# Patient Record
Sex: MALE | Race: BLACK OR AFRICAN AMERICAN | NOT HISPANIC OR LATINO | Employment: PART TIME | URBAN - METROPOLITAN AREA
[De-identification: names, ages, dates, MRNs, and addresses within clinical notes are randomized per-mention and may not be internally consistent; named-entity substitution may affect disease eponyms.]

---

## 2019-09-03 PROBLEM — Z72.0 TOBACCO USE: Status: ACTIVE | Noted: 2019-09-03

## 2019-09-03 PROBLEM — N17.9 AKI (ACUTE KIDNEY INJURY): Status: ACTIVE | Noted: 2019-09-03

## 2019-09-03 PROBLEM — R09.1 PLEURISY: Status: ACTIVE | Noted: 2019-09-03

## 2019-09-03 PROBLEM — E87.1 HYPONATREMIA: Status: ACTIVE | Noted: 2019-09-03

## 2019-09-03 PROBLEM — E80.6 HYPERBILIRUBINEMIA: Status: ACTIVE | Noted: 2019-09-03

## 2019-09-03 PROBLEM — J18.9 PNEUMONIA OF LEFT LOWER LOBE DUE TO INFECTIOUS ORGANISM: Status: ACTIVE | Noted: 2019-09-03

## 2019-09-04 PROBLEM — D72.829 LEUKOCYTOSIS: Status: ACTIVE | Noted: 2019-09-04

## 2019-09-05 PROBLEM — N17.9 AKI (ACUTE KIDNEY INJURY): Status: RESOLVED | Noted: 2019-09-03 | Resolved: 2019-09-05

## 2019-09-05 PROBLEM — D72.829 LEUKOCYTOSIS: Status: RESOLVED | Noted: 2019-09-04 | Resolved: 2019-09-05

## 2019-09-05 PROBLEM — E80.6 HYPERBILIRUBINEMIA: Status: RESOLVED | Noted: 2019-09-03 | Resolved: 2019-09-05

## 2019-09-05 PROBLEM — R09.1 PLEURISY: Status: RESOLVED | Noted: 2019-09-03 | Resolved: 2019-09-05

## 2019-09-05 PROBLEM — E87.1 HYPONATREMIA: Status: RESOLVED | Noted: 2019-09-03 | Resolved: 2019-09-05

## 2019-12-07 ENCOUNTER — HOSPITAL ENCOUNTER (INPATIENT)
Facility: HOSPITAL | Age: 59
LOS: 4 days | Discharge: HOME | DRG: 552 | End: 2019-12-11
Attending: EMERGENCY MEDICINE | Admitting: NEUROLOGICAL SURGERY

## 2019-12-07 DIAGNOSIS — M79.601 PARESTHESIA AND PAIN OF BOTH UPPER EXTREMITIES: ICD-10-CM

## 2019-12-07 DIAGNOSIS — M79.602 PARESTHESIA AND PAIN OF BOTH UPPER EXTREMITIES: ICD-10-CM

## 2019-12-07 DIAGNOSIS — M46.42 DISCITIS OF CERVICAL REGION: ICD-10-CM

## 2019-12-07 DIAGNOSIS — R78.81 POSITIVE BLOOD CULTURE: ICD-10-CM

## 2019-12-07 DIAGNOSIS — M48.02 CERVICAL SPINAL STENOSIS: ICD-10-CM

## 2019-12-07 DIAGNOSIS — M54.2 ACUTE NECK PAIN: ICD-10-CM

## 2019-12-07 DIAGNOSIS — R20.0 EXTREMITY NUMBNESS: ICD-10-CM

## 2019-12-07 DIAGNOSIS — R26.9 ABNORMAL GAIT: Primary | ICD-10-CM

## 2019-12-07 DIAGNOSIS — I10 ESSENTIAL HYPERTENSION: ICD-10-CM

## 2019-12-07 DIAGNOSIS — R20.2 PARESTHESIA AND PAIN OF BOTH UPPER EXTREMITIES: ICD-10-CM

## 2019-12-07 LAB
ANION GAP SERPL CALC-SCNC: 8 MMOL/L (ref 8–16)
BASOPHILS # BLD AUTO: 0.05 K/UL (ref 0–0.2)
BASOPHILS NFR BLD: 1 % (ref 0–1.9)
BUN SERPL-MCNC: 11 MG/DL (ref 6–20)
CALCIUM SERPL-MCNC: 8 MG/DL (ref 8.7–10.5)
CHLORIDE SERPL-SCNC: 109 MMOL/L (ref 95–110)
CO2 SERPL-SCNC: 25 MMOL/L (ref 23–29)
CREAT SERPL-MCNC: 1 MG/DL (ref 0.5–1.4)
CRP SERPL-MCNC: 1.6 MG/L (ref 0–8.2)
DIFFERENTIAL METHOD: ABNORMAL
EOSINOPHIL # BLD AUTO: 0.3 K/UL (ref 0–0.5)
EOSINOPHIL NFR BLD: 6.1 % (ref 0–8)
ERYTHROCYTE [DISTWIDTH] IN BLOOD BY AUTOMATED COUNT: 11.9 % (ref 11.5–14.5)
ERYTHROCYTE [SEDIMENTATION RATE] IN BLOOD BY WESTERGREN METHOD: <2 MM/HR (ref 0–23)
EST. GFR  (AFRICAN AMERICAN): >60 ML/MIN/1.73 M^2
EST. GFR  (NON AFRICAN AMERICAN): >60 ML/MIN/1.73 M^2
GLUCOSE SERPL-MCNC: 84 MG/DL (ref 70–110)
HCT VFR BLD AUTO: 37.8 % (ref 40–54)
HGB BLD-MCNC: 11.8 G/DL (ref 14–18)
IMM GRANULOCYTES # BLD AUTO: 0.01 K/UL (ref 0–0.04)
IMM GRANULOCYTES NFR BLD AUTO: 0.2 % (ref 0–0.5)
LYMPHOCYTES # BLD AUTO: 1.8 K/UL (ref 1–4.8)
LYMPHOCYTES NFR BLD: 34.4 % (ref 18–48)
MCH RBC QN AUTO: 30.5 PG (ref 27–31)
MCHC RBC AUTO-ENTMCNC: 31.2 G/DL (ref 32–36)
MCV RBC AUTO: 98 FL (ref 82–98)
MONOCYTES # BLD AUTO: 0.5 K/UL (ref 0.3–1)
MONOCYTES NFR BLD: 9.4 % (ref 4–15)
NEUTROPHILS # BLD AUTO: 2.5 K/UL (ref 1.8–7.7)
NEUTROPHILS NFR BLD: 48.9 % (ref 38–73)
NRBC BLD-RTO: 0 /100 WBC
PLATELET # BLD AUTO: 213 K/UL (ref 150–350)
PMV BLD AUTO: 10.2 FL (ref 9.2–12.9)
POCT GLUCOSE: 184 MG/DL (ref 70–110)
POTASSIUM SERPL-SCNC: 3.6 MMOL/L (ref 3.5–5.1)
RBC # BLD AUTO: 3.87 M/UL (ref 4.6–6.2)
SODIUM SERPL-SCNC: 142 MMOL/L (ref 136–145)
WBC # BLD AUTO: 5.12 K/UL (ref 3.9–12.7)

## 2019-12-07 PROCEDURE — 99222 PR INITIAL HOSPITAL CARE,LEVL II: ICD-10-PCS | Mod: ,,, | Performed by: NEUROLOGICAL SURGERY

## 2019-12-07 PROCEDURE — 99285 EMERGENCY DEPT VISIT HI MDM: CPT | Mod: ,,, | Performed by: EMERGENCY MEDICINE

## 2019-12-07 PROCEDURE — 99285 EMERGENCY DEPT VISIT HI MDM: CPT | Mod: 25

## 2019-12-07 PROCEDURE — 11000001 HC ACUTE MED/SURG PRIVATE ROOM

## 2019-12-07 PROCEDURE — 80048 BASIC METABOLIC PNL TOTAL CA: CPT

## 2019-12-07 PROCEDURE — 93005 ELECTROCARDIOGRAM TRACING: CPT

## 2019-12-07 PROCEDURE — 99222 1ST HOSP IP/OBS MODERATE 55: CPT | Mod: ,,, | Performed by: NEUROLOGICAL SURGERY

## 2019-12-07 PROCEDURE — 25500020 PHARM REV CODE 255: Performed by: EMERGENCY MEDICINE

## 2019-12-07 PROCEDURE — 85025 COMPLETE CBC W/AUTO DIFF WBC: CPT | Mod: 91

## 2019-12-07 PROCEDURE — 86140 C-REACTIVE PROTEIN: CPT

## 2019-12-07 PROCEDURE — 87077 CULTURE AEROBIC IDENTIFY: CPT | Mod: 59

## 2019-12-07 PROCEDURE — 25000003 PHARM REV CODE 250: Performed by: STUDENT IN AN ORGANIZED HEALTH CARE EDUCATION/TRAINING PROGRAM

## 2019-12-07 PROCEDURE — 99285 PR EMERGENCY DEPT VISIT,LEVEL V: ICD-10-PCS | Mod: ,,, | Performed by: EMERGENCY MEDICINE

## 2019-12-07 PROCEDURE — 93010 ELECTROCARDIOGRAM REPORT: CPT | Mod: ,,, | Performed by: INTERNAL MEDICINE

## 2019-12-07 PROCEDURE — 93010 EKG 12-LEAD: ICD-10-PCS | Mod: ,,, | Performed by: INTERNAL MEDICINE

## 2019-12-07 PROCEDURE — 82962 GLUCOSE BLOOD TEST: CPT

## 2019-12-07 PROCEDURE — 85652 RBC SED RATE AUTOMATED: CPT

## 2019-12-07 PROCEDURE — 87040 BLOOD CULTURE FOR BACTERIA: CPT

## 2019-12-07 PROCEDURE — 87186 SC STD MICRODIL/AGAR DIL: CPT | Mod: 59

## 2019-12-07 PROCEDURE — A9585 GADOBUTROL INJECTION: HCPCS | Performed by: EMERGENCY MEDICINE

## 2019-12-07 RX ORDER — GADOBUTROL 604.72 MG/ML
10 INJECTION INTRAVENOUS
Status: COMPLETED | OUTPATIENT
Start: 2019-12-07 | End: 2019-12-07

## 2019-12-07 RX ORDER — LABETALOL HCL 20 MG/4 ML
10 SYRINGE (ML) INTRAVENOUS EVERY 4 HOURS PRN
Status: DISCONTINUED | OUTPATIENT
Start: 2019-12-07 | End: 2019-12-11 | Stop reason: HOSPADM

## 2019-12-07 RX ORDER — GLUCAGON 1 MG
1 KIT INJECTION
Status: DISCONTINUED | OUTPATIENT
Start: 2019-12-07 | End: 2019-12-09

## 2019-12-07 RX ORDER — IBUPROFEN 200 MG
24 TABLET ORAL
Status: DISCONTINUED | OUTPATIENT
Start: 2019-12-07 | End: 2019-12-09

## 2019-12-07 RX ORDER — IBUPROFEN 200 MG
16 TABLET ORAL
Status: DISCONTINUED | OUTPATIENT
Start: 2019-12-07 | End: 2019-12-09

## 2019-12-07 RX ORDER — LABETALOL HYDROCHLORIDE 5 MG/ML
10 INJECTION, SOLUTION INTRAVENOUS
Status: DISCONTINUED | OUTPATIENT
Start: 2019-12-07 | End: 2019-12-07

## 2019-12-07 RX ADMIN — LABETALOL HCL IV SOLN PREFILLED SYRINGE 20 MG/4ML (5 MG/ML) 10 MG: 20/4 SOLUTION PREFILLED SYRINGE at 09:12

## 2019-12-07 RX ADMIN — GADOBUTROL 10 ML: 604.72 INJECTION INTRAVENOUS at 05:12
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
## 2019-12-07 NOTE — LETTER
December 11, 2019         [address]  [facility] [address]  Phone: [phone]  Fax: [phone]       Patient: Valentín Field   YOB: 1960  Date of Visit: 12/11/2019    To Whom It May Concern:    Anais Field  was at [facility] from 12/07/2019 until 12/11/2019. He may return to work/school on 12/12/2019. If you have any questions or concerns, or if I can be of further assistance, please do not hesitate to contact me.    Sincerely,          Nimisha Napoles PA-C

## 2019-12-07 NOTE — LETTER
December 11, 2019         [address]  [facility] [address]  Phone: [phone]  Fax: [phone]       Patient: Valentín Field   YOB: 1960  Date of Visit: 12/11/2019    To Whom It May Concern:    Anais Field  was admitted to [facility] on 12/07/2019 and was discharged on 12/11/2019.  Please excuse his absence from work for that time period. He may return to work on 12/12/2019 with light duty (no lifting anything over 10 lbs). If you have any questions or concerns, or if I can be of further assistance, please do not hesitate to contact me.    Sincerely,          Nimisha Napoles PA-C

## 2019-12-07 NOTE — LETTER
December 11, 2019         [address]  [facility] [address]  Phone: [phone]  Fax: [phone]       Patient: Valentín Field   YOB: 1960  Date of Visit: 12/11/2019    To Whom It May Concern:    Anais Field  was at [facility] from 12/07/2019 until 12/11/2019. He may return to work on 12/12/2019. If you have any questions or concerns, or if I can be of further assistance, please do not hesitate to contact me.    Sincerely,          Nimisha Napoles PA-C

## 2019-12-07 NOTE — ED TRIAGE NOTES
"Patient reports numbness and tingling to all extremities x 4, weakness to BLE, and "slurred" speech x two weeks. Patient reports going to the hospital in [place] and reports that they were unable to complete his work-up. Patient reports he was advised to come to [facility] for MRI. Patient is A&Ox4 and following commands.   "

## 2019-12-08 PROCEDURE — 11000001 HC ACUTE MED/SURG PRIVATE ROOM

## 2019-12-08 NOTE — H&P
Chief Complaint/Reason for Admission: numbness in hands and trouble walking     History of Present Illness: 59yom with no significant known pmh presents with complaint of 2-4 weeks of progressive bilateral hand parasthesias and progressive gait instability when walking. MRI C spine showed C5-6 discitis with cord signal change as well as C3 central and foraminal stenosis. Neurosurgery consulted for evaluation.        (Not in a hospital admission)     Review of patient's allergies indicates:  No Known Allergies          Past Medical History:   Diagnosis Date    HTN (hypertension)        History reviewed. No pertinent surgical history.      Family History      None                Tobacco Use    Smoking status: Former Smoker       Start date: 9/3/2019    Smokeless tobacco: Never Used   Substance and Sexual Activity    Alcohol use: Yes       Alcohol/week: 5.0 standard drinks       Types: 5 Cans of beer per week       Comment: weekends    Drug use: No    Sexual activity: Not on file      Review of Systems   Constitutional: Negative for activity change and fatigue.   Eyes: Negative for visual disturbance.   Respiratory: Negative for shortness of breath.    Cardiovascular: Negative for chest pain.   Genitourinary: Negative for decreased urine volume and difficulty urinating.   Musculoskeletal: Negative for neck pain and neck stiffness.   Neurological: Positive for numbness (bialteral hands). Negative for dizziness, tremors, syncope, facial asymmetry, speech difficulty, weakness, light-headedness and headaches.      Objective:      Weight: 89.4 kg (197 lb)  Body mass index is 27.48 kg/m².  Vital Signs (Most Recent):  Temp: 98 °F (36.7 °C) (12/07/19 1453)  Pulse: 69 (12/07/19 1750)  Resp: 18 (12/07/19 1750)  BP: (!) 186/93 (12/07/19 1750)  SpO2: 97 % (12/07/19 1750) Vital Signs (24h Range):  Temp:  [97.8 °F (36.6 °C)-98 °F (36.7 °C)] 98 °F (36.7 °C)  Pulse:  [58-80] 69  Resp:  [10-18] 18  SpO2:  [97 %-100 %] 97 %  BP:  (165192)/() 186/93                               Physical Exam:  Nursing note and vitals reviewed.     Constitutional: He appears well-developed.      Cardiovascular: Normal rate.      Abdominal: Soft.     Neurological:   E4V5M6  AOX3  PERRL, CN II-XII grossly intact  SHIRLEY symm, full strength throughout  Sensation decreased in hands and distal forearms bilaterally, otherwise intact  No drift, negtive hoffmans bilateral           Significant Labs:      Recent Labs   Lab 12/07/19  1022   GLU 96      K 4.4      CO2 28   BUN 11   CREATININE 1.0   CALCIUM 9.0   MG 1.9      Recent Labs   Lab 12/07/19  1022   WBC 3.65*   HGB 12.4*   HCT 39.6*             Recent Labs   Lab 12/07/19  1022   INR 1.0   APTT 30.4          Microbiology Results (last 7 days)      ** No results found for the last 168 hours. **          All pertinent labs from the last 24 hours have been reviewed.     Significant Diagnostics:  CT: Ct Head Without Contrast     Result Date: 12/7/2019  Mild to moderate presumed microvascular ischemic changes cerebral white matter including hypodensities adjacent to head of caudate bilaterally particularly on the right which may reflect additional microvascular ischemic change and/or lacunar infarcts of indeterminate age.  Old lacunar infarct right thalamus. If an acute CVA is clinically suspected follow-up MRI can be obtained. Preliminary report provided by [facility] physician at time of exam. Electronically signed by:            Nazia Aparicio MD Date:                                            12/07/2019 Time:                                            10:37  MRI: Mri Brain W Wo Contrast     Result Date: 12/7/2019  1. No evidence of acute intracranial pathology. 2. Remote lacunar type infarcts within the right thalamus, right basal ganglia, and right internal capsule. 3. Chronic microvascular ischemic change. Electronically signed by resident: Emir Garcia Date:                                           12/07/2019 Time:                                            17:23 Electronically signed by:       Nima Marquez MD Date:                                              12/07/2019 Time:                                            17:34     MRI C spine:  1. Abnormal signal and enhancement of the C5-6 disc space consistent with discitis.  Abnormal T2/STIR signal within the adjacent vertebral body endplates as well as mild enhancement of the superior C5 endplate which may reflect degenerative changes with component of osteomyelitis not excluded.  2. Short-segment focus of increased signal within the cervical cord at the C4 level which may reflect cord edema or myelomalacia.  3. Cervical spondylosis, most pronounced at C3-4 where there is severe spinal canal stenosis as well as severe neural foraminal narrowing bilaterally.  This report was flagged in Epic as abnormal.     Assessment/Plan:      Cervical spinal stenosis  59yom with no significant known pmh presents with complaint of 2-4 weeks of progressive bilateral hand parasthesias and progressive gait instability when walking. MRI C spine showed concern for C5-6 discitis with cord signal change as well as C3 central and foraminal stenosis. Neurosurgery consulted for evaluation.     Plan:  Admit to Neurosurgery  CT Cervical spine  Flexion extension XR C spine  Blood cultures x2  Labs for Inflammatory markers  IR consult for aspiration biopsy  NPO midnight              Thank you for your consult. I will follow-up with patient. Please contact us if you have any additional questions.     Dereck Arteaga MD  Neurosurgery  [facility]

## 2019-12-08 NOTE — SUBJECTIVE & OBJECTIVE
(Not in a hospital admission)    Review of patient's allergies indicates:  No Known Allergies    Past Medical History:   Diagnosis Date    HTN (hypertension)      History reviewed. No pertinent surgical history.  Family History     None        Tobacco Use    Smoking status: Former Smoker     Start date: 9/3/2019    Smokeless tobacco: Never Used   Substance and Sexual Activity    Alcohol use: Yes     Alcohol/week: 5.0 standard drinks     Types: 5 Cans of beer per week     Comment: weekends    Drug use: No    Sexual activity: Not on file     Review of Systems   Constitutional: Negative for activity change and fatigue.   Eyes: Negative for visual disturbance.   Respiratory: Negative for shortness of breath.    Cardiovascular: Negative for chest pain.   Genitourinary: Negative for decreased urine volume and difficulty urinating.   Musculoskeletal: Negative for neck pain and neck stiffness.   Neurological: Positive for numbness (bialteral hands). Negative for dizziness, tremors, syncope, facial asymmetry, speech difficulty, weakness, light-headedness and headaches.     Objective:     Weight: 89.4 kg (197 lb)  Body mass index is 27.48 kg/m².  Vital Signs (Most Recent):  Temp: 98 °F (36.7 °C) (12/07/19 1453)  Pulse: 69 (12/07/19 1750)  Resp: 18 (12/07/19 1750)  BP: (!) 186/93 (12/07/19 1750)  SpO2: 97 % (12/07/19 1750) Vital Signs (24h Range):  Temp:  [97.8 °F (36.6 °C)-98 °F (36.7 °C)] 98 °F (36.7 °C)  Pulse:  [58-80] 69  Resp:  [10-18] 18  SpO2:  [97 %-100 %] 97 %  BP: (165-192)/() 186/93                          Physical Exam:  Nursing note and vitals reviewed.    Constitutional: He appears well-developed.     Cardiovascular: Normal rate.     Abdominal: Soft.     Neurological:   E4V5M6  AOX3  PERRL, CN II-XII grossly intact  SHIRLEY symm, full strength throughout  Sensation decreased in hands and distal forearms bilaterally, otherwise intact  No drift, negtive hoffmans bilateral         Significant  Labs:  Recent Labs   Lab 12/07/19  1022   GLU 96      K 4.4      CO2 28   BUN 11   CREATININE 1.0   CALCIUM 9.0   MG 1.9     Recent Labs   Lab 12/07/19  1022   WBC 3.65*   HGB 12.4*   HCT 39.6*        Recent Labs   Lab 12/07/19  1022   INR 1.0   APTT 30.4     Microbiology Results (last 7 days)     ** No results found for the last 168 hours. **        All pertinent labs from the last 24 hours have been reviewed.    Significant Diagnostics:  CT: Ct Head Without Contrast    Result Date: 12/7/2019  Mild to moderate presumed microvascular ischemic changes cerebral white matter including hypodensities adjacent to head of caudate bilaterally particularly on the right which may reflect additional microvascular ischemic change and/or lacunar infarcts of indeterminate age.  Old lacunar infarct right thalamus. If an acute CVA is clinically suspected follow-up MRI can be obtained. Preliminary report provided by [facility] physician at time of exam. Electronically signed by: Nazia Aparicio MD Date:    12/07/2019 Time:    10:37  MRI: Mri Brain W Wo Contrast    Result Date: 12/7/2019  1. No evidence of acute intracranial pathology. 2. Remote lacunar type infarcts within the right thalamus, right basal ganglia, and right internal capsule. 3. Chronic microvascular ischemic change. Electronically signed by resident: Emir Garcia Date:    12/07/2019 Time:    17:23 Electronically signed by: Nima Marquez MD Date:    12/07/2019 Time:    17:34    MRI C spine:  1. Abnormal signal and enhancement of the C5-6 disc space consistent with discitis.  Abnormal T2/STIR signal within the adjacent vertebral body endplates as well as mild enhancement of the superior C5 endplate which may reflect degenerative changes with component of osteomyelitis not excluded.  2. Short-segment focus of increased signal within the cervical cord at the C4 level which may reflect cord edema or myelomalacia.  3. Cervical spondylosis, most pronounced at  C3-4 where there is severe spinal canal stenosis as well as severe neural foraminal narrowing bilaterally.  This report was flagged in Epic as abnormal.

## 2019-12-08 NOTE — CONSULTS
[facility]  Neurosurgery  Consult Note    Consults  Subjective:     Chief Complaint/Reason for Admission: numbness in hands and trouble walking    History of Present Illness: 59yom with no significant known pmh presents with complaint of 2-4 weeks of progressive bilateral hand parasthesias and progressive gait instability when walking. MRI C spine showed C5-6 discitis with cord signal change as well as C3 central and foraminal stenosis. Neurosurgery consulted for evaluation.      (Not in a hospital admission)    Review of patient's allergies indicates:  No Known Allergies    Past Medical History:   Diagnosis Date    HTN (hypertension)      History reviewed. No pertinent surgical history.  Family History     None        Tobacco Use    Smoking status: Former Smoker     Start date: 9/3/2019    Smokeless tobacco: Never Used   Substance and Sexual Activity    Alcohol use: Yes     Alcohol/week: 5.0 standard drinks     Types: 5 Cans of beer per week     Comment: weekends    Drug use: No    Sexual activity: Not on file     Review of Systems   Constitutional: Negative for activity change and fatigue.   Eyes: Negative for visual disturbance.   Respiratory: Negative for shortness of breath.    Cardiovascular: Negative for chest pain.   Genitourinary: Negative for decreased urine volume and difficulty urinating.   Musculoskeletal: Negative for neck pain and neck stiffness.   Neurological: Positive for numbness (bialteral hands). Negative for dizziness, tremors, syncope, facial asymmetry, speech difficulty, weakness, light-headedness and headaches.     Objective:     Weight: 89.4 kg (197 lb)  Body mass index is 27.48 kg/m².  Vital Signs (Most Recent):  Temp: 98 °F (36.7 °C) (12/07/19 1453)  Pulse: 69 (12/07/19 1750)  Resp: 18 (12/07/19 1750)  BP: (!) 186/93 (12/07/19 1750)  SpO2: 97 % (12/07/19 1750) Vital Signs (24h Range):  Temp:  [97.8 °F (36.6 °C)-98 °F (36.7 °C)] 98 °F (36.7 °C)  Pulse:  [58-80]  69  Resp:  [10-18] 18  SpO2:  [97 %-100 %] 97 %  BP: (165-192)/() 186/93                          Physical Exam:  Nursing note and vitals reviewed.    Constitutional: He appears well-developed.     Cardiovascular: Normal rate.     Abdominal: Soft.     Neurological:   E4V5M6  AOX3  PERRL, CN II-XII grossly intact  SHIRLEY symm, full strength throughout  Sensation decreased in hands and distal forearms bilaterally, otherwise intact  No drift, negtive hoffmans bilateral         Significant Labs:  Recent Labs   Lab 12/07/19  1022   GLU 96      K 4.4      CO2 28   BUN 11   CREATININE 1.0   CALCIUM 9.0   MG 1.9     Recent Labs   Lab 12/07/19  1022   WBC 3.65*   HGB 12.4*   HCT 39.6*        Recent Labs   Lab 12/07/19  1022   INR 1.0   APTT 30.4     Microbiology Results (last 7 days)     ** No results found for the last 168 hours. **        All pertinent labs from the last 24 hours have been reviewed.    Significant Diagnostics:  CT: Ct Head Without Contrast    Result Date: 12/7/2019  Mild to moderate presumed microvascular ischemic changes cerebral white matter including hypodensities adjacent to head of caudate bilaterally particularly on the right which may reflect additional microvascular ischemic change and/or lacunar infarcts of indeterminate age.  Old lacunar infarct right thalamus. If an acute CVA is clinically suspected follow-up MRI can be obtained. Preliminary report provided by [facility] physician at time of exam. Electronically signed by: Nazia Aparicio MD Date:    12/07/2019 Time:    10:37  MRI: Mri Brain W Wo Contrast    Result Date: 12/7/2019  1. No evidence of acute intracranial pathology. 2. Remote lacunar type infarcts within the right thalamus, right basal ganglia, and right internal capsule. 3. Chronic microvascular ischemic change. Electronically signed by resident: Emir Garcia Date:    12/07/2019 Time:    17:23 Electronically signed by: Nima Marquez MD Date:    12/07/2019  Time:    17:34    MRI C spine:  1. Abnormal signal and enhancement of the C5-6 disc space consistent with discitis.  Abnormal T2/STIR signal within the adjacent vertebral body endplates as well as mild enhancement of the superior C5 endplate which may reflect degenerative changes with component of osteomyelitis not excluded.  2. Short-segment focus of increased signal within the cervical cord at the C4 level which may reflect cord edema or myelomalacia.  3. Cervical spondylosis, most pronounced at C3-4 where there is severe spinal canal stenosis as well as severe neural foraminal narrowing bilaterally.  This report was flagged in Epic as abnormal.    Assessment/Plan:     Cervical spinal stenosis  59yom with no significant known pmh presents with complaint of 2-4 weeks of progressive bilateral hand parasthesias and progressive gait instability when walking. MRI C spine showed concern for C5-6 discitis with cord signal change as well as C3 central and foraminal stenosis. Neurosurgery consulted for evaluation.    Plan:  Admit to Neurosurgery  CT Cervical spine  Flexion extension XR C spine  Blood cultures x2  Labs for Inflammatory markers  IR consult for aspiration biopsy  NPO midnight          Thank you for your consult. I will follow-up with patient. Please contact us if you have any additional questions.    Dereck Arteaga MD  Neurosurgery  [facility]

## 2019-12-08 NOTE — NURSING
Received lab values for this patient. Positive blood cultures. Gram positive cocci with clusters resembling staph. Notified Dr. Rory Saab of labs findings at 1326.

## 2019-12-08 NOTE — PROVIDER PROGRESS NOTES - EMERGENCY DEPT.
Encounter Date: 12/7/2019    ED Physician Progress Notes        Physician Note:   MRI shows diskitis, loss severe spinal cord stenosis and possible C5 osteomyelitis.  Neurosurgery has been consulted and plans admission.  Brain MRI shows 3 remote lacunar infarcts.

## 2019-12-08 NOTE — NURSING
Pt. Awake, alert, and oriented x 4. Call bell within reach. Bed in low position. Pt. C/o numbness to bilateral upper and lower extremities. Speech is clear. Pt. Is able to ambulate to bathroom with standby assist.  Will continue to monitor.

## 2019-12-08 NOTE — HPI
59yom with no significant known pmh presents with complaint of 2-4 weeks of progressive bilateral hand parasthesias and progressive gait instability when walking. MRI C spine showed C5-6 discitis with cord signal change as well as C3 central and foraminal stenosis. Neurosurgery consulted for evaluation.

## 2019-12-08 NOTE — ASSESSMENT & PLAN NOTE
59yom with no significant known pmh presents with complaint of 2-4 weeks of progressive bilateral hand parasthesias and progressive gait instability when walking. MRI C spine showed concern for C5-6 discitis with cord signal change as well as C3 central and foraminal stenosis. Neurosurgery consulted for evaluation.    Plan:  Admit to Neurosurgery  CT Cervical spine  Flexion extension XR C spine  Blood cultures x2  Labs for Inflammatory markers  IR consult for aspiration biopsy  NPO midnight

## 2019-12-08 NOTE — HOSPITAL COURSE
12/7: Admitted to NSGY service for infectious work up. Labs, CT Cervical spine, Flex/ext xrays ordered.   12/8: NAEON. IR consulted for disc biopsy. Plan for aspiration tomorrow. NPO at midnight. BCx from 12/7 growing GPC's. Identification pending.   12/9: NAEON. IR for disc aspiration. No complications. Patient tolerated procedure well. Recovered in ROCU. BCx from 12/7 growing COAGULASE-NEGATIVE STAPHYLOCOCCUS SPECIES, concern for contaminant. Repeat BCxs ordered. ID consulted. Begin Neurontin for BUE numbness.   12/10:  NAEON. IR cxs pending. Pending ID recs. Exam stable. Pain controlled.   12/11: NAEON. Repeat BCx's and IR Cx's from 12/9 NGTD. Repeat inflammatory markers done today remain WNL. ID with low suspicion for discitis if cultures remain negative, no Abx recommended unless Cx's begin to grow. Neuro exam stable. Continues to complain of burning/tingling pain in BUEs and heaviness or RLE while walking, unchanged. Able to ambulate unassisted and without instability/falls. Denies neck pain, weakness, loss of sensation, saddle anesthesia, b/b dysfunction, and difficulty swallowing.

## 2019-12-08 NOTE — CONSULTS
Radiology Consult    Valentín Field is a 59 y.o. male with a history of no significant known pmh presents with complaint of 2-4 weeks of progressive bilateral hand parasthesias and progressive gait instability when walking. MRI C spine showed C5-6 discitis with cord signal change as well as C3 central and foraminal stenosis. Interventional Radiology has been consulted for disc aspiration for sampling.  Past Medical History:   Diagnosis Date    HTN (hypertension)      History reviewed. No pertinent surgical history.    Scheduled Meds:   Continuous Infusions:   PRN Meds:Dextrose 10% Bolus, Dextrose 10% Bolus, glucagon (human recombinant), glucose, glucose, glucose, labetalol    Allergies: Review of patient's allergies indicates:  No Known Allergies    Labs:  Recent Labs   Lab 12/07/19  1022   INR 1.0       Recent Labs   Lab 12/07/19 2007   WBC 5.12   HGB 11.8*   HCT 37.8*   MCV 98         Recent Labs   Lab 12/07/19  1022 12/07/19  1909   GLU 96 84    142   K 4.4 3.6    109   CO2 28 25   BUN 11 11   CREATININE 1.0 1.0   CALCIUM 9.0 8.0*   MG 1.9  --    ALT 29  --    AST 24  --    ALBUMIN 3.6  --    BILITOT 0.6  --          Vitals (Most Recent):  Temp: 97 °F (36.1 °C) (12/08/19 1125)  Pulse: 79 (12/08/19 1125)  Resp: 18 (12/08/19 1125)  BP: (!) 180/96 (12/08/19 1125)  SpO2: 96 % (12/08/19 1125)    Plan:   1. NPO after midnight.  2. Hold anticoagulants.  3. Will plan for disc aspiration at C5-C6 using anterior appraoach tomorrow 12/9/19.    Jesus Phillips M.D.  PGY-3  Radiology

## 2019-12-08 NOTE — PLAN OF CARE
Pt. Making ongoing progress toward optimal comfort and wellbeing. Absence of fall/and fall related injury.

## 2019-12-09 LAB
ANION GAP SERPL CALC-SCNC: 4 MMOL/L (ref 8–16)
BASOPHILS # BLD AUTO: 0.04 K/UL (ref 0–0.2)
BASOPHILS NFR BLD: 1 % (ref 0–1.9)
BUN SERPL-MCNC: 12 MG/DL (ref 6–20)
CALCIUM SERPL-MCNC: 8.9 MG/DL (ref 8.7–10.5)
CHLORIDE SERPL-SCNC: 107 MMOL/L (ref 95–110)
CO2 SERPL-SCNC: 28 MMOL/L (ref 23–29)
CREAT SERPL-MCNC: 1.1 MG/DL (ref 0.5–1.4)
DIFFERENTIAL METHOD: ABNORMAL
EOSINOPHIL # BLD AUTO: 0.2 K/UL (ref 0–0.5)
EOSINOPHIL NFR BLD: 6.1 % (ref 0–8)
ERYTHROCYTE [DISTWIDTH] IN BLOOD BY AUTOMATED COUNT: 12.1 % (ref 11.5–14.5)
EST. GFR  (AFRICAN AMERICAN): >60 ML/MIN/1.73 M^2
EST. GFR  (NON AFRICAN AMERICAN): >60 ML/MIN/1.73 M^2
GLUCOSE SERPL-MCNC: 90 MG/DL (ref 70–110)
GRAM STN SPEC: NORMAL
GRAM STN SPEC: NORMAL
HCT VFR BLD AUTO: 40.4 % (ref 40–54)
HGB BLD-MCNC: 12.4 G/DL (ref 14–18)
IMM GRANULOCYTES # BLD AUTO: 0.01 K/UL (ref 0–0.04)
IMM GRANULOCYTES NFR BLD AUTO: 0.3 % (ref 0–0.5)
LYMPHOCYTES # BLD AUTO: 1.4 K/UL (ref 1–4.8)
LYMPHOCYTES NFR BLD: 34.4 % (ref 18–48)
MCH RBC QN AUTO: 30.1 PG (ref 27–31)
MCHC RBC AUTO-ENTMCNC: 30.7 G/DL (ref 32–36)
MCV RBC AUTO: 98 FL (ref 82–98)
MONOCYTES # BLD AUTO: 0.4 K/UL (ref 0.3–1)
MONOCYTES NFR BLD: 10.2 % (ref 4–15)
NEUTROPHILS # BLD AUTO: 1.9 K/UL (ref 1.8–7.7)
NEUTROPHILS NFR BLD: 48 % (ref 38–73)
NRBC BLD-RTO: 0 /100 WBC
PLATELET # BLD AUTO: 233 K/UL (ref 150–350)
PMV BLD AUTO: 9.6 FL (ref 9.2–12.9)
POTASSIUM SERPL-SCNC: 4.9 MMOL/L (ref 3.5–5.1)
RBC # BLD AUTO: 4.12 M/UL (ref 4.6–6.2)
SODIUM SERPL-SCNC: 139 MMOL/L (ref 136–145)
WBC # BLD AUTO: 3.92 K/UL (ref 3.9–12.7)

## 2019-12-09 PROCEDURE — 36415 COLL VENOUS BLD VENIPUNCTURE: CPT

## 2019-12-09 PROCEDURE — 87116 MYCOBACTERIA CULTURE: CPT

## 2019-12-09 PROCEDURE — 87040 BLOOD CULTURE FOR BACTERIA: CPT | Mod: 59

## 2019-12-09 PROCEDURE — 87070 CULTURE OTHR SPECIMN AEROBIC: CPT

## 2019-12-09 PROCEDURE — 63600175 PHARM REV CODE 636 W HCPCS: Performed by: RADIOLOGY

## 2019-12-09 PROCEDURE — 87206 SMEAR FLUORESCENT/ACID STAI: CPT

## 2019-12-09 PROCEDURE — 87075 CULTR BACTERIA EXCEPT BLOOD: CPT

## 2019-12-09 PROCEDURE — 80048 BASIC METABOLIC PNL TOTAL CA: CPT

## 2019-12-09 PROCEDURE — 99232 PR SUBSEQUENT HOSPITAL CARE,LEVL II: ICD-10-PCS | Mod: ,,, | Performed by: PHYSICIAN ASSISTANT

## 2019-12-09 PROCEDURE — 85025 COMPLETE CBC W/AUTO DIFF WBC: CPT

## 2019-12-09 PROCEDURE — 25000003 PHARM REV CODE 250: Performed by: PHYSICIAN ASSISTANT

## 2019-12-09 PROCEDURE — 99232 SBSQ HOSP IP/OBS MODERATE 35: CPT | Mod: ,,, | Performed by: PHYSICIAN ASSISTANT

## 2019-12-09 PROCEDURE — 25000003 PHARM REV CODE 250: Performed by: RADIOLOGY

## 2019-12-09 PROCEDURE — 11000001 HC ACUTE MED/SURG PRIVATE ROOM

## 2019-12-09 PROCEDURE — 87205 SMEAR GRAM STAIN: CPT

## 2019-12-09 PROCEDURE — 87102 FUNGUS ISOLATION CULTURE: CPT

## 2019-12-09 RX ORDER — BISACODYL 10 MG
10 SUPPOSITORY, RECTAL RECTAL DAILY PRN
Status: DISCONTINUED | OUTPATIENT
Start: 2019-12-09 | End: 2019-12-11 | Stop reason: HOSPADM

## 2019-12-09 RX ORDER — HYDROCODONE BITARTRATE AND ACETAMINOPHEN 5; 325 MG/1; MG/1
1 TABLET ORAL EVERY 4 HOURS PRN
Status: DISCONTINUED | OUTPATIENT
Start: 2019-12-09 | End: 2019-12-11 | Stop reason: HOSPADM

## 2019-12-09 RX ORDER — FENTANYL CITRATE 50 UG/ML
INJECTION, SOLUTION INTRAMUSCULAR; INTRAVENOUS CODE/TRAUMA/SEDATION MEDICATION
Status: COMPLETED | OUTPATIENT
Start: 2019-12-09 | End: 2019-12-09

## 2019-12-09 RX ORDER — ACETAMINOPHEN 325 MG/1
650 TABLET ORAL EVERY 6 HOURS PRN
Status: DISCONTINUED | OUTPATIENT
Start: 2019-12-09 | End: 2019-12-11 | Stop reason: HOSPADM

## 2019-12-09 RX ORDER — LABETALOL HCL 20 MG/4 ML
SYRINGE (ML) INTRAVENOUS CODE/TRAUMA/SEDATION MEDICATION
Status: COMPLETED | OUTPATIENT
Start: 2019-12-09 | End: 2019-12-09

## 2019-12-09 RX ORDER — HYDROCHLOROTHIAZIDE 25 MG/1
25 TABLET ORAL DAILY
Status: DISCONTINUED | OUTPATIENT
Start: 2019-12-09 | End: 2019-12-11 | Stop reason: HOSPADM

## 2019-12-09 RX ORDER — AMOXICILLIN 250 MG
1 CAPSULE ORAL 2 TIMES DAILY
Status: DISCONTINUED | OUTPATIENT
Start: 2019-12-09 | End: 2019-12-11 | Stop reason: HOSPADM

## 2019-12-09 RX ORDER — AMLODIPINE BESYLATE 5 MG/1
5 TABLET ORAL DAILY
Status: DISCONTINUED | OUTPATIENT
Start: 2019-12-09 | End: 2019-12-11 | Stop reason: HOSPADM

## 2019-12-09 RX ORDER — GABAPENTIN 300 MG/1
300 CAPSULE ORAL 3 TIMES DAILY
Status: DISCONTINUED | OUTPATIENT
Start: 2019-12-09 | End: 2019-12-11 | Stop reason: HOSPADM

## 2019-12-09 RX ORDER — MIDAZOLAM HYDROCHLORIDE 1 MG/ML
INJECTION INTRAMUSCULAR; INTRAVENOUS CODE/TRAUMA/SEDATION MEDICATION
Status: COMPLETED | OUTPATIENT
Start: 2019-12-09 | End: 2019-12-09

## 2019-12-09 RX ORDER — POLYETHYLENE GLYCOL 3350 17 G/17G
17 POWDER, FOR SOLUTION ORAL DAILY PRN
Status: DISCONTINUED | OUTPATIENT
Start: 2019-12-09 | End: 2019-12-11 | Stop reason: HOSPADM

## 2019-12-09 RX ADMIN — MIDAZOLAM HYDROCHLORIDE 1 MG: 1 INJECTION, SOLUTION INTRAMUSCULAR; INTRAVENOUS at 05:12

## 2019-12-09 RX ADMIN — AMLODIPINE BESYLATE 5 MG: 5 TABLET ORAL at 01:12

## 2019-12-09 RX ADMIN — FENTANYL CITRATE 50 MCG: 50 INJECTION, SOLUTION INTRAMUSCULAR; INTRAVENOUS at 05:12

## 2019-12-09 RX ADMIN — GABAPENTIN 300 MG: 300 CAPSULE ORAL at 09:12

## 2019-12-09 RX ADMIN — HYDROCHLOROTHIAZIDE 25 MG: 25 TABLET ORAL at 01:12

## 2019-12-09 RX ADMIN — Medication 10 MG: at 05:12

## 2019-12-09 NOTE — H&P
Vascular and Interventional Radiology History & Physical    Date:  12/9/2019    Chief Complaint:   Hand paresthesia, gait instability    History of Present Illness:  Valentín Field is a 59 y.o. male with a history of no significant known pmh presents with complaint of 2-4 weeks of progressive bilateral hand parasthesias and progressive gait instability when walking. MRI C spine showed C5-6 discitis with cord signal change as well as C3 central and foraminal stenosis. Interventional Radiology has been consulted for disc aspiration for sampling.    Past Medical History:  Past Medical History:   Diagnosis Date    HTN (hypertension)        Past Surgical History:  History reviewed. No pertinent surgical history.     Sedation History:    No known adverse reactions.     Social History:  Social History     Tobacco Use    Smoking status: Former Smoker     Start date: 9/3/2019    Smokeless tobacco: Never Used   Substance Use Topics    Alcohol use: Yes     Alcohol/week: 5.0 standard drinks     Types: 5 Cans of beer per week     Comment: weekends    Drug use: No        Home Medications:   Prior to Admission medications    Medication Sig Start Date End Date Taking? Authorizing Provider   amLODIPine (NORVASC) 5 MG tablet Take 1 tablet (5 mg total) by mouth once daily. 12/7/19 12/6/20  Ayanna Crawford MD   hydroCHLOROthiazide (HYDRODIURIL) 25 MG tablet Take 1 tablet (25 mg total) by mouth once daily. 12/7/19 12/6/20  Ayanna Crawford MD   naproxen (NAPROSYN) 500 MG tablet Take 1 tablet (500 mg total) by mouth 2 (two) times daily with meals. 11/29/19   Alex Gomez MD       Inpatient Medications:    Current Facility-Administered Medications:     acetaminophen tablet 650 mg, 650 mg, Oral, Q6H PRN, MARIA G Diaz    amLODIPine tablet 5 mg, 5 mg, Oral, Daily, MARIA G Diaz, 5 mg at 12/09/19 1320    bisacodyl suppository 10 mg, 10 mg, Rectal, Daily PRN, MARIA G Diaz    gabapentin capsule 300 mg, 300 mg, Oral,  TID, MARIA G Diaz    hydroCHLOROthiazide tablet 25 mg, 25 mg, Oral, Daily, MARIA G Diaz, 25 mg at 12/09/19 1320    HYDROcodone-acetaminophen 5-325 mg per tablet 1 tablet, 1 tablet, Oral, Q4H PRN, MARIA G Diaz    labetalol 20 mg/4 mL (5 mg/mL) IV syring, 10 mg, Intravenous, Q4H PRN, Dereck Arteaga MD, 10 mg at 12/07/19 2102    polyethylene glycol packet 17 g, 17 g, Oral, Daily PRN, MARIA G Diaz    senna-docusate 8.6-50 mg per tablet 1 tablet, 1 tablet, Oral, BID, MARIA G Diaz     Anticoagulants/Antiplatelets:   no anticoagulation    Allergies:   Review of patient's allergies indicates:  No Known Allergies    Review of Systems:   As documented in primary provider H&P.    Vital Signs (Most Recent):  Temp: 97.4 °F (36.3 °C) (12/09/19 1210)  Pulse: 66 (12/09/19 1210)  Resp: 18 (12/09/19 1210)  BP: (!) 159/84 (12/09/19 1210)  SpO2: 98 % (12/09/19 1210)    Physical Exam:  No acute distress, laying comfortably in bed, pleasant and cooperative  Regular rate and rhythm  Breathing unlabored  Abdomen benign  Extremities warm and well perfused    Sedation Exam:  ASA: III - Patient appears to have severe systemic disease not posing a constant threat to life   Mallampati: II (hard and soft palate, upper portion of tonsils anduvula visible)     Laboratory  Lab Results   Component Value Date    INR 1.0 12/07/2019       Lab Results   Component Value Date    WBC 3.92 12/09/2019    HGB 12.4 (L) 12/09/2019    HCT 40.4 12/09/2019    MCV 98 12/09/2019     12/09/2019      Lab Results   Component Value Date    GLU 90 12/09/2019     12/09/2019    K 4.9 12/09/2019     12/09/2019    CO2 28 12/09/2019    BUN 12 12/09/2019    CREATININE 1.1 12/09/2019    CALCIUM 8.9 12/09/2019    MG 1.9 12/07/2019    ALT 29 12/07/2019    AST 24 12/07/2019    ALBUMIN 3.6 12/07/2019    BILITOT 0.6 12/07/2019    BILIDIR 0.1 09/03/2019       ASSESSMENT/PLAN:                     Sedation Plan: Up to  moderate  Patient will undergo: C5-C6 disc aspiration.    Octavio Murillo MD  Radiology PGY-2

## 2019-12-09 NOTE — PLAN OF CARE
Cm met with patient to obtain discharge planning assessment.  Patient admitted with cervical stenosis and is going to IR for aspiration today.  Planned discharge is home - Plan (A) and (B).    PCP:  Primary Doctor No     Payor:  Payor: /      Pharmacy:    [id number] [place] - [id number]  [address]  [place] [id number]  Phone: [phone] Fax: [phone]       12/09/19 1306   Discharge Assessment   Assessment Type Discharge Planning Assessment   Confirmed/corrected address and phone number on facesheet? Yes   Assessment information obtained from? Patient   Expected Length of Stay (days) 5   Communicated expected length of stay with patient/caregiver yes   Prior to hospitilization cognitive status: Alert/Oriented   Prior to hospitalization functional status: Independent   Current cognitive status: Alert/Oriented   Current Functional Status: Independent   Lives With friend(s)   Able to Return to Prior Arrangements yes   Is patient able to care for self after discharge? Yes   Who are your caregiver(s) and their phone number(s)? Mirna Zaragoza - mother [phone]   Patient's perception of discharge disposition home or selfcare   Readmission Within the Last 30 Days no previous admission in last 30 days   Patient currently being followed by outpatient case management? No   Patient currently receives any other outside agency services? No   Equipment Currently Used at Home none   Do you have any problems affording any of your prescribed medications? TBD   Is the patient taking medications as prescribed? yes   Does the patient have transportation home? Yes   Transportation Anticipated family or friend will provide   Does the patient receive services at the [facility]? No   Discharge Plan A Home   Discharge Plan B Home   DME Needed Upon Discharge  none   Patient/Family in Agreement with Plan yes

## 2019-12-10 PROCEDURE — 63600175 PHARM REV CODE 636 W HCPCS: Performed by: PHYSICIAN ASSISTANT

## 2019-12-10 PROCEDURE — 99223 PR INITIAL HOSPITAL CARE,LEVL III: ICD-10-PCS | Mod: ,,, | Performed by: INTERNAL MEDICINE

## 2019-12-10 PROCEDURE — 99499 NO LOS: ICD-10-PCS | Mod: ,,, | Performed by: PHYSICIAN ASSISTANT

## 2019-12-10 PROCEDURE — 99223 1ST HOSP IP/OBS HIGH 75: CPT | Mod: ,,, | Performed by: INTERNAL MEDICINE

## 2019-12-10 PROCEDURE — 99232 PR SUBSEQUENT HOSPITAL CARE,LEVL II: ICD-10-PCS | Mod: ,,, | Performed by: PHYSICIAN ASSISTANT

## 2019-12-10 PROCEDURE — 25000003 PHARM REV CODE 250: Performed by: PHYSICIAN ASSISTANT

## 2019-12-10 PROCEDURE — 99232 SBSQ HOSP IP/OBS MODERATE 35: CPT | Mod: ,,, | Performed by: PHYSICIAN ASSISTANT

## 2019-12-10 PROCEDURE — 11000001 HC ACUTE MED/SURG PRIVATE ROOM

## 2019-12-10 PROCEDURE — 99499 UNLISTED E&M SERVICE: CPT | Mod: ,,, | Performed by: PHYSICIAN ASSISTANT

## 2019-12-10 RX ORDER — HEPARIN SODIUM 5000 [USP'U]/ML
5000 INJECTION, SOLUTION INTRAVENOUS; SUBCUTANEOUS EVERY 8 HOURS
Status: DISCONTINUED | OUTPATIENT
Start: 2019-12-10 | End: 2019-12-11 | Stop reason: HOSPADM

## 2019-12-10 RX ADMIN — SENNOSIDES AND DOCUSATE SODIUM 1 TABLET: 8.6; 5 TABLET ORAL at 09:12

## 2019-12-10 RX ADMIN — GABAPENTIN 300 MG: 300 CAPSULE ORAL at 09:12

## 2019-12-10 RX ADMIN — HYDROCHLOROTHIAZIDE 25 MG: 25 TABLET ORAL at 08:12

## 2019-12-10 RX ADMIN — GABAPENTIN 300 MG: 300 CAPSULE ORAL at 02:12

## 2019-12-10 RX ADMIN — GABAPENTIN 300 MG: 300 CAPSULE ORAL at 08:12

## 2019-12-10 RX ADMIN — AMLODIPINE BESYLATE 5 MG: 5 TABLET ORAL at 08:12

## 2019-12-10 RX ADMIN — SENNOSIDES AND DOCUSATE SODIUM 1 TABLET: 8.6; 5 TABLET ORAL at 08:12

## 2019-12-10 RX ADMIN — HEPARIN SODIUM 5000 UNITS: 5000 INJECTION, SOLUTION INTRAVENOUS; SUBCUTANEOUS at 02:12

## 2019-12-10 RX ADMIN — HEPARIN SODIUM 5000 UNITS: 5000 INJECTION, SOLUTION INTRAVENOUS; SUBCUTANEOUS at 09:12

## 2019-12-10 NOTE — HPI
60 y/o male with no significant medical history presents with BUE numbness/weakness RL> with gait instability was found to have C5-C6 discitis with C3 central and foraminal stenosis. NSGY consulted for evaluation. ESR >1, CRP 1.6. Afebrile, stable, without a leukocytosis. Blood cultures on admit +coag negative Staph. Repeat blood cultures 12/9 NGTD. Underwent disc aspiration with IR and removed serosanguineous fluid and clot. Cultures so far NGTD. We are consulted for management.     Denies any steroid injections, dental procedures, IVDU. Denies hx of boils or abscesses. Reports having falls 2/2 leg weakness and has abrasions of his right shin.

## 2019-12-10 NOTE — PROCEDURES
Radiology Post-Procedure Note    Pre Op Diagnosis: Concern for C5-6 discitis  Post Op Diagnosis: Same    Procedure: US and CT guided C 5-6 disc asporation    Procedure performed by: Jimbo Griffin MD    Written Informed Consent Obtained: Yes  Specimen Removed: YES trace serosanguinous fluid and clot  Estimated Blood Loss: Minimal    Findings:   US showed a narrow window medial to the left carotid. Under CT guidance a 22 gauge needle was advanced from an anterior lateral approach. The needle was seated in the anterior aspect of the disc space. Only trace serosanguinous fluid and clot could be aspirated. Needle was removed and hemostasis was achieved.    Patient tolerated procedure well.    Jimbo Griffin MD  Department of Radiology  Pager: [phone]

## 2019-12-10 NOTE — SUBJECTIVE & OBJECTIVE
Interval History:   NAEON. Patient resting comfortably in bed. No family at bedside. Continues to report neck pain and stiffness. Denies any UE radicular pain or weakness. Continues to report BUE numbness throughout entire arm, R>L. NPO since midnight. Voiding appropriately.     Medications:  Continuous Infusions:  Scheduled Meds:   amLODIPine  5 mg Oral Daily    gabapentin  300 mg Oral TID    hydroCHLOROthiazide  25 mg Oral Daily    senna-docusate 8.6-50 mg  1 tablet Oral BID     PRN Meds:acetaminophen, bisacodyl, HYDROcodone-acetaminophen, labetalol, polyethylene glycol     Review of Systems  Objective:     Weight: 89.4 kg (197 lb)  Body mass index is 27.48 kg/m².  Vital Signs (Most Recent):  Temp: 98.2 °F (36.8 °C) (12/09/19 1755)  Pulse: 62 (12/09/19 1815)  Resp: 12 (12/09/19 1815)  BP: (!) 168/86 (12/09/19 1815)  SpO2: 100 % (12/09/19 1815) Vital Signs (24h Range):  Temp:  [97.4 °F (36.3 °C)-98.7 °F (37.1 °C)] 98.2 °F (36.8 °C)  Pulse:  [60-74] 62  Resp:  [10-18] 12  SpO2:  [95 %-100 %] 100 %  BP: (119-190)/() 168/86       Neurosurgery Physical Exam   General: well developed, well nourished, poor dentition, no distress.   Head: normocephalic, atraumatic  Neurologic: Alert and oriented. Thought content appropriate.  GCS: Motor: 6/Verbal: 5/Eyes: 4 GCS Total: 15  Mental Status: Awake, Alert, Oriented x 4  Language: No aphasia  Speech: No dysarthria  Cranial nerves: face symmetric, tongue midline, CN II-XII grossly intact.   Eyes: pupils equal, round, reactive to light with accomodation, EOMI.   Pulmonary: normal respirations, no signs of respiratory distress  Abdomen: soft, non-distended, not tender to palpation  Skin: Skin is warm, dry and intact.  Sensory: Decreased sensation throughout RUE, not following a dermatomal distribution. Sensation intact in LUE and BLE.   Motor Strength:Moves all extremities spontaneously with good tone.  Full strength upper and lower extremities. No abnormal movements  seen.   Hancock's: Negative.  Babinski's: Negative.  Clonus: Negative.       Significant Labs:  Recent Labs   Lab 12/07/19  1909 12/09/19  1036   GLU 84 90    139   K 3.6 4.9    107   CO2 25 28   BUN 11 12   CREATININE 1.0 1.1   CALCIUM 8.0* 8.9     Recent Labs   Lab 12/07/19 2007 12/09/19  1036   WBC 5.12 3.92   HGB 11.8* 12.4*   HCT 37.8* 40.4    233     No results for input(s): LABPT, INR, APTT in the last 48 hours.  Microbiology Results (last 7 days)     Procedure Component Value Units Date/Time    Fungus culture [[id number]] Collected:  12/09/19 1759    Order Status:  Sent Specimen:  Body Fluid from Neck Updated:  12/09/19 1759    Culture, Anaerobe [[id number]] Collected:  12/09/19 1759    Order Status:  Sent Specimen:  Body Fluid from Neck Updated:  12/09/19 1759    Aerobic culture [[id number]] Collected:  12/09/19 1759    Order Status:  Sent Specimen:  Body Fluid from Neck Updated:  12/09/19 1759    AFB Culture & Smear [[id number]] Collected:  12/09/19 1759    Order Status:  Sent Specimen:  Body Fluid from Neck Updated:  12/09/19 1759    Gram stain [[id number]] Collected:  12/09/19 1759    Order Status:  Sent Specimen:  Body Fluid from Neck Updated:  12/09/19 1759    Blood culture [[id number]] Collected:  12/09/19 1036    Order Status:  Completed Specimen:  Blood Updated:  12/09/19 1745     Blood Culture, Routine No Growth to date    Blood culture [[id number]] Collected:  12/09/19 1036    Order Status:  Completed Specimen:  Blood Updated:  12/09/19 1745     Blood Culture, Routine No Growth to date    Blood culture [[id number]]  (Abnormal) Collected:  12/07/19 2008    Order Status:  Completed Specimen:  Blood from Peripheral, Antecubital, Left Updated:  12/09/19 0818     Blood Culture, Routine Gram stain aer bottle: Gram positive cocci in clusters resembling Staph       Results called to and read back by: Natasha Hahn LPN 12/08/2019  13:13      Gram stain rene bottle: Gram positive cocci in clusters  resembling Staph       Positive results previously called 12/08/2019  20:02      COAGULASE-NEGATIVE STAPHYLOCOCCUS SPECIES  Organism is a probable contaminant

## 2019-12-10 NOTE — NURSING
Pt tolerated cervical disk aspiration well. VSS. Hypertensive. See MAR for med admins. Report to be called to bedside RN. Pt to recover in ROCU before returning upstairs per policy.

## 2019-12-10 NOTE — ASSESSMENT & PLAN NOTE
60 y/o male with no significant medical history presents with BUE numbness/weakness R>L with gait instability was found to have C5-C6 discitis with C3 central and foraminal stenosis. NSGY consulted for evaluation. ESR >1, CRP 1.6. Afebrile, stable, without a leukocytosis. Blood cultures on admit +Staph hominis and Staph capitis. Repeat blood cultures 12/9 NGTD. Underwent disc aspiration with IR and removed serosanguineous fluid and clot. Cultures so far NGTD. We are consulted for management.         1. Continue to monitor off of antibiotics. Do not suspect pt with discitis if aspiration cultures and blood cultures remain negative. Recommend further workup/management per primary team in regards to weakness/numbness of his BUE   2.  ID will sign off. Please call/reconsult if aspiration cultures return positive

## 2019-12-10 NOTE — CONSULTS
[facility]  Infectious Disease  Consult Note    Patient Name: Valentín Field  MRN: [id number]  Admission Date: 12/7/2019  Hospital Length of Stay: 3 days  Attending Physician: Dedrick Mccann MD  Primary Care Provider: Primary Doctor No     Isolation Status: No active isolations    Consults  Assessment/Plan:     Discitis of cervical region  60 y/o male with no significant medical history presents with BUE numbness/weakness R>L with gait instability was found to have C5-C6 discitis with C3 central and foraminal stenosis. NSGY consulted for evaluation. ESR >1, CRP 1.6. Afebrile, stable, without a leukocytosis. Blood cultures on admit +Staph hominis and Staph capitis. Repeat blood cultures 12/9 NGTD. Underwent disc aspiration with IR and removed serosanguineous fluid and clot. Cultures so far NGTD. We are consulted for management.         1. Continue to monitor off of antibiotics. Do not suspect pt with discitis if aspiration cultures and blood cultures remain negative. Recommend further workup/management per primary team in regards to weakness/numbness of his BUE   2.  ID will sign off. Please call/reconsult if aspiration cultures return positive           Thank you for your consult. I will follow-up with patient. Please contact us if you have any additional questions.    Alicia Stephens PA-C  Infectious Disease  [facility]    Subjective:     Principal Problem: Cervical spinal stenosis    HPI: 60 y/o male with no significant medical history presents with BUE numbness/weakness RL> with gait instability was found to have C5-C6 discitis with C3 central and foraminal stenosis. NSGY consulted for evaluation. ESR >1, CRP 1.6. Afebrile, stable, without a leukocytosis. Blood cultures on admit +coag negative Staph. Repeat blood cultures 12/9 NGTD. Underwent disc aspiration with IR and removed serosanguineous fluid and clot. Cultures so far NGTD. We are consulted for management.     Denies any  steroid injections, dental procedures, IVDU. Denies hx of boils or abscesses. Reports having falls 2/2 leg weakness and has abrasions of his right shin.     Past Medical History:   Diagnosis Date    HTN (hypertension)        History reviewed. No pertinent surgical history.    Review of patient's allergies indicates:  No Known Allergies    Medications:  Medications Prior to Admission   Medication Sig    amLODIPine (NORVASC) 5 MG tablet Take 1 tablet (5 mg total) by mouth once daily.    hydroCHLOROthiazide (HYDRODIURIL) 25 MG tablet Take 1 tablet (25 mg total) by mouth once daily.    naproxen (NAPROSYN) 500 MG tablet Take 1 tablet (500 mg total) by mouth 2 (two) times daily with meals.     Antibiotics (From admission, onward)    None        Antifungals (From admission, onward)    None        Antivirals (From admission, onward)    None           Immunization History   Administered Date(s) Administered    Pneumococcal Polysaccharide - 23 Valent 09/05/2019       Family History     None        Social History     Socioeconomic History    Marital status: Single     Spouse name: Not on file    Number of children: Not on file    Years of education: Not on file    Highest education level: Not on file   Occupational History    Not on file   Social Needs    Financial resource strain: Not on file    Food insecurity:     Worry: Not on file     Inability: Not on file    Transportation needs:     Medical: Not on file     Non-medical: Not on file   Tobacco Use    Smoking status: Former Smoker     Start date: 9/3/2019    Smokeless tobacco: Never Used   Substance and Sexual Activity    Alcohol use: Yes     Alcohol/week: 5.0 standard drinks     Types: 5 Cans of beer per week     Comment: weekends    Drug use: No    Sexual activity: Not on file   Lifestyle    Physical activity:     Days per week: Not on file     Minutes per session: Not on file    Stress: Not on file   Relationships    Social connections:     Talks  on phone: Not on file     Gets together: Not on file     Attends [employer] service: Not on file     Active member of club or organization: Not on file     Attends meetings of clubs or organizations: Not on file     Relationship status: Not on file   Other Topics Concern    Not on file   Social History Narrative    Not on file     Review of Systems   Constitutional: Positive for activity change. Negative for appetite change, chills, diaphoresis, fatigue and fever.   Respiratory: Negative for cough and shortness of breath.    Cardiovascular: Negative for chest pain.   Gastrointestinal: Negative for diarrhea, nausea and vomiting.   Genitourinary: Negative for dysuria.   Musculoskeletal: Positive for arthralgias, gait problem and neck pain.   Skin: Negative for color change, pallor, rash and wound.   Neurological: Positive for weakness and numbness.   All other systems reviewed and are negative.    Objective:     Vital Signs (Most Recent):  Temp: 98 °F (36.7 °C) (12/10/19 1125)  Pulse: 67 (12/10/19 1125)  Resp: 18 (12/10/19 1125)  BP: 135/76 (12/10/19 1125)  SpO2: 97 % (12/10/19 1125) Vital Signs (24h Range):  Temp:  [96.3 °F (35.7 °C)-98.2 °F (36.8 °C)] 98 °F (36.7 °C)  Pulse:  [60-74] 67  Resp:  [10-18] 18  SpO2:  [96 %-100 %] 97 %  BP: (117-190)/() 135/76     Weight: 89.4 kg (197 lb)  Body mass index is 27.48 kg/m².    Estimated Creatinine Clearance: 77 mL/min (based on SCr of 1.1 mg/dL).    Physical Exam   Constitutional: He is oriented to person, place, and time. He appears well-developed and well-nourished. No distress.   HENT:   Head: Normocephalic and atraumatic.   Poor dentition   Eyes: Pupils are equal, round, and reactive to light.   Cardiovascular: Normal rate, regular rhythm and normal heart sounds. Exam reveals no friction rub.   No murmur heard.  Pulmonary/Chest: Effort normal. No stridor. No respiratory distress. He has no wheezes.   Abdominal: Soft. He exhibits no distension. There is no  tenderness. There is no guarding.   Musculoskeletal: Normal range of motion. He exhibits no edema, tenderness or deformity.   Abrasion to right shin   Neurological: He is alert and oriented to person, place, and time.   Skin: Skin is warm and dry. No rash noted. He is not diaphoretic. No erythema.   Psychiatric: He has a normal mood and affect.   Vitals reviewed.      Significant Labs: All pertinent labs within the past 24 hours have been reviewed.    Significant Imaging: I have reviewed all pertinent imaging results/findings within the past 24 hours.

## 2019-12-10 NOTE — SUBJECTIVE & OBJECTIVE
Interval History:   NAEON. Patient resting comfortably in bed. No family at bedside. Reports improvement in neck pain and stiffness with medication adjustment yesterday. Denies any UE radicular pain or weakness. States BUE numbness is unchanged. Denies any new symptoms. Tolerating diet. Denies any dysphagia or throat swelling. Voiding appropriately.     Medications:  Continuous Infusions:  Scheduled Meds:   amLODIPine  5 mg Oral Daily    gabapentin  300 mg Oral TID    hydroCHLOROthiazide  25 mg Oral Daily    senna-docusate 8.6-50 mg  1 tablet Oral BID     PRN Meds:acetaminophen, bisacodyl, HYDROcodone-acetaminophen, labetalol, polyethylene glycol     Review of Systems  Objective:     Weight: 89.4 kg (197 lb)  Body mass index is 27.48 kg/m².  Vital Signs (Most Recent):  Temp: 98 °F (36.7 °C) (12/10/19 1125)  Pulse: 67 (12/10/19 1125)  Resp: 18 (12/10/19 1125)  BP: 135/76 (12/10/19 1125)  SpO2: 97 % (12/10/19 1125) Vital Signs (24h Range):  Temp:  [96.3 °F (35.7 °C)-98.2 °F (36.8 °C)] 98 °F (36.7 °C)  Pulse:  [60-74] 67  Resp:  [10-18] 18  SpO2:  [96 %-100 %] 97 %  BP: (117-190)/() 135/76     Date 12/10/19 0700 - 12/11/19 0659   Shift 6019-1283 7587-3122 2177-8843 24 Hour Total   INTAKE   P.O. 600   600   Shift Total(mL/kg) 600(6.7)   600(6.7)   OUTPUT   Shift Total(mL/kg)       Weight (kg) 89.4 89.4 89.4 89.4       Neurosurgery Physical Exam   General: well developed, well nourished, poor dentition, no distress.   Head: normocephalic, atraumatic  Neck: IR aspiration site is clean and dry. No evidence of edema or erythema. No TTP.   Neurologic: Alert and oriented. Thought content appropriate.  GCS: Motor: 6/Verbal: 5/Eyes: 4 GCS Total: 15  Mental Status: Awake, Alert, Oriented x 4  Language: No aphasia  Speech: No dysarthria  Cranial nerves: face symmetric, tongue midline, CN II-XII grossly intact.   Eyes: pupils equal, round, reactive to light with accomodation, EOMI.   Pulmonary: normal respirations, no  signs of respiratory distress  Abdomen: soft, non-distended, not tender to palpation  Skin: Skin is warm, dry and intact.  Sensory: Decreased sensation throughout RUE, not following a dermatomal distribution. Sensation intact in LUE and BLE.   Motor Strength: Moves all extremities spontaneously with good tone.  Full strength upper and lower extremities. No abnormal movements seen.   Hancock's: Negative.  Babinski's: Negative.  Clonus: Negative.      Significant Labs:  Recent Labs   Lab 12/09/19  1036   GLU 90      K 4.9      CO2 28   BUN 12   CREATININE 1.1   CALCIUM 8.9     Recent Labs   Lab 12/09/19  1036   WBC 3.92   HGB 12.4*   HCT 40.4        No results for input(s): LABPT, INR, APTT in the last 48 hours.  Microbiology Results (last 7 days)     Procedure Component Value Units Date/Time    Blood culture [[id number]]  (Abnormal) Collected:  12/07/19 2008    Order Status:  Completed Specimen:  Blood from Peripheral, Antecubital, Left Updated:  12/10/19 0912     Blood Culture, Routine Gram stain aer bottle: Gram positive cocci in clusters resembling Staph       Results called to and read back by: Natasha Hahn LPN 12/08/2019  13:13      Gram stain rene bottle: Gram positive cocci in clusters resembling Staph       Positive results previously called 12/08/2019  20:02      COAGULASE-NEGATIVE STAPHYLOCOCCUS SPECIES  Identification and susceptibility pending      Aerobic culture [[id number]] Collected:  12/09/19 1759    Order Status:  Completed Specimen:  Body Fluid from Neck Updated:  12/10/19 0726     Aerobic Bacterial Culture No growth    Narrative:       Cervical Disk    Culture, Anaerobe [[id number]] Collected:  12/09/19 1759    Order Status:  Completed Specimen:  Body Fluid from Neck Updated:  12/10/19 0638     Anaerobic Culture Culture in progress    Narrative:       Cervical Disk    Gram stain [[id number]] Collected:  12/09/19 1759    Order Status:  Completed Specimen:  Body Fluid from Neck  Updated:  12/09/19 2130     Gram Stain Result Rare WBC's      No organisms seen    Narrative:       Cervical Disk    AFB Culture & Smear [[id number]] Collected:  12/09/19 1759    Order Status:  Sent Specimen:  Body Fluid from Neck Updated:  12/09/19 1916    Fungus culture [[id number]] Collected:  12/09/19 1759    Order Status:  Sent Specimen:  Body Fluid from Neck Updated:  12/09/19 1915    Blood culture [[id number]] Collected:  12/09/19 1036    Order Status:  Completed Specimen:  Blood Updated:  12/09/19 1745     Blood Culture, Routine No Growth to date    Blood culture [[id number]] Collected:  12/09/19 1036    Order Status:  Completed Specimen:  Blood Updated:  12/09/19 1745     Blood Culture, Routine No Growth to date

## 2019-12-10 NOTE — SUBJECTIVE & OBJECTIVE
Past Medical History:   Diagnosis Date    HTN (hypertension)        History reviewed. No pertinent surgical history.    Review of patient's allergies indicates:  No Known Allergies    Medications:  Medications Prior to Admission   Medication Sig    amLODIPine (NORVASC) 5 MG tablet Take 1 tablet (5 mg total) by mouth once daily.    hydroCHLOROthiazide (HYDRODIURIL) 25 MG tablet Take 1 tablet (25 mg total) by mouth once daily.    naproxen (NAPROSYN) 500 MG tablet Take 1 tablet (500 mg total) by mouth 2 (two) times daily with meals.     Antibiotics (From admission, onward)    None        Antifungals (From admission, onward)    None        Antivirals (From admission, onward)    None           Immunization History   Administered Date(s) Administered    Pneumococcal Polysaccharide - 23 Valent 09/05/2019       Family History     None        Social History     Socioeconomic History    Marital status: Single     Spouse name: Not on file    Number of children: Not on file    Years of education: Not on file    Highest education level: Not on file   Occupational History    Not on file   Social Needs    Financial resource strain: Not on file    Food insecurity:     Worry: Not on file     Inability: Not on file    Transportation needs:     Medical: Not on file     Non-medical: Not on file   Tobacco Use    Smoking status: Former Smoker     Start date: 9/3/2019    Smokeless tobacco: Never Used   Substance and Sexual Activity    Alcohol use: Yes     Alcohol/week: 5.0 standard drinks     Types: 5 Cans of beer per week     Comment: weekends    Drug use: No    Sexual activity: Not on file   Lifestyle    Physical activity:     Days per week: Not on file     Minutes per session: Not on file    Stress: Not on file   Relationships    Social connections:     Talks on phone: Not on file     Gets together: Not on file     Attends Hoahaoism service: Not on file     Active member of club or organization: Not on file      Attends meetings of clubs or organizations: Not on file     Relationship status: Not on file   Other Topics Concern    Not on file   Social History Narrative    Not on file     Review of Systems   Constitutional: Positive for activity change. Negative for appetite change, chills, diaphoresis, fatigue and fever.   Respiratory: Negative for cough and shortness of breath.    Cardiovascular: Negative for chest pain.   Gastrointestinal: Negative for diarrhea, nausea and vomiting.   Genitourinary: Negative for dysuria.   Musculoskeletal: Positive for arthralgias, gait problem and neck pain.   Skin: Negative for color change, pallor, rash and wound.   Neurological: Positive for weakness and numbness.   All other systems reviewed and are negative.    Objective:     Vital Signs (Most Recent):  Temp: 98 °F (36.7 °C) (12/10/19 1125)  Pulse: 67 (12/10/19 1125)  Resp: 18 (12/10/19 1125)  BP: 135/76 (12/10/19 1125)  SpO2: 97 % (12/10/19 1125) Vital Signs (24h Range):  Temp:  [96.3 °F (35.7 °C)-98.2 °F (36.8 °C)] 98 °F (36.7 °C)  Pulse:  [60-74] 67  Resp:  [10-18] 18  SpO2:  [96 %-100 %] 97 %  BP: (117-190)/() 135/76     Weight: 89.4 kg (197 lb)  Body mass index is 27.48 kg/m².    Estimated Creatinine Clearance: 77 mL/min (based on SCr of 1.1 mg/dL).    Physical Exam   Constitutional: He is oriented to person, place, and time. He appears well-developed and well-nourished. No distress.   HENT:   Head: Normocephalic and atraumatic.   Poor dentition   Eyes: Pupils are equal, round, and reactive to light.   Cardiovascular: Normal rate, regular rhythm and normal heart sounds. Exam reveals no friction rub.   No murmur heard.  Pulmonary/Chest: Effort normal. No stridor. No respiratory distress. He has no wheezes.   Abdominal: Soft. He exhibits no distension. There is no tenderness. There is no guarding.   Musculoskeletal: Normal range of motion. He exhibits no edema, tenderness or deformity.   Abrasion to right shin    Neurological: He is alert and oriented to person, place, and time.   Skin: Skin is warm and dry. No rash noted. He is not diaphoretic. No erythema.   Psychiatric: He has a normal mood and affect.   Vitals reviewed.      Significant Labs: All pertinent labs within the past 24 hours have been reviewed.    Significant Imaging: I have reviewed all pertinent imaging results/findings within the past 24 hours.

## 2019-12-10 NOTE — PROGRESS NOTES
[facility]  Neurosurgery  Progress Note    Subjective:     History of Present Illness: 59yom with no significant known pmh presents with complaint of 2-4 weeks of progressive bilateral hand parasthesias and progressive gait instability when walking. MRI C spine showed C5-6 discitis with cord signal change as well as C3 central and foraminal stenosis. Neurosurgery consulted for evaluation.    Post-Op Info:  * No surgery found *         Interval History:   NAEON. Patient resting comfortably in bed. No family at bedside. Continues to report neck pain and stiffness. Denies any UE radicular pain or weakness. Continues to report BUE numbness throughout entire arm, R>L. NPO since midnight. Voiding appropriately.     Medications:  Continuous Infusions:  Scheduled Meds:   amLODIPine  5 mg Oral Daily    gabapentin  300 mg Oral TID    hydroCHLOROthiazide  25 mg Oral Daily    senna-docusate 8.6-50 mg  1 tablet Oral BID     PRN Meds:acetaminophen, bisacodyl, HYDROcodone-acetaminophen, labetalol, polyethylene glycol     Review of Systems  Objective:     Weight: 89.4 kg (197 lb)  Body mass index is 27.48 kg/m².  Vital Signs (Most Recent):  Temp: 98.2 °F (36.8 °C) (12/09/19 1755)  Pulse: 62 (12/09/19 1815)  Resp: 12 (12/09/19 1815)  BP: (!) 168/86 (12/09/19 1815)  SpO2: 100 % (12/09/19 1815) Vital Signs (24h Range):  Temp:  [97.4 °F (36.3 °C)-98.7 °F (37.1 °C)] 98.2 °F (36.8 °C)  Pulse:  [60-74] 62  Resp:  [10-18] 12  SpO2:  [95 %-100 %] 100 %  BP: (119-190)/() 168/86       Neurosurgery Physical Exam   General: well developed, well nourished, poor dentition, no distress.   Head: normocephalic, atraumatic  Neurologic: Alert and oriented. Thought content appropriate.  GCS: Motor: 6/Verbal: 5/Eyes: 4 GCS Total: 15  Mental Status: Awake, Alert, Oriented x 4  Language: No aphasia  Speech: No dysarthria  Cranial nerves: face symmetric, tongue midline, CN II-XII grossly intact.   Eyes: pupils equal, round,  reactive to light with accomodation, EOMI.   Pulmonary: normal respirations, no signs of respiratory distress  Abdomen: soft, non-distended, not tender to palpation  Skin: Skin is warm, dry and intact.  Sensory: Decreased sensation throughout RUE, not following a dermatomal distribution. Sensation intact in LUE and BLE.   Motor Strength:Moves all extremities spontaneously with good tone.  Full strength upper and lower extremities. No abnormal movements seen.   Hancock's: Negative.  Babinski's: Negative.  Clonus: Negative.       Significant Labs:  Recent Labs   Lab 12/07/19 1909 12/09/19  1036   GLU 84 90    139   K 3.6 4.9    107   CO2 25 28   BUN 11 12   CREATININE 1.0 1.1   CALCIUM 8.0* 8.9     Recent Labs   Lab 12/07/19 2007 12/09/19 1036   WBC 5.12 3.92   HGB 11.8* 12.4*   HCT 37.8* 40.4    233     No results for input(s): LABPT, INR, APTT in the last 48 hours.  Microbiology Results (last 7 days)     Procedure Component Value Units Date/Time    Fungus culture [[id number]] Collected:  12/09/19 1759    Order Status:  Sent Specimen:  Body Fluid from Neck Updated:  12/09/19 1759    Culture, Anaerobe [[id number]] Collected:  12/09/19 1759    Order Status:  Sent Specimen:  Body Fluid from Neck Updated:  12/09/19 1759    Aerobic culture [[id number]] Collected:  12/09/19 1759    Order Status:  Sent Specimen:  Body Fluid from Neck Updated:  12/09/19 1759    AFB Culture & Smear [[id number]] Collected:  12/09/19 1759    Order Status:  Sent Specimen:  Body Fluid from Neck Updated:  12/09/19 1759    Gram stain [[id number]] Collected:  12/09/19 1759    Order Status:  Sent Specimen:  Body Fluid from Neck Updated:  12/09/19 1759    Blood culture [[id number]] Collected:  12/09/19 1036    Order Status:  Completed Specimen:  Blood Updated:  12/09/19 1745     Blood Culture, Routine No Growth to date    Blood culture [[id number]] Collected:  12/09/19 1036    Order Status:  Completed Specimen:  Blood Updated:  12/09/19  1745     Blood Culture, Routine No Growth to date    Blood culture [[id number]]  (Abnormal) Collected:  12/07/19 2008    Order Status:  Completed Specimen:  Blood from Peripheral, Antecubital, Left Updated:  12/09/19 0818     Blood Culture, Routine Gram stain aer bottle: Gram positive cocci in clusters resembling Staph       Results called to and read back by: Natasha Hahn LPN 12/08/2019  13:13      Gram stain rene bottle: Gram positive cocci in clusters resembling Staph       Positive results previously called 12/08/2019  20:02      COAGULASE-NEGATIVE STAPHYLOCOCCUS SPECIES  Organism is a probable contaminant              Assessment/Plan:     * Cervical spinal stenosis  59yom with no significant known pmh presents with complaint of 2-4 weeks of progressive bilateral hand parasthesias and progressive gait instability when walking. MRI C spine showed concern for C5-6 discitis with cord signal change as well as C3 central and foraminal stenosis.     -Patient neurologically stable on exam  -Pain: Begin Norco 5 mg q 4 hours PRN. Begin Neurontin 300 mg TID for UE paresthesias.   -ID: Afebrile. No leukocytosis. Pending IR aspiration today. Will FU cxs tomorrow. BCx from 12/7 growing COAGULASE-NEGATIVE STAPHYLOCOCCUS SPECIES, concern for contaminant. Repeat BCxs ordered. ESR < 2 and CRP 1.6. ID consulted to determine if abx are indicated and if so, recommend a regimen.   -HTN: -147. Continue home dose of Amlodipine 5 mg daily and HCTZ 25 mg daily.   -DVT prophylaxis: ALTON's, SCD's. Will start SQH tomorrow.  -Bowel regimen: senna BID and miralax daily PRN  -Atelectasis prevention: IS hourly while awake, OOB > 6 hours per day    DISPO: Pending cx results and ID consult        Please call with any questions      Kenyatta Ring PA-C   Neurosurgery   Pager: [phone]

## 2019-12-10 NOTE — ASSESSMENT & PLAN NOTE
59yom with no significant known pmh presents with complaint of 2-4 weeks of progressive bilateral hand parasthesias and progressive gait instability when walking. MRI C spine showed concern for C5-6 discitis with cord signal change as well as C3 central and foraminal stenosis.     -Patient neurologically stable on exam  -Pain: Improved. Continue Norco 5 mg q 4 hours PRN and Neurontin 300 mg TID for UE paresthesias.   -ID: Afebrile. Cxs from IR aspiration NGTD/pending. BCx from 12/7 growing COAGULASE-NEGATIVE STAPHYLOCOCCUS SPECIES, concern for contaminant. BCxs 12/9 NGTD. ESR < 2 and CRP 1.6. ID consulted to determine if abx are indicated and if so, recommend a regimen.   -HTN: /135. Continue home dose of Amlodipine 5 mg daily and HCTZ 25 mg daily.   -DVT prophylaxis: ALTON's, SCD's. Begin SQH today.  -Bowel regimen: senna BID and miralax daily PRN  -Atelectasis prevention: IS hourly while awake, OOB > 6 hours per day    DISPO: Pending cx results and ID consult

## 2019-12-10 NOTE — ASSESSMENT & PLAN NOTE
59yom with no significant known pmh presents with complaint of 2-4 weeks of progressive bilateral hand parasthesias and progressive gait instability when walking. MRI C spine showed concern for C5-6 discitis with cord signal change as well as C3 central and foraminal stenosis.     -Patient neurologically stable on exam  -Pain: Begin Norco 5 mg q 4 hours PRN. Begin Neurontin 300 mg TID for UE paresthesias.   -ID: Afebrile. No leukocytosis. Pending IR aspiration today. Will FU cxs tomorrow. BCx from 12/7 growing COAGULASE-NEGATIVE STAPHYLOCOCCUS SPECIES, concern for contaminant. Repeat BCxs ordered. ESR < 2 and CRP 1.6. ID consulted to determine if abx are indicated and if so, recommend a regimen.   -HTN: -147. Continue home dose of Amlodipine 5 mg daily and HCTZ 25 mg daily.   -DVT prophylaxis: ALTON's, SCD's. Will start SQH tomorrow.  -Bowel regimen: senna BID and miralax daily PRN  -Atelectasis prevention: IS hourly while awake, OOB > 6 hours per day    DISPO: Pending cx results and ID consult

## 2019-12-10 NOTE — NURSING
POC reviewed w/ patient. Questions encouraged and addressed. Verbalized understanding. AAOx4, VSS see flowsheets. Call bell in reach, bed locked in the lowest position, side rails up x2, fall precautions maintained, advised to call staff for assistance, no distress noted, WCTM.

## 2019-12-10 NOTE — PROGRESS NOTES
[facility]  Neurosurgery  Progress Note    Subjective:     History of Present Illness: 59yom with no significant known pmh presents with complaint of 2-4 weeks of progressive bilateral hand parasthesias and progressive gait instability when walking. MRI C spine showed C5-6 discitis with cord signal change as well as C3 central and foraminal stenosis. Neurosurgery consulted for evaluation.    Post-Op Info:  * No surgery found *         Interval History:   NAEON. Patient resting comfortably in bed. No family at bedside. Reports improvement in neck pain and stiffness with medication adjustment yesterday. Denies any UE radicular pain or weakness. States BUE numbness is unchanged. Denies any new symptoms. Tolerating diet. Denies any dysphagia or throat swelling. Voiding appropriately.     Medications:  Continuous Infusions:  Scheduled Meds:   amLODIPine  5 mg Oral Daily    gabapentin  300 mg Oral TID    hydroCHLOROthiazide  25 mg Oral Daily    senna-docusate 8.6-50 mg  1 tablet Oral BID     PRN Meds:acetaminophen, bisacodyl, HYDROcodone-acetaminophen, labetalol, polyethylene glycol     Review of Systems  Objective:     Weight: 89.4 kg (197 lb)  Body mass index is 27.48 kg/m².  Vital Signs (Most Recent):  Temp: 98 °F (36.7 °C) (12/10/19 1125)  Pulse: 67 (12/10/19 1125)  Resp: 18 (12/10/19 1125)  BP: 135/76 (12/10/19 1125)  SpO2: 97 % (12/10/19 1125) Vital Signs (24h Range):  Temp:  [96.3 °F (35.7 °C)-98.2 °F (36.8 °C)] 98 °F (36.7 °C)  Pulse:  [60-74] 67  Resp:  [10-18] 18  SpO2:  [96 %-100 %] 97 %  BP: (117-190)/() 135/76     Date 12/10/19 0700 - 12/11/19 0659   Shift 1991-9978 6450-9312 8396-0909 24 Hour Total   INTAKE   P.O. 600   600   Shift Total(mL/kg) 600(6.7)   600(6.7)   OUTPUT   Shift Total(mL/kg)       Weight (kg) 89.4 89.4 89.4 89.4       Neurosurgery Physical Exam   General: well developed, well nourished, poor dentition, no distress.   Head: normocephalic, atraumatic  Neck: IR  aspiration site is clean and dry. No evidence of edema or erythema. No TTP.   Neurologic: Alert and oriented. Thought content appropriate.  GCS: Motor: 6/Verbal: 5/Eyes: 4 GCS Total: 15  Mental Status: Awake, Alert, Oriented x 4  Language: No aphasia  Speech: No dysarthria  Cranial nerves: face symmetric, tongue midline, CN II-XII grossly intact.   Eyes: pupils equal, round, reactive to light with accomodation, EOMI.   Pulmonary: normal respirations, no signs of respiratory distress  Abdomen: soft, non-distended, not tender to palpation  Skin: Skin is warm, dry and intact.  Sensory: Decreased sensation throughout RUE, not following a dermatomal distribution. Sensation intact in LUE and BLE.   Motor Strength: Moves all extremities spontaneously with good tone.  Full strength upper and lower extremities. No abnormal movements seen.   Hancock's: Negative.  Babinski's: Negative.  Clonus: Negative.      Significant Labs:  Recent Labs   Lab 12/09/19  1036   GLU 90      K 4.9      CO2 28   BUN 12   CREATININE 1.1   CALCIUM 8.9     Recent Labs   Lab 12/09/19  1036   WBC 3.92   HGB 12.4*   HCT 40.4        No results for input(s): LABPT, INR, APTT in the last 48 hours.  Microbiology Results (last 7 days)     Procedure Component Value Units Date/Time    Blood culture [[id number]]  (Abnormal) Collected:  12/07/19 2008    Order Status:  Completed Specimen:  Blood from Peripheral, Antecubital, Left Updated:  12/10/19 0912     Blood Culture, Routine Gram stain aer bottle: Gram positive cocci in clusters resembling Staph       Results called to and read back by: Natasha Hahn LPN 12/08/2019  13:13      Gram stain rene bottle: Gram positive cocci in clusters resembling Staph       Positive results previously called 12/08/2019  20:02      COAGULASE-NEGATIVE STAPHYLOCOCCUS SPECIES  Identification and susceptibility pending      Aerobic culture [[id number]] Collected:  12/09/19 1759    Order Status:  Completed Specimen:   Body Fluid from Neck Updated:  12/10/19 0726     Aerobic Bacterial Culture No growth    Narrative:       Cervical Disk    Culture, Anaerobe [[id number]] Collected:  12/09/19 1759    Order Status:  Completed Specimen:  Body Fluid from Neck Updated:  12/10/19 0638     Anaerobic Culture Culture in progress    Narrative:       Cervical Disk    Gram stain [[id number]] Collected:  12/09/19 1759    Order Status:  Completed Specimen:  Body Fluid from Neck Updated:  12/09/19 2130     Gram Stain Result Rare WBC's      No organisms seen    Narrative:       Cervical Disk    AFB Culture & Smear [[id number]] Collected:  12/09/19 1759    Order Status:  Sent Specimen:  Body Fluid from Neck Updated:  12/09/19 1916    Fungus culture [[id number]] Collected:  12/09/19 1759    Order Status:  Sent Specimen:  Body Fluid from Neck Updated:  12/09/19 1915    Blood culture [[id number]] Collected:  12/09/19 1036    Order Status:  Completed Specimen:  Blood Updated:  12/09/19 1745     Blood Culture, Routine No Growth to date    Blood culture [[id number]] Collected:  12/09/19 1036    Order Status:  Completed Specimen:  Blood Updated:  12/09/19 1745     Blood Culture, Routine No Growth to date            Assessment/Plan:     * Cervical spinal stenosis  59yom with no significant known pmh presents with complaint of 2-4 weeks of progressive bilateral hand parasthesias and progressive gait instability when walking. MRI C spine showed concern for C5-6 discitis with cord signal change as well as C3 central and foraminal stenosis.     -Patient neurologically stable on exam  -Pain: Improved. Continue Norco 5 mg q 4 hours PRN and Neurontin 300 mg TID for UE paresthesias.   -ID: Afebrile. Cxs from IR aspiration NGTD/pending. BCx from 12/7 growing COAGULASE-NEGATIVE STAPHYLOCOCCUS SPECIES, concern for contaminant. BCxs 12/9 NGTD. ESR < 2 and CRP 1.6. ID consulted to determine if abx are indicated and if so, recommend a regimen.   -HTN: /135. Continue  home dose of Amlodipine 5 mg daily and HCTZ 25 mg daily.   -DVT prophylaxis: ALTON's, SCD's. Begin SQH today.  -Bowel regimen: senna BID and miralax daily PRN  -Atelectasis prevention: IS hourly while awake, OOB > 6 hours per day    DISPO: Pending cx results and ID consult      Please call with any questions      Kenyatta Ring PA-C   Neurosurgery   Pager: [phone]

## 2019-12-10 NOTE — CONSULTS
[facility]  Infectious Disease  Consult Note    Patient Name: Valentín Field  MRN: [id number]  Admission Date: 12/7/2019  Hospital Length of Stay: 3 days  Attending Physician: Dedrick Mccann MD  Primary Care Provider: Primary Doctor No     Isolation Status: No active isolations      Inpatient consult to Infectious Diseases  Consult performed by: Alicia Stephens PA-C  Consult ordered by: MARIA G Diaz  Reason for consult: Possible Discitis; positive blood cx - contam likely        ID consult received. Chart being reviewed. Full note with recommendations to follow.    Thank you,  Alicia Stephens PA-C  [phone]

## 2019-12-10 NOTE — NURSING
Pt a/o x 4,cont of b/b, denies any pain or discomfort, has a small  incision on left side of neck covered with a band-aid, other than that skin intact and no discoloration, ambulates to/ from bathroom independently, bed in lowest position, wheels locked, call light within reach, and informed to call for any assistance if/ when needed

## 2019-12-11 VITALS
RESPIRATION RATE: 18 BRPM | BODY MASS INDEX: 27.58 KG/M2 | SYSTOLIC BLOOD PRESSURE: 130 MMHG | HEART RATE: 82 BPM | DIASTOLIC BLOOD PRESSURE: 81 MMHG | TEMPERATURE: 99 F | OXYGEN SATURATION: 97 % | HEIGHT: 71 IN | WEIGHT: 197 LBS

## 2019-12-11 DIAGNOSIS — M48.02 CERVICAL SPINAL STENOSIS: Primary | ICD-10-CM

## 2019-12-11 LAB
ANION GAP SERPL CALC-SCNC: 6 MMOL/L (ref 8–16)
BASOPHILS # BLD AUTO: 0.03 K/UL (ref 0–0.2)
BASOPHILS NFR BLD: 0.7 % (ref 0–1.9)
BUN SERPL-MCNC: 14 MG/DL (ref 6–20)
CALCIUM SERPL-MCNC: 9.6 MG/DL (ref 8.7–10.5)
CHLORIDE SERPL-SCNC: 101 MMOL/L (ref 95–110)
CO2 SERPL-SCNC: 32 MMOL/L (ref 23–29)
CREAT SERPL-MCNC: 1.2 MG/DL (ref 0.5–1.4)
CRP SERPL-MCNC: 2.4 MG/L (ref 0–8.2)
DIFFERENTIAL METHOD: ABNORMAL
EOSINOPHIL # BLD AUTO: 0.3 K/UL (ref 0–0.5)
EOSINOPHIL NFR BLD: 7.5 % (ref 0–8)
ERYTHROCYTE [DISTWIDTH] IN BLOOD BY AUTOMATED COUNT: 12.2 % (ref 11.5–14.5)
ERYTHROCYTE [SEDIMENTATION RATE] IN BLOOD BY WESTERGREN METHOD: 12 MM/HR (ref 0–23)
EST. GFR  (AFRICAN AMERICAN): >60 ML/MIN/1.73 M^2
EST. GFR  (NON AFRICAN AMERICAN): >60 ML/MIN/1.73 M^2
GLUCOSE SERPL-MCNC: 117 MG/DL (ref 70–110)
HCT VFR BLD AUTO: 44.4 % (ref 40–54)
HGB BLD-MCNC: 13.8 G/DL (ref 14–18)
IMM GRANULOCYTES # BLD AUTO: 0.01 K/UL (ref 0–0.04)
IMM GRANULOCYTES NFR BLD AUTO: 0.2 % (ref 0–0.5)
LYMPHOCYTES # BLD AUTO: 1.6 K/UL (ref 1–4.8)
LYMPHOCYTES NFR BLD: 38.4 % (ref 18–48)
MCH RBC QN AUTO: 29.9 PG (ref 27–31)
MCHC RBC AUTO-ENTMCNC: 31.1 G/DL (ref 32–36)
MCV RBC AUTO: 96 FL (ref 82–98)
MONOCYTES # BLD AUTO: 0.5 K/UL (ref 0.3–1)
MONOCYTES NFR BLD: 11.3 % (ref 4–15)
NEUTROPHILS # BLD AUTO: 1.8 K/UL (ref 1.8–7.7)
NEUTROPHILS NFR BLD: 41.9 % (ref 38–73)
NRBC BLD-RTO: 0 /100 WBC
PLATELET # BLD AUTO: 261 K/UL (ref 150–350)
PMV BLD AUTO: 9.8 FL (ref 9.2–12.9)
POTASSIUM SERPL-SCNC: 4.4 MMOL/L (ref 3.5–5.1)
RBC # BLD AUTO: 4.62 M/UL (ref 4.6–6.2)
SODIUM SERPL-SCNC: 139 MMOL/L (ref 136–145)
WBC # BLD AUTO: 4.24 K/UL (ref 3.9–12.7)

## 2019-12-11 PROCEDURE — 85652 RBC SED RATE AUTOMATED: CPT

## 2019-12-11 PROCEDURE — 80048 BASIC METABOLIC PNL TOTAL CA: CPT

## 2019-12-11 PROCEDURE — 99238 HOSP IP/OBS DSCHRG MGMT 30/<: CPT | Mod: ,,, | Performed by: PHYSICIAN ASSISTANT

## 2019-12-11 PROCEDURE — 85025 COMPLETE CBC W/AUTO DIFF WBC: CPT

## 2019-12-11 PROCEDURE — 36415 COLL VENOUS BLD VENIPUNCTURE: CPT

## 2019-12-11 PROCEDURE — 86140 C-REACTIVE PROTEIN: CPT

## 2019-12-11 PROCEDURE — 25000003 PHARM REV CODE 250: Performed by: PHYSICIAN ASSISTANT

## 2019-12-11 PROCEDURE — 63600175 PHARM REV CODE 636 W HCPCS: Performed by: PHYSICIAN ASSISTANT

## 2019-12-11 PROCEDURE — 99238 PR HOSPITAL DISCHARGE DAY,<30 MIN: ICD-10-PCS | Mod: ,,, | Performed by: PHYSICIAN ASSISTANT

## 2019-12-11 RX ORDER — GABAPENTIN 300 MG/1
300 CAPSULE ORAL 3 TIMES DAILY
Qty: 90 CAPSULE | Refills: 3
Start: 2019-12-11 | End: 2019-12-11

## 2019-12-11 RX ORDER — GABAPENTIN 300 MG/1
300 CAPSULE ORAL 3 TIMES DAILY
Qty: 90 CAPSULE | Refills: 3 | Status: ON HOLD | OUTPATIENT
Start: 2019-12-11 | End: 2021-03-18 | Stop reason: HOSPADM

## 2019-12-11 RX ORDER — HYDROCODONE BITARTRATE AND ACETAMINOPHEN 5; 325 MG/1; MG/1
1 TABLET ORAL EVERY 6 HOURS PRN
Qty: 50 TABLET | Refills: 0 | Status: SHIPPED | OUTPATIENT
Start: 2019-12-11 | End: 2019-12-11

## 2019-12-11 RX ORDER — AMLODIPINE BESYLATE 5 MG/1
5 TABLET ORAL DAILY
Qty: 30 TABLET | Refills: 0
Start: 2019-12-11 | End: 2019-12-11 | Stop reason: SDUPTHER

## 2019-12-11 RX ORDER — HYDROCHLOROTHIAZIDE 25 MG/1
25 TABLET ORAL DAILY
Qty: 30 TABLET | Refills: 0
Start: 2019-12-11 | End: 2019-12-11 | Stop reason: SDUPTHER

## 2019-12-11 RX ORDER — HYDROCHLOROTHIAZIDE 25 MG/1
25 TABLET ORAL DAILY
Qty: 30 TABLET | Refills: 0 | Status: SHIPPED | OUTPATIENT
Start: 2019-12-11 | End: 2021-08-17 | Stop reason: CLARIF

## 2019-12-11 RX ORDER — HYDROCODONE BITARTRATE AND ACETAMINOPHEN 5; 325 MG/1; MG/1
1 TABLET ORAL EVERY 6 HOURS PRN
Qty: 50 TABLET | Refills: 0 | Status: ON HOLD | OUTPATIENT
Start: 2019-12-11 | End: 2021-03-18 | Stop reason: HOSPADM

## 2019-12-11 RX ORDER — AMLODIPINE BESYLATE 5 MG/1
5 TABLET ORAL DAILY
Qty: 30 TABLET | Refills: 0 | Status: SHIPPED | OUTPATIENT
Start: 2019-12-11 | End: 2021-08-24

## 2019-12-11 RX ORDER — GABAPENTIN 300 MG/1
300 CAPSULE ORAL 3 TIMES DAILY
Qty: 90 CAPSULE | Refills: 3 | Status: SHIPPED | OUTPATIENT
Start: 2019-12-11 | End: 2019-12-11

## 2019-12-11 RX ADMIN — HEPARIN SODIUM 5000 UNITS: 5000 INJECTION, SOLUTION INTRAVENOUS; SUBCUTANEOUS at 02:12

## 2019-12-11 RX ADMIN — HEPARIN SODIUM 5000 UNITS: 5000 INJECTION, SOLUTION INTRAVENOUS; SUBCUTANEOUS at 06:12

## 2019-12-11 RX ADMIN — SENNOSIDES AND DOCUSATE SODIUM 1 TABLET: 8.6; 5 TABLET ORAL at 09:12

## 2019-12-11 RX ADMIN — HYDROCODONE BITARTRATE AND ACETAMINOPHEN 1 TABLET: 5; 325 TABLET ORAL at 09:12

## 2019-12-11 RX ADMIN — AMLODIPINE BESYLATE 5 MG: 5 TABLET ORAL at 09:12

## 2019-12-11 RX ADMIN — GABAPENTIN 300 MG: 300 CAPSULE ORAL at 02:12

## 2019-12-11 RX ADMIN — HYDROCHLOROTHIAZIDE 25 MG: 25 TABLET ORAL at 09:12

## 2019-12-11 RX ADMIN — GABAPENTIN 300 MG: 300 CAPSULE ORAL at 09:12

## 2019-12-11 NOTE — DISCHARGE INSTRUCTIONS
Neurosurgery Patient Information. Please follow ONLY the instructions that are checked below.    Activity Restrictions:  [x]  Return to work will be determined on an individual basis.  [x]  No lifting greater than 5-10 pounds.  [x]  Avoid bending and twisting of the neck more than 45 degrees from neutral position in any direction. Do not lift your arms above your shoulders.  [x]  No driving or operating machinery:  [x]  while taking narcotic pain medications or muscle relaxants.  [x]  No brace needed    Discharge Medication/Follow-up:  [x]  Please refer to discharge medication reconciliation form.  [x]  Prescriptions for appropriate medication will be given upon discharge.  [x]  Take docusate (Colace 100 mg): take one capsule a day as needed for constipation. You can get this over the counter.  [x]  Follow-up appointment:  [x]  4-6 weeks with MD:  [x]  with MRI  [x]  An appointment will be mailed to you.      Call your doctor or go to the Emergency Room for any signs of infection, including: increased redness, drainage, pain, or fever (temperature ?101.5 for 24 hours). Call your doctor or go to the Emergency Room if there are any localized neurological changes; problems with speech, vision, numbness, tingling, weakness, or severe headache; or for other concerns.    Special Instructions:  [x]  No use of tobacco products.  [x]  Diet: Please eat a regular diet as tolerated.    You were given a prescription for your blood pressure medications (amlodipine and hydrochlorothiazide). Please continue to take this medication as prescribed. You will need to follow up with your Primary Care Provider within 4 weeks for continued management of your blood pressure and for medication refills. The Neurosurgery Clinic will not provide further refills for blood pressure medication.         Physicians need 3 days' notice for pain medicine to be refilled. Pain medicine will only be refilled between 8 AM and 5 PM, Monday through Friday,  due to Food and Drug Administration regulation of documentation.        [facility] Neurosurgery Office: [phone]              [facility] Neurosurgery Office: [phone]   [place] Neurosurgery Office: [phone]

## 2019-12-11 NOTE — PLAN OF CARE
Problem: Fall Injury Risk  Goal: Absence of Fall and Fall-Related Injury  Outcome: Ongoing, Not Progressing     Problem: Adult Inpatient Plan of Care  Goal: Plan of Care Review  Outcome: Ongoing, Not Progressing     POC reviewed with pt.  Pt verbalized understanding.  VSS. Questions and concerns addressed.   Pt still exhibiting numbness and tingling in bilateral upper extremities, with little relief. Fall/safety precautions maintained throughout shift.  Bed locked and in lowest position.  Call light within reach.  PT will be discharged today.   Will continue to monitor.

## 2019-12-11 NOTE — ASSESSMENT & PLAN NOTE
59yom with no significant known pmh presents with complaint of 2-4 weeks of progressive bilateral hand parasthesias and progressive gait instability when walking. MRI C spine showed concern for C5-6 discitis with cord signal change as well as C3 central and foraminal stenosis.     -Patient neurologically stable on exam. No weakness or loss of sensation, no dermatomal distribution of paresthesias.  -No acute neurosurgical intervention indicated at this time  -Pain: Improved. Continue Norco 5 mg q 4 hours PRN and Neurontin 300 mg TID for UE paresthesias.  -ID: Afebrile. Cxs from IR aspiration 12/9 NGTD. BCx from 12/7 growing COAGULASE-NEGATIVE STAPHYLOCOCCUS SPECIES, concern for contaminant. BCxs 12/9 remain NGTD. ESR and CRP < 2 and 1.6 on 12/7, repeat today 12 and 2.4 (WNL). ID consulted, appreciate input. Low suspicion for discitis given labs and as long as Cx's remain negative, recommend continuing to monitor off Abx. Will continue to follow Cx's and notify pt if become positive.  -HTN: SBP well controlled. Continue Amlodipine 5 mg daily and HCTZ 25 mg daily. Pt reports he had just been prescribed these medications and had not yet started them at home, concerned he may not be able to afford Rx at this time. Will supply 1 month Rx with cost assistance, explained he will need to f/u with PCP for further refills, pt voiced understanding.  -DVT prophylaxis: ALTON's, SCD's, SQH while inpatient  -Bowel regimen: senna and miralax OTC as needed  -Atelectasis prevention: IS hourly while awake, OOB > 6 hours per day  -Follow up with Dr. North in Neurosurgery clinic in 4 weeks with repeat MRI C-spine w/wo contrast  -Pt has no insurance and does not qualify for Medicaid, may not be able to obtain MRI for follow up visit. STEPHANIE spoke to pt about this, advised that he will need to connect with [facility] if unable to pay for services here, information provided by STEPHANIE.  -Discharge instructions given verbally to patient. All of his questions  were answered. He was encouraged to call the clinic with any questions or concerns prior to follow up appt. We discussed signs/symptoms that may warrant returning or calling, including new weakness, numbness, and bowel/bladder dysfunction.      DISPO: Given neurological stability and low suspicion for infectious process, pt medically stable for discharge today. Will need to follow up in about 4 weeks with repeat imaging to ensure stability and to discuss need for possible surgical intervention. May f/u here or at [facility] if unable to return here.    Discussed with attending staff Dr. Mccann

## 2019-12-11 NOTE — SUBJECTIVE & OBJECTIVE
Interval History: NAEON. Repeat BCx's and IR Cx's from 12/9 NGTD. Repeat inflammatory markers done today remain WNL. ID with low suspicion for discitis if cultures remain negative, no Abx recommended unless Cx's begin to grow. Neuro exam stable. Continues to complain of burning/tingling pain in BUEs and heaviness of RLE while walking, unchanged. Able to ambulate unassisted and without instability/falls. Denies neck pain, weakness, loss of sensation, saddle anesthesia, b/b dysfunction, and difficulty swallowing.     Medications:  Continuous Infusions:  Scheduled Meds:   amLODIPine  5 mg Oral Daily    gabapentin  300 mg Oral TID    heparin (porcine)  5,000 Units Subcutaneous Q8H    hydroCHLOROthiazide  25 mg Oral Daily    senna-docusate 8.6-50 mg  1 tablet Oral BID     PRN Meds:acetaminophen, bisacodyl, HYDROcodone-acetaminophen, labetalol, polyethylene glycol     Review of Systems   Constitutional: Negative for chills, diaphoresis and fever.   HENT: Negative for rhinorrhea and trouble swallowing.    Eyes: Negative for photophobia and visual disturbance.   Respiratory: Negative for cough and shortness of breath.    Cardiovascular: Negative for chest pain.   Gastrointestinal: Negative for nausea and vomiting.   Genitourinary: Negative for difficulty urinating and enuresis.   Musculoskeletal: Positive for gait problem and myalgias. Negative for neck pain and neck stiffness.   Neurological: Negative for dizziness, seizures, weakness, light-headedness, numbness and headaches.        Burning sensation throughout BUEs   Psychiatric/Behavioral: Negative for behavioral problems and confusion.     Objective:     Weight: 89.4 kg (197 lb)  Body mass index is 27.48 kg/m².  Vital Signs (Most Recent):  Temp: 98.9 °F (37.2 °C) (12/11/19 1201)  Pulse: 82 (12/11/19 1201)  Resp: 18 (12/11/19 1201)  BP: 130/81 (12/11/19 1201)  SpO2: 97 % (12/11/19 1201) Vital Signs (24h Range):  Temp:  [96.8 °F (36 °C)-98.9 °F (37.2 °C)] 98.9 °F  (37.2 °C)  Pulse:  [70-82] 82  Resp:  [18] 18  SpO2:  [96 %-99 %] 97 %  BP: (113-147)/(59-83) 130/81                          Neurosurgery Physical Exam    General: well developed, well nourished, poor dentition, no distress.   Head: normocephalic, atraumatic  Neck: IR aspiration site is clean and dry. No evidence of edema or erythema. No TTP. No stiffness.  Neurologic: Alert and oriented. Thought content appropriate.  GCS: Motor: 6/Verbal: 5/Eyes: 4 GCS Total: 15  Mental Status: Awake, Alert, Oriented x 4  Language: No aphasia  Speech: No dysarthria  Cranial nerves: face symmetric, tongue midline, CN II-XII grossly intact.   Eyes: pupils equal, round, reactive to light with accomodation, EOMI.   Pulmonary: normal respirations, no signs of respiratory distress  Abdomen: soft, non-distended, not tender to palpation  Skin: Skin is warm, dry and intact.  Sensory: Decreased sensation throughout bilateral upper extremities, R>L, not following a dermatomal distribution. Sensation intact in BLE.   Motor Strength: Moves all extremities spontaneously with good tone.  Full strength upper and lower extremities. No abnormal movements seen.   Hancock's: Negative.  Babinski's: Negative.  Clonus: Negative.  Gait: Shuffling    Significant Labs:  Recent Labs   Lab 12/11/19  0828   *      K 4.4      CO2 32*   BUN 14   CREATININE 1.2   CALCIUM 9.6     Recent Labs   Lab 12/11/19  0828   WBC 4.24   HGB 13.8*   HCT 44.4        No results for input(s): LABPT, INR, APTT in the last 48 hours.  Microbiology Results (last 7 days)     Procedure Component Value Units Date/Time    Blood culture [[id number]] Collected:  12/09/19 1036    Order Status:  Completed Specimen:  Blood Updated:  12/11/19 1212     Blood Culture, Routine No Growth to date      No Growth to date      No Growth to date    Blood culture [[id number]] Collected:  12/09/19 1036    Order Status:  Completed Specimen:  Blood Updated:  12/11/19 1212     Blood  Culture, Routine No Growth to date      No Growth to date      No Growth to date    Blood culture [[id number]]  (Abnormal)  (Susceptibility) Collected:  12/07/19 2008    Order Status:  Completed Specimen:  Blood from Peripheral, Antecubital, Left Updated:  12/11/19 1001     Blood Culture, Routine Gram stain aer bottle: Gram positive cocci in clusters resembling Staph       Results called to and read back by: Natasha Hahn LPN 12/08/2019  13:13      Gram stain rene bottle: Gram positive cocci in clusters resembling Staph       Positive results previously called 12/08/2019  20:02      STAPHYLOCOCCUS HAEMOLYTICUS      STAPHYLOCOCCUS CAPITIS  Susceptibility pending      Culture, Anaerobe [[id number]] Collected:  12/09/19 1759    Order Status:  Completed Specimen:  Body Fluid from Neck Updated:  12/11/19 0949     Anaerobic Culture Culture in progress    Narrative:       Cervical Disk    AFB Culture & Smear [[id number]] Collected:  12/09/19 1759    Order Status:  Completed Specimen:  Body Fluid from Neck Updated:  12/10/19 2127     AFB Culture & Smear Culture in progress     AFB CULTURE STAIN No acid fast bacilli seen.    Narrative:       Cervical Disk    Aerobic culture [[id number]] Collected:  12/09/19 1759    Order Status:  Completed Specimen:  Body Fluid from Neck Updated:  12/10/19 0726     Aerobic Bacterial Culture No growth    Narrative:       Cervical Disk    Gram stain [[id number]] Collected:  12/09/19 1759    Order Status:  Completed Specimen:  Body Fluid from Neck Updated:  12/09/19 2130     Gram Stain Result Rare WBC's      No organisms seen    Narrative:       Cervical Disk    Fungus culture [[id number]] Collected:  12/09/19 1759    Order Status:  Sent Specimen:  Body Fluid from Neck Updated:  12/09/19 1915        All pertinent labs from the last 24 hours have been reviewed.    Significant Diagnostics:  I have reviewed and interpreted all pertinent imaging results/findings within the past 24 hours.

## 2019-12-11 NOTE — PLAN OF CARE
Patient to be discharged home.  The patient was provided with medications paid for by CM.  The patient was instructed to reach out to [facility] to obtain MRI needed in 4 weeks.  CM suggested that patient work on this sooner then later to enable the patient to obtain the MRI.  Family to provide transportation home.  Neurosurgery clinic to schedule follow up appointment.         12/11/19 0742   Final Note   Assessment Type Final Discharge Note   Anticipated Discharge Disposition Home   Hospital Follow Up  Appt(s) scheduled?   (Neurosurgery clinic to schedule follow up appointment.)   Discharge plans and expectations educations in teach back method with documentation complete? Yes

## 2019-12-11 NOTE — DISCHARGE SUMMARY
[facility]  Neurosurgery  Discharge Summary      Patient Name: Valentín Field  MRN: [id number]  Admission Date: 12/7/2019  Hospital Length of Stay: 4 days  Discharge Date and Time:  12/11/2019 4:21 PM  Attending Physician: Dedrick Mccann MD   Discharging Provider: Nimisha Napoles PA-C  Primary Care Provider: Primary Doctor No    HPI:   59yom with no significant known pmh presents with complaint of 2-4 weeks of progressive bilateral hand parasthesias and progressive gait instability when walking. MRI C spine showed C5-6 discitis with cord signal change as well as C3 central and foraminal stenosis. Neurosurgery consulted for evaluation.    * No surgery found *     Hospital Course: 12/7: Admitted to NSGY service for infectious work up. Labs, CT Cervical spine, Flex/ext xrays ordered.   12/8: NAEON. IR consulted for disc biopsy. Plan for aspiration tomorrow. NPO at midnight. BCx from 12/7 growing GPC's. Identification pending.   12/9: NAEON. IR for disc aspiration. No complications. Patient tolerated procedure well. Recovered in ROCU. BCx from 12/7 growing COAGULASE-NEGATIVE STAPHYLOCOCCUS SPECIES, concern for contaminant. Repeat BCxs ordered. ID consulted. Begin Neurontin for BUE numbness.   12/10:  NAEON. IR cxs pending. Pending ID recs. Exam stable. Pain controlled.   12/11: NAEON. Repeat BCx's and IR Cx's from 12/9 NGTD. Repeat inflammatory markers done today remain WNL. ID with low suspicion for discitis if cultures remain negative, no Abx recommended unless Cx's begin to grow. Neuro exam stable. Continues to complain of burning/tingling pain in BUEs and heaviness or RLE while walking, unchanged. Able to ambulate unassisted and without instability/falls. Denies neck pain, weakness, loss of sensation, saddle anesthesia, b/b dysfunction, and difficulty swallowing.     Consults:   Consults (From admission, onward)        Status Ordering Provider     Inpatient consult to Infectious Diseases  Once      Provider:  (Not yet assigned)    Completed MÓNICA GILLESPIE     Inpatient consult to Interventional Radiology  Once     Provider:  (Not yet assigned)    Completed LAURI BLANCO     Inpatient consult to Neurosurgery  Once     Provider:  (Not yet assigned)    Acknowledged NARESH KULKARNI          Significant Diagnostic Studies: Labs:   BMP:   Recent Labs   Lab 12/11/19  0828   *      K 4.4      CO2 32*   BUN 14   CREATININE 1.2   CALCIUM 9.6   , CMP   Recent Labs   Lab 12/11/19  0828      K 4.4      CO2 32*   *   BUN 14   CREATININE 1.2   CALCIUM 9.6   ANIONGAP 6*   ESTGFRAFRICA >60.0   EGFRNONAA >60.0   , CBC   Recent Labs   Lab 12/11/19  0828   WBC 4.24   HGB 13.8*   HCT 44.4      , INR   Lab Results   Component Value Date    INR 1.0 12/07/2019   , A1C: No results for input(s): HGBA1C in the last 4320 hours. and All labs within the past 24 hours have been reviewed  Microbiology:   Blood Culture   Lab Results   Component Value Date    LABBLOO No Growth to date 12/09/2019    LABBLOO No Growth to date 12/09/2019    LABBLOO No Growth to date 12/09/2019    LABBLOO No Growth to date 12/09/2019    LABBLOO No Growth to date 12/09/2019    LABBLOO No Growth to date 12/09/2019    and IR Aspiration Culture (C5-6 Cervical Disc) 12/09/2019: No growth to date    Radiology:   MRI Brain, CT Head   MRI Cervical Spine, CT Cervical Spine, X-Ray Cervical Spine    Pending Diagnostic Studies:     None        Final Active Diagnoses:    Diagnosis Date Noted POA    PRINCIPAL PROBLEM:  Cervical spinal stenosis [M48.02] 12/07/2019 Yes    Paresthesia and pain of both upper extremities [R20.2, M79.601, M79.602]  Yes    Essential hypertension [I10]  Yes    Acute neck pain [M54.2]  Yes    Positive blood culture [R78.81]  Yes    Discitis of cervical region [M46.42]  Yes    Abnormal gait [R26.9] 12/07/2019 Yes      Problems Resolved During this Admission:      Discharged Condition:  stable    Disposition: Home or Self Care    Follow Up:  Follow-up Information     [facility] 7th Fl In 1 month.    Specialty:  Neurosurgery  Why:  With repeat MRI Cervical Spine  Contact information:  [facility]  [facility] [address] [phone]  Additional information:  7th Floor               Patient Instructions:      Notify your health care provider if you experience any of the following:  temperature >100.4     Notify your health care provider if you experience any of the following:  persistent nausea and vomiting or diarrhea     Notify your health care provider if you experience any of the following:  severe uncontrolled pain     Notify your health care provider if you experience any of the following:  redness, tenderness, or signs of infection (pain, swelling, redness, odor or green/yellow discharge around incision site)     Notify your health care provider if you experience any of the following:  difficulty breathing or increased cough     Notify your health care provider if you experience any of the following:  severe persistent headache     Notify your health care provider if you experience any of the following:  worsening rash     Notify your health care provider if you experience any of the following:  persistent dizziness, light-headedness, or visual disturbances     Notify your health care provider if you experience any of the following:  increased confusion or weakness     Activity as tolerated     Medications:  Reconciled Home Medications:      Medication List      START taking these medications    amLODIPine 5 MG tablet  Commonly known as:  NORVASC  Take 1 tablet (5 mg total) by mouth once daily.     gabapentin 300 MG capsule  Commonly known as:  NEURONTIN  Take 1 capsule (300 mg total) by mouth 3 (three) times daily.     hydroCHLOROthiazide 25 MG tablet  Commonly known as:  HYDRODIURIL  Take 1 tablet (25 mg total) by mouth once daily.     HYDROcodone-acetaminophen  5-325 mg per tablet  Commonly known as:  NORCO  Take 1 tablet by mouth every 6 (six) hours as needed for Pain.        CONTINUE taking these medications    naproxen 500 MG tablet  Commonly known as:  NAPROSYN  Take 1 tablet (500 mg total) by mouth 2 (two) times daily with meals.            Nimisha Napoles PA-C  Neurosurgery  [facility]

## 2019-12-11 NOTE — PLAN OF CARE
POC reviewed with pt.  Pt verbalized understanding.  Questions and concerns addressed.  Pt remains AOx4.  Pt still noting numbness and tingling in both upper extremities, with no relief overnight.  Fall/safety precautions maintained throughout shift.  Bed locked and in lowest position.  Call light within reach.  See flowsheet for full assessment and vital signs.  Will continue to monitor.

## 2019-12-12 LAB
BACTERIA BLD CULT: ABNORMAL
BACTERIA SPEC AEROBE CULT: NO GROWTH

## 2019-12-14 LAB
BACTERIA BLD CULT: NORMAL
BACTERIA BLD CULT: NORMAL

## 2019-12-16 LAB — BACTERIA SPEC ANAEROBE CULT: NORMAL

## 2020-01-03 ENCOUNTER — TELEPHONE (OUTPATIENT)
Dept: NEUROSURGERY | Facility: CLINIC | Age: 60
End: 2020-01-03

## 2020-01-03 NOTE — TELEPHONE ENCOUNTER
"Patient has been called follow-up his follow-up from the hospital. Patient states" he still does not have insurance and he has a follow-up at [facility] Friday January 10th." Patient will send his brother to pick-up a copy of his records.  "

## 2020-01-03 NOTE — TELEPHONE ENCOUNTER
----- Message from Jenny Taylor MA sent at 12/11/2019  2:07 PM CST -----      ----- Message -----  From: Nimisha Napoles PA-C  Sent: 12/11/2019   1:55 PM CST  To: Jenny Taylor MA    Patient was seen in the hospital during Dr. Mccann's call for cervical myelopathy and possible C5-6 discitis, had an IR disc biopsy and cultures were negative. He was discharged with no further intervention. Dr. Mccann would like him to see Dr. North in 4 weeks, please schedule this if able. Pt will need an MRI C-spine w/wo contrast (ordered).     He has no insurance and does not qualify for Medicaid. CM is working on finding out if/how he will be able to get the MRI, so you are aware.    Thank you,  Jordana

## 2020-01-15 LAB — FUNGUS SPEC CULT: NORMAL

## 2020-02-10 LAB
ACID FAST MOD KINY STN SPEC: NORMAL
MYCOBACTERIUM SPEC QL CULT: NORMAL

## 2020-09-01 PROBLEM — Z74.09 IMPAIRED MOBILITY AND ADLS: Status: ACTIVE | Noted: 2020-09-01

## 2020-09-01 PROBLEM — R53.1 WEAKNESS: Status: ACTIVE | Noted: 2020-09-01

## 2020-09-01 PROBLEM — Z78.9 IMPAIRED MOBILITY AND ADLS: Status: ACTIVE | Noted: 2020-09-01

## 2021-03-09 ENCOUNTER — HOSPITAL ENCOUNTER (INPATIENT)
Facility: HOSPITAL | Age: 61
LOS: 12 days | Discharge: HOME | DRG: 472 | End: 2021-03-21
Attending: EMERGENCY MEDICINE | Admitting: ORTHOPAEDIC SURGERY
Payer: MEDICAID

## 2021-03-09 DIAGNOSIS — R29.898 UPPER EXTREMITY WEAKNESS: Primary | ICD-10-CM

## 2021-03-09 DIAGNOSIS — G95.9 CERVICAL MYELOPATHY: ICD-10-CM

## 2021-03-09 LAB
BASOPHILS # BLD AUTO: 0.06 K/UL (ref 0–0.2)
BASOPHILS NFR BLD: 1.1 % (ref 0–1.9)
BUN SERPL-MCNC: 19 MG/DL (ref 6–30)
CHLORIDE SERPL-SCNC: 104 MMOL/L (ref 95–110)
CREAT SERPL-MCNC: 1 MG/DL (ref 0.5–1.4)
CRP SERPL-MCNC: 6.7 MG/L (ref 0–8.2)
CTP QC/QA: YES
DIFFERENTIAL METHOD: ABNORMAL
EOSINOPHIL # BLD AUTO: 0.2 K/UL (ref 0–0.5)
EOSINOPHIL NFR BLD: 3.5 % (ref 0–8)
ERYTHROCYTE [DISTWIDTH] IN BLOOD BY AUTOMATED COUNT: 12.2 % (ref 11.5–14.5)
ERYTHROCYTE [SEDIMENTATION RATE] IN BLOOD BY WESTERGREN METHOD: 48 MM/HR (ref 0–23)
GLUCOSE SERPL-MCNC: 91 MG/DL (ref 70–110)
HCT VFR BLD AUTO: 37.4 % (ref 40–54)
HCT VFR BLD CALC: 38 %PCV (ref 36–54)
HGB BLD-MCNC: 11.9 G/DL (ref 14–18)
IMM GRANULOCYTES # BLD AUTO: 0.02 K/UL (ref 0–0.04)
IMM GRANULOCYTES NFR BLD AUTO: 0.4 % (ref 0–0.5)
LYMPHOCYTES # BLD AUTO: 1.5 K/UL (ref 1–4.8)
LYMPHOCYTES NFR BLD: 26.6 % (ref 18–48)
MCH RBC QN AUTO: 29.7 PG (ref 27–31)
MCHC RBC AUTO-ENTMCNC: 31.8 G/DL (ref 32–36)
MCV RBC AUTO: 93 FL (ref 82–98)
MONOCYTES # BLD AUTO: 0.6 K/UL (ref 0.3–1)
MONOCYTES NFR BLD: 11.3 % (ref 4–15)
NEUTROPHILS # BLD AUTO: 3.2 K/UL (ref 1.8–7.7)
NEUTROPHILS NFR BLD: 57.1 % (ref 38–73)
NRBC BLD-RTO: 0 /100 WBC
PLATELET # BLD AUTO: 347 K/UL (ref 150–350)
PMV BLD AUTO: 9.7 FL (ref 9.2–12.9)
POC IONIZED CALCIUM: 1.2 MMOL/L (ref 1.06–1.42)
POC TCO2 (MEASURED): 32 MMOL/L (ref 23–29)
POTASSIUM BLD-SCNC: 4.5 MMOL/L (ref 3.5–5.1)
RBC # BLD AUTO: 4.01 M/UL (ref 4.6–6.2)
SAMPLE: ABNORMAL
SARS-COV-2 RDRP RESP QL NAA+PROBE: NEGATIVE
SODIUM BLD-SCNC: 138 MMOL/L (ref 136–145)
WBC # BLD AUTO: 5.67 K/UL (ref 3.9–12.7)

## 2021-03-09 PROCEDURE — U0002 COVID-19 LAB TEST NON-CDC: HCPCS | Performed by: STUDENT IN AN ORGANIZED HEALTH CARE EDUCATION/TRAINING PROGRAM

## 2021-03-09 PROCEDURE — 80047 BASIC METABLC PNL IONIZED CA: CPT

## 2021-03-09 PROCEDURE — 11000001 HC ACUTE MED/SURG PRIVATE ROOM

## 2021-03-09 PROCEDURE — 99284 PR EMERGENCY DEPT VISIT,LEVEL IV: ICD-10-PCS | Mod: CS,,, | Performed by: PHYSICIAN ASSISTANT

## 2021-03-09 PROCEDURE — 25000003 PHARM REV CODE 250: Performed by: PHYSICIAN ASSISTANT

## 2021-03-09 PROCEDURE — 86140 C-REACTIVE PROTEIN: CPT | Performed by: PHYSICIAN ASSISTANT

## 2021-03-09 PROCEDURE — A9585 GADOBUTROL INJECTION: HCPCS | Performed by: EMERGENCY MEDICINE

## 2021-03-09 PROCEDURE — 25000003 PHARM REV CODE 250: Performed by: STUDENT IN AN ORGANIZED HEALTH CARE EDUCATION/TRAINING PROGRAM

## 2021-03-09 PROCEDURE — 99285 EMERGENCY DEPT VISIT HI MDM: CPT | Mod: 25

## 2021-03-09 PROCEDURE — 85025 COMPLETE CBC W/AUTO DIFF WBC: CPT | Performed by: PHYSICIAN ASSISTANT

## 2021-03-09 PROCEDURE — 99284 EMERGENCY DEPT VISIT MOD MDM: CPT | Mod: CS,,, | Performed by: PHYSICIAN ASSISTANT

## 2021-03-09 PROCEDURE — 85652 RBC SED RATE AUTOMATED: CPT | Performed by: PHYSICIAN ASSISTANT

## 2021-03-09 PROCEDURE — 25500020 PHARM REV CODE 255: Performed by: EMERGENCY MEDICINE

## 2021-03-09 RX ORDER — CELECOXIB 200 MG/1
200 CAPSULE ORAL DAILY
Status: DISCONTINUED | OUTPATIENT
Start: 2021-03-10 | End: 2021-03-21 | Stop reason: HOSPADM

## 2021-03-09 RX ORDER — ONDANSETRON 8 MG/1
8 TABLET, ORALLY DISINTEGRATING ORAL EVERY 8 HOURS PRN
Status: DISCONTINUED | OUTPATIENT
Start: 2021-03-09 | End: 2021-03-21 | Stop reason: HOSPADM

## 2021-03-09 RX ORDER — GADOBUTROL 604.72 MG/ML
10 INJECTION INTRAVENOUS
Status: COMPLETED | OUTPATIENT
Start: 2021-03-09 | End: 2021-03-09

## 2021-03-09 RX ORDER — GABAPENTIN 100 MG/1
300 CAPSULE ORAL 3 TIMES DAILY
Status: DISCONTINUED | OUTPATIENT
Start: 2021-03-09 | End: 2021-03-21 | Stop reason: HOSPADM

## 2021-03-09 RX ORDER — OXYCODONE HYDROCHLORIDE 10 MG/1
10 TABLET ORAL EVERY 4 HOURS PRN
Status: DISCONTINUED | OUTPATIENT
Start: 2021-03-09 | End: 2021-03-21 | Stop reason: HOSPADM

## 2021-03-09 RX ORDER — ACETAMINOPHEN 325 MG/1
650 TABLET ORAL
Status: DISCONTINUED | OUTPATIENT
Start: 2021-03-09 | End: 2021-03-21 | Stop reason: HOSPADM

## 2021-03-09 RX ORDER — METHOCARBAMOL 750 MG/1
750 TABLET, FILM COATED ORAL 3 TIMES DAILY
Status: DISCONTINUED | OUTPATIENT
Start: 2021-03-09 | End: 2021-03-21 | Stop reason: HOSPADM

## 2021-03-09 RX ORDER — HYDROCODONE BITARTRATE AND ACETAMINOPHEN 5; 325 MG/1; MG/1
1 TABLET ORAL
Status: COMPLETED | OUTPATIENT
Start: 2021-03-09 | End: 2021-03-09

## 2021-03-09 RX ORDER — TALC
6 POWDER (GRAM) TOPICAL NIGHTLY PRN
Status: DISCONTINUED | OUTPATIENT
Start: 2021-03-09 | End: 2021-03-21 | Stop reason: HOSPADM

## 2021-03-09 RX ORDER — SODIUM CHLORIDE 0.9 % (FLUSH) 0.9 %
10 SYRINGE (ML) INJECTION
Status: DISCONTINUED | OUTPATIENT
Start: 2021-03-09 | End: 2021-03-21

## 2021-03-09 RX ORDER — HYDROMORPHONE HYDROCHLORIDE 1 MG/ML
0.5 INJECTION, SOLUTION INTRAMUSCULAR; INTRAVENOUS; SUBCUTANEOUS
Status: DISCONTINUED | OUTPATIENT
Start: 2021-03-09 | End: 2021-03-21 | Stop reason: HOSPADM

## 2021-03-09 RX ORDER — LIDOCAINE HYDROCHLORIDE 10 MG/ML
1 INJECTION, SOLUTION EPIDURAL; INFILTRATION; INTRACAUDAL; PERINEURAL ONCE
Status: DISCONTINUED | OUTPATIENT
Start: 2021-03-09 | End: 2021-03-21 | Stop reason: HOSPADM

## 2021-03-09 RX ORDER — OXYCODONE HYDROCHLORIDE 5 MG/1
5 TABLET ORAL EVERY 4 HOURS PRN
Status: DISCONTINUED | OUTPATIENT
Start: 2021-03-09 | End: 2021-03-21 | Stop reason: HOSPADM

## 2021-03-09 RX ADMIN — METHOCARBAMOL 750 MG: 750 TABLET ORAL at 11:03

## 2021-03-09 RX ADMIN — GABAPENTIN 300 MG: 100 CAPSULE ORAL at 10:03

## 2021-03-09 RX ADMIN — ACETAMINOPHEN 650 MG: 325 TABLET ORAL at 10:03

## 2021-03-09 RX ADMIN — GADOBUTROL 10 ML: 604.72 INJECTION INTRAVENOUS at 06:03

## 2021-03-09 RX ADMIN — OXYCODONE HYDROCHLORIDE 10 MG: 10 TABLET ORAL at 11:03

## 2021-03-09 RX ADMIN — HYDROCODONE BITARTRATE AND ACETAMINOPHEN 1 TABLET: 5; 325 TABLET ORAL at 04:03

## 2021-03-10 PROCEDURE — 99223 1ST HOSP IP/OBS HIGH 75: CPT | Mod: ,,, | Performed by: ORTHOPAEDIC SURGERY

## 2021-03-10 PROCEDURE — 63600175 PHARM REV CODE 636 W HCPCS: Performed by: STUDENT IN AN ORGANIZED HEALTH CARE EDUCATION/TRAINING PROGRAM

## 2021-03-10 PROCEDURE — 11000001 HC ACUTE MED/SURG PRIVATE ROOM

## 2021-03-10 PROCEDURE — 25000003 PHARM REV CODE 250: Performed by: STUDENT IN AN ORGANIZED HEALTH CARE EDUCATION/TRAINING PROGRAM

## 2021-03-10 PROCEDURE — 99223 PR INITIAL HOSPITAL CARE,LEVL III: ICD-10-PCS | Mod: ,,, | Performed by: ORTHOPAEDIC SURGERY

## 2021-03-10 RX ADMIN — ACETAMINOPHEN 650 MG: 325 TABLET ORAL at 11:03

## 2021-03-10 RX ADMIN — HYDROMORPHONE HYDROCHLORIDE 0.5 MG: 1 INJECTION, SOLUTION INTRAMUSCULAR; INTRAVENOUS; SUBCUTANEOUS at 09:03

## 2021-03-10 RX ADMIN — OXYCODONE HYDROCHLORIDE 10 MG: 10 TABLET ORAL at 09:03

## 2021-03-10 RX ADMIN — HYDROMORPHONE HYDROCHLORIDE 0.5 MG: 1 INJECTION, SOLUTION INTRAMUSCULAR; INTRAVENOUS; SUBCUTANEOUS at 06:03

## 2021-03-10 RX ADMIN — METHOCARBAMOL 750 MG: 750 TABLET ORAL at 04:03

## 2021-03-10 RX ADMIN — MELATONIN TAB 3 MG 6 MG: 3 TAB at 09:03

## 2021-03-10 RX ADMIN — METHOCARBAMOL 750 MG: 750 TABLET ORAL at 09:03

## 2021-03-10 RX ADMIN — GABAPENTIN 300 MG: 100 CAPSULE ORAL at 09:03

## 2021-03-10 RX ADMIN — ACETAMINOPHEN 650 MG: 325 TABLET ORAL at 04:03

## 2021-03-10 RX ADMIN — GABAPENTIN 300 MG: 100 CAPSULE ORAL at 04:03

## 2021-03-10 RX ADMIN — OXYCODONE 5 MG: 5 TABLET ORAL at 06:03

## 2021-03-10 RX ADMIN — ACETAMINOPHEN 650 MG: 325 TABLET ORAL at 09:03

## 2021-03-10 RX ADMIN — ACETAMINOPHEN 650 MG: 325 TABLET ORAL at 06:03

## 2021-03-10 RX ADMIN — CELECOXIB 200 MG: 200 CAPSULE ORAL at 09:03

## 2021-03-10 RX ADMIN — OXYCODONE HYDROCHLORIDE 15 MG: 10 TABLET ORAL at 11:03

## 2021-03-10 RX ADMIN — OXYCODONE HYDROCHLORIDE 15 MG: 10 TABLET ORAL at 04:03

## 2021-03-11 PROCEDURE — 11000001 HC ACUTE MED/SURG PRIVATE ROOM

## 2021-03-11 PROCEDURE — 63600175 PHARM REV CODE 636 W HCPCS: Performed by: STUDENT IN AN ORGANIZED HEALTH CARE EDUCATION/TRAINING PROGRAM

## 2021-03-11 PROCEDURE — 86235 NUCLEAR ANTIGEN ANTIBODY: CPT | Mod: 59 | Performed by: PSYCHIATRY & NEUROLOGY

## 2021-03-11 PROCEDURE — 82164 ANGIOTENSIN I ENZYME TEST: CPT | Performed by: PSYCHIATRY & NEUROLOGY

## 2021-03-11 PROCEDURE — 99232 PR SUBSEQUENT HOSPITAL CARE,LEVL II: ICD-10-PCS | Mod: ,,, | Performed by: ORTHOPAEDIC SURGERY

## 2021-03-11 PROCEDURE — 83520 IMMUNOASSAY QUANT NOS NONAB: CPT | Performed by: PSYCHIATRY & NEUROLOGY

## 2021-03-11 PROCEDURE — 99232 SBSQ HOSP IP/OBS MODERATE 35: CPT | Mod: ,,, | Performed by: ORTHOPAEDIC SURGERY

## 2021-03-11 PROCEDURE — 86039 ANTINUCLEAR ANTIBODIES (ANA): CPT | Performed by: PSYCHIATRY & NEUROLOGY

## 2021-03-11 PROCEDURE — 86255 FLUORESCENT ANTIBODY SCREEN: CPT | Performed by: PSYCHIATRY & NEUROLOGY

## 2021-03-11 PROCEDURE — 86235 NUCLEAR ANTIGEN ANTIBODY: CPT | Performed by: PSYCHIATRY & NEUROLOGY

## 2021-03-11 PROCEDURE — 86038 ANTINUCLEAR ANTIBODIES: CPT | Performed by: PSYCHIATRY & NEUROLOGY

## 2021-03-11 PROCEDURE — 25000003 PHARM REV CODE 250: Performed by: STUDENT IN AN ORGANIZED HEALTH CARE EDUCATION/TRAINING PROGRAM

## 2021-03-11 RX ADMIN — METHOCARBAMOL 750 MG: 750 TABLET ORAL at 02:03

## 2021-03-11 RX ADMIN — ACETAMINOPHEN 650 MG: 325 TABLET ORAL at 10:03

## 2021-03-11 RX ADMIN — ACETAMINOPHEN 650 MG: 325 TABLET ORAL at 06:03

## 2021-03-11 RX ADMIN — OXYCODONE HYDROCHLORIDE 15 MG: 10 TABLET ORAL at 08:03

## 2021-03-11 RX ADMIN — OXYCODONE HYDROCHLORIDE 15 MG: 10 TABLET ORAL at 02:03

## 2021-03-11 RX ADMIN — HYDROMORPHONE HYDROCHLORIDE 0.5 MG: 1 INJECTION, SOLUTION INTRAMUSCULAR; INTRAVENOUS; SUBCUTANEOUS at 08:03

## 2021-03-11 RX ADMIN — GABAPENTIN 300 MG: 100 CAPSULE ORAL at 09:03

## 2021-03-11 RX ADMIN — GABAPENTIN 300 MG: 100 CAPSULE ORAL at 02:03

## 2021-03-11 RX ADMIN — GABAPENTIN 300 MG: 100 CAPSULE ORAL at 08:03

## 2021-03-11 RX ADMIN — OXYCODONE HYDROCHLORIDE 10 MG: 10 TABLET ORAL at 06:03

## 2021-03-11 RX ADMIN — CELECOXIB 200 MG: 200 CAPSULE ORAL at 09:03

## 2021-03-11 RX ADMIN — METHOCARBAMOL 750 MG: 750 TABLET ORAL at 08:03

## 2021-03-11 RX ADMIN — ACETAMINOPHEN 650 MG: 325 TABLET ORAL at 09:03

## 2021-03-11 RX ADMIN — ACETAMINOPHEN 650 MG: 325 TABLET ORAL at 04:03

## 2021-03-11 RX ADMIN — METHOCARBAMOL 750 MG: 750 TABLET ORAL at 09:03

## 2021-03-12 PROCEDURE — 25000003 PHARM REV CODE 250: Performed by: STUDENT IN AN ORGANIZED HEALTH CARE EDUCATION/TRAINING PROGRAM

## 2021-03-12 PROCEDURE — 63600175 PHARM REV CODE 636 W HCPCS: Performed by: STUDENT IN AN ORGANIZED HEALTH CARE EDUCATION/TRAINING PROGRAM

## 2021-03-12 PROCEDURE — 99232 PR SUBSEQUENT HOSPITAL CARE,LEVL II: ICD-10-PCS | Mod: ,,, | Performed by: ORTHOPAEDIC SURGERY

## 2021-03-12 PROCEDURE — 11000001 HC ACUTE MED/SURG PRIVATE ROOM

## 2021-03-12 PROCEDURE — 99232 SBSQ HOSP IP/OBS MODERATE 35: CPT | Mod: ,,, | Performed by: ORTHOPAEDIC SURGERY

## 2021-03-12 RX ORDER — POLYETHYLENE GLYCOL 3350 17 G/17G
17 POWDER, FOR SOLUTION ORAL DAILY
Status: DISCONTINUED | OUTPATIENT
Start: 2021-03-12 | End: 2021-03-21 | Stop reason: HOSPADM

## 2021-03-12 RX ORDER — AMLODIPINE BESYLATE 5 MG/1
5 TABLET ORAL DAILY
Status: DISCONTINUED | OUTPATIENT
Start: 2021-03-12 | End: 2021-03-21 | Stop reason: HOSPADM

## 2021-03-12 RX ORDER — HEPARIN SODIUM 5000 [USP'U]/ML
5000 INJECTION, SOLUTION INTRAVENOUS; SUBCUTANEOUS EVERY 8 HOURS
Status: DISCONTINUED | OUTPATIENT
Start: 2021-03-12 | End: 2021-03-12

## 2021-03-12 RX ADMIN — OXYCODONE HYDROCHLORIDE 15 MG: 10 TABLET ORAL at 07:03

## 2021-03-12 RX ADMIN — METHOCARBAMOL 750 MG: 750 TABLET ORAL at 08:03

## 2021-03-12 RX ADMIN — OXYCODONE HYDROCHLORIDE 15 MG: 10 TABLET ORAL at 06:03

## 2021-03-12 RX ADMIN — ACETAMINOPHEN 650 MG: 325 TABLET ORAL at 09:03

## 2021-03-12 RX ADMIN — GABAPENTIN 300 MG: 100 CAPSULE ORAL at 08:03

## 2021-03-12 RX ADMIN — OXYCODONE HYDROCHLORIDE 15 MG: 10 TABLET ORAL at 10:03

## 2021-03-12 RX ADMIN — POLYETHYLENE GLYCOL 3350 17 G: 17 POWDER, FOR SOLUTION ORAL at 01:03

## 2021-03-12 RX ADMIN — HYDROMORPHONE HYDROCHLORIDE 0.5 MG: 1 INJECTION, SOLUTION INTRAMUSCULAR; INTRAVENOUS; SUBCUTANEOUS at 03:03

## 2021-03-12 RX ADMIN — GABAPENTIN 300 MG: 100 CAPSULE ORAL at 03:03

## 2021-03-12 RX ADMIN — METHOCARBAMOL 750 MG: 750 TABLET ORAL at 09:03

## 2021-03-12 RX ADMIN — OXYCODONE HYDROCHLORIDE 15 MG: 10 TABLET ORAL at 04:03

## 2021-03-12 RX ADMIN — GABAPENTIN 300 MG: 100 CAPSULE ORAL at 09:03

## 2021-03-12 RX ADMIN — ACETAMINOPHEN 650 MG: 325 TABLET ORAL at 04:03

## 2021-03-12 RX ADMIN — ACETAMINOPHEN 650 MG: 325 TABLET ORAL at 05:03

## 2021-03-12 RX ADMIN — HYDROMORPHONE HYDROCHLORIDE 0.5 MG: 1 INJECTION, SOLUTION INTRAMUSCULAR; INTRAVENOUS; SUBCUTANEOUS at 11:03

## 2021-03-12 RX ADMIN — OXYCODONE HYDROCHLORIDE 15 MG: 10 TABLET ORAL at 02:03

## 2021-03-12 RX ADMIN — METHOCARBAMOL 750 MG: 750 TABLET ORAL at 03:03

## 2021-03-12 RX ADMIN — AMLODIPINE BESYLATE 5 MG: 5 TABLET ORAL at 05:03

## 2021-03-12 RX ADMIN — HYDROMORPHONE HYDROCHLORIDE 0.5 MG: 1 INJECTION, SOLUTION INTRAMUSCULAR; INTRAVENOUS; SUBCUTANEOUS at 07:03

## 2021-03-12 RX ADMIN — CELECOXIB 200 MG: 200 CAPSULE ORAL at 08:03

## 2021-03-13 LAB
ALBUMIN SERPL BCP-MCNC: 3.7 G/DL (ref 3.5–5.2)
ALP SERPL-CCNC: 97 U/L (ref 55–135)
ALT SERPL W/O P-5'-P-CCNC: 66 U/L (ref 10–44)
ANA PATTERN 1: NORMAL
ANA SER QL IF: POSITIVE
ANA TITR SER IF: NORMAL {TITER}
ANION GAP SERPL CALC-SCNC: 7 MMOL/L (ref 8–16)
AST SERPL-CCNC: 23 U/L (ref 10–40)
BASOPHILS # BLD AUTO: 0.05 K/UL (ref 0–0.2)
BASOPHILS NFR BLD: 1 % (ref 0–1.9)
BILIRUB SERPL-MCNC: 0.4 MG/DL (ref 0.1–1)
BUN SERPL-MCNC: 11 MG/DL (ref 6–20)
CALCIUM SERPL-MCNC: 9.4 MG/DL (ref 8.7–10.5)
CHLORIDE SERPL-SCNC: 101 MMOL/L (ref 95–110)
CO2 SERPL-SCNC: 28 MMOL/L (ref 23–29)
CREAT SERPL-MCNC: 0.9 MG/DL (ref 0.5–1.4)
DIFFERENTIAL METHOD: ABNORMAL
EOSINOPHIL # BLD AUTO: 0.3 K/UL (ref 0–0.5)
EOSINOPHIL NFR BLD: 5.5 % (ref 0–8)
ERYTHROCYTE [DISTWIDTH] IN BLOOD BY AUTOMATED COUNT: 11.9 % (ref 11.5–14.5)
EST. GFR  (AFRICAN AMERICAN): >60 ML/MIN/1.73 M^2
EST. GFR  (NON AFRICAN AMERICAN): >60 ML/MIN/1.73 M^2
GLUCOSE SERPL-MCNC: 107 MG/DL (ref 70–110)
HCT VFR BLD AUTO: 36.5 % (ref 40–54)
HGB BLD-MCNC: 11.6 G/DL (ref 14–18)
IMM GRANULOCYTES # BLD AUTO: 0.01 K/UL (ref 0–0.04)
IMM GRANULOCYTES NFR BLD AUTO: 0.2 % (ref 0–0.5)
LYMPHOCYTES # BLD AUTO: 1.7 K/UL (ref 1–4.8)
LYMPHOCYTES NFR BLD: 33.4 % (ref 18–48)
MCH RBC QN AUTO: 29.2 PG (ref 27–31)
MCHC RBC AUTO-ENTMCNC: 31.8 G/DL (ref 32–36)
MCV RBC AUTO: 92 FL (ref 82–98)
MONOCYTES # BLD AUTO: 0.6 K/UL (ref 0.3–1)
MONOCYTES NFR BLD: 12.2 % (ref 4–15)
NEUTROPHILS # BLD AUTO: 2.4 K/UL (ref 1.8–7.7)
NEUTROPHILS NFR BLD: 47.7 % (ref 38–73)
NRBC BLD-RTO: 0 /100 WBC
PLATELET # BLD AUTO: 342 K/UL (ref 150–350)
PMV BLD AUTO: 9.6 FL (ref 9.2–12.9)
POTASSIUM SERPL-SCNC: 4 MMOL/L (ref 3.5–5.1)
PROT SERPL-MCNC: 7.4 G/DL (ref 6–8.4)
RBC # BLD AUTO: 3.97 M/UL (ref 4.6–6.2)
SODIUM SERPL-SCNC: 136 MMOL/L (ref 136–145)
WBC # BLD AUTO: 5.09 K/UL (ref 3.9–12.7)

## 2021-03-13 PROCEDURE — 80053 COMPREHEN METABOLIC PANEL: CPT | Performed by: STUDENT IN AN ORGANIZED HEALTH CARE EDUCATION/TRAINING PROGRAM

## 2021-03-13 PROCEDURE — 25000003 PHARM REV CODE 250: Performed by: STUDENT IN AN ORGANIZED HEALTH CARE EDUCATION/TRAINING PROGRAM

## 2021-03-13 PROCEDURE — 63600175 PHARM REV CODE 636 W HCPCS: Performed by: STUDENT IN AN ORGANIZED HEALTH CARE EDUCATION/TRAINING PROGRAM

## 2021-03-13 PROCEDURE — 36415 COLL VENOUS BLD VENIPUNCTURE: CPT | Performed by: STUDENT IN AN ORGANIZED HEALTH CARE EDUCATION/TRAINING PROGRAM

## 2021-03-13 PROCEDURE — 11000001 HC ACUTE MED/SURG PRIVATE ROOM

## 2021-03-13 PROCEDURE — 85025 COMPLETE CBC W/AUTO DIFF WBC: CPT | Performed by: STUDENT IN AN ORGANIZED HEALTH CARE EDUCATION/TRAINING PROGRAM

## 2021-03-13 RX ADMIN — HYDROMORPHONE HYDROCHLORIDE 0.5 MG: 1 INJECTION, SOLUTION INTRAMUSCULAR; INTRAVENOUS; SUBCUTANEOUS at 05:03

## 2021-03-13 RX ADMIN — OXYCODONE HYDROCHLORIDE 15 MG: 10 TABLET ORAL at 06:03

## 2021-03-13 RX ADMIN — METHOCARBAMOL 750 MG: 750 TABLET ORAL at 08:03

## 2021-03-13 RX ADMIN — AMLODIPINE BESYLATE 5 MG: 5 TABLET ORAL at 09:03

## 2021-03-13 RX ADMIN — OXYCODONE HYDROCHLORIDE 15 MG: 10 TABLET ORAL at 11:03

## 2021-03-13 RX ADMIN — GABAPENTIN 300 MG: 100 CAPSULE ORAL at 08:03

## 2021-03-13 RX ADMIN — OXYCODONE HYDROCHLORIDE 15 MG: 10 TABLET ORAL at 02:03

## 2021-03-13 RX ADMIN — POLYETHYLENE GLYCOL 3350 17 G: 17 POWDER, FOR SOLUTION ORAL at 09:03

## 2021-03-13 RX ADMIN — OXYCODONE HYDROCHLORIDE 15 MG: 10 TABLET ORAL at 07:03

## 2021-03-13 RX ADMIN — ACETAMINOPHEN 650 MG: 325 TABLET ORAL at 05:03

## 2021-03-13 RX ADMIN — HYDROMORPHONE HYDROCHLORIDE 0.5 MG: 1 INJECTION, SOLUTION INTRAMUSCULAR; INTRAVENOUS; SUBCUTANEOUS at 08:03

## 2021-03-13 RX ADMIN — HYDROMORPHONE HYDROCHLORIDE 0.5 MG: 1 INJECTION, SOLUTION INTRAMUSCULAR; INTRAVENOUS; SUBCUTANEOUS at 01:03

## 2021-03-14 LAB
ACE SERPL-CCNC: 64 U/L (ref 16–85)
ALBUMIN SERPL BCP-MCNC: 3.7 G/DL (ref 3.5–5.2)
ALP SERPL-CCNC: 109 U/L (ref 55–135)
ALT SERPL W/O P-5'-P-CCNC: 65 U/L (ref 10–44)
ANION GAP SERPL CALC-SCNC: 10 MMOL/L (ref 8–16)
AST SERPL-CCNC: 23 U/L (ref 10–40)
BASOPHILS # BLD AUTO: 0.05 K/UL (ref 0–0.2)
BASOPHILS NFR BLD: 1 % (ref 0–1.9)
BILIRUB SERPL-MCNC: 0.4 MG/DL (ref 0.1–1)
BUN SERPL-MCNC: 12 MG/DL (ref 6–20)
CALCIUM SERPL-MCNC: 9.8 MG/DL (ref 8.7–10.5)
CHLORIDE SERPL-SCNC: 103 MMOL/L (ref 95–110)
CO2 SERPL-SCNC: 26 MMOL/L (ref 23–29)
CREAT SERPL-MCNC: 1 MG/DL (ref 0.5–1.4)
DIFFERENTIAL METHOD: ABNORMAL
EOSINOPHIL # BLD AUTO: 0.3 K/UL (ref 0–0.5)
EOSINOPHIL NFR BLD: 5.4 % (ref 0–8)
ERYTHROCYTE [DISTWIDTH] IN BLOOD BY AUTOMATED COUNT: 11.9 % (ref 11.5–14.5)
EST. GFR  (AFRICAN AMERICAN): >60 ML/MIN/1.73 M^2
EST. GFR  (NON AFRICAN AMERICAN): >60 ML/MIN/1.73 M^2
GLUCOSE SERPL-MCNC: 112 MG/DL (ref 70–110)
HCT VFR BLD AUTO: 37 % (ref 40–54)
HGB BLD-MCNC: 12 G/DL (ref 14–18)
IMM GRANULOCYTES # BLD AUTO: 0.01 K/UL (ref 0–0.04)
IMM GRANULOCYTES NFR BLD AUTO: 0.2 % (ref 0–0.5)
LYMPHOCYTES # BLD AUTO: 1.5 K/UL (ref 1–4.8)
LYMPHOCYTES NFR BLD: 30.7 % (ref 18–48)
MCH RBC QN AUTO: 29.3 PG (ref 27–31)
MCHC RBC AUTO-ENTMCNC: 32.4 G/DL (ref 32–36)
MCV RBC AUTO: 91 FL (ref 82–98)
MONOCYTES # BLD AUTO: 0.8 K/UL (ref 0.3–1)
MONOCYTES NFR BLD: 16.3 % (ref 4–15)
NEUTROPHILS # BLD AUTO: 2.3 K/UL (ref 1.8–7.7)
NEUTROPHILS NFR BLD: 46.4 % (ref 38–73)
NRBC BLD-RTO: 0 /100 WBC
PLATELET # BLD AUTO: 354 K/UL (ref 150–350)
PMV BLD AUTO: 9.5 FL (ref 9.2–12.9)
POTASSIUM SERPL-SCNC: 4.2 MMOL/L (ref 3.5–5.1)
PROT SERPL-MCNC: 7.5 G/DL (ref 6–8.4)
RBC # BLD AUTO: 4.09 M/UL (ref 4.6–6.2)
SODIUM SERPL-SCNC: 139 MMOL/L (ref 136–145)
SOL IL2 RECEP SERPL-MCNC: 1304.9 PG/ML (ref 175.3–858.2)
WBC # BLD AUTO: 4.98 K/UL (ref 3.9–12.7)

## 2021-03-14 PROCEDURE — 63600175 PHARM REV CODE 636 W HCPCS: Performed by: STUDENT IN AN ORGANIZED HEALTH CARE EDUCATION/TRAINING PROGRAM

## 2021-03-14 PROCEDURE — 36415 COLL VENOUS BLD VENIPUNCTURE: CPT | Performed by: STUDENT IN AN ORGANIZED HEALTH CARE EDUCATION/TRAINING PROGRAM

## 2021-03-14 PROCEDURE — 80053 COMPREHEN METABOLIC PANEL: CPT | Performed by: STUDENT IN AN ORGANIZED HEALTH CARE EDUCATION/TRAINING PROGRAM

## 2021-03-14 PROCEDURE — 25000003 PHARM REV CODE 250: Performed by: STUDENT IN AN ORGANIZED HEALTH CARE EDUCATION/TRAINING PROGRAM

## 2021-03-14 PROCEDURE — 85025 COMPLETE CBC W/AUTO DIFF WBC: CPT | Performed by: STUDENT IN AN ORGANIZED HEALTH CARE EDUCATION/TRAINING PROGRAM

## 2021-03-14 PROCEDURE — 11000001 HC ACUTE MED/SURG PRIVATE ROOM

## 2021-03-14 RX ADMIN — OXYCODONE HYDROCHLORIDE 15 MG: 10 TABLET ORAL at 07:03

## 2021-03-14 RX ADMIN — ACETAMINOPHEN 650 MG: 325 TABLET ORAL at 09:03

## 2021-03-14 RX ADMIN — GABAPENTIN 300 MG: 100 CAPSULE ORAL at 03:03

## 2021-03-14 RX ADMIN — ACETAMINOPHEN 650 MG: 325 TABLET ORAL at 03:03

## 2021-03-14 RX ADMIN — CELECOXIB 200 MG: 200 CAPSULE ORAL at 09:03

## 2021-03-14 RX ADMIN — AMLODIPINE BESYLATE 5 MG: 5 TABLET ORAL at 09:03

## 2021-03-14 RX ADMIN — HYDROMORPHONE HYDROCHLORIDE 0.5 MG: 1 INJECTION, SOLUTION INTRAMUSCULAR; INTRAVENOUS; SUBCUTANEOUS at 09:03

## 2021-03-14 RX ADMIN — METHOCARBAMOL 750 MG: 750 TABLET ORAL at 09:03

## 2021-03-14 RX ADMIN — GABAPENTIN 300 MG: 100 CAPSULE ORAL at 09:03

## 2021-03-14 RX ADMIN — OXYCODONE HYDROCHLORIDE 15 MG: 10 TABLET ORAL at 04:03

## 2021-03-14 RX ADMIN — OXYCODONE HYDROCHLORIDE 10 MG: 10 TABLET ORAL at 09:03

## 2021-03-14 RX ADMIN — POLYETHYLENE GLYCOL 3350 17 G: 17 POWDER, FOR SOLUTION ORAL at 09:03

## 2021-03-14 RX ADMIN — HYDROMORPHONE HYDROCHLORIDE 0.5 MG: 1 INJECTION, SOLUTION INTRAMUSCULAR; INTRAVENOUS; SUBCUTANEOUS at 07:03

## 2021-03-14 RX ADMIN — METHOCARBAMOL 750 MG: 750 TABLET ORAL at 03:03

## 2021-03-14 RX ADMIN — OXYCODONE HYDROCHLORIDE 15 MG: 10 TABLET ORAL at 02:03

## 2021-03-15 LAB
ALBUMIN SERPL BCP-MCNC: 3.6 G/DL (ref 3.5–5.2)
ALP SERPL-CCNC: 108 U/L (ref 55–135)
ALT SERPL W/O P-5'-P-CCNC: 65 U/L (ref 10–44)
ANION GAP SERPL CALC-SCNC: 10 MMOL/L (ref 8–16)
ANTI SM ANTIBODY: 0.13 RATIO (ref 0–0.99)
ANTI SM/RNP ANTIBODY: 0.17 RATIO (ref 0–0.99)
ANTI-SM INTERPRETATION: NEGATIVE
ANTI-SM/RNP INTERPRETATION: NEGATIVE
ANTI-SSA ANTIBODY: 0.13 RATIO (ref 0–0.99)
ANTI-SSA ANTIBODY: 0.13 RATIO (ref 0–0.99)
ANTI-SSA INTERPRETATION: NEGATIVE
ANTI-SSA INTERPRETATION: NEGATIVE
ANTI-SSB ANTIBODY: 0.07 RATIO (ref 0–0.99)
ANTI-SSB ANTIBODY: 0.07 RATIO (ref 0–0.99)
ANTI-SSB INTERPRETATION: NEGATIVE
ANTI-SSB INTERPRETATION: NEGATIVE
AST SERPL-CCNC: 27 U/L (ref 10–40)
BASOPHILS # BLD AUTO: 0.04 K/UL (ref 0–0.2)
BASOPHILS NFR BLD: 0.8 % (ref 0–1.9)
BILIRUB SERPL-MCNC: 0.4 MG/DL (ref 0.1–1)
BUN SERPL-MCNC: 16 MG/DL (ref 6–20)
CALCIUM SERPL-MCNC: 9.3 MG/DL (ref 8.7–10.5)
CHLORIDE SERPL-SCNC: 100 MMOL/L (ref 95–110)
CO2 SERPL-SCNC: 27 MMOL/L (ref 23–29)
CREAT SERPL-MCNC: 1.1 MG/DL (ref 0.5–1.4)
DIFFERENTIAL METHOD: ABNORMAL
DSDNA AB SER-ACNC: NORMAL [IU]/ML
EOSINOPHIL # BLD AUTO: 0.3 K/UL (ref 0–0.5)
EOSINOPHIL NFR BLD: 5.3 % (ref 0–8)
ERYTHROCYTE [DISTWIDTH] IN BLOOD BY AUTOMATED COUNT: 12.1 % (ref 11.5–14.5)
EST. GFR  (AFRICAN AMERICAN): >60 ML/MIN/1.73 M^2
EST. GFR  (NON AFRICAN AMERICAN): >60 ML/MIN/1.73 M^2
GLUCOSE SERPL-MCNC: 101 MG/DL (ref 70–110)
HCT VFR BLD AUTO: 34.5 % (ref 40–54)
HGB BLD-MCNC: 11.2 G/DL (ref 14–18)
IMM GRANULOCYTES # BLD AUTO: 0.01 K/UL (ref 0–0.04)
IMM GRANULOCYTES NFR BLD AUTO: 0.2 % (ref 0–0.5)
LYMPHOCYTES # BLD AUTO: 1.5 K/UL (ref 1–4.8)
LYMPHOCYTES NFR BLD: 30.2 % (ref 18–48)
MCH RBC QN AUTO: 29.7 PG (ref 27–31)
MCHC RBC AUTO-ENTMCNC: 32.5 G/DL (ref 32–36)
MCV RBC AUTO: 92 FL (ref 82–98)
MONOCYTES # BLD AUTO: 1.3 K/UL (ref 0.3–1)
MONOCYTES NFR BLD: 27.5 % (ref 4–15)
NEUTROPHILS # BLD AUTO: 1.8 K/UL (ref 1.8–7.7)
NEUTROPHILS NFR BLD: 36 % (ref 38–73)
NRBC BLD-RTO: 0 /100 WBC
PLATELET # BLD AUTO: 351 K/UL (ref 150–350)
PMV BLD AUTO: 9.6 FL (ref 9.2–12.9)
POTASSIUM SERPL-SCNC: 3.9 MMOL/L (ref 3.5–5.1)
PROT SERPL-MCNC: 7.3 G/DL (ref 6–8.4)
RBC # BLD AUTO: 3.77 M/UL (ref 4.6–6.2)
SODIUM SERPL-SCNC: 137 MMOL/L (ref 136–145)
WBC # BLD AUTO: 4.87 K/UL (ref 3.9–12.7)

## 2021-03-15 PROCEDURE — 36415 COLL VENOUS BLD VENIPUNCTURE: CPT | Performed by: STUDENT IN AN ORGANIZED HEALTH CARE EDUCATION/TRAINING PROGRAM

## 2021-03-15 PROCEDURE — 11000001 HC ACUTE MED/SURG PRIVATE ROOM

## 2021-03-15 PROCEDURE — 25000003 PHARM REV CODE 250: Performed by: STUDENT IN AN ORGANIZED HEALTH CARE EDUCATION/TRAINING PROGRAM

## 2021-03-15 PROCEDURE — 85025 COMPLETE CBC W/AUTO DIFF WBC: CPT | Performed by: STUDENT IN AN ORGANIZED HEALTH CARE EDUCATION/TRAINING PROGRAM

## 2021-03-15 PROCEDURE — 80053 COMPREHEN METABOLIC PANEL: CPT | Performed by: STUDENT IN AN ORGANIZED HEALTH CARE EDUCATION/TRAINING PROGRAM

## 2021-03-15 RX ADMIN — CELECOXIB 200 MG: 200 CAPSULE ORAL at 08:03

## 2021-03-15 RX ADMIN — GABAPENTIN 300 MG: 100 CAPSULE ORAL at 08:03

## 2021-03-15 RX ADMIN — GABAPENTIN 300 MG: 100 CAPSULE ORAL at 10:03

## 2021-03-15 RX ADMIN — OXYCODONE HYDROCHLORIDE 15 MG: 10 TABLET ORAL at 08:03

## 2021-03-15 RX ADMIN — OXYCODONE HYDROCHLORIDE 15 MG: 10 TABLET ORAL at 07:03

## 2021-03-15 RX ADMIN — ACETAMINOPHEN 650 MG: 325 TABLET ORAL at 04:03

## 2021-03-15 RX ADMIN — METHOCARBAMOL 750 MG: 750 TABLET ORAL at 08:03

## 2021-03-15 RX ADMIN — GABAPENTIN 300 MG: 100 CAPSULE ORAL at 02:03

## 2021-03-15 RX ADMIN — OXYCODONE HYDROCHLORIDE 15 MG: 10 TABLET ORAL at 04:03

## 2021-03-15 RX ADMIN — OXYCODONE HYDROCHLORIDE 15 MG: 10 TABLET ORAL at 12:03

## 2021-03-15 RX ADMIN — METHOCARBAMOL 750 MG: 750 TABLET ORAL at 10:03

## 2021-03-15 RX ADMIN — AMLODIPINE BESYLATE 5 MG: 5 TABLET ORAL at 08:03

## 2021-03-16 LAB
ABO + RH BLD: NORMAL
ALBUMIN SERPL BCP-MCNC: 3.8 G/DL (ref 3.5–5.2)
ALP SERPL-CCNC: 97 U/L (ref 55–135)
ALT SERPL W/O P-5'-P-CCNC: 57 U/L (ref 10–44)
ANION GAP SERPL CALC-SCNC: 13 MMOL/L (ref 8–16)
AST SERPL-CCNC: 27 U/L (ref 10–40)
BASOPHILS # BLD AUTO: 0.05 K/UL (ref 0–0.2)
BASOPHILS NFR BLD: 1.4 % (ref 0–1.9)
BILIRUB SERPL-MCNC: 0.5 MG/DL (ref 0.1–1)
BLD GP AB SCN CELLS X3 SERPL QL: NORMAL
BUN SERPL-MCNC: 13 MG/DL (ref 6–20)
CALCIUM SERPL-MCNC: 9.3 MG/DL (ref 8.7–10.5)
CHLORIDE SERPL-SCNC: 103 MMOL/L (ref 95–110)
CO2 SERPL-SCNC: 18 MMOL/L (ref 23–29)
CREAT SERPL-MCNC: 1 MG/DL (ref 0.5–1.4)
DIFFERENTIAL METHOD: ABNORMAL
EOSINOPHIL # BLD AUTO: 0.1 K/UL (ref 0–0.5)
EOSINOPHIL NFR BLD: 3.9 % (ref 0–8)
ERYTHROCYTE [DISTWIDTH] IN BLOOD BY AUTOMATED COUNT: 12 % (ref 11.5–14.5)
EST. GFR  (AFRICAN AMERICAN): >60 ML/MIN/1.73 M^2
EST. GFR  (NON AFRICAN AMERICAN): >60 ML/MIN/1.73 M^2
GLUCOSE SERPL-MCNC: 99 MG/DL (ref 70–110)
HCT VFR BLD AUTO: 38.2 % (ref 40–54)
HGB BLD-MCNC: 12.3 G/DL (ref 14–18)
IMM GRANULOCYTES # BLD AUTO: 0.01 K/UL (ref 0–0.04)
IMM GRANULOCYTES NFR BLD AUTO: 0.3 % (ref 0–0.5)
LYMPHOCYTES # BLD AUTO: 1.1 K/UL (ref 1–4.8)
LYMPHOCYTES NFR BLD: 29.9 % (ref 18–48)
MCH RBC QN AUTO: 29.6 PG (ref 27–31)
MCHC RBC AUTO-ENTMCNC: 32.2 G/DL (ref 32–36)
MCV RBC AUTO: 92 FL (ref 82–98)
MONOCYTES # BLD AUTO: 0.6 K/UL (ref 0.3–1)
MONOCYTES NFR BLD: 17.7 % (ref 4–15)
NEUTROPHILS # BLD AUTO: 1.7 K/UL (ref 1.8–7.7)
NEUTROPHILS NFR BLD: 46.8 % (ref 38–73)
NRBC BLD-RTO: 0 /100 WBC
PLATELET # BLD AUTO: 312 K/UL (ref 150–350)
PMV BLD AUTO: 9.7 FL (ref 9.2–12.9)
POTASSIUM SERPL-SCNC: 4.3 MMOL/L (ref 3.5–5.1)
PROT SERPL-MCNC: 7.4 G/DL (ref 6–8.4)
RBC # BLD AUTO: 4.16 M/UL (ref 4.6–6.2)
SODIUM SERPL-SCNC: 134 MMOL/L (ref 136–145)
WBC # BLD AUTO: 3.55 K/UL (ref 3.9–12.7)

## 2021-03-16 PROCEDURE — 80053 COMPREHEN METABOLIC PANEL: CPT | Performed by: STUDENT IN AN ORGANIZED HEALTH CARE EDUCATION/TRAINING PROGRAM

## 2021-03-16 PROCEDURE — U0003 INFECTIOUS AGENT DETECTION BY NUCLEIC ACID (DNA OR RNA); SEVERE ACUTE RESPIRATORY SYNDROME CORONAVIRUS 2 (SARS-COV-2) (CORONAVIRUS DISEASE [COVID-19]), AMPLIFIED PROBE TECHNIQUE, MAKING USE OF HIGH THROUGHPUT TECHNOLOGIES AS DESCRIBED BY CMS-2020-01-R: HCPCS | Performed by: STUDENT IN AN ORGANIZED HEALTH CARE EDUCATION/TRAINING PROGRAM

## 2021-03-16 PROCEDURE — 11000001 HC ACUTE MED/SURG PRIVATE ROOM

## 2021-03-16 PROCEDURE — 86920 COMPATIBILITY TEST SPIN: CPT | Performed by: STUDENT IN AN ORGANIZED HEALTH CARE EDUCATION/TRAINING PROGRAM

## 2021-03-16 PROCEDURE — 85025 COMPLETE CBC W/AUTO DIFF WBC: CPT | Performed by: ORTHOPAEDIC SURGERY

## 2021-03-16 PROCEDURE — 86900 BLOOD TYPING SEROLOGIC ABO: CPT | Performed by: STUDENT IN AN ORGANIZED HEALTH CARE EDUCATION/TRAINING PROGRAM

## 2021-03-16 PROCEDURE — 36415 COLL VENOUS BLD VENIPUNCTURE: CPT | Performed by: STUDENT IN AN ORGANIZED HEALTH CARE EDUCATION/TRAINING PROGRAM

## 2021-03-16 PROCEDURE — U0005 INFEC AGEN DETEC AMPLI PROBE: HCPCS | Performed by: STUDENT IN AN ORGANIZED HEALTH CARE EDUCATION/TRAINING PROGRAM

## 2021-03-16 RX ORDER — SODIUM CHLORIDE 0.9 % (FLUSH) 0.9 %
10 SYRINGE (ML) INJECTION
Status: DISCONTINUED | OUTPATIENT
Start: 2021-03-16 | End: 2021-03-21

## 2021-03-16 RX ORDER — HYDROCODONE BITARTRATE AND ACETAMINOPHEN 500; 5 MG/1; MG/1
TABLET ORAL
Status: DISCONTINUED | OUTPATIENT
Start: 2021-03-16 | End: 2021-03-21 | Stop reason: HOSPADM

## 2021-03-17 ENCOUNTER — ANESTHESIA EVENT (OUTPATIENT)
Dept: SURGERY | Facility: HOSPITAL | Age: 61
DRG: 472 | End: 2021-03-17
Payer: MEDICAID

## 2021-03-17 LAB
ALBUMIN SERPL BCP-MCNC: 3.7 G/DL (ref 3.5–5.2)
ALP SERPL-CCNC: 94 U/L (ref 55–135)
ALT SERPL W/O P-5'-P-CCNC: 54 U/L (ref 10–44)
ANION GAP SERPL CALC-SCNC: 11 MMOL/L (ref 8–16)
AST SERPL-CCNC: 24 U/L (ref 10–40)
BASOPHILS # BLD AUTO: 0.06 K/UL (ref 0–0.2)
BASOPHILS NFR BLD: 1.1 % (ref 0–1.9)
BILIRUB SERPL-MCNC: 0.6 MG/DL (ref 0.1–1)
BUN SERPL-MCNC: 14 MG/DL (ref 6–20)
CALCIUM SERPL-MCNC: 9.5 MG/DL (ref 8.7–10.5)
CHLORIDE SERPL-SCNC: 103 MMOL/L (ref 95–110)
CNS DEMYELINATING DISEASE EVAL: NORMAL
CO2 SERPL-SCNC: 26 MMOL/L (ref 23–29)
CREAT SERPL-MCNC: 1 MG/DL (ref 0.5–1.4)
DIFFERENTIAL METHOD: ABNORMAL
EOSINOPHIL # BLD AUTO: 0.1 K/UL (ref 0–0.5)
EOSINOPHIL NFR BLD: 2.2 % (ref 0–8)
ERYTHROCYTE [DISTWIDTH] IN BLOOD BY AUTOMATED COUNT: 12.1 % (ref 11.5–14.5)
EST. GFR  (AFRICAN AMERICAN): >60 ML/MIN/1.73 M^2
EST. GFR  (NON AFRICAN AMERICAN): >60 ML/MIN/1.73 M^2
GLUCOSE SERPL-MCNC: 93 MG/DL (ref 70–110)
HCT VFR BLD AUTO: 34.9 % (ref 40–54)
HGB BLD-MCNC: 11.4 G/DL (ref 14–18)
IMM GRANULOCYTES # BLD AUTO: 0.01 K/UL (ref 0–0.04)
IMM GRANULOCYTES NFR BLD AUTO: 0.2 % (ref 0–0.5)
LYMPHOCYTES # BLD AUTO: 1.7 K/UL (ref 1–4.8)
LYMPHOCYTES NFR BLD: 31.8 % (ref 18–48)
MCH RBC QN AUTO: 30.2 PG (ref 27–31)
MCHC RBC AUTO-ENTMCNC: 32.7 G/DL (ref 32–36)
MCV RBC AUTO: 93 FL (ref 82–98)
MOG-IGG1: NORMAL
MONOCYTES # BLD AUTO: 1.2 K/UL (ref 0.3–1)
MONOCYTES NFR BLD: 21.5 % (ref 4–15)
NEUTROPHILS # BLD AUTO: 2.4 K/UL (ref 1.8–7.7)
NEUTROPHILS NFR BLD: 43.2 % (ref 38–73)
NMO/AQP4 FACS,S: NORMAL
NRBC BLD-RTO: 0 /100 WBC
PLATELET # BLD AUTO: 327 K/UL (ref 150–350)
PMV BLD AUTO: 10 FL (ref 9.2–12.9)
POTASSIUM SERPL-SCNC: 3.8 MMOL/L (ref 3.5–5.1)
PROT SERPL-MCNC: 7.4 G/DL (ref 6–8.4)
RBC # BLD AUTO: 3.77 M/UL (ref 4.6–6.2)
SARS-COV-2 RNA RESP QL NAA+PROBE: NOT DETECTED
SODIUM SERPL-SCNC: 140 MMOL/L (ref 136–145)
WBC # BLD AUTO: 5.44 K/UL (ref 3.9–12.7)

## 2021-03-17 PROCEDURE — 36415 COLL VENOUS BLD VENIPUNCTURE: CPT | Performed by: STUDENT IN AN ORGANIZED HEALTH CARE EDUCATION/TRAINING PROGRAM

## 2021-03-17 PROCEDURE — 80053 COMPREHEN METABOLIC PANEL: CPT | Performed by: STUDENT IN AN ORGANIZED HEALTH CARE EDUCATION/TRAINING PROGRAM

## 2021-03-17 PROCEDURE — 11000001 HC ACUTE MED/SURG PRIVATE ROOM

## 2021-03-17 PROCEDURE — 25000003 PHARM REV CODE 250: Performed by: STUDENT IN AN ORGANIZED HEALTH CARE EDUCATION/TRAINING PROGRAM

## 2021-03-17 PROCEDURE — 85025 COMPLETE CBC W/AUTO DIFF WBC: CPT | Performed by: ORTHOPAEDIC SURGERY

## 2021-03-17 RX ADMIN — AMLODIPINE BESYLATE 5 MG: 5 TABLET ORAL at 09:03

## 2021-03-17 RX ADMIN — GABAPENTIN 300 MG: 100 CAPSULE ORAL at 10:03

## 2021-03-17 RX ADMIN — OXYCODONE HYDROCHLORIDE 15 MG: 10 TABLET ORAL at 08:03

## 2021-03-18 ENCOUNTER — ANESTHESIA (OUTPATIENT)
Dept: SURGERY | Facility: HOSPITAL | Age: 61
DRG: 472 | End: 2021-03-18
Payer: MEDICAID

## 2021-03-18 PROBLEM — T88.4XXA HARD TO INTUBATE: Status: ACTIVE | Noted: 2021-03-18

## 2021-03-18 LAB
ALBUMIN SERPL BCP-MCNC: 3.7 G/DL (ref 3.5–5.2)
ALP SERPL-CCNC: 91 U/L (ref 55–135)
ALT SERPL W/O P-5'-P-CCNC: 50 U/L (ref 10–44)
ANION GAP SERPL CALC-SCNC: 13 MMOL/L (ref 8–16)
AST SERPL-CCNC: 22 U/L (ref 10–40)
BASOPHILS # BLD AUTO: 0.06 K/UL (ref 0–0.2)
BASOPHILS NFR BLD: 1.1 % (ref 0–1.9)
BILIRUB SERPL-MCNC: 0.5 MG/DL (ref 0.1–1)
BUN SERPL-MCNC: 17 MG/DL (ref 6–20)
CALCIUM SERPL-MCNC: 9.3 MG/DL (ref 8.7–10.5)
CHLORIDE SERPL-SCNC: 102 MMOL/L (ref 95–110)
CO2 SERPL-SCNC: 24 MMOL/L (ref 23–29)
CREAT SERPL-MCNC: 1 MG/DL (ref 0.5–1.4)
DIFFERENTIAL METHOD: ABNORMAL
EOSINOPHIL # BLD AUTO: 0.2 K/UL (ref 0–0.5)
EOSINOPHIL NFR BLD: 3.3 % (ref 0–8)
ERYTHROCYTE [DISTWIDTH] IN BLOOD BY AUTOMATED COUNT: 12 % (ref 11.5–14.5)
EST. GFR  (AFRICAN AMERICAN): >60 ML/MIN/1.73 M^2
EST. GFR  (NON AFRICAN AMERICAN): >60 ML/MIN/1.73 M^2
GLUCOSE SERPL-MCNC: 97 MG/DL (ref 70–110)
HCT VFR BLD AUTO: 36.9 % (ref 40–54)
HGB BLD-MCNC: 11.7 G/DL (ref 14–18)
IMM GRANULOCYTES # BLD AUTO: 0.02 K/UL (ref 0–0.04)
IMM GRANULOCYTES NFR BLD AUTO: 0.4 % (ref 0–0.5)
LYMPHOCYTES # BLD AUTO: 1.8 K/UL (ref 1–4.8)
LYMPHOCYTES NFR BLD: 33.3 % (ref 18–48)
MCH RBC QN AUTO: 29.4 PG (ref 27–31)
MCHC RBC AUTO-ENTMCNC: 31.7 G/DL (ref 32–36)
MCV RBC AUTO: 93 FL (ref 82–98)
MONOCYTES # BLD AUTO: 0.7 K/UL (ref 0.3–1)
MONOCYTES NFR BLD: 13.1 % (ref 4–15)
NEUTROPHILS # BLD AUTO: 2.7 K/UL (ref 1.8–7.7)
NEUTROPHILS NFR BLD: 48.8 % (ref 38–73)
NRBC BLD-RTO: 0 /100 WBC
PLATELET # BLD AUTO: 342 K/UL (ref 150–350)
PMV BLD AUTO: 9.8 FL (ref 9.2–12.9)
POTASSIUM SERPL-SCNC: 3.6 MMOL/L (ref 3.5–5.1)
PROT SERPL-MCNC: 7.3 G/DL (ref 6–8.4)
RBC # BLD AUTO: 3.98 M/UL (ref 4.6–6.2)
SODIUM SERPL-SCNC: 139 MMOL/L (ref 136–145)
WBC # BLD AUTO: 5.49 K/UL (ref 3.9–12.7)

## 2021-03-18 PROCEDURE — 25000003 PHARM REV CODE 250: Performed by: STUDENT IN AN ORGANIZED HEALTH CARE EDUCATION/TRAINING PROGRAM

## 2021-03-18 PROCEDURE — 63600175 PHARM REV CODE 636 W HCPCS: Performed by: STUDENT IN AN ORGANIZED HEALTH CARE EDUCATION/TRAINING PROGRAM

## 2021-03-18 PROCEDURE — 22845 INSERT SPINE FIXATION DEVICE: CPT | Mod: 59,,, | Performed by: ORTHOPAEDIC SURGERY

## 2021-03-18 PROCEDURE — 20936 SP BONE AGRFT LOCAL ADD-ON: CPT | Mod: ,,, | Performed by: ORTHOPAEDIC SURGERY

## 2021-03-18 PROCEDURE — 22854 INSJ BIOMECHANICAL DEVICE: CPT | Mod: AS,,, | Performed by: PHYSICIAN ASSISTANT

## 2021-03-18 PROCEDURE — 22554 PR ARTHRODESIS ANT INTERBODY MIN DISCECTOMY, CERVICAL BELOW C2: ICD-10-PCS | Mod: AS,51,, | Performed by: PHYSICIAN ASSISTANT

## 2021-03-18 PROCEDURE — 71000033 HC RECOVERY, INTIAL HOUR: Performed by: ORTHOPAEDIC SURGERY

## 2021-03-18 PROCEDURE — 99232 SBSQ HOSP IP/OBS MODERATE 35: CPT | Mod: 57,,, | Performed by: ORTHOPAEDIC SURGERY

## 2021-03-18 PROCEDURE — 63081 REMOVE VERT BODY DCMPRN CRVL: CPT | Mod: AS,,, | Performed by: PHYSICIAN ASSISTANT

## 2021-03-18 PROCEDURE — 22845 INSERT SPINE FIXATION DEVICE: CPT | Mod: AS,59,, | Performed by: PHYSICIAN ASSISTANT

## 2021-03-18 PROCEDURE — 71000015 HC POSTOP RECOV 1ST HR: Performed by: ORTHOPAEDIC SURGERY

## 2021-03-18 PROCEDURE — 63600175 PHARM REV CODE 636 W HCPCS: Performed by: NURSE ANESTHETIST, CERTIFIED REGISTERED

## 2021-03-18 PROCEDURE — 11000001 HC ACUTE MED/SURG PRIVATE ROOM

## 2021-03-18 PROCEDURE — 22845 PR ANTERIOR INSTRUMENTATION 2-3 VERTEBRAL SEGMENTS: ICD-10-PCS | Mod: AS,59,, | Performed by: PHYSICIAN ASSISTANT

## 2021-03-18 PROCEDURE — 22585 PR ARTHRODESIS ANT INTERBODY MIN DISCECTOMY,EA ADDL: ICD-10-PCS | Mod: ,,, | Performed by: ORTHOPAEDIC SURGERY

## 2021-03-18 PROCEDURE — 37000009 HC ANESTHESIA EA ADD 15 MINS: Performed by: ORTHOPAEDIC SURGERY

## 2021-03-18 PROCEDURE — 63600175 PHARM REV CODE 636 W HCPCS: Performed by: ORTHOPAEDIC SURGERY

## 2021-03-18 PROCEDURE — 63081 REMOVE VERT BODY DCMPRN CRVL: CPT | Mod: ,,, | Performed by: ORTHOPAEDIC SURGERY

## 2021-03-18 PROCEDURE — 22854 PR INS BIOMECH DEV, VERT CORPECTOMY W/INTRBODY ARTHRODESIS EA INTERSPC: ICD-10-PCS | Mod: AS,,, | Performed by: PHYSICIAN ASSISTANT

## 2021-03-18 PROCEDURE — 22854 PR INS BIOMECH DEV, VERT CORPECTOMY W/INTRBODY ARTHRODESIS EA INTERSPC: ICD-10-PCS | Mod: ,,, | Performed by: ORTHOPAEDIC SURGERY

## 2021-03-18 PROCEDURE — D9220A PRA ANESTHESIA: ICD-10-PCS | Mod: CRNA,,, | Performed by: NURSE ANESTHETIST, CERTIFIED REGISTERED

## 2021-03-18 PROCEDURE — 94761 N-INVAS EAR/PLS OXIMETRY MLT: CPT

## 2021-03-18 PROCEDURE — 99232 PR SUBSEQUENT HOSPITAL CARE,LEVL II: ICD-10-PCS | Mod: 57,,, | Performed by: ORTHOPAEDIC SURGERY

## 2021-03-18 PROCEDURE — 25000003 PHARM REV CODE 250: Performed by: ORTHOPAEDIC SURGERY

## 2021-03-18 PROCEDURE — 80053 COMPREHEN METABOLIC PANEL: CPT | Performed by: ORTHOPAEDIC SURGERY

## 2021-03-18 PROCEDURE — 22585 ARTHRD ANT NTRBD MIN DSC EA: CPT | Mod: AS,,, | Performed by: PHYSICIAN ASSISTANT

## 2021-03-18 PROCEDURE — C1769 GUIDE WIRE: HCPCS | Performed by: ORTHOPAEDIC SURGERY

## 2021-03-18 PROCEDURE — 37000008 HC ANESTHESIA 1ST 15 MINUTES: Performed by: ORTHOPAEDIC SURGERY

## 2021-03-18 PROCEDURE — 63081 PR REMV VERT BODY,CERV,ONE SGMT: ICD-10-PCS | Mod: AS,,, | Performed by: PHYSICIAN ASSISTANT

## 2021-03-18 PROCEDURE — 36000710: Performed by: ORTHOPAEDIC SURGERY

## 2021-03-18 PROCEDURE — 25000003 PHARM REV CODE 250

## 2021-03-18 PROCEDURE — 22554 PR ARTHRODESIS ANT INTERBODY MIN DISCECTOMY, CERVICAL BELOW C2: ICD-10-PCS | Mod: 51,,, | Performed by: ORTHOPAEDIC SURGERY

## 2021-03-18 PROCEDURE — 36415 COLL VENOUS BLD VENIPUNCTURE: CPT | Performed by: ORTHOPAEDIC SURGERY

## 2021-03-18 PROCEDURE — 25000003 PHARM REV CODE 250: Performed by: NURSE ANESTHETIST, CERTIFIED REGISTERED

## 2021-03-18 PROCEDURE — C1729 CATH, DRAINAGE: HCPCS | Performed by: ORTHOPAEDIC SURGERY

## 2021-03-18 PROCEDURE — C1713 ANCHOR/SCREW BN/BN,TIS/BN: HCPCS | Performed by: ORTHOPAEDIC SURGERY

## 2021-03-18 PROCEDURE — 22845 PR ANTERIOR INSTRUMENTATION 2-3 VERTEBRAL SEGMENTS: ICD-10-PCS | Mod: 59,,, | Performed by: ORTHOPAEDIC SURGERY

## 2021-03-18 PROCEDURE — 22554 ARTHRD ANT NTRBD MIN DSC CRV: CPT | Mod: 51,,, | Performed by: ORTHOPAEDIC SURGERY

## 2021-03-18 PROCEDURE — D9220A PRA ANESTHESIA: ICD-10-PCS | Mod: ANES,,, | Performed by: STUDENT IN AN ORGANIZED HEALTH CARE EDUCATION/TRAINING PROGRAM

## 2021-03-18 PROCEDURE — 71000016 HC POSTOP RECOV ADDL HR: Performed by: ORTHOPAEDIC SURGERY

## 2021-03-18 PROCEDURE — 20936 PR AUTOGRAFT SPINE SURGERY LOCAL FROM SAME INCISION: ICD-10-PCS | Mod: ,,, | Performed by: ORTHOPAEDIC SURGERY

## 2021-03-18 PROCEDURE — 85025 COMPLETE CBC W/AUTO DIFF WBC: CPT | Performed by: ORTHOPAEDIC SURGERY

## 2021-03-18 PROCEDURE — 22554 ARTHRD ANT NTRBD MIN DSC CRV: CPT | Mod: AS,51,, | Performed by: PHYSICIAN ASSISTANT

## 2021-03-18 PROCEDURE — 27201423 OPTIME MED/SURG SUP & DEVICES STERILE SUPPLY: Performed by: ORTHOPAEDIC SURGERY

## 2021-03-18 PROCEDURE — 22854 INSJ BIOMECHANICAL DEVICE: CPT | Mod: ,,, | Performed by: ORTHOPAEDIC SURGERY

## 2021-03-18 PROCEDURE — 63081 PR REMV VERT BODY,CERV,ONE SGMT: ICD-10-PCS | Mod: ,,, | Performed by: ORTHOPAEDIC SURGERY

## 2021-03-18 PROCEDURE — 22585 PR ARTHRODESIS ANT INTERBODY MIN DISCECTOMY,EA ADDL: ICD-10-PCS | Mod: AS,,, | Performed by: PHYSICIAN ASSISTANT

## 2021-03-18 PROCEDURE — 71000039 HC RECOVERY, EACH ADD'L HOUR: Performed by: ORTHOPAEDIC SURGERY

## 2021-03-18 PROCEDURE — 36000711: Performed by: ORTHOPAEDIC SURGERY

## 2021-03-18 PROCEDURE — 22585 ARTHRD ANT NTRBD MIN DSC EA: CPT | Mod: ,,, | Performed by: ORTHOPAEDIC SURGERY

## 2021-03-18 PROCEDURE — D9220A PRA ANESTHESIA: Mod: CRNA,,, | Performed by: NURSE ANESTHETIST, CERTIFIED REGISTERED

## 2021-03-18 PROCEDURE — D9220A PRA ANESTHESIA: Mod: ANES,,, | Performed by: STUDENT IN AN ORGANIZED HEALTH CARE EDUCATION/TRAINING PROGRAM

## 2021-03-18 DEVICE — IMPLANTABLE DEVICE: Type: IMPLANTABLE DEVICE | Site: SPINE CERVICAL | Status: FUNCTIONAL

## 2021-03-18 DEVICE — SCREW BONE ANT SKYLINE 18MM TI: Type: IMPLANTABLE DEVICE | Site: SPINE CERVICAL | Status: FUNCTIONAL

## 2021-03-18 RX ORDER — MUPIROCIN 20 MG/G
OINTMENT TOPICAL
Status: DISCONTINUED | OUTPATIENT
Start: 2021-03-18 | End: 2021-03-18

## 2021-03-18 RX ORDER — DEXTROMETHORPHAN HYDROBROMIDE, GUAIFENESIN 5; 100 MG/5ML; MG/5ML
650 LIQUID ORAL EVERY 6 HOURS PRN
Qty: 56 TABLET | Refills: 0 | Status: SHIPPED | OUTPATIENT
Start: 2021-03-18 | End: 2021-08-25

## 2021-03-18 RX ORDER — METHOCARBAMOL 500 MG/1
1000 TABLET, FILM COATED ORAL 3 TIMES DAILY
Qty: 84 TABLET | Refills: 0 | Status: SHIPPED | OUTPATIENT
Start: 2021-03-18 | End: 2021-04-04

## 2021-03-18 RX ORDER — CELECOXIB 200 MG/1
200 CAPSULE ORAL DAILY
Qty: 14 CAPSULE | Refills: 0 | Status: ON HOLD | OUTPATIENT
Start: 2021-03-18 | End: 2021-08-26 | Stop reason: HOSPADM

## 2021-03-18 RX ORDER — CEFAZOLIN SODIUM 1 G/3ML
2 INJECTION, POWDER, FOR SOLUTION INTRAMUSCULAR; INTRAVENOUS
Status: DISCONTINUED | OUTPATIENT
Start: 2021-03-18 | End: 2021-03-18

## 2021-03-18 RX ORDER — GABAPENTIN 100 MG/1
100 CAPSULE ORAL 3 TIMES DAILY
Qty: 42 CAPSULE | Refills: 0 | Status: SHIPPED | OUTPATIENT
Start: 2021-03-18 | End: 2021-06-07 | Stop reason: SDUPTHER

## 2021-03-18 RX ORDER — SUCCINYLCHOLINE CHLORIDE 20 MG/ML
INJECTION INTRAMUSCULAR; INTRAVENOUS
Status: DISCONTINUED | OUTPATIENT
Start: 2021-03-18 | End: 2021-03-18

## 2021-03-18 RX ORDER — LABETALOL HYDROCHLORIDE 5 MG/ML
10 INJECTION, SOLUTION INTRAVENOUS ONCE
Status: COMPLETED | OUTPATIENT
Start: 2021-03-18 | End: 2021-03-18

## 2021-03-18 RX ORDER — PROPOFOL 10 MG/ML
VIAL (ML) INTRAVENOUS CONTINUOUS PRN
Status: DISCONTINUED | OUTPATIENT
Start: 2021-03-18 | End: 2021-03-18

## 2021-03-18 RX ORDER — SODIUM CHLORIDE 0.9 % (FLUSH) 0.9 %
10 SYRINGE (ML) INJECTION
Status: DISCONTINUED | OUTPATIENT
Start: 2021-03-18 | End: 2021-03-18 | Stop reason: HOSPADM

## 2021-03-18 RX ORDER — OXYCODONE HYDROCHLORIDE 5 MG/1
5 TABLET ORAL EVERY 4 HOURS PRN
Qty: 40 TABLET | Refills: 0 | Status: SHIPPED | OUTPATIENT
Start: 2021-03-18 | End: 2021-04-05 | Stop reason: SDUPTHER

## 2021-03-18 RX ORDER — HYDROMORPHONE HYDROCHLORIDE 1 MG/ML
0.2 INJECTION, SOLUTION INTRAMUSCULAR; INTRAVENOUS; SUBCUTANEOUS EVERY 5 MIN PRN
Status: COMPLETED | OUTPATIENT
Start: 2021-03-18 | End: 2021-03-18

## 2021-03-18 RX ORDER — CEFAZOLIN SODIUM 1 G/3ML
2 INJECTION, POWDER, FOR SOLUTION INTRAMUSCULAR; INTRAVENOUS
Status: COMPLETED | OUTPATIENT
Start: 2021-03-18 | End: 2021-03-19

## 2021-03-18 RX ORDER — LABETALOL HYDROCHLORIDE 5 MG/ML
20 INJECTION, SOLUTION INTRAVENOUS ONCE
Status: DISCONTINUED | OUTPATIENT
Start: 2021-03-18 | End: 2021-03-18

## 2021-03-18 RX ORDER — REMIFENTANIL HYDROCHLORIDE 1 MG/ML
INJECTION, POWDER, LYOPHILIZED, FOR SOLUTION INTRAVENOUS CONTINUOUS PRN
Status: DISCONTINUED | OUTPATIENT
Start: 2021-03-18 | End: 2021-03-18

## 2021-03-18 RX ORDER — KETAMINE HCL IN 0.9 % NACL 50 MG/5 ML
SYRINGE (ML) INTRAVENOUS
Status: DISCONTINUED | OUTPATIENT
Start: 2021-03-18 | End: 2021-03-18

## 2021-03-18 RX ORDER — DEXAMETHASONE SODIUM PHOSPHATE 4 MG/ML
8 INJECTION, SOLUTION INTRA-ARTICULAR; INTRALESIONAL; INTRAMUSCULAR; INTRAVENOUS; SOFT TISSUE EVERY 6 HOURS
Status: COMPLETED | OUTPATIENT
Start: 2021-03-18 | End: 2021-03-19

## 2021-03-18 RX ORDER — FENTANYL CITRATE 50 UG/ML
INJECTION, SOLUTION INTRAMUSCULAR; INTRAVENOUS
Status: DISCONTINUED | OUTPATIENT
Start: 2021-03-18 | End: 2021-03-18

## 2021-03-18 RX ORDER — LIDOCAINE HYDROCHLORIDE AND EPINEPHRINE 10; 10 MG/ML; UG/ML
INJECTION, SOLUTION INFILTRATION; PERINEURAL
Status: DISCONTINUED | OUTPATIENT
Start: 2021-03-18 | End: 2021-03-18

## 2021-03-18 RX ORDER — CEFAZOLIN SODIUM 1 G/3ML
INJECTION, POWDER, FOR SOLUTION INTRAMUSCULAR; INTRAVENOUS
Status: DISCONTINUED | OUTPATIENT
Start: 2021-03-18 | End: 2021-03-18

## 2021-03-18 RX ORDER — LIDOCAINE HYDROCHLORIDE 20 MG/ML
INJECTION INTRAVENOUS
Status: DISCONTINUED | OUTPATIENT
Start: 2021-03-18 | End: 2021-03-18

## 2021-03-18 RX ORDER — ACETAMINOPHEN 10 MG/ML
INJECTION, SOLUTION INTRAVENOUS
Status: DISCONTINUED | OUTPATIENT
Start: 2021-03-18 | End: 2021-03-18

## 2021-03-18 RX ORDER — MIDAZOLAM HYDROCHLORIDE 1 MG/ML
INJECTION, SOLUTION INTRAMUSCULAR; INTRAVENOUS
Status: DISCONTINUED | OUTPATIENT
Start: 2021-03-18 | End: 2021-03-18

## 2021-03-18 RX ORDER — PROPOFOL 10 MG/ML
VIAL (ML) INTRAVENOUS
Status: DISCONTINUED | OUTPATIENT
Start: 2021-03-18 | End: 2021-03-18

## 2021-03-18 RX ORDER — DEXAMETHASONE SODIUM PHOSPHATE 4 MG/ML
INJECTION, SOLUTION INTRA-ARTICULAR; INTRALESIONAL; INTRAMUSCULAR; INTRAVENOUS; SOFT TISSUE
Status: DISCONTINUED | OUTPATIENT
Start: 2021-03-18 | End: 2021-03-18

## 2021-03-18 RX ORDER — PHENYLEPHRINE HCL IN 0.9% NACL 1 MG/10 ML
SYRINGE (ML) INTRAVENOUS
Status: DISCONTINUED | OUTPATIENT
Start: 2021-03-18 | End: 2021-03-18

## 2021-03-18 RX ORDER — LABETALOL HCL 20 MG/4 ML
SYRINGE (ML) INTRAVENOUS
Status: COMPLETED
Start: 2021-03-18 | End: 2021-03-18

## 2021-03-18 RX ADMIN — HYDROMORPHONE HYDROCHLORIDE 0.2 MG: 1 INJECTION, SOLUTION INTRAMUSCULAR; INTRAVENOUS; SUBCUTANEOUS at 06:03

## 2021-03-18 RX ADMIN — HYDROMORPHONE HYDROCHLORIDE 0.2 MG: 1 INJECTION, SOLUTION INTRAMUSCULAR; INTRAVENOUS; SUBCUTANEOUS at 07:03

## 2021-03-18 RX ADMIN — SUCCINYLCHOLINE CHLORIDE 160 MG: 20 INJECTION, SOLUTION INTRAMUSCULAR; INTRAVENOUS at 02:03

## 2021-03-18 RX ADMIN — Medication 100 MCG: at 03:03

## 2021-03-18 RX ADMIN — FENTANYL CITRATE 50 MCG: 50 INJECTION, SOLUTION INTRAMUSCULAR; INTRAVENOUS at 02:03

## 2021-03-18 RX ADMIN — FENTANYL CITRATE 25 MCG: 50 INJECTION, SOLUTION INTRAMUSCULAR; INTRAVENOUS at 02:03

## 2021-03-18 RX ADMIN — Medication 200 MCG: at 03:03

## 2021-03-18 RX ADMIN — CEFAZOLIN 2 G: 330 INJECTION, POWDER, FOR SOLUTION INTRAMUSCULAR; INTRAVENOUS at 11:03

## 2021-03-18 RX ADMIN — Medication 0.2 MCG/KG/MIN: at 02:03

## 2021-03-18 RX ADMIN — Medication 50 MG: at 03:03

## 2021-03-18 RX ADMIN — LIDOCAINE HYDROCHLORIDE 100 MG: 20 INJECTION, SOLUTION INTRAVENOUS at 02:03

## 2021-03-18 RX ADMIN — DEXAMETHASONE SODIUM PHOSPHATE 8 MG: 4 INJECTION, SOLUTION INTRAMUSCULAR; INTRAVENOUS at 11:03

## 2021-03-18 RX ADMIN — MUPIROCIN: 20 OINTMENT TOPICAL at 10:03

## 2021-03-18 RX ADMIN — MIDAZOLAM HYDROCHLORIDE 2 MG: 1 INJECTION, SOLUTION INTRAMUSCULAR; INTRAVENOUS at 02:03

## 2021-03-18 RX ADMIN — METHOCARBAMOL 750 MG: 750 TABLET ORAL at 07:03

## 2021-03-18 RX ADMIN — OXYCODONE HYDROCHLORIDE 15 MG: 10 TABLET ORAL at 06:03

## 2021-03-18 RX ADMIN — GABAPENTIN 300 MG: 100 CAPSULE ORAL at 07:03

## 2021-03-18 RX ADMIN — Medication 10 MG: at 08:03

## 2021-03-18 RX ADMIN — PROPOFOL 200 MCG/KG/MIN: 10 INJECTION, EMULSION INTRAVENOUS at 02:03

## 2021-03-18 RX ADMIN — DEXAMETHASONE SODIUM PHOSPHATE 8 MG: 4 INJECTION, SOLUTION INTRAMUSCULAR; INTRAVENOUS at 06:03

## 2021-03-18 RX ADMIN — DEXAMETHASONE SODIUM PHOSPHATE 12 MG: 4 INJECTION INTRA-ARTICULAR; INTRALESIONAL; INTRAMUSCULAR; INTRAVENOUS; SOFT TISSUE at 03:03

## 2021-03-18 RX ADMIN — AMLODIPINE BESYLATE 5 MG: 5 TABLET ORAL at 07:03

## 2021-03-18 RX ADMIN — CEFAZOLIN 2 G: 330 INJECTION, POWDER, FOR SOLUTION INTRAMUSCULAR; INTRAVENOUS at 03:03

## 2021-03-18 RX ADMIN — OXYCODONE HYDROCHLORIDE 15 MG: 10 TABLET ORAL at 11:03

## 2021-03-18 RX ADMIN — ACETAMINOPHEN 1000 MG: 10 INJECTION, SOLUTION INTRAVENOUS at 05:03

## 2021-03-18 RX ADMIN — LABETALOL HYDROCHLORIDE 10 MG: 5 INJECTION, SOLUTION INTRAVENOUS at 08:03

## 2021-03-18 RX ADMIN — PROPOFOL 200 MG: 10 INJECTION, EMULSION INTRAVENOUS at 02:03

## 2021-03-18 RX ADMIN — SODIUM CHLORIDE 0.5 MCG/KG/MIN: 9 INJECTION, SOLUTION INTRAVENOUS at 03:03

## 2021-03-18 RX ADMIN — SODIUM CHLORIDE: 0.9 INJECTION, SOLUTION INTRAVENOUS at 02:03

## 2021-03-18 RX ADMIN — Medication 100 MCG: at 04:03

## 2021-03-19 LAB
ALBUMIN SERPL BCP-MCNC: 3.8 G/DL (ref 3.5–5.2)
ALP SERPL-CCNC: 85 U/L (ref 55–135)
ALT SERPL W/O P-5'-P-CCNC: 44 U/L (ref 10–44)
ANION GAP SERPL CALC-SCNC: 12 MMOL/L (ref 8–16)
AST SERPL-CCNC: 22 U/L (ref 10–40)
BILIRUB SERPL-MCNC: 0.3 MG/DL (ref 0.1–1)
BUN SERPL-MCNC: 14 MG/DL (ref 6–20)
CALCIUM SERPL-MCNC: 9.6 MG/DL (ref 8.7–10.5)
CHLORIDE SERPL-SCNC: 103 MMOL/L (ref 95–110)
CO2 SERPL-SCNC: 23 MMOL/L (ref 23–29)
CREAT SERPL-MCNC: 1 MG/DL (ref 0.5–1.4)
EST. GFR  (AFRICAN AMERICAN): >60 ML/MIN/1.73 M^2
EST. GFR  (NON AFRICAN AMERICAN): >60 ML/MIN/1.73 M^2
GLUCOSE SERPL-MCNC: 130 MG/DL (ref 70–110)
POTASSIUM SERPL-SCNC: 4.4 MMOL/L (ref 3.5–5.1)
PROT SERPL-MCNC: 7.7 G/DL (ref 6–8.4)
SODIUM SERPL-SCNC: 138 MMOL/L (ref 136–145)

## 2021-03-19 PROCEDURE — 36415 COLL VENOUS BLD VENIPUNCTURE: CPT | Performed by: STUDENT IN AN ORGANIZED HEALTH CARE EDUCATION/TRAINING PROGRAM

## 2021-03-19 PROCEDURE — 25000003 PHARM REV CODE 250: Performed by: STUDENT IN AN ORGANIZED HEALTH CARE EDUCATION/TRAINING PROGRAM

## 2021-03-19 PROCEDURE — 63600175 PHARM REV CODE 636 W HCPCS: Performed by: STUDENT IN AN ORGANIZED HEALTH CARE EDUCATION/TRAINING PROGRAM

## 2021-03-19 PROCEDURE — 80053 COMPREHEN METABOLIC PANEL: CPT | Performed by: STUDENT IN AN ORGANIZED HEALTH CARE EDUCATION/TRAINING PROGRAM

## 2021-03-19 PROCEDURE — 11000001 HC ACUTE MED/SURG PRIVATE ROOM

## 2021-03-19 RX ADMIN — METHOCARBAMOL 750 MG: 750 TABLET ORAL at 09:03

## 2021-03-19 RX ADMIN — ACETAMINOPHEN 650 MG: 325 TABLET ORAL at 05:03

## 2021-03-19 RX ADMIN — CEFAZOLIN 2 G: 330 INJECTION, POWDER, FOR SOLUTION INTRAMUSCULAR; INTRAVENOUS at 09:03

## 2021-03-19 RX ADMIN — GABAPENTIN 300 MG: 100 CAPSULE ORAL at 03:03

## 2021-03-19 RX ADMIN — ACETAMINOPHEN 650 MG: 325 TABLET ORAL at 10:03

## 2021-03-19 RX ADMIN — AMLODIPINE BESYLATE 5 MG: 5 TABLET ORAL at 09:03

## 2021-03-19 RX ADMIN — ACETAMINOPHEN 650 MG: 325 TABLET ORAL at 11:03

## 2021-03-19 RX ADMIN — CELECOXIB 200 MG: 200 CAPSULE ORAL at 09:03

## 2021-03-19 RX ADMIN — OXYCODONE HYDROCHLORIDE 15 MG: 10 TABLET ORAL at 11:03

## 2021-03-19 RX ADMIN — DEXAMETHASONE SODIUM PHOSPHATE 8 MG: 4 INJECTION, SOLUTION INTRAMUSCULAR; INTRAVENOUS at 05:03

## 2021-03-19 RX ADMIN — GABAPENTIN 300 MG: 100 CAPSULE ORAL at 11:03

## 2021-03-19 RX ADMIN — OXYCODONE HYDROCHLORIDE 15 MG: 10 TABLET ORAL at 01:03

## 2021-03-19 RX ADMIN — METHOCARBAMOL 750 MG: 750 TABLET ORAL at 04:03

## 2021-03-19 RX ADMIN — GABAPENTIN 300 MG: 100 CAPSULE ORAL at 09:03

## 2021-03-19 RX ADMIN — OXYCODONE HYDROCHLORIDE 15 MG: 10 TABLET ORAL at 05:03

## 2021-03-19 RX ADMIN — OXYCODONE HYDROCHLORIDE 15 MG: 10 TABLET ORAL at 09:03

## 2021-03-19 RX ADMIN — CEFAZOLIN 2 G: 330 INJECTION, POWDER, FOR SOLUTION INTRAMUSCULAR; INTRAVENOUS at 03:03

## 2021-03-20 LAB
BASOPHILS # BLD AUTO: 0.04 K/UL (ref 0–0.2)
BASOPHILS NFR BLD: 0.4 % (ref 0–1.9)
BLD PROD TYP BPU: NORMAL
BLOOD UNIT EXPIRATION DATE: NORMAL
BLOOD UNIT TYPE CODE: 7300
BLOOD UNIT TYPE: NORMAL
CODING SYSTEM: NORMAL
DIFFERENTIAL METHOD: ABNORMAL
DISPENSE STATUS: NORMAL
EOSINOPHIL # BLD AUTO: 0 K/UL (ref 0–0.5)
EOSINOPHIL NFR BLD: 0.1 % (ref 0–8)
ERYTHROCYTE [DISTWIDTH] IN BLOOD BY AUTOMATED COUNT: 12.2 % (ref 11.5–14.5)
HCT VFR BLD AUTO: 32.5 % (ref 40–54)
HGB BLD-MCNC: 10.4 G/DL (ref 14–18)
IMM GRANULOCYTES # BLD AUTO: 0.03 K/UL (ref 0–0.04)
IMM GRANULOCYTES NFR BLD AUTO: 0.3 % (ref 0–0.5)
LYMPHOCYTES # BLD AUTO: 1.9 K/UL (ref 1–4.8)
LYMPHOCYTES NFR BLD: 17.2 % (ref 18–48)
MCH RBC QN AUTO: 29.2 PG (ref 27–31)
MCHC RBC AUTO-ENTMCNC: 32 G/DL (ref 32–36)
MCV RBC AUTO: 91 FL (ref 82–98)
MONOCYTES # BLD AUTO: 1.5 K/UL (ref 0.3–1)
MONOCYTES NFR BLD: 13.7 % (ref 4–15)
NEUTROPHILS # BLD AUTO: 7.7 K/UL (ref 1.8–7.7)
NEUTROPHILS NFR BLD: 68.3 % (ref 38–73)
NRBC BLD-RTO: 0 /100 WBC
NUM UNITS TRANS PACKED RBC: NORMAL
PLATELET # BLD AUTO: 328 K/UL (ref 150–350)
PMV BLD AUTO: 9.6 FL (ref 9.2–12.9)
RBC # BLD AUTO: 3.56 M/UL (ref 4.6–6.2)
WBC # BLD AUTO: 11.2 K/UL (ref 3.9–12.7)

## 2021-03-20 PROCEDURE — 85025 COMPLETE CBC W/AUTO DIFF WBC: CPT | Performed by: STUDENT IN AN ORGANIZED HEALTH CARE EDUCATION/TRAINING PROGRAM

## 2021-03-20 PROCEDURE — 11000001 HC ACUTE MED/SURG PRIVATE ROOM

## 2021-03-20 PROCEDURE — 97165 OT EVAL LOW COMPLEX 30 MIN: CPT

## 2021-03-20 PROCEDURE — 97116 GAIT TRAINING THERAPY: CPT

## 2021-03-20 PROCEDURE — 36415 COLL VENOUS BLD VENIPUNCTURE: CPT | Performed by: STUDENT IN AN ORGANIZED HEALTH CARE EDUCATION/TRAINING PROGRAM

## 2021-03-20 PROCEDURE — 97535 SELF CARE MNGMENT TRAINING: CPT

## 2021-03-20 PROCEDURE — 63600175 PHARM REV CODE 636 W HCPCS: Performed by: STUDENT IN AN ORGANIZED HEALTH CARE EDUCATION/TRAINING PROGRAM

## 2021-03-20 PROCEDURE — 25000003 PHARM REV CODE 250: Performed by: STUDENT IN AN ORGANIZED HEALTH CARE EDUCATION/TRAINING PROGRAM

## 2021-03-20 PROCEDURE — 97162 PT EVAL MOD COMPLEX 30 MIN: CPT

## 2021-03-20 RX ADMIN — OXYCODONE HYDROCHLORIDE 15 MG: 10 TABLET ORAL at 09:03

## 2021-03-20 RX ADMIN — HYDROMORPHONE HYDROCHLORIDE 0.5 MG: 1 INJECTION, SOLUTION INTRAMUSCULAR; INTRAVENOUS; SUBCUTANEOUS at 03:03

## 2021-03-20 RX ADMIN — POLYETHYLENE GLYCOL 3350 17 G: 17 POWDER, FOR SOLUTION ORAL at 08:03

## 2021-03-20 RX ADMIN — CELECOXIB 200 MG: 200 CAPSULE ORAL at 08:03

## 2021-03-20 RX ADMIN — GABAPENTIN 300 MG: 100 CAPSULE ORAL at 09:03

## 2021-03-20 RX ADMIN — ACETAMINOPHEN 650 MG: 325 TABLET ORAL at 10:03

## 2021-03-20 RX ADMIN — METHOCARBAMOL 750 MG: 750 TABLET ORAL at 09:03

## 2021-03-20 RX ADMIN — ACETAMINOPHEN 650 MG: 325 TABLET ORAL at 05:03

## 2021-03-20 RX ADMIN — HYDROMORPHONE HYDROCHLORIDE 0.5 MG: 1 INJECTION, SOLUTION INTRAMUSCULAR; INTRAVENOUS; SUBCUTANEOUS at 10:03

## 2021-03-20 RX ADMIN — METHOCARBAMOL 750 MG: 750 TABLET ORAL at 08:03

## 2021-03-20 RX ADMIN — OXYCODONE HYDROCHLORIDE 15 MG: 10 TABLET ORAL at 05:03

## 2021-03-20 RX ADMIN — MELATONIN TAB 3 MG 6 MG: 3 TAB at 10:03

## 2021-03-20 RX ADMIN — GABAPENTIN 300 MG: 100 CAPSULE ORAL at 03:03

## 2021-03-20 RX ADMIN — AMLODIPINE BESYLATE 5 MG: 5 TABLET ORAL at 08:03

## 2021-03-20 RX ADMIN — GABAPENTIN 300 MG: 100 CAPSULE ORAL at 08:03

## 2021-03-20 RX ADMIN — ACETAMINOPHEN 650 MG: 325 TABLET ORAL at 04:03

## 2021-03-20 RX ADMIN — METHOCARBAMOL 750 MG: 750 TABLET ORAL at 03:03

## 2021-03-20 RX ADMIN — OXYCODONE HYDROCHLORIDE 15 MG: 10 TABLET ORAL at 01:03

## 2021-03-21 VITALS
BODY MASS INDEX: 28 KG/M2 | RESPIRATION RATE: 18 BRPM | DIASTOLIC BLOOD PRESSURE: 96 MMHG | OXYGEN SATURATION: 95 % | HEIGHT: 71 IN | HEART RATE: 98 BPM | SYSTOLIC BLOOD PRESSURE: 170 MMHG | TEMPERATURE: 99 F | WEIGHT: 200 LBS

## 2021-03-21 LAB
BASOPHILS # BLD AUTO: 0.07 K/UL (ref 0–0.2)
BASOPHILS NFR BLD: 0.8 % (ref 0–1.9)
DIFFERENTIAL METHOD: ABNORMAL
EOSINOPHIL # BLD AUTO: 0.1 K/UL (ref 0–0.5)
EOSINOPHIL NFR BLD: 1.4 % (ref 0–8)
ERYTHROCYTE [DISTWIDTH] IN BLOOD BY AUTOMATED COUNT: 12 % (ref 11.5–14.5)
HCT VFR BLD AUTO: 34.1 % (ref 40–54)
HGB BLD-MCNC: 10.9 G/DL (ref 14–18)
IMM GRANULOCYTES # BLD AUTO: 0.02 K/UL (ref 0–0.04)
IMM GRANULOCYTES NFR BLD AUTO: 0.2 % (ref 0–0.5)
LYMPHOCYTES # BLD AUTO: 2.4 K/UL (ref 1–4.8)
LYMPHOCYTES NFR BLD: 26.4 % (ref 18–48)
MCH RBC QN AUTO: 29.1 PG (ref 27–31)
MCHC RBC AUTO-ENTMCNC: 32 G/DL (ref 32–36)
MCV RBC AUTO: 91 FL (ref 82–98)
MONOCYTES # BLD AUTO: 1.4 K/UL (ref 0.3–1)
MONOCYTES NFR BLD: 15.4 % (ref 4–15)
NEUTROPHILS # BLD AUTO: 5.1 K/UL (ref 1.8–7.7)
NEUTROPHILS NFR BLD: 55.8 % (ref 38–73)
NRBC BLD-RTO: 0 /100 WBC
PLATELET # BLD AUTO: 329 K/UL (ref 150–350)
PMV BLD AUTO: 9.6 FL (ref 9.2–12.9)
RBC # BLD AUTO: 3.74 M/UL (ref 4.6–6.2)
WBC # BLD AUTO: 9.07 K/UL (ref 3.9–12.7)

## 2021-03-21 PROCEDURE — 25000003 PHARM REV CODE 250: Performed by: STUDENT IN AN ORGANIZED HEALTH CARE EDUCATION/TRAINING PROGRAM

## 2021-03-21 PROCEDURE — 85025 COMPLETE CBC W/AUTO DIFF WBC: CPT | Performed by: STUDENT IN AN ORGANIZED HEALTH CARE EDUCATION/TRAINING PROGRAM

## 2021-03-21 PROCEDURE — 36415 COLL VENOUS BLD VENIPUNCTURE: CPT | Performed by: STUDENT IN AN ORGANIZED HEALTH CARE EDUCATION/TRAINING PROGRAM

## 2021-03-21 RX ORDER — SODIUM CHLORIDE 0.9 % (FLUSH) 0.9 %
10 SYRINGE (ML) INJECTION
Status: DISCONTINUED | OUTPATIENT
Start: 2021-03-21 | End: 2021-03-21 | Stop reason: HOSPADM

## 2021-03-21 RX ORDER — MUPIROCIN 20 MG/G
OINTMENT TOPICAL 2 TIMES DAILY
Status: DISCONTINUED | OUTPATIENT
Start: 2021-03-21 | End: 2021-03-21 | Stop reason: HOSPADM

## 2021-03-21 RX ADMIN — METHOCARBAMOL 750 MG: 750 TABLET ORAL at 08:03

## 2021-03-21 RX ADMIN — GABAPENTIN 300 MG: 100 CAPSULE ORAL at 08:03

## 2021-03-21 RX ADMIN — OXYCODONE HYDROCHLORIDE 10 MG: 10 TABLET ORAL at 04:03

## 2021-03-21 RX ADMIN — GABAPENTIN 300 MG: 100 CAPSULE ORAL at 03:03

## 2021-03-21 RX ADMIN — MUPIROCIN: 20 OINTMENT TOPICAL at 03:03

## 2021-03-21 RX ADMIN — OXYCODONE HYDROCHLORIDE 15 MG: 10 TABLET ORAL at 08:03

## 2021-03-21 RX ADMIN — POLYETHYLENE GLYCOL 3350 17 G: 17 POWDER, FOR SOLUTION ORAL at 08:03

## 2021-03-21 RX ADMIN — OXYCODONE HYDROCHLORIDE 15 MG: 10 TABLET ORAL at 03:03

## 2021-03-21 RX ADMIN — AMLODIPINE BESYLATE 5 MG: 5 TABLET ORAL at 08:03

## 2021-03-21 RX ADMIN — CELECOXIB 200 MG: 200 CAPSULE ORAL at 08:03

## 2021-03-23 ENCOUNTER — TELEPHONE (OUTPATIENT)
Dept: ORTHOPEDICS | Facility: CLINIC | Age: 61
End: 2021-03-23

## 2021-03-23 DIAGNOSIS — Z98.1 S/P CERVICAL SPINAL FUSION: Primary | ICD-10-CM

## 2021-03-31 RX ORDER — OXYCODONE HYDROCHLORIDE 5 MG/1
5 TABLET ORAL EVERY 4 HOURS PRN
Qty: 40 TABLET | Refills: 0 | Status: CANCELLED | OUTPATIENT
Start: 2021-03-31

## 2021-04-05 RX ORDER — OXYCODONE HYDROCHLORIDE 5 MG/1
5 TABLET ORAL EVERY 4 HOURS PRN
Qty: 40 TABLET | Refills: 0 | Status: SHIPPED | OUTPATIENT
Start: 2021-04-05 | End: 2021-04-16 | Stop reason: SDUPTHER

## 2021-04-14 ENCOUNTER — OFFICE VISIT (OUTPATIENT)
Dept: ORTHOPEDICS | Facility: CLINIC | Age: 61
End: 2021-04-14
Payer: MEDICAID

## 2021-04-14 ENCOUNTER — HOSPITAL ENCOUNTER (OUTPATIENT)
Dept: RADIOLOGY | Facility: HOSPITAL | Age: 61
Discharge: HOME | End: 2021-04-14
Attending: PHYSICIAN ASSISTANT
Payer: MEDICAID

## 2021-04-14 VITALS — WEIGHT: 200 LBS | HEIGHT: 71 IN | BODY MASS INDEX: 28 KG/M2

## 2021-04-14 DIAGNOSIS — Z98.1 S/P CERVICAL SPINAL FUSION: Primary | ICD-10-CM

## 2021-04-14 DIAGNOSIS — Z98.1 S/P CERVICAL SPINAL FUSION: ICD-10-CM

## 2021-04-14 PROCEDURE — 99024 PR POST-OP FOLLOW-UP VISIT: ICD-10-PCS | Mod: ,,, | Performed by: PHYSICIAN ASSISTANT

## 2021-04-14 PROCEDURE — 99999 PR PBB SHADOW E&M-EST. PATIENT-LVL III: ICD-10-PCS | Mod: PBBFAC,,, | Performed by: PHYSICIAN ASSISTANT

## 2021-04-14 PROCEDURE — 72040 X-RAY EXAM NECK SPINE 2-3 VW: CPT | Mod: 26,,, | Performed by: RADIOLOGY

## 2021-04-14 PROCEDURE — 99213 OFFICE O/P EST LOW 20 MIN: CPT | Mod: PBBFAC,25 | Performed by: PHYSICIAN ASSISTANT

## 2021-04-14 PROCEDURE — 72040 X-RAY EXAM NECK SPINE 2-3 VW: CPT | Mod: TC

## 2021-04-14 PROCEDURE — 99999 PR PBB SHADOW E&M-EST. PATIENT-LVL III: CPT | Mod: PBBFAC,,, | Performed by: PHYSICIAN ASSISTANT

## 2021-04-14 PROCEDURE — 72040 XR CERVICAL SPINE AP LATERAL: ICD-10-PCS | Mod: 26,,, | Performed by: RADIOLOGY

## 2021-04-14 PROCEDURE — 99024 POSTOP FOLLOW-UP VISIT: CPT | Mod: ,,, | Performed by: PHYSICIAN ASSISTANT

## 2021-04-14 RX ORDER — METHOCARBAMOL 750 MG/1
750 TABLET, FILM COATED ORAL 3 TIMES DAILY
Qty: 60 TABLET | Refills: 0 | Status: SHIPPED | OUTPATIENT
Start: 2021-04-14 | End: 2021-04-16 | Stop reason: SDUPTHER

## 2021-04-15 ENCOUNTER — TELEPHONE (OUTPATIENT)
Dept: ORTHOPEDICS | Facility: CLINIC | Age: 61
End: 2021-04-15

## 2021-04-16 ENCOUNTER — NURSE TRIAGE (OUTPATIENT)
Dept: ADMINISTRATIVE | Facility: CLINIC | Age: 61
End: 2021-04-16

## 2021-04-16 RX ORDER — METHOCARBAMOL 750 MG/1
750 TABLET, FILM COATED ORAL 3 TIMES DAILY
Qty: 60 TABLET | Refills: 0 | Status: SHIPPED | OUTPATIENT
Start: 2021-04-16 | End: 2021-05-06

## 2021-04-16 RX ORDER — METHOCARBAMOL 750 MG/1
750 TABLET, FILM COATED ORAL 3 TIMES DAILY
Qty: 60 TABLET | Refills: 0 | Status: CANCELLED | OUTPATIENT
Start: 2021-04-16 | End: 2021-05-06

## 2021-04-16 RX ORDER — OXYCODONE HYDROCHLORIDE 5 MG/1
5 TABLET ORAL EVERY 6 HOURS PRN
Qty: 28 TABLET | Refills: 0 | Status: SHIPPED | OUTPATIENT
Start: 2021-04-16 | End: 2021-05-05 | Stop reason: SDUPTHER

## 2021-04-16 RX ORDER — OXYCODONE HYDROCHLORIDE 5 MG/1
5 TABLET ORAL EVERY 6 HOURS PRN
Qty: 28 TABLET | Refills: 0 | Status: CANCELLED | OUTPATIENT
Start: 2021-04-16 | End: 2021-04-23

## 2021-04-19 ENCOUNTER — TELEPHONE (OUTPATIENT)
Dept: ORTHOPEDICS | Facility: CLINIC | Age: 61
End: 2021-04-19

## 2021-04-30 PROBLEM — Z74.09 IMPAIRED FUNCTIONAL MOBILITY, BALANCE, GAIT, AND ENDURANCE: Status: ACTIVE | Noted: 2021-04-30

## 2021-05-05 DIAGNOSIS — Z98.1 S/P CERVICAL SPINAL FUSION: Primary | ICD-10-CM

## 2021-05-05 RX ORDER — OXYCODONE HYDROCHLORIDE 5 MG/1
5 TABLET ORAL EVERY 8 HOURS PRN
Qty: 21 TABLET | Refills: 0 | Status: SHIPPED | OUTPATIENT
Start: 2021-05-05 | End: 2021-05-12

## 2021-05-17 PROBLEM — Z78.9 IMPAIRED MOBILITY AND ADLS: Status: RESOLVED | Noted: 2020-09-01 | Resolved: 2021-05-17

## 2021-05-17 PROBLEM — Z74.09 IMPAIRED MOBILITY AND ADLS: Status: RESOLVED | Noted: 2020-09-01 | Resolved: 2021-05-17

## 2021-05-17 PROBLEM — R53.1 WEAKNESS: Status: RESOLVED | Noted: 2020-09-01 | Resolved: 2021-05-17

## 2021-05-19 RX ORDER — HYDROCODONE BITARTRATE AND ACETAMINOPHEN 5; 325 MG/1; MG/1
1 TABLET ORAL EVERY 12 HOURS PRN
Qty: 14 TABLET | Refills: 0 | OUTPATIENT
Start: 2021-05-19 | End: 2021-06-29

## 2021-05-24 PROBLEM — R20.0 PAIN AND NUMBNESS OF LEFT UPPER EXTREMITY: Status: ACTIVE | Noted: 2021-05-24

## 2021-05-24 PROBLEM — M79.601 PAIN AND NUMBNESS OF RIGHT UPPER EXTREMITY: Status: ACTIVE | Noted: 2021-05-24

## 2021-05-24 PROBLEM — R20.0 PAIN AND NUMBNESS OF RIGHT UPPER EXTREMITY: Status: ACTIVE | Noted: 2021-05-24

## 2021-05-24 PROBLEM — M79.602 PAIN AND NUMBNESS OF LEFT UPPER EXTREMITY: Status: ACTIVE | Noted: 2021-05-24

## 2021-05-25 ENCOUNTER — TELEPHONE (OUTPATIENT)
Dept: ORTHOPEDICS | Facility: CLINIC | Age: 61
End: 2021-05-25

## 2021-05-25 DIAGNOSIS — M50.30 DDD (DEGENERATIVE DISC DISEASE), CERVICAL: Primary | ICD-10-CM

## 2021-06-01 ENCOUNTER — OFFICE VISIT (OUTPATIENT)
Dept: ORTHOPEDICS | Facility: CLINIC | Age: 61
End: 2021-06-01
Payer: MEDICAID

## 2021-06-01 ENCOUNTER — HOSPITAL ENCOUNTER (OUTPATIENT)
Dept: RADIOLOGY | Facility: HOSPITAL | Age: 61
Discharge: HOME | End: 2021-06-01
Attending: PHYSICIAN ASSISTANT
Payer: MEDICAID

## 2021-06-01 VITALS — HEIGHT: 71 IN | WEIGHT: 200 LBS | BODY MASS INDEX: 28 KG/M2

## 2021-06-01 DIAGNOSIS — Z98.1 S/P CERVICAL SPINAL FUSION: Primary | ICD-10-CM

## 2021-06-01 DIAGNOSIS — M50.30 DDD (DEGENERATIVE DISC DISEASE), CERVICAL: ICD-10-CM

## 2021-06-01 PROCEDURE — 72040 X-RAY EXAM NECK SPINE 2-3 VW: CPT | Mod: TC

## 2021-06-01 PROCEDURE — 99024 PR POST-OP FOLLOW-UP VISIT: ICD-10-PCS | Mod: ,,, | Performed by: PHYSICIAN ASSISTANT

## 2021-06-01 PROCEDURE — 72040 X-RAY EXAM NECK SPINE 2-3 VW: CPT | Mod: 26,,, | Performed by: RADIOLOGY

## 2021-06-01 PROCEDURE — 99999 PR PBB SHADOW E&M-EST. PATIENT-LVL III: ICD-10-PCS | Mod: PBBFAC,,, | Performed by: PHYSICIAN ASSISTANT

## 2021-06-01 PROCEDURE — 99213 OFFICE O/P EST LOW 20 MIN: CPT | Mod: PBBFAC,25 | Performed by: PHYSICIAN ASSISTANT

## 2021-06-01 PROCEDURE — 99999 PR PBB SHADOW E&M-EST. PATIENT-LVL III: CPT | Mod: PBBFAC,,, | Performed by: PHYSICIAN ASSISTANT

## 2021-06-01 PROCEDURE — 72040 XR CERVICAL SPINE AP LATERAL: ICD-10-PCS | Mod: 26,,, | Performed by: RADIOLOGY

## 2021-06-01 PROCEDURE — 99024 POSTOP FOLLOW-UP VISIT: CPT | Mod: ,,, | Performed by: PHYSICIAN ASSISTANT

## 2021-06-01 RX ORDER — METHOCARBAMOL 750 MG/1
750 TABLET, FILM COATED ORAL 3 TIMES DAILY
Qty: 60 TABLET | Refills: 0 | Status: SHIPPED | OUTPATIENT
Start: 2021-06-01 | End: 2021-06-07 | Stop reason: SDUPTHER

## 2021-06-02 ENCOUNTER — TELEPHONE (OUTPATIENT)
Dept: ORTHOPEDICS | Facility: CLINIC | Age: 61
End: 2021-06-02

## 2021-06-02 DIAGNOSIS — Z98.1 S/P CERVICAL SPINAL FUSION: Primary | ICD-10-CM

## 2021-06-02 DIAGNOSIS — R13.10 DYSPHAGIA, UNSPECIFIED TYPE: ICD-10-CM

## 2021-06-07 RX ORDER — METHOCARBAMOL 750 MG/1
750 TABLET, FILM COATED ORAL 3 TIMES DAILY
Qty: 60 TABLET | Refills: 0 | Status: SHIPPED | OUTPATIENT
Start: 2021-06-07 | End: 2021-06-27

## 2021-06-07 RX ORDER — TRAMADOL HYDROCHLORIDE 50 MG/1
50 TABLET ORAL EVERY 12 HOURS PRN
Qty: 14 TABLET | Refills: 0 | Status: SHIPPED | OUTPATIENT
Start: 2021-06-07 | End: 2021-06-29 | Stop reason: SDUPTHER

## 2021-06-07 RX ORDER — HYDROCODONE BITARTRATE AND ACETAMINOPHEN 5; 325 MG/1; MG/1
1 TABLET ORAL EVERY 12 HOURS PRN
Qty: 14 TABLET | Refills: 0 | OUTPATIENT
Start: 2021-06-07

## 2021-06-07 RX ORDER — GABAPENTIN 100 MG/1
100 CAPSULE ORAL 3 TIMES DAILY
Qty: 42 CAPSULE | Refills: 0 | Status: ON HOLD | OUTPATIENT
Start: 2021-06-07 | End: 2021-08-26 | Stop reason: HOSPADM

## 2021-06-29 ENCOUNTER — NURSE TRIAGE (OUTPATIENT)
Dept: ADMINISTRATIVE | Facility: CLINIC | Age: 61
End: 2021-06-29

## 2021-06-29 ENCOUNTER — TELEPHONE (OUTPATIENT)
Dept: ORTHOPEDICS | Facility: CLINIC | Age: 61
End: 2021-06-29

## 2021-07-12 ENCOUNTER — TELEPHONE (OUTPATIENT)
Dept: ORTHOPEDICS | Facility: CLINIC | Age: 61
End: 2021-07-12

## 2021-07-16 ENCOUNTER — HOSPITAL ENCOUNTER (OUTPATIENT)
Dept: RADIOLOGY | Facility: HOSPITAL | Age: 61
Discharge: HOME | End: 2021-07-16
Attending: PHYSICIAN ASSISTANT
Payer: MEDICAID

## 2021-07-16 ENCOUNTER — OFFICE VISIT (OUTPATIENT)
Dept: ORTHOPEDICS | Facility: CLINIC | Age: 61
End: 2021-07-16
Payer: MEDICAID

## 2021-07-16 DIAGNOSIS — M48.02 SPINAL STENOSIS, CERVICAL REGION: ICD-10-CM

## 2021-07-16 DIAGNOSIS — Z98.1 HISTORY OF FUSION OF CERVICAL SPINE: ICD-10-CM

## 2021-07-16 DIAGNOSIS — F11.93 OPIATE WITHDRAWAL: ICD-10-CM

## 2021-07-16 DIAGNOSIS — Z98.1 ARTHRODESIS STATUS: ICD-10-CM

## 2021-07-16 DIAGNOSIS — Z98.1 S/P CERVICAL SPINAL FUSION: ICD-10-CM

## 2021-07-16 PROCEDURE — 72040 X-RAY EXAM NECK SPINE 2-3 VW: CPT | Mod: TC

## 2021-07-16 PROCEDURE — 72040 X-RAY EXAM NECK SPINE 2-3 VW: CPT | Mod: 26,,, | Performed by: RADIOLOGY

## 2021-07-16 PROCEDURE — 72040 XR CERVICAL SPINE AP LATERAL: ICD-10-PCS | Mod: 26,,, | Performed by: RADIOLOGY

## 2021-07-16 PROCEDURE — 99213 PR OFFICE/OUTPT VISIT, EST, LEVL III, 20-29 MIN: ICD-10-PCS | Mod: S$PBB,,, | Performed by: ORTHOPAEDIC SURGERY

## 2021-07-16 PROCEDURE — 99213 OFFICE O/P EST LOW 20 MIN: CPT | Mod: PBBFAC | Performed by: ORTHOPAEDIC SURGERY

## 2021-07-16 PROCEDURE — 99999 PR PBB SHADOW E&M-EST. PATIENT-LVL III: CPT | Mod: PBBFAC,,, | Performed by: ORTHOPAEDIC SURGERY

## 2021-07-16 PROCEDURE — 99213 OFFICE O/P EST LOW 20 MIN: CPT | Mod: S$PBB,,, | Performed by: ORTHOPAEDIC SURGERY

## 2021-07-16 PROCEDURE — 99999 PR PBB SHADOW E&M-EST. PATIENT-LVL III: ICD-10-PCS | Mod: PBBFAC,,, | Performed by: ORTHOPAEDIC SURGERY

## 2021-07-16 RX ORDER — TRAMADOL HYDROCHLORIDE 50 MG/1
50 TABLET ORAL EVERY 6 HOURS
Qty: 20 TABLET | Refills: 0 | Status: SHIPPED | OUTPATIENT
Start: 2021-07-16 | End: 2021-08-05 | Stop reason: SDUPTHER

## 2021-07-28 ENCOUNTER — TELEPHONE (OUTPATIENT)
Dept: ORTHOPEDICS | Facility: CLINIC | Age: 61
End: 2021-07-28
Payer: MEDICAID

## 2021-07-28 NOTE — TELEPHONE ENCOUNTER
Returned call to patient but voicemail is not set up yet. I will continue to try to reach patient.

## 2021-07-28 NOTE — TELEPHONE ENCOUNTER
----- Message from Anette Mitchell sent at 7/28/2021  1:30 PM CDT -----  Regarding: status of an authorization for an MRI  Type: Patient Call Back    Who called:Valentín     What is the request in detail: the patient is requesting a call back from the staff to check the authorization for an MRI. Patient stated that Dr. Wilson put in an order for an MRI but it was refused by his insurance. Patient would like to know the status of the MRI so that he can get in to be seen asap.    Can the clinic reply by [facility]?no     Would the patient rather a call back or a response via [facility]? Call back     Best call back number:[phone]

## 2021-07-29 NOTE — TELEPHONE ENCOUNTER
Tried again to reach patient to discuss his MRI that was denied but the voicemail was not setup yet so I was unable to leave message.

## 2021-08-02 DIAGNOSIS — Z98.1 S/P CERVICAL SPINAL FUSION: Primary | ICD-10-CM

## 2021-08-04 ENCOUNTER — TELEPHONE (OUTPATIENT)
Dept: ORTHOPEDICS | Facility: CLINIC | Age: 61
End: 2021-08-04

## 2021-08-05 ENCOUNTER — HOSPITAL ENCOUNTER (OUTPATIENT)
Dept: RADIOLOGY | Facility: HOSPITAL | Age: 61
Discharge: HOME | End: 2021-08-05
Attending: ORTHOPAEDIC SURGERY
Payer: MEDICAID

## 2021-08-05 ENCOUNTER — TELEPHONE (OUTPATIENT)
Dept: ORTHOPEDICS | Facility: CLINIC | Age: 61
End: 2021-08-05

## 2021-08-05 DIAGNOSIS — Z98.1 ARTHRODESIS STATUS: ICD-10-CM

## 2021-08-05 DIAGNOSIS — M48.02 SPINAL STENOSIS, CERVICAL REGION: ICD-10-CM

## 2021-08-05 DIAGNOSIS — Z98.1 S/P CERVICAL SPINAL FUSION: Primary | ICD-10-CM

## 2021-08-05 PROCEDURE — 72156 MRI NECK SPINE W/O & W/DYE: CPT | Mod: TC

## 2021-08-05 PROCEDURE — 72156 MRI CERVICAL SPINE W WO CONTRAST: ICD-10-PCS | Mod: 26,,, | Performed by: RADIOLOGY

## 2021-08-05 PROCEDURE — 25500020 PHARM REV CODE 255: Performed by: ORTHOPAEDIC SURGERY

## 2021-08-05 PROCEDURE — 72156 MRI NECK SPINE W/O & W/DYE: CPT | Mod: 26,,, | Performed by: RADIOLOGY

## 2021-08-05 PROCEDURE — A9585 GADOBUTROL INJECTION: HCPCS | Performed by: ORTHOPAEDIC SURGERY

## 2021-08-05 RX ORDER — GADOBUTROL 604.72 MG/ML
9 INJECTION INTRAVENOUS
Status: COMPLETED | OUTPATIENT
Start: 2021-08-05 | End: 2021-08-05

## 2021-08-05 RX ADMIN — GADOBUTROL 9 ML: 604.72 INJECTION INTRAVENOUS at 09:08

## 2021-08-06 RX ORDER — TRAMADOL HYDROCHLORIDE 50 MG/1
50 TABLET ORAL EVERY 6 HOURS
Qty: 28 TABLET | Refills: 0 | Status: ON HOLD | OUTPATIENT
Start: 2021-08-06 | End: 2021-08-26 | Stop reason: HOSPADM

## 2021-08-06 NOTE — TELEPHONE ENCOUNTER
Spoke to pt and he advised that he is in a lot of pain. I advised that Dr. Wilson sent his pain medication to [facility] yesterday morning and they confirmed it. Pt states he is in a lot of pain and wants to know when surgery will be. I advised that surgery is till not being done for inpatients right now, but as soon as it opens back up I will let him know. I advised that if his symptoms get too bad or his pain, she should go to the emergency room. Pt verbalized understanding.

## 2021-08-06 NOTE — TELEPHONE ENCOUNTER
----- Message from Latrice Hardy sent at 8/6/2021  4:08 PM CDT -----  Contact: pt  Who Called: PT  Regarding: The pt is calling to speak with the nurse stating he can't use his hands and can barely stand to walk. The pt also states he needs to speak with the nurse about his medication being called into the pharmacy. He wants to be scheduled for his procedure. Please contact the pt for additional information.   Would the patient rather a call back or a response via [facility]? Call back  Best Call Back Number: [phone]  Additional Information:

## 2021-08-16 ENCOUNTER — TELEPHONE (OUTPATIENT)
Dept: ORTHOPEDICS | Facility: CLINIC | Age: 61
End: 2021-08-16

## 2021-08-16 DIAGNOSIS — Z01.818 PREOP TESTING: Primary | ICD-10-CM

## 2021-08-16 DIAGNOSIS — Z98.1 S/P CERVICAL SPINAL FUSION: Primary | ICD-10-CM

## 2021-08-16 NOTE — TELEPHONE ENCOUNTER
Left message for pt offering 8-26 as his surgery date. I also advised he will need to do a covid test on 8-23 if this date is confirmed. I asked that pt call me back to let me know if this date works.

## 2021-08-17 ENCOUNTER — TELEPHONE (OUTPATIENT)
Dept: PREADMISSION TESTING | Facility: HOSPITAL | Age: 61
End: 2021-08-17

## 2021-08-17 DIAGNOSIS — Z01.818 PREOPERATIVE TESTING: Primary | ICD-10-CM

## 2021-08-17 RX ORDER — METHOCARBAMOL 750 MG/1
500 TABLET, FILM COATED ORAL 3 TIMES DAILY
COMMUNITY
End: 2021-08-24 | Stop reason: SDUPTHER

## 2021-08-17 NOTE — TELEPHONE ENCOUNTER
Spoke with pt and advised that we are trying to get his surgery done on 8-26. I advised that he will need to go get his covid test done at [place] on 8-23 at 10:30. Pt verbalized understanding.

## 2021-08-23 ENCOUNTER — LAB VISIT (OUTPATIENT)
Dept: SPORTS MEDICINE | Facility: CLINIC | Age: 61
End: 2021-08-23
Payer: MEDICAID

## 2021-08-23 DIAGNOSIS — Z01.818 PREOP TESTING: ICD-10-CM

## 2021-08-23 PROCEDURE — U0003 INFECTIOUS AGENT DETECTION BY NUCLEIC ACID (DNA OR RNA); SEVERE ACUTE RESPIRATORY SYNDROME CORONAVIRUS 2 (SARS-COV-2) (CORONAVIRUS DISEASE [COVID-19]), AMPLIFIED PROBE TECHNIQUE, MAKING USE OF HIGH THROUGHPUT TECHNOLOGIES AS DESCRIBED BY CMS-2020-01-R: HCPCS | Performed by: ORTHOPAEDIC SURGERY

## 2021-08-23 PROCEDURE — U0005 INFEC AGEN DETEC AMPLI PROBE: HCPCS | Performed by: ORTHOPAEDIC SURGERY

## 2021-08-24 ENCOUNTER — OFFICE VISIT (OUTPATIENT)
Dept: INTERNAL MEDICINE | Facility: CLINIC | Age: 61
End: 2021-08-24
Payer: MEDICAID

## 2021-08-24 ENCOUNTER — HOSPITAL ENCOUNTER (OUTPATIENT)
Dept: CARDIOLOGY | Facility: CLINIC | Age: 61
Discharge: HOME | End: 2021-08-24
Payer: MEDICAID

## 2021-08-24 VITALS
HEART RATE: 89 BPM | DIASTOLIC BLOOD PRESSURE: 83 MMHG | OXYGEN SATURATION: 99 % | SYSTOLIC BLOOD PRESSURE: 140 MMHG | TEMPERATURE: 99 F | BODY MASS INDEX: 26.08 KG/M2 | HEIGHT: 71 IN

## 2021-08-24 DIAGNOSIS — M54.12 CERVICAL RADICULAR PAIN: ICD-10-CM

## 2021-08-24 DIAGNOSIS — I10 ESSENTIAL HYPERTENSION: ICD-10-CM

## 2021-08-24 DIAGNOSIS — Z72.0 TOBACCO USE: ICD-10-CM

## 2021-08-24 DIAGNOSIS — R29.898 WEAKNESS OF BOTH HANDS: ICD-10-CM

## 2021-08-24 DIAGNOSIS — M79.601 PARESTHESIA AND PAIN OF BOTH UPPER EXTREMITIES: ICD-10-CM

## 2021-08-24 DIAGNOSIS — M79.602 PARESTHESIA AND PAIN OF BOTH UPPER EXTREMITIES: ICD-10-CM

## 2021-08-24 DIAGNOSIS — Z01.818 PREOPERATIVE TESTING: ICD-10-CM

## 2021-08-24 DIAGNOSIS — D64.9 ANEMIA, UNSPECIFIED TYPE: ICD-10-CM

## 2021-08-24 DIAGNOSIS — M48.02 CERVICAL SPINAL STENOSIS: ICD-10-CM

## 2021-08-24 DIAGNOSIS — E16.2 HYPOGLYCEMIA: ICD-10-CM

## 2021-08-24 DIAGNOSIS — G47.33 OSA (OBSTRUCTIVE SLEEP APNEA): ICD-10-CM

## 2021-08-24 DIAGNOSIS — G95.9 CERVICAL MYELOPATHY: ICD-10-CM

## 2021-08-24 DIAGNOSIS — I63.81 LACUNAR INFARCTION: ICD-10-CM

## 2021-08-24 DIAGNOSIS — R26.9 ABNORMALITY OF GAIT: ICD-10-CM

## 2021-08-24 DIAGNOSIS — R20.2 PARESTHESIA AND PAIN OF BOTH UPPER EXTREMITIES: ICD-10-CM
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
## 2021-08-24 PROBLEM — R20.0 PAIN AND NUMBNESS OF LEFT UPPER EXTREMITY: Status: RESOLVED | Noted: 2021-05-24 | Resolved: 2021-08-24

## 2021-08-24 PROBLEM — R29.6 FALLS: Status: ACTIVE | Noted: 2020-07-27

## 2021-08-24 PROBLEM — R20.0 PAIN AND NUMBNESS OF RIGHT UPPER EXTREMITY: Status: RESOLVED | Noted: 2021-05-24 | Resolved: 2021-08-24

## 2021-08-24 PROBLEM — G04.91 MYELITIS, UNSPECIFIED: Status: ACTIVE | Noted: 2020-10-30

## 2021-08-24 LAB
SARS-COV-2 RNA RESP QL NAA+PROBE: NOT DETECTED
SARS-COV-2- CYCLE NUMBER: NORMAL

## 2021-08-24 PROCEDURE — 99999 PR PBB SHADOW E&M-EST. PATIENT-LVL III: ICD-10-PCS | Mod: PBBFAC,,,

## 2021-08-24 PROCEDURE — 99205 PR OFFICE/OUTPT VISIT, NEW, LEVL V, 60-74 MIN: ICD-10-PCS | Mod: S$PBB,ICN,, | Performed by: NURSE PRACTITIONER

## 2021-08-24 PROCEDURE — 99205 OFFICE O/P NEW HI 60 MIN: CPT | Mod: S$PBB,ICN,, | Performed by: NURSE PRACTITIONER

## 2021-08-24 PROCEDURE — 99213 OFFICE O/P EST LOW 20 MIN: CPT | Mod: PBBFAC

## 2021-08-24 PROCEDURE — 93005 ELECTROCARDIOGRAM TRACING: CPT | Mod: PBBFAC | Performed by: INTERNAL MEDICINE

## 2021-08-24 PROCEDURE — 99999 PR PBB SHADOW E&M-EST. PATIENT-LVL III: CPT | Mod: PBBFAC,,,

## 2021-08-24 PROCEDURE — 93010 ELECTROCARDIOGRAM REPORT: CPT | Mod: S$PBB,,, | Performed by: INTERNAL MEDICINE

## 2021-08-24 PROCEDURE — 93010 EKG 12-LEAD: ICD-10-PCS | Mod: S$PBB,,, | Performed by: INTERNAL MEDICINE

## 2021-08-24 RX ORDER — CHLORHEXIDINE GLUCONATE ORAL RINSE 1.2 MG/ML
SOLUTION DENTAL
COMMUNITY
Start: 2021-06-10 | End: 2021-08-25 | Stop reason: ALTCHOICE

## 2021-08-24 RX ORDER — DICLOFENAC SODIUM 75 MG/1
TABLET, DELAYED RELEASE ORAL
COMMUNITY
End: 2021-08-25 | Stop reason: ALTCHOICE

## 2021-08-24 RX ORDER — OXYCODONE AND ACETAMINOPHEN 5; 325 MG/1; MG/1
TABLET ORAL
COMMUNITY
End: 2021-08-24

## 2021-08-24 RX ORDER — DULOXETIN HYDROCHLORIDE 20 MG/1
40 CAPSULE, DELAYED RELEASE ORAL DAILY
COMMUNITY
Start: 2021-08-03 | End: 2021-08-25 | Stop reason: ALTCHOICE

## 2021-08-24 RX ORDER — LISINOPRIL AND HYDROCHLOROTHIAZIDE 20; 25 MG/1; MG/1
TABLET ORAL
COMMUNITY
End: 2021-08-24

## 2021-08-24 RX ORDER — PANTOPRAZOLE SODIUM 40 MG/1
1 TABLET, DELAYED RELEASE ORAL DAILY
COMMUNITY
Start: 2021-02-05 | End: 2021-08-24

## 2021-08-24 RX ORDER — ERGOCALCIFEROL 1.25 MG/1
50000 CAPSULE ORAL
COMMUNITY
Start: 2021-06-30 | End: 2021-08-25 | Stop reason: ALTCHOICE

## 2021-08-24 RX ORDER — AMITRIPTYLINE HYDROCHLORIDE 25 MG/1
25 TABLET, FILM COATED ORAL NIGHTLY
COMMUNITY
Start: 2021-06-30 | End: 2021-08-25

## 2021-08-24 RX ORDER — HYDROCHLOROTHIAZIDE 25 MG/1
TABLET ORAL
COMMUNITY
End: 2021-08-24

## 2021-08-24 RX ORDER — NAPROXEN 500 MG/1
500 TABLET ORAL 2 TIMES DAILY PRN
COMMUNITY
Start: 2021-08-02 | End: 2021-08-24

## 2021-08-24 RX ORDER — METHOCARBAMOL 750 MG/1
750 TABLET, FILM COATED ORAL 3 TIMES DAILY
Qty: 90 TABLET | Refills: 0 | Status: ON HOLD | OUTPATIENT
Start: 2021-08-24 | End: 2021-08-26 | Stop reason: HOSPADM

## 2021-08-24 RX ORDER — BACLOFEN 10 MG/1
10 TABLET ORAL
COMMUNITY
Start: 2021-07-30 | End: 2021-08-24

## 2021-08-24 RX ORDER — ATENOLOL AND CHLORTHALIDONE TABLET 50; 25 MG/1; MG/1
TABLET ORAL
COMMUNITY
End: 2021-08-25 | Stop reason: ALTCHOICE

## 2021-08-24 RX ORDER — IBUPROFEN 800 MG/1
800 TABLET ORAL EVERY 8 HOURS PRN
Status: ON HOLD | COMMUNITY
Start: 2021-06-10 | End: 2021-08-26 | Stop reason: HOSPADM

## 2021-08-24 RX ORDER — CYCLOBENZAPRINE HCL 10 MG
TABLET ORAL
COMMUNITY
End: 2021-08-24

## 2021-08-25 ENCOUNTER — TELEPHONE (OUTPATIENT)
Dept: ORTHOPEDICS | Facility: CLINIC | Age: 61
End: 2021-08-25

## 2021-08-25 ENCOUNTER — ANESTHESIA EVENT (OUTPATIENT)
Dept: SURGERY | Facility: HOSPITAL | Age: 61
DRG: 472 | End: 2021-08-25
Payer: MEDICAID

## 2021-08-25 PROBLEM — E16.2 HYPOGLYCEMIA: Status: ACTIVE | Noted: 2021-08-25

## 2021-08-25 RX ORDER — AMLODIPINE BESYLATE 5 MG/1
1 TABLET ORAL DAILY
COMMUNITY
Start: 2021-03-10 | End: 2022-03-10

## 2021-08-26 ENCOUNTER — HOSPITAL ENCOUNTER (INPATIENT)
Facility: HOSPITAL | Age: 61
LOS: 11 days | Discharge: HOME | DRG: 472 | End: 2021-09-06
Attending: ORTHOPAEDIC SURGERY | Admitting: ORTHOPAEDIC SURGERY
Payer: MEDICAID

## 2021-08-26 ENCOUNTER — TELEPHONE (OUTPATIENT)
Dept: PHARMACY | Facility: CLINIC | Age: 61
End: 2021-08-26

## 2021-08-26 ENCOUNTER — ANESTHESIA (OUTPATIENT)
Dept: SURGERY | Facility: HOSPITAL | Age: 61
DRG: 472 | End: 2021-08-26
Payer: MEDICAID

## 2021-08-26 DIAGNOSIS — G95.9 CERVICAL MYELOPATHY: ICD-10-CM

## 2021-08-26 DIAGNOSIS — M48.02 CERVICAL STENOSIS OF SPINAL CANAL: Primary | ICD-10-CM

## 2021-08-26 LAB
ALBUMIN SERPL BCP-MCNC: 3.2 G/DL (ref 3.5–5.2)
ALP SERPL-CCNC: 46 U/L (ref 55–135)
ALT SERPL W/O P-5'-P-CCNC: 31 U/L (ref 10–44)
ANION GAP SERPL CALC-SCNC: 8 MMOL/L (ref 8–16)
AST SERPL-CCNC: 21 U/L (ref 10–40)
BASOPHILS # BLD AUTO: 0.05 K/UL (ref 0–0.2)
BASOPHILS NFR BLD: 1.1 % (ref 0–1.9)
BILIRUB SERPL-MCNC: 0.3 MG/DL (ref 0.1–1)
BUN SERPL-MCNC: 9 MG/DL (ref 6–20)
CALCIUM SERPL-MCNC: 8.7 MG/DL (ref 8.7–10.5)
CHLORIDE SERPL-SCNC: 110 MMOL/L (ref 95–110)
CO2 SERPL-SCNC: 25 MMOL/L (ref 23–29)
CREAT SERPL-MCNC: 0.8 MG/DL (ref 0.5–1.4)
DIFFERENTIAL METHOD: ABNORMAL
EOSINOPHIL # BLD AUTO: 0.2 K/UL (ref 0–0.5)
EOSINOPHIL NFR BLD: 4.1 % (ref 0–8)
ERYTHROCYTE [DISTWIDTH] IN BLOOD BY AUTOMATED COUNT: 12.5 % (ref 11.5–14.5)
EST. GFR  (AFRICAN AMERICAN): >60 ML/MIN/1.73 M^2
EST. GFR  (NON AFRICAN AMERICAN): >60 ML/MIN/1.73 M^2
GLUCOSE SERPL-MCNC: 114 MG/DL (ref 70–110)
HCT VFR BLD AUTO: 31.3 % (ref 40–54)
HGB BLD-MCNC: 10.1 G/DL (ref 14–18)
IMM GRANULOCYTES # BLD AUTO: 0.01 K/UL (ref 0–0.04)
IMM GRANULOCYTES NFR BLD AUTO: 0.2 % (ref 0–0.5)
LYMPHOCYTES # BLD AUTO: 1.8 K/UL (ref 1–4.8)
LYMPHOCYTES NFR BLD: 40.5 % (ref 18–48)
MCH RBC QN AUTO: 29.9 PG (ref 27–31)
MCHC RBC AUTO-ENTMCNC: 32.3 G/DL (ref 32–36)
MCV RBC AUTO: 93 FL (ref 82–98)
MONOCYTES # BLD AUTO: 0.3 K/UL (ref 0.3–1)
MONOCYTES NFR BLD: 7.7 % (ref 4–15)
NEUTROPHILS # BLD AUTO: 2.1 K/UL (ref 1.8–7.7)
NEUTROPHILS NFR BLD: 46.4 % (ref 38–73)
NRBC BLD-RTO: 0 /100 WBC
PLATELET # BLD AUTO: 221 K/UL (ref 150–450)
PMV BLD AUTO: 9.5 FL (ref 9.2–12.9)
POTASSIUM SERPL-SCNC: 3.6 MMOL/L (ref 3.5–5.1)
PROT SERPL-MCNC: 6 G/DL (ref 6–8.4)
RBC # BLD AUTO: 3.38 M/UL (ref 4.6–6.2)
SODIUM SERPL-SCNC: 143 MMOL/L (ref 136–145)
WBC # BLD AUTO: 4.42 K/UL (ref 3.9–12.7)

## 2021-08-26 PROCEDURE — 36000710: Performed by: ORTHOPAEDIC SURGERY

## 2021-08-26 PROCEDURE — 37000008 HC ANESTHESIA 1ST 15 MINUTES: Performed by: ORTHOPAEDIC SURGERY

## 2021-08-26 PROCEDURE — 85025 COMPLETE CBC W/AUTO DIFF WBC: CPT | Performed by: STUDENT IN AN ORGANIZED HEALTH CARE EDUCATION/TRAINING PROGRAM

## 2021-08-26 PROCEDURE — C1729 CATH, DRAINAGE: HCPCS | Performed by: ORTHOPAEDIC SURGERY

## 2021-08-26 PROCEDURE — 25000003 PHARM REV CODE 250: Performed by: STUDENT IN AN ORGANIZED HEALTH CARE EDUCATION/TRAINING PROGRAM

## 2021-08-26 PROCEDURE — 63600175 PHARM REV CODE 636 W HCPCS: Performed by: ORTHOPAEDIC SURGERY

## 2021-08-26 PROCEDURE — 11000001 HC ACUTE MED/SURG PRIVATE ROOM

## 2021-08-26 PROCEDURE — C1713 ANCHOR/SCREW BN/BN,TIS/BN: HCPCS | Performed by: ORTHOPAEDIC SURGERY

## 2021-08-26 PROCEDURE — 63015 PR LAMINECTOMY,>2 SGMT,CERVICAL: ICD-10-PCS | Mod: ,,, | Performed by: ORTHOPAEDIC SURGERY

## 2021-08-26 PROCEDURE — 63015 REMOVE SPINE LAMINA >2 CRVCL: CPT | Mod: ,,, | Performed by: ORTHOPAEDIC SURGERY

## 2021-08-26 PROCEDURE — 63600175 PHARM REV CODE 636 W HCPCS: Performed by: NURSE ANESTHETIST, CERTIFIED REGISTERED

## 2021-08-26 PROCEDURE — 22614 PR ARTHRODESIS, POST/POSTLAT, SNGL INTERSPACE, EA ADDTL: ICD-10-PCS | Mod: ,,, | Performed by: ORTHOPAEDIC SURGERY

## 2021-08-26 PROCEDURE — 36000711: Performed by: ORTHOPAEDIC SURGERY

## 2021-08-26 PROCEDURE — 36620 ARTERIAL: ICD-10-PCS | Mod: 59,,, | Performed by: ANESTHESIOLOGY

## 2021-08-26 PROCEDURE — 27201423 OPTIME MED/SURG SUP & DEVICES STERILE SUPPLY: Performed by: ORTHOPAEDIC SURGERY

## 2021-08-26 PROCEDURE — 22600 PR ARTHRODESIS, POST/POSTLAT, SNGL INTERSPACE, CERVICAL BELOW C2: ICD-10-PCS | Mod: 51,,, | Performed by: ORTHOPAEDIC SURGERY

## 2021-08-26 PROCEDURE — 36620 INSERTION CATHETER ARTERY: CPT | Mod: 59,,, | Performed by: ANESTHESIOLOGY

## 2021-08-26 PROCEDURE — 71000033 HC RECOVERY, INTIAL HOUR: Performed by: ORTHOPAEDIC SURGERY

## 2021-08-26 PROCEDURE — D9220A PRA ANESTHESIA: ICD-10-PCS | Mod: CRNA,,, | Performed by: NURSE ANESTHETIST, CERTIFIED REGISTERED

## 2021-08-26 PROCEDURE — 22842 PR POSTERIOR SEGMENTAL INSTRUMENTATION 3-6 VRT SEG: ICD-10-PCS | Mod: ,,, | Performed by: ORTHOPAEDIC SURGERY

## 2021-08-26 PROCEDURE — 27201037 HC PRESSURE MONITORING SET UP

## 2021-08-26 PROCEDURE — 63600175 PHARM REV CODE 636 W HCPCS: Performed by: STUDENT IN AN ORGANIZED HEALTH CARE EDUCATION/TRAINING PROGRAM

## 2021-08-26 PROCEDURE — D9220A PRA ANESTHESIA: Mod: ANES,,, | Performed by: ANESTHESIOLOGY

## 2021-08-26 PROCEDURE — 71000016 HC POSTOP RECOV ADDL HR: Performed by: ORTHOPAEDIC SURGERY

## 2021-08-26 PROCEDURE — 37000009 HC ANESTHESIA EA ADD 15 MINS: Performed by: ORTHOPAEDIC SURGERY

## 2021-08-26 PROCEDURE — 22842 INSERT SPINE FIXATION DEVICE: CPT | Mod: ,,, | Performed by: ORTHOPAEDIC SURGERY

## 2021-08-26 PROCEDURE — 22600 ARTHRD PST TQ 1NTRSPC CRV: CPT | Mod: 51,,, | Performed by: ORTHOPAEDIC SURGERY

## 2021-08-26 PROCEDURE — 86920 COMPATIBILITY TEST SPIN: CPT | Performed by: STUDENT IN AN ORGANIZED HEALTH CARE EDUCATION/TRAINING PROGRAM

## 2021-08-26 PROCEDURE — 25000003 PHARM REV CODE 250: Performed by: ORTHOPAEDIC SURGERY

## 2021-08-26 PROCEDURE — D9220A PRA ANESTHESIA: ICD-10-PCS | Mod: ANES,,, | Performed by: ANESTHESIOLOGY

## 2021-08-26 PROCEDURE — 25000003 PHARM REV CODE 250: Performed by: NURSE ANESTHETIST, CERTIFIED REGISTERED

## 2021-08-26 PROCEDURE — 20936 SP BONE AGRFT LOCAL ADD-ON: CPT | Mod: ,,, | Performed by: ORTHOPAEDIC SURGERY

## 2021-08-26 PROCEDURE — 25000003 PHARM REV CODE 250: Performed by: PHYSICIAN ASSISTANT

## 2021-08-26 PROCEDURE — 94761 N-INVAS EAR/PLS OXIMETRY MLT: CPT

## 2021-08-26 PROCEDURE — 80053 COMPREHEN METABOLIC PANEL: CPT | Performed by: STUDENT IN AN ORGANIZED HEALTH CARE EDUCATION/TRAINING PROGRAM

## 2021-08-26 PROCEDURE — 63600175 PHARM REV CODE 636 W HCPCS: Performed by: ANESTHESIOLOGY

## 2021-08-26 PROCEDURE — 22614 ARTHRD PST TQ 1NTRSPC EA ADD: CPT | Mod: ,,, | Performed by: ORTHOPAEDIC SURGERY

## 2021-08-26 PROCEDURE — D9220A PRA ANESTHESIA: Mod: CRNA,,, | Performed by: NURSE ANESTHETIST, CERTIFIED REGISTERED

## 2021-08-26 PROCEDURE — 71000015 HC POSTOP RECOV 1ST HR: Performed by: ORTHOPAEDIC SURGERY

## 2021-08-26 PROCEDURE — 20936 PR AUTOGRAFT SPINE SURGERY LOCAL FROM SAME INCISION: ICD-10-PCS | Mod: ,,, | Performed by: ORTHOPAEDIC SURGERY

## 2021-08-26 DEVICE — SET SYMPHONY SCREW NS: Type: IMPLANTABLE DEVICE | Site: SPINE CERVICAL | Status: FUNCTIONAL

## 2021-08-26 DEVICE — IMPLANTABLE DEVICE: Type: IMPLANTABLE DEVICE | Site: SPINE CERVICAL | Status: FUNCTIONAL

## 2021-08-26 DEVICE — ROD SYMPHONY PRE-CUT 3.5X60MM: Type: IMPLANTABLE DEVICE | Site: SPINE CERVICAL | Status: FUNCTIONAL

## 2021-08-26 RX ORDER — TALC
6 POWDER (GRAM) TOPICAL NIGHTLY PRN
Status: DISCONTINUED | OUTPATIENT
Start: 2021-08-26 | End: 2021-09-06 | Stop reason: HOSPADM

## 2021-08-26 RX ORDER — CELECOXIB 100 MG/1
100 CAPSULE ORAL 2 TIMES DAILY
Qty: 28 CAPSULE | Refills: 0 | Status: SHIPPED | OUTPATIENT
Start: 2021-08-26 | End: 2021-09-23 | Stop reason: SDUPTHER

## 2021-08-26 RX ORDER — FENTANYL CITRATE 50 UG/ML
INJECTION, SOLUTION INTRAMUSCULAR; INTRAVENOUS
Status: DISCONTINUED | OUTPATIENT
Start: 2021-08-26 | End: 2021-08-26

## 2021-08-26 RX ORDER — METHOCARBAMOL 500 MG/1
1000 TABLET, FILM COATED ORAL 3 TIMES DAILY
Qty: 90 TABLET | Refills: 0 | Status: SHIPPED | OUTPATIENT
Start: 2021-08-26 | End: 2021-09-20

## 2021-08-26 RX ORDER — ONDANSETRON 2 MG/ML
4 INJECTION INTRAMUSCULAR; INTRAVENOUS DAILY PRN
Status: DISCONTINUED | OUTPATIENT
Start: 2021-08-26 | End: 2021-08-26 | Stop reason: HOSPADM

## 2021-08-26 RX ORDER — MIDAZOLAM HYDROCHLORIDE 1 MG/ML
INJECTION, SOLUTION INTRAMUSCULAR; INTRAVENOUS
Status: DISCONTINUED | OUTPATIENT
Start: 2021-08-26 | End: 2021-08-26

## 2021-08-26 RX ORDER — OXYCODONE HYDROCHLORIDE 5 MG/1
5 TABLET ORAL EVERY 4 HOURS PRN
Status: DISCONTINUED | OUTPATIENT
Start: 2021-08-26 | End: 2021-09-06 | Stop reason: HOSPADM

## 2021-08-26 RX ORDER — HYDROMORPHONE HYDROCHLORIDE 1 MG/ML
1 INJECTION, SOLUTION INTRAMUSCULAR; INTRAVENOUS; SUBCUTANEOUS EVERY 4 HOURS PRN
Status: DISCONTINUED | OUTPATIENT
Start: 2021-08-26 | End: 2021-09-06 | Stop reason: HOSPADM

## 2021-08-26 RX ORDER — PHENYLEPHRINE HYDROCHLORIDE 10 MG/ML
INJECTION INTRAVENOUS
Status: DISCONTINUED | OUTPATIENT
Start: 2021-08-26 | End: 2021-08-26

## 2021-08-26 RX ORDER — SUCCINYLCHOLINE CHLORIDE 20 MG/ML
INJECTION INTRAMUSCULAR; INTRAVENOUS
Status: DISCONTINUED | OUTPATIENT
Start: 2021-08-26 | End: 2021-08-26

## 2021-08-26 RX ORDER — POLYETHYLENE GLYCOL 3350 17 G/17G
17 POWDER, FOR SOLUTION ORAL DAILY
Status: DISCONTINUED | OUTPATIENT
Start: 2021-08-26 | End: 2021-09-06 | Stop reason: HOSPADM

## 2021-08-26 RX ORDER — CEFAZOLIN SODIUM 1 G/3ML
2 INJECTION, POWDER, FOR SOLUTION INTRAMUSCULAR; INTRAVENOUS
Status: COMPLETED | OUTPATIENT
Start: 2021-08-26 | End: 2021-08-27

## 2021-08-26 RX ORDER — TRANEXAMIC ACID 100 MG/ML
INJECTION, SOLUTION INTRAVENOUS
Status: DISCONTINUED | OUTPATIENT
Start: 2021-08-26 | End: 2021-08-26

## 2021-08-26 RX ORDER — SODIUM CHLORIDE 0.9 % (FLUSH) 0.9 %
10 SYRINGE (ML) INJECTION
Status: DISCONTINUED | OUTPATIENT
Start: 2021-08-26 | End: 2021-09-06 | Stop reason: HOSPADM

## 2021-08-26 RX ORDER — AMOXICILLIN 250 MG
1 CAPSULE ORAL 2 TIMES DAILY
Status: DISCONTINUED | OUTPATIENT
Start: 2021-08-26 | End: 2021-09-06 | Stop reason: HOSPADM

## 2021-08-26 RX ORDER — MORPHINE SULFATE 2 MG/ML
3 INJECTION, SOLUTION INTRAMUSCULAR; INTRAVENOUS
Status: DISCONTINUED | OUTPATIENT
Start: 2021-08-26 | End: 2021-08-26

## 2021-08-26 RX ORDER — ONDANSETRON 2 MG/ML
4 INJECTION INTRAMUSCULAR; INTRAVENOUS EVERY 12 HOURS PRN
Status: DISCONTINUED | OUTPATIENT
Start: 2021-08-26 | End: 2021-09-06 | Stop reason: HOSPADM

## 2021-08-26 RX ORDER — BACITRACIN ZINC 500 UNIT/G
OINTMENT (GRAM) TOPICAL
Status: DISCONTINUED | OUTPATIENT
Start: 2021-08-26 | End: 2021-08-26 | Stop reason: HOSPADM

## 2021-08-26 RX ORDER — PROPOFOL 10 MG/ML
VIAL (ML) INTRAVENOUS CONTINUOUS PRN
Status: DISCONTINUED | OUTPATIENT
Start: 2021-08-26 | End: 2021-08-26

## 2021-08-26 RX ORDER — NEOSTIGMINE METHYLSULFATE 0.5 MG/ML
INJECTION, SOLUTION INTRAVENOUS
Status: DISCONTINUED | OUTPATIENT
Start: 2021-08-26 | End: 2021-08-26

## 2021-08-26 RX ORDER — MUPIROCIN 20 MG/G
OINTMENT TOPICAL 2 TIMES DAILY
Status: COMPLETED | OUTPATIENT
Start: 2021-08-26 | End: 2021-08-31

## 2021-08-26 RX ORDER — LIDOCAINE HYDROCHLORIDE 20 MG/ML
INJECTION INTRAVENOUS
Status: DISCONTINUED | OUTPATIENT
Start: 2021-08-26 | End: 2021-08-26

## 2021-08-26 RX ORDER — VANCOMYCIN HYDROCHLORIDE 1 G/20ML
INJECTION, POWDER, LYOPHILIZED, FOR SOLUTION INTRAVENOUS
Status: DISCONTINUED | OUTPATIENT
Start: 2021-08-26 | End: 2021-08-26 | Stop reason: HOSPADM

## 2021-08-26 RX ORDER — METHOCARBAMOL 500 MG/1
1000 TABLET, FILM COATED ORAL 3 TIMES DAILY
Status: DISCONTINUED | OUTPATIENT
Start: 2021-08-26 | End: 2021-09-06 | Stop reason: HOSPADM

## 2021-08-26 RX ORDER — SODIUM CHLORIDE 0.9 % (FLUSH) 0.9 %
3 SYRINGE (ML) INJECTION EVERY 30 MIN PRN
Status: DISCONTINUED | OUTPATIENT
Start: 2021-08-26 | End: 2021-08-26 | Stop reason: HOSPADM

## 2021-08-26 RX ORDER — PREGABALIN 150 MG/1
150 CAPSULE ORAL NIGHTLY
Status: DISCONTINUED | OUTPATIENT
Start: 2021-08-26 | End: 2021-09-06 | Stop reason: HOSPADM

## 2021-08-26 RX ORDER — ACETAMINOPHEN 500 MG
1000 TABLET ORAL EVERY 8 HOURS
Status: DISPENSED | OUTPATIENT
Start: 2021-08-26 | End: 2021-09-02

## 2021-08-26 RX ORDER — CEFAZOLIN SODIUM 1 G/3ML
INJECTION, POWDER, FOR SOLUTION INTRAMUSCULAR; INTRAVENOUS
Status: DISCONTINUED | OUTPATIENT
Start: 2021-08-26 | End: 2021-08-26

## 2021-08-26 RX ORDER — METOCLOPRAMIDE HYDROCHLORIDE 5 MG/ML
5 INJECTION INTRAMUSCULAR; INTRAVENOUS EVERY 6 HOURS PRN
Status: DISCONTINUED | OUTPATIENT
Start: 2021-08-26 | End: 2021-09-06 | Stop reason: HOSPADM

## 2021-08-26 RX ORDER — BISACODYL 10 MG
10 SUPPOSITORY, RECTAL RECTAL DAILY PRN
Status: DISCONTINUED | OUTPATIENT
Start: 2021-08-26 | End: 2021-09-06 | Stop reason: HOSPADM

## 2021-08-26 RX ORDER — ONDANSETRON 2 MG/ML
INJECTION INTRAMUSCULAR; INTRAVENOUS
Status: DISCONTINUED | OUTPATIENT
Start: 2021-08-26 | End: 2021-08-26

## 2021-08-26 RX ORDER — LIDOCAINE HYDROCHLORIDE 10 MG/ML
1 INJECTION, SOLUTION EPIDURAL; INFILTRATION; INTRACAUDAL; PERINEURAL ONCE
Status: COMPLETED | OUTPATIENT
Start: 2021-08-26 | End: 2021-08-26

## 2021-08-26 RX ORDER — AMLODIPINE BESYLATE 5 MG/1
5 TABLET ORAL DAILY
Status: DISCONTINUED | OUTPATIENT
Start: 2021-08-27 | End: 2021-09-06 | Stop reason: HOSPADM

## 2021-08-26 RX ORDER — PROPOFOL 10 MG/ML
VIAL (ML) INTRAVENOUS
Status: DISCONTINUED | OUTPATIENT
Start: 2021-08-26 | End: 2021-08-26

## 2021-08-26 RX ORDER — HYDROCODONE BITARTRATE AND ACETAMINOPHEN 500; 5 MG/1; MG/1
TABLET ORAL
Status: DISCONTINUED | OUTPATIENT
Start: 2021-08-26 | End: 2021-09-06 | Stop reason: HOSPADM

## 2021-08-26 RX ORDER — DOCUSATE SODIUM 100 MG/1
100 CAPSULE, LIQUID FILLED ORAL 2 TIMES DAILY
Status: DISCONTINUED | OUTPATIENT
Start: 2021-08-26 | End: 2021-09-06 | Stop reason: HOSPADM

## 2021-08-26 RX ORDER — DEXAMETHASONE SODIUM PHOSPHATE 4 MG/ML
INJECTION, SOLUTION INTRA-ARTICULAR; INTRALESIONAL; INTRAMUSCULAR; INTRAVENOUS; SOFT TISSUE
Status: DISCONTINUED | OUTPATIENT
Start: 2021-08-26 | End: 2021-08-26

## 2021-08-26 RX ORDER — HYDROMORPHONE HYDROCHLORIDE 1 MG/ML
0.2 INJECTION, SOLUTION INTRAMUSCULAR; INTRAVENOUS; SUBCUTANEOUS EVERY 5 MIN PRN
Status: COMPLETED | OUTPATIENT
Start: 2021-08-26 | End: 2021-08-26

## 2021-08-26 RX ORDER — CELECOXIB 100 MG/1
100 CAPSULE ORAL 2 TIMES DAILY
Status: DISCONTINUED | OUTPATIENT
Start: 2021-08-26 | End: 2021-09-06 | Stop reason: HOSPADM

## 2021-08-26 RX ORDER — KETAMINE HCL IN 0.9 % NACL 50 MG/5 ML
SYRINGE (ML) INTRAVENOUS
Status: DISCONTINUED | OUTPATIENT
Start: 2021-08-26 | End: 2021-08-26

## 2021-08-26 RX ORDER — SODIUM CHLORIDE 9 MG/ML
INJECTION, SOLUTION INTRAVENOUS CONTINUOUS
Status: DISCONTINUED | OUTPATIENT
Start: 2021-08-26 | End: 2021-09-06 | Stop reason: HOSPADM

## 2021-08-26 RX ORDER — ROCURONIUM BROMIDE 10 MG/ML
INJECTION, SOLUTION INTRAVENOUS
Status: DISCONTINUED | OUTPATIENT
Start: 2021-08-26 | End: 2021-08-26

## 2021-08-26 RX ORDER — ACETAMINOPHEN 500 MG
1000 TABLET ORAL EVERY 8 HOURS
Qty: 120 TABLET | Refills: 0 | Status: SHIPPED | OUTPATIENT
Start: 2021-08-26 | End: 2021-10-06 | Stop reason: SDUPTHER

## 2021-08-26 RX ORDER — FAMOTIDINE 20 MG/1
20 TABLET, FILM COATED ORAL 2 TIMES DAILY
Status: DISCONTINUED | OUTPATIENT
Start: 2021-08-26 | End: 2021-09-06 | Stop reason: HOSPADM

## 2021-08-26 RX ORDER — GABAPENTIN 100 MG/1
100 CAPSULE ORAL 3 TIMES DAILY
Qty: 45 CAPSULE | Refills: 0 | Status: SHIPPED | OUTPATIENT
Start: 2021-08-26 | End: 2021-09-23 | Stop reason: SDUPTHER

## 2021-08-26 RX ORDER — OXYCODONE HYDROCHLORIDE 5 MG/1
5 TABLET ORAL EVERY 6 HOURS PRN
Qty: 24 TABLET | Refills: 0 | Status: SHIPPED | OUTPATIENT
Start: 2021-08-26 | End: 2021-09-23

## 2021-08-26 RX ORDER — OXYCODONE HYDROCHLORIDE 10 MG/1
10 TABLET ORAL EVERY 4 HOURS PRN
Status: DISCONTINUED | OUTPATIENT
Start: 2021-08-26 | End: 2021-09-06 | Stop reason: HOSPADM

## 2021-08-26 RX ADMIN — Medication 30 MG: at 12:08

## 2021-08-26 RX ADMIN — HYDROMORPHONE HYDROCHLORIDE 0.2 MG: 1 INJECTION, SOLUTION INTRAMUSCULAR; INTRAVENOUS; SUBCUTANEOUS at 04:08

## 2021-08-26 RX ADMIN — OXYCODONE HYDROCHLORIDE 10 MG: 10 TABLET ORAL at 10:08

## 2021-08-26 RX ADMIN — NEOSTIGMINE METHYLSULFATE 2.5 MG: 0.5 INJECTION INTRAVENOUS at 02:08

## 2021-08-26 RX ADMIN — SODIUM CHLORIDE: 0.9 INJECTION, SOLUTION INTRAVENOUS at 08:08

## 2021-08-26 RX ADMIN — OXYCODONE HYDROCHLORIDE 10 MG: 10 TABLET ORAL at 03:08

## 2021-08-26 RX ADMIN — HYDROMORPHONE HYDROCHLORIDE 0.2 MG: 1 INJECTION, SOLUTION INTRAMUSCULAR; INTRAVENOUS; SUBCUTANEOUS at 03:08

## 2021-08-26 RX ADMIN — ROCURONIUM BROMIDE 20 MG: 10 INJECTION, SOLUTION INTRAVENOUS at 12:08

## 2021-08-26 RX ADMIN — PROPOFOL 40 MG: 10 INJECTION, EMULSION INTRAVENOUS at 12:08

## 2021-08-26 RX ADMIN — ACETAMINOPHEN 1000 MG: 500 TABLET ORAL at 10:08

## 2021-08-26 RX ADMIN — Medication 150 MCG/KG/MIN: at 12:08

## 2021-08-26 RX ADMIN — PHENYLEPHRINE HYDROCHLORIDE 200 MCG: 10 INJECTION INTRAVENOUS at 12:08

## 2021-08-26 RX ADMIN — DOCUSATE SODIUM 50MG AND SENNOSIDES 8.6MG 1 TABLET: 8.6; 5 TABLET, FILM COATED ORAL at 08:08

## 2021-08-26 RX ADMIN — MORPHINE SULFATE 3 MG: 2 INJECTION, SOLUTION INTRAMUSCULAR; INTRAVENOUS at 05:08

## 2021-08-26 RX ADMIN — MIDAZOLAM HYDROCHLORIDE 2 MG: 1 INJECTION, SOLUTION INTRAMUSCULAR; INTRAVENOUS at 11:08

## 2021-08-26 RX ADMIN — MUPIROCIN: 20 OINTMENT TOPICAL at 08:08

## 2021-08-26 RX ADMIN — DOCUSATE SODIUM 100 MG: 100 CAPSULE, LIQUID FILLED ORAL at 08:08

## 2021-08-26 RX ADMIN — PHENYLEPHRINE HYDROCHLORIDE 100 MCG: 10 INJECTION INTRAVENOUS at 12:08

## 2021-08-26 RX ADMIN — LIDOCAINE HYDROCHLORIDE 2 MG: 10 INJECTION, SOLUTION EPIDURAL; INFILTRATION; INTRACAUDAL at 08:08

## 2021-08-26 RX ADMIN — DEXAMETHASONE SODIUM PHOSPHATE 8 MG: 4 INJECTION, SOLUTION INTRAMUSCULAR; INTRAVENOUS at 02:08

## 2021-08-26 RX ADMIN — GLYCOPYRROLATE 0.4 MG: 0.2 INJECTION, SOLUTION INTRAMUSCULAR; INTRAVITREAL at 02:08

## 2021-08-26 RX ADMIN — Medication 10 MG: at 01:08

## 2021-08-26 RX ADMIN — SODIUM CHLORIDE 0.25 MCG/KG/MIN: 9 INJECTION, SOLUTION INTRAVENOUS at 12:08

## 2021-08-26 RX ADMIN — FENTANYL CITRATE 100 MCG: 50 INJECTION, SOLUTION INTRAMUSCULAR; INTRAVENOUS at 12:08

## 2021-08-26 RX ADMIN — GLYCOPYRROLATE 0.2 MG: 0.2 INJECTION, SOLUTION INTRAMUSCULAR; INTRAVITREAL at 12:08

## 2021-08-26 RX ADMIN — ROCURONIUM BROMIDE 5 MG: 10 INJECTION, SOLUTION INTRAVENOUS at 12:08

## 2021-08-26 RX ADMIN — SUCCINYLCHOLINE CHLORIDE 120 MG: 20 INJECTION, SOLUTION INTRAMUSCULAR; INTRAVENOUS at 12:08

## 2021-08-26 RX ADMIN — ONDANSETRON 4 MG: 2 INJECTION INTRAMUSCULAR; INTRAVENOUS at 02:08

## 2021-08-26 RX ADMIN — PREGABALIN 150 MG: 150 CAPSULE ORAL at 08:08

## 2021-08-26 RX ADMIN — TRANEXAMIC ACID 1000 MG: 100 INJECTION, SOLUTION INTRAVENOUS at 12:08

## 2021-08-26 RX ADMIN — LIDOCAINE HYDROCHLORIDE 100 MG: 20 INJECTION, SOLUTION INTRAVENOUS at 12:08

## 2021-08-26 RX ADMIN — MORPHINE SULFATE 3 MG: 2 INJECTION, SOLUTION INTRAMUSCULAR; INTRAVENOUS at 08:08

## 2021-08-26 RX ADMIN — CEFAZOLIN 2 G: 330 INJECTION, POWDER, FOR SOLUTION INTRAMUSCULAR; INTRAVENOUS at 12:08

## 2021-08-26 RX ADMIN — METHOCARBAMOL 1000 MG: 500 TABLET ORAL at 03:08

## 2021-08-26 RX ADMIN — CEFAZOLIN 2 G: 330 INJECTION, POWDER, FOR SOLUTION INTRAMUSCULAR; INTRAVENOUS at 08:08

## 2021-08-26 RX ADMIN — METHOCARBAMOL 1000 MG: 500 TABLET ORAL at 08:08

## 2021-08-26 RX ADMIN — CELECOXIB 100 MG: 100 CAPSULE ORAL at 08:08

## 2021-08-26 RX ADMIN — SODIUM CHLORIDE: 0.9 INJECTION, SOLUTION INTRAVENOUS at 12:08

## 2021-08-26 RX ADMIN — FAMOTIDINE 20 MG: 20 TABLET, FILM COATED ORAL at 08:08

## 2021-08-26 RX ADMIN — PROPOFOL 200 MG: 10 INJECTION, EMULSION INTRAVENOUS at 12:08

## 2021-08-26 RX ADMIN — REMIFENTANIL HYDROCHLORIDE 0.2 MCG/KG/MIN: 1 INJECTION, POWDER, LYOPHILIZED, FOR SOLUTION INTRAVENOUS at 12:08

## 2021-08-27 ENCOUNTER — TELEPHONE (OUTPATIENT)
Dept: ORTHOPEDICS | Facility: CLINIC | Age: 61
End: 2021-08-27

## 2021-08-27 DIAGNOSIS — Z98.1 S/P CERVICAL SPINAL FUSION: Primary | ICD-10-CM

## 2021-08-27 LAB
ANION GAP SERPL CALC-SCNC: 11 MMOL/L (ref 8–16)
BUN SERPL-MCNC: 10 MG/DL (ref 6–20)
CALCIUM SERPL-MCNC: 9.3 MG/DL (ref 8.7–10.5)
CHLORIDE SERPL-SCNC: 102 MMOL/L (ref 95–110)
CO2 SERPL-SCNC: 23 MMOL/L (ref 23–29)
CREAT SERPL-MCNC: 0.9 MG/DL (ref 0.5–1.4)
EST. GFR  (AFRICAN AMERICAN): >60 ML/MIN/1.73 M^2
EST. GFR  (NON AFRICAN AMERICAN): >60 ML/MIN/1.73 M^2
GLUCOSE SERPL-MCNC: 95 MG/DL (ref 70–110)
HCT VFR BLD AUTO: 37.8 % (ref 40–54)
HGB BLD-MCNC: 12 G/DL (ref 14–18)
POTASSIUM SERPL-SCNC: 4.3 MMOL/L (ref 3.5–5.1)
SODIUM SERPL-SCNC: 136 MMOL/L (ref 136–145)

## 2021-08-27 PROCEDURE — 63600175 PHARM REV CODE 636 W HCPCS: Performed by: STUDENT IN AN ORGANIZED HEALTH CARE EDUCATION/TRAINING PROGRAM

## 2021-08-27 PROCEDURE — 25000003 PHARM REV CODE 250: Performed by: STUDENT IN AN ORGANIZED HEALTH CARE EDUCATION/TRAINING PROGRAM

## 2021-08-27 PROCEDURE — 97165 OT EVAL LOW COMPLEX 30 MIN: CPT

## 2021-08-27 PROCEDURE — 97116 GAIT TRAINING THERAPY: CPT

## 2021-08-27 PROCEDURE — 85014 HEMATOCRIT: CPT | Performed by: ORTHOPAEDIC SURGERY

## 2021-08-27 PROCEDURE — 36415 COLL VENOUS BLD VENIPUNCTURE: CPT | Performed by: STUDENT IN AN ORGANIZED HEALTH CARE EDUCATION/TRAINING PROGRAM

## 2021-08-27 PROCEDURE — 97530 THERAPEUTIC ACTIVITIES: CPT

## 2021-08-27 PROCEDURE — 97535 SELF CARE MNGMENT TRAINING: CPT

## 2021-08-27 PROCEDURE — 85018 HEMOGLOBIN: CPT | Performed by: ORTHOPAEDIC SURGERY

## 2021-08-27 PROCEDURE — 80048 BASIC METABOLIC PNL TOTAL CA: CPT | Performed by: STUDENT IN AN ORGANIZED HEALTH CARE EDUCATION/TRAINING PROGRAM

## 2021-08-27 PROCEDURE — 97161 PT EVAL LOW COMPLEX 20 MIN: CPT

## 2021-08-27 PROCEDURE — 36415 COLL VENOUS BLD VENIPUNCTURE: CPT | Performed by: ORTHOPAEDIC SURGERY

## 2021-08-27 PROCEDURE — 63600175 PHARM REV CODE 636 W HCPCS: Performed by: ORTHOPAEDIC SURGERY

## 2021-08-27 PROCEDURE — 11000001 HC ACUTE MED/SURG PRIVATE ROOM

## 2021-08-27 RX ADMIN — DOCUSATE SODIUM 100 MG: 100 CAPSULE, LIQUID FILLED ORAL at 08:08

## 2021-08-27 RX ADMIN — FAMOTIDINE 20 MG: 20 TABLET, FILM COATED ORAL at 08:08

## 2021-08-27 RX ADMIN — MUPIROCIN: 20 OINTMENT TOPICAL at 08:08

## 2021-08-27 RX ADMIN — AMLODIPINE BESYLATE 5 MG: 5 TABLET ORAL at 08:08

## 2021-08-27 RX ADMIN — DOCUSATE SODIUM 50MG AND SENNOSIDES 8.6MG 1 TABLET: 8.6; 5 TABLET, FILM COATED ORAL at 08:08

## 2021-08-27 RX ADMIN — HYDROMORPHONE HYDROCHLORIDE 1 MG: 1 INJECTION, SOLUTION INTRAMUSCULAR; INTRAVENOUS; SUBCUTANEOUS at 08:08

## 2021-08-27 RX ADMIN — HYDROMORPHONE HYDROCHLORIDE 1 MG: 1 INJECTION, SOLUTION INTRAMUSCULAR; INTRAVENOUS; SUBCUTANEOUS at 12:08

## 2021-08-27 RX ADMIN — CEFAZOLIN 2 G: 330 INJECTION, POWDER, FOR SOLUTION INTRAMUSCULAR; INTRAVENOUS at 04:08

## 2021-08-27 RX ADMIN — ACETAMINOPHEN 1000 MG: 500 TABLET ORAL at 06:08

## 2021-08-27 RX ADMIN — CELECOXIB 100 MG: 100 CAPSULE ORAL at 08:08

## 2021-08-27 RX ADMIN — ACETAMINOPHEN 1000 MG: 500 TABLET ORAL at 02:08

## 2021-08-27 RX ADMIN — HYDROMORPHONE HYDROCHLORIDE 1 MG: 1 INJECTION, SOLUTION INTRAMUSCULAR; INTRAVENOUS; SUBCUTANEOUS at 04:08

## 2021-08-27 RX ADMIN — METHOCARBAMOL 1000 MG: 500 TABLET ORAL at 02:08

## 2021-08-27 RX ADMIN — OXYCODONE HYDROCHLORIDE 10 MG: 10 TABLET ORAL at 06:08

## 2021-08-27 RX ADMIN — OXYCODONE HYDROCHLORIDE 10 MG: 10 TABLET ORAL at 10:08

## 2021-08-27 RX ADMIN — METHOCARBAMOL 1000 MG: 500 TABLET ORAL at 08:08

## 2021-08-27 RX ADMIN — PREGABALIN 150 MG: 150 CAPSULE ORAL at 08:08

## 2021-08-27 RX ADMIN — OXYCODONE HYDROCHLORIDE 10 MG: 10 TABLET ORAL at 02:08

## 2021-08-27 RX ADMIN — ACETAMINOPHEN 1000 MG: 500 TABLET ORAL at 08:08

## 2021-08-27 RX ADMIN — HYDROMORPHONE HYDROCHLORIDE 1 MG: 1 INJECTION, SOLUTION INTRAMUSCULAR; INTRAVENOUS; SUBCUTANEOUS at 11:08

## 2021-08-28 PROCEDURE — 11000001 HC ACUTE MED/SURG PRIVATE ROOM

## 2021-08-28 PROCEDURE — 97530 THERAPEUTIC ACTIVITIES: CPT

## 2021-08-28 PROCEDURE — 97116 GAIT TRAINING THERAPY: CPT | Mod: CQ

## 2021-08-28 PROCEDURE — 97535 SELF CARE MNGMENT TRAINING: CPT

## 2021-08-28 PROCEDURE — 97530 THERAPEUTIC ACTIVITIES: CPT | Mod: CQ

## 2021-08-28 PROCEDURE — 25000003 PHARM REV CODE 250: Performed by: STUDENT IN AN ORGANIZED HEALTH CARE EDUCATION/TRAINING PROGRAM

## 2021-08-28 PROCEDURE — 63600175 PHARM REV CODE 636 W HCPCS: Performed by: ORTHOPAEDIC SURGERY

## 2021-08-28 RX ADMIN — METHOCARBAMOL 1000 MG: 500 TABLET ORAL at 04:08

## 2021-08-28 RX ADMIN — FAMOTIDINE 20 MG: 20 TABLET, FILM COATED ORAL at 10:08

## 2021-08-28 RX ADMIN — OXYCODONE HYDROCHLORIDE 10 MG: 10 TABLET ORAL at 11:08

## 2021-08-28 RX ADMIN — OXYCODONE HYDROCHLORIDE 10 MG: 10 TABLET ORAL at 06:08

## 2021-08-28 RX ADMIN — FAMOTIDINE 20 MG: 20 TABLET, FILM COATED ORAL at 09:08

## 2021-08-28 RX ADMIN — HYDROMORPHONE HYDROCHLORIDE 1 MG: 1 INJECTION, SOLUTION INTRAMUSCULAR; INTRAVENOUS; SUBCUTANEOUS at 04:08

## 2021-08-28 RX ADMIN — MUPIROCIN: 20 OINTMENT TOPICAL at 09:08

## 2021-08-28 RX ADMIN — METHOCARBAMOL 1000 MG: 500 TABLET ORAL at 10:08

## 2021-08-28 RX ADMIN — METHOCARBAMOL 1000 MG: 500 TABLET ORAL at 09:08

## 2021-08-28 RX ADMIN — AMLODIPINE BESYLATE 5 MG: 5 TABLET ORAL at 09:08

## 2021-08-28 RX ADMIN — PREGABALIN 150 MG: 150 CAPSULE ORAL at 09:08

## 2021-08-28 RX ADMIN — CELECOXIB 100 MG: 100 CAPSULE ORAL at 10:08

## 2021-08-28 RX ADMIN — ACETAMINOPHEN 1000 MG: 500 TABLET ORAL at 02:08

## 2021-08-28 RX ADMIN — CELECOXIB 100 MG: 100 CAPSULE ORAL at 09:08

## 2021-08-28 RX ADMIN — ACETAMINOPHEN 1000 MG: 500 TABLET ORAL at 09:08

## 2021-08-28 RX ADMIN — OXYCODONE HYDROCHLORIDE 10 MG: 10 TABLET ORAL at 12:08

## 2021-08-28 RX ADMIN — HYDROMORPHONE HYDROCHLORIDE 1 MG: 1 INJECTION, SOLUTION INTRAMUSCULAR; INTRAVENOUS; SUBCUTANEOUS at 09:08

## 2021-08-28 RX ADMIN — ACETAMINOPHEN 1000 MG: 500 TABLET ORAL at 05:08

## 2021-08-29 LAB
ALBUMIN SERPL BCP-MCNC: 3.2 G/DL (ref 3.5–5.2)
ALP SERPL-CCNC: 67 U/L (ref 55–135)
ALT SERPL W/O P-5'-P-CCNC: 24 U/L (ref 10–44)
ANION GAP SERPL CALC-SCNC: 9 MMOL/L (ref 8–16)
AST SERPL-CCNC: 19 U/L (ref 10–40)
BASOPHILS # BLD AUTO: 0.05 K/UL (ref 0–0.2)
BASOPHILS NFR BLD: 0.6 % (ref 0–1.9)
BILIRUB SERPL-MCNC: 0.3 MG/DL (ref 0.1–1)
BUN SERPL-MCNC: 13 MG/DL (ref 6–20)
CALCIUM SERPL-MCNC: 8.9 MG/DL (ref 8.7–10.5)
CHLORIDE SERPL-SCNC: 102 MMOL/L (ref 95–110)
CO2 SERPL-SCNC: 25 MMOL/L (ref 23–29)
CREAT SERPL-MCNC: 0.8 MG/DL (ref 0.5–1.4)
DIFFERENTIAL METHOD: ABNORMAL
EOSINOPHIL # BLD AUTO: 0.2 K/UL (ref 0–0.5)
EOSINOPHIL NFR BLD: 2.8 % (ref 0–8)
ERYTHROCYTE [DISTWIDTH] IN BLOOD BY AUTOMATED COUNT: 12.6 % (ref 11.5–14.5)
EST. GFR  (AFRICAN AMERICAN): >60 ML/MIN/1.73 M^2
EST. GFR  (NON AFRICAN AMERICAN): >60 ML/MIN/1.73 M^2
GLUCOSE SERPL-MCNC: 110 MG/DL (ref 70–110)
HCT VFR BLD AUTO: 31.6 % (ref 40–54)
HGB BLD-MCNC: 10.4 G/DL (ref 14–18)
IMM GRANULOCYTES # BLD AUTO: 0.1 K/UL (ref 0–0.04)
IMM GRANULOCYTES NFR BLD AUTO: 1.1 % (ref 0–0.5)
LYMPHOCYTES # BLD AUTO: 2.8 K/UL (ref 1–4.8)
LYMPHOCYTES NFR BLD: 32 % (ref 18–48)
MCH RBC QN AUTO: 29.5 PG (ref 27–31)
MCHC RBC AUTO-ENTMCNC: 32.9 G/DL (ref 32–36)
MCV RBC AUTO: 90 FL (ref 82–98)
MONOCYTES # BLD AUTO: 1.2 K/UL (ref 0.3–1)
MONOCYTES NFR BLD: 13.8 % (ref 4–15)
NEUTROPHILS # BLD AUTO: 4.3 K/UL (ref 1.8–7.7)
NEUTROPHILS NFR BLD: 49.7 % (ref 38–73)
NRBC BLD-RTO: 1 /100 WBC
PLATELET # BLD AUTO: 211 K/UL (ref 150–450)
PLATELET BLD QL SMEAR: ABNORMAL
PMV BLD AUTO: 10.8 FL (ref 9.2–12.9)
POTASSIUM SERPL-SCNC: 4.6 MMOL/L (ref 3.5–5.1)
PROT SERPL-MCNC: 6.7 G/DL (ref 6–8.4)
RBC # BLD AUTO: 3.53 M/UL (ref 4.6–6.2)
SODIUM SERPL-SCNC: 136 MMOL/L (ref 136–145)
WBC # BLD AUTO: 8.72 K/UL (ref 3.9–12.7)

## 2021-08-29 PROCEDURE — 25000003 PHARM REV CODE 250: Performed by: STUDENT IN AN ORGANIZED HEALTH CARE EDUCATION/TRAINING PROGRAM

## 2021-08-29 PROCEDURE — 80053 COMPREHEN METABOLIC PANEL: CPT | Performed by: STUDENT IN AN ORGANIZED HEALTH CARE EDUCATION/TRAINING PROGRAM

## 2021-08-29 PROCEDURE — 85025 COMPLETE CBC W/AUTO DIFF WBC: CPT | Performed by: STUDENT IN AN ORGANIZED HEALTH CARE EDUCATION/TRAINING PROGRAM

## 2021-08-29 PROCEDURE — 63600175 PHARM REV CODE 636 W HCPCS: Performed by: ORTHOPAEDIC SURGERY

## 2021-08-29 PROCEDURE — 97110 THERAPEUTIC EXERCISES: CPT | Mod: CQ

## 2021-08-29 PROCEDURE — 36415 COLL VENOUS BLD VENIPUNCTURE: CPT | Performed by: STUDENT IN AN ORGANIZED HEALTH CARE EDUCATION/TRAINING PROGRAM

## 2021-08-29 PROCEDURE — 97116 GAIT TRAINING THERAPY: CPT | Mod: CQ

## 2021-08-29 PROCEDURE — 11000001 HC ACUTE MED/SURG PRIVATE ROOM

## 2021-08-29 PROCEDURE — 97530 THERAPEUTIC ACTIVITIES: CPT | Mod: CQ

## 2021-08-29 RX ADMIN — PREGABALIN 150 MG: 150 CAPSULE ORAL at 09:08

## 2021-08-29 RX ADMIN — AMLODIPINE BESYLATE 5 MG: 5 TABLET ORAL at 08:08

## 2021-08-29 RX ADMIN — HYDROMORPHONE HYDROCHLORIDE 1 MG: 1 INJECTION, SOLUTION INTRAMUSCULAR; INTRAVENOUS; SUBCUTANEOUS at 02:08

## 2021-08-29 RX ADMIN — CELECOXIB 100 MG: 100 CAPSULE ORAL at 09:08

## 2021-08-29 RX ADMIN — ACETAMINOPHEN 1000 MG: 500 TABLET ORAL at 06:08

## 2021-08-29 RX ADMIN — OXYCODONE HYDROCHLORIDE 10 MG: 10 TABLET ORAL at 08:08

## 2021-08-29 RX ADMIN — METHOCARBAMOL 1000 MG: 500 TABLET ORAL at 08:08

## 2021-08-29 RX ADMIN — MUPIROCIN: 20 OINTMENT TOPICAL at 09:08

## 2021-08-29 RX ADMIN — OXYCODONE HYDROCHLORIDE 10 MG: 10 TABLET ORAL at 06:08

## 2021-08-29 RX ADMIN — HYDROMORPHONE HYDROCHLORIDE 1 MG: 1 INJECTION, SOLUTION INTRAMUSCULAR; INTRAVENOUS; SUBCUTANEOUS at 06:08

## 2021-08-29 RX ADMIN — METHOCARBAMOL 1000 MG: 500 TABLET ORAL at 09:08

## 2021-08-29 RX ADMIN — OXYCODONE HYDROCHLORIDE 10 MG: 10 TABLET ORAL at 11:08

## 2021-08-29 RX ADMIN — OXYCODONE HYDROCHLORIDE 10 MG: 10 TABLET ORAL at 04:08

## 2021-08-29 RX ADMIN — HYDROMORPHONE HYDROCHLORIDE 1 MG: 1 INJECTION, SOLUTION INTRAMUSCULAR; INTRAVENOUS; SUBCUTANEOUS at 09:08

## 2021-08-29 RX ADMIN — HYDROMORPHONE HYDROCHLORIDE 1 MG: 1 INJECTION, SOLUTION INTRAMUSCULAR; INTRAVENOUS; SUBCUTANEOUS at 05:08

## 2021-08-29 RX ADMIN — OXYCODONE HYDROCHLORIDE 10 MG: 10 TABLET ORAL at 02:08

## 2021-08-29 RX ADMIN — ACETAMINOPHEN 1000 MG: 500 TABLET ORAL at 02:08

## 2021-08-29 RX ADMIN — HYDROMORPHONE HYDROCHLORIDE 1 MG: 1 INJECTION, SOLUTION INTRAMUSCULAR; INTRAVENOUS; SUBCUTANEOUS at 12:08

## 2021-08-29 RX ADMIN — ACETAMINOPHEN 1000 MG: 500 TABLET ORAL at 09:08

## 2021-08-29 RX ADMIN — FAMOTIDINE 20 MG: 20 TABLET, FILM COATED ORAL at 09:08

## 2021-08-29 RX ADMIN — METHOCARBAMOL 1000 MG: 500 TABLET ORAL at 03:08

## 2021-08-29 RX ADMIN — CELECOXIB 100 MG: 100 CAPSULE ORAL at 08:08

## 2021-08-29 RX ADMIN — MUPIROCIN: 20 OINTMENT TOPICAL at 11:08

## 2021-08-29 RX ADMIN — FAMOTIDINE 20 MG: 20 TABLET, FILM COATED ORAL at 08:08

## 2021-08-30 LAB
BLD PROD TYP BPU: NORMAL
BLD PROD TYP BPU: NORMAL
BLOOD UNIT EXPIRATION DATE: NORMAL
BLOOD UNIT EXPIRATION DATE: NORMAL
BLOOD UNIT TYPE CODE: 7300
BLOOD UNIT TYPE CODE: 7300
BLOOD UNIT TYPE: NORMAL
BLOOD UNIT TYPE: NORMAL
CODING SYSTEM: NORMAL
CODING SYSTEM: NORMAL
DISPENSE STATUS: NORMAL
DISPENSE STATUS: NORMAL
NUM UNITS TRANS PACKED RBC: NORMAL
NUM UNITS TRANS PACKED RBC: NORMAL

## 2021-08-30 PROCEDURE — 11000001 HC ACUTE MED/SURG PRIVATE ROOM

## 2021-08-30 PROCEDURE — 25000003 PHARM REV CODE 250: Performed by: STUDENT IN AN ORGANIZED HEALTH CARE EDUCATION/TRAINING PROGRAM

## 2021-08-30 PROCEDURE — 63600175 PHARM REV CODE 636 W HCPCS: Performed by: ORTHOPAEDIC SURGERY

## 2021-08-30 RX ADMIN — MELATONIN TAB 3 MG 6 MG: 3 TAB at 09:08

## 2021-08-30 RX ADMIN — HYDROMORPHONE HYDROCHLORIDE 1 MG: 1 INJECTION, SOLUTION INTRAMUSCULAR; INTRAVENOUS; SUBCUTANEOUS at 06:08

## 2021-08-30 RX ADMIN — METHOCARBAMOL 1000 MG: 500 TABLET ORAL at 09:08

## 2021-08-30 RX ADMIN — OXYCODONE HYDROCHLORIDE 10 MG: 10 TABLET ORAL at 08:08

## 2021-08-30 RX ADMIN — FAMOTIDINE 20 MG: 20 TABLET, FILM COATED ORAL at 09:08

## 2021-08-30 RX ADMIN — OXYCODONE HYDROCHLORIDE 10 MG: 10 TABLET ORAL at 12:08

## 2021-08-30 RX ADMIN — METHOCARBAMOL 1000 MG: 500 TABLET ORAL at 08:08

## 2021-08-30 RX ADMIN — HYDROMORPHONE HYDROCHLORIDE 1 MG: 1 INJECTION, SOLUTION INTRAMUSCULAR; INTRAVENOUS; SUBCUTANEOUS at 02:08

## 2021-08-30 RX ADMIN — CELECOXIB 100 MG: 100 CAPSULE ORAL at 08:08

## 2021-08-30 RX ADMIN — AMLODIPINE BESYLATE 5 MG: 5 TABLET ORAL at 08:08

## 2021-08-30 RX ADMIN — ACETAMINOPHEN 1000 MG: 500 TABLET ORAL at 02:08

## 2021-08-30 RX ADMIN — MUPIROCIN: 20 OINTMENT TOPICAL at 09:08

## 2021-08-30 RX ADMIN — CELECOXIB 100 MG: 100 CAPSULE ORAL at 09:08

## 2021-08-30 RX ADMIN — OXYCODONE HYDROCHLORIDE 10 MG: 10 TABLET ORAL at 04:08

## 2021-08-30 RX ADMIN — HYDROMORPHONE HYDROCHLORIDE 1 MG: 1 INJECTION, SOLUTION INTRAMUSCULAR; INTRAVENOUS; SUBCUTANEOUS at 07:08

## 2021-08-30 RX ADMIN — PREGABALIN 150 MG: 150 CAPSULE ORAL at 09:08

## 2021-08-30 RX ADMIN — METHOCARBAMOL 1000 MG: 500 TABLET ORAL at 02:08

## 2021-08-30 RX ADMIN — ACETAMINOPHEN 1000 MG: 500 TABLET ORAL at 06:08

## 2021-08-30 RX ADMIN — MUPIROCIN: 20 OINTMENT TOPICAL at 08:08

## 2021-08-30 RX ADMIN — HYDROMORPHONE HYDROCHLORIDE 1 MG: 1 INJECTION, SOLUTION INTRAMUSCULAR; INTRAVENOUS; SUBCUTANEOUS at 11:08

## 2021-08-30 RX ADMIN — FAMOTIDINE 20 MG: 20 TABLET, FILM COATED ORAL at 08:08

## 2021-08-31 PROCEDURE — 25000003 PHARM REV CODE 250: Performed by: STUDENT IN AN ORGANIZED HEALTH CARE EDUCATION/TRAINING PROGRAM

## 2021-08-31 PROCEDURE — 11000001 HC ACUTE MED/SURG PRIVATE ROOM

## 2021-08-31 PROCEDURE — 63600175 PHARM REV CODE 636 W HCPCS: Performed by: ORTHOPAEDIC SURGERY

## 2021-08-31 RX ADMIN — HYDROMORPHONE HYDROCHLORIDE 1 MG: 1 INJECTION, SOLUTION INTRAMUSCULAR; INTRAVENOUS; SUBCUTANEOUS at 07:08

## 2021-08-31 RX ADMIN — HYDROMORPHONE HYDROCHLORIDE 1 MG: 1 INJECTION, SOLUTION INTRAMUSCULAR; INTRAVENOUS; SUBCUTANEOUS at 03:08

## 2021-08-31 RX ADMIN — OXYCODONE HYDROCHLORIDE 10 MG: 10 TABLET ORAL at 12:08

## 2021-08-31 RX ADMIN — OXYCODONE HYDROCHLORIDE 10 MG: 10 TABLET ORAL at 05:08

## 2021-08-31 RX ADMIN — CELECOXIB 100 MG: 100 CAPSULE ORAL at 09:08

## 2021-08-31 RX ADMIN — HYDROMORPHONE HYDROCHLORIDE 1 MG: 1 INJECTION, SOLUTION INTRAMUSCULAR; INTRAVENOUS; SUBCUTANEOUS at 09:08

## 2021-08-31 RX ADMIN — ACETAMINOPHEN 1000 MG: 500 TABLET ORAL at 09:08

## 2021-08-31 RX ADMIN — ACETAMINOPHEN 1000 MG: 500 TABLET ORAL at 02:08

## 2021-08-31 RX ADMIN — METHOCARBAMOL 1000 MG: 500 TABLET ORAL at 02:08

## 2021-08-31 RX ADMIN — FAMOTIDINE 20 MG: 20 TABLET, FILM COATED ORAL at 08:08

## 2021-08-31 RX ADMIN — CELECOXIB 100 MG: 100 CAPSULE ORAL at 08:08

## 2021-08-31 RX ADMIN — OXYCODONE HYDROCHLORIDE 10 MG: 10 TABLET ORAL at 11:08

## 2021-08-31 RX ADMIN — PREGABALIN 150 MG: 150 CAPSULE ORAL at 09:08

## 2021-08-31 RX ADMIN — MELATONIN TAB 3 MG 6 MG: 3 TAB at 11:08

## 2021-08-31 RX ADMIN — METHOCARBAMOL 1000 MG: 500 TABLET ORAL at 09:08

## 2021-08-31 RX ADMIN — METHOCARBAMOL 1000 MG: 500 TABLET ORAL at 08:08

## 2021-08-31 RX ADMIN — HYDROMORPHONE HYDROCHLORIDE 1 MG: 1 INJECTION, SOLUTION INTRAMUSCULAR; INTRAVENOUS; SUBCUTANEOUS at 05:08

## 2021-08-31 RX ADMIN — AMLODIPINE BESYLATE 5 MG: 5 TABLET ORAL at 08:08

## 2021-08-31 RX ADMIN — OXYCODONE HYDROCHLORIDE 10 MG: 10 TABLET ORAL at 01:08

## 2021-08-31 RX ADMIN — MUPIROCIN: 20 OINTMENT TOPICAL at 08:08

## 2021-08-31 RX ADMIN — ACETAMINOPHEN 1000 MG: 500 TABLET ORAL at 05:08

## 2021-08-31 RX ADMIN — HYDROMORPHONE HYDROCHLORIDE 1 MG: 1 INJECTION, SOLUTION INTRAMUSCULAR; INTRAVENOUS; SUBCUTANEOUS at 12:08

## 2021-08-31 RX ADMIN — FAMOTIDINE 20 MG: 20 TABLET, FILM COATED ORAL at 09:08

## 2021-09-01 PROCEDURE — 63600175 PHARM REV CODE 636 W HCPCS: Performed by: ORTHOPAEDIC SURGERY

## 2021-09-01 PROCEDURE — 11000001 HC ACUTE MED/SURG PRIVATE ROOM

## 2021-09-01 PROCEDURE — 25000003 PHARM REV CODE 250: Performed by: STUDENT IN AN ORGANIZED HEALTH CARE EDUCATION/TRAINING PROGRAM

## 2021-09-01 RX ADMIN — METHOCARBAMOL 1000 MG: 500 TABLET ORAL at 09:09

## 2021-09-01 RX ADMIN — ACETAMINOPHEN 1000 MG: 500 TABLET ORAL at 05:09

## 2021-09-01 RX ADMIN — AMLODIPINE BESYLATE 5 MG: 5 TABLET ORAL at 09:09

## 2021-09-01 RX ADMIN — OXYCODONE HYDROCHLORIDE 10 MG: 10 TABLET ORAL at 12:09

## 2021-09-01 RX ADMIN — HYDROMORPHONE HYDROCHLORIDE 1 MG: 1 INJECTION, SOLUTION INTRAMUSCULAR; INTRAVENOUS; SUBCUTANEOUS at 09:09

## 2021-09-01 RX ADMIN — METHOCARBAMOL 1000 MG: 500 TABLET ORAL at 03:09

## 2021-09-01 RX ADMIN — FAMOTIDINE 20 MG: 20 TABLET, FILM COATED ORAL at 08:09

## 2021-09-01 RX ADMIN — CELECOXIB 100 MG: 100 CAPSULE ORAL at 08:09

## 2021-09-01 RX ADMIN — PREGABALIN 150 MG: 150 CAPSULE ORAL at 08:09

## 2021-09-01 RX ADMIN — HYDROMORPHONE HYDROCHLORIDE 1 MG: 1 INJECTION, SOLUTION INTRAMUSCULAR; INTRAVENOUS; SUBCUTANEOUS at 10:09

## 2021-09-01 RX ADMIN — CELECOXIB 100 MG: 100 CAPSULE ORAL at 09:09

## 2021-09-01 RX ADMIN — ACETAMINOPHEN 1000 MG: 500 TABLET ORAL at 10:09

## 2021-09-01 RX ADMIN — HYDROMORPHONE HYDROCHLORIDE 1 MG: 1 INJECTION, SOLUTION INTRAMUSCULAR; INTRAVENOUS; SUBCUTANEOUS at 02:09

## 2021-09-01 RX ADMIN — OXYCODONE HYDROCHLORIDE 10 MG: 10 TABLET ORAL at 03:09

## 2021-09-01 RX ADMIN — METHOCARBAMOL 1000 MG: 500 TABLET ORAL at 08:09

## 2021-09-01 RX ADMIN — OXYCODONE HYDROCHLORIDE 10 MG: 10 TABLET ORAL at 08:09

## 2021-09-01 RX ADMIN — ACETAMINOPHEN 1000 MG: 500 TABLET ORAL at 03:09

## 2021-09-01 RX ADMIN — OXYCODONE HYDROCHLORIDE 10 MG: 10 TABLET ORAL at 05:09

## 2021-09-01 RX ADMIN — FAMOTIDINE 20 MG: 20 TABLET, FILM COATED ORAL at 09:09

## 2021-09-01 RX ADMIN — HYDROMORPHONE HYDROCHLORIDE 1 MG: 1 INJECTION, SOLUTION INTRAMUSCULAR; INTRAVENOUS; SUBCUTANEOUS at 06:09

## 2021-09-02 PROCEDURE — 63600175 PHARM REV CODE 636 W HCPCS: Performed by: ORTHOPAEDIC SURGERY

## 2021-09-02 PROCEDURE — 97116 GAIT TRAINING THERAPY: CPT | Mod: CQ

## 2021-09-02 PROCEDURE — 25000003 PHARM REV CODE 250: Performed by: STUDENT IN AN ORGANIZED HEALTH CARE EDUCATION/TRAINING PROGRAM

## 2021-09-02 PROCEDURE — 97535 SELF CARE MNGMENT TRAINING: CPT

## 2021-09-02 PROCEDURE — 11000001 HC ACUTE MED/SURG PRIVATE ROOM

## 2021-09-02 RX ADMIN — CELECOXIB 100 MG: 100 CAPSULE ORAL at 09:09

## 2021-09-02 RX ADMIN — HYDROMORPHONE HYDROCHLORIDE 1 MG: 1 INJECTION, SOLUTION INTRAMUSCULAR; INTRAVENOUS; SUBCUTANEOUS at 11:09

## 2021-09-02 RX ADMIN — MELATONIN TAB 3 MG 6 MG: 3 TAB at 12:09

## 2021-09-02 RX ADMIN — HYDROMORPHONE HYDROCHLORIDE 1 MG: 1 INJECTION, SOLUTION INTRAMUSCULAR; INTRAVENOUS; SUBCUTANEOUS at 04:09

## 2021-09-02 RX ADMIN — OXYCODONE HYDROCHLORIDE 10 MG: 10 TABLET ORAL at 06:09

## 2021-09-02 RX ADMIN — OXYCODONE HYDROCHLORIDE 10 MG: 10 TABLET ORAL at 09:09

## 2021-09-02 RX ADMIN — OXYCODONE HYDROCHLORIDE 10 MG: 10 TABLET ORAL at 10:09

## 2021-09-02 RX ADMIN — OXYCODONE HYDROCHLORIDE 10 MG: 10 TABLET ORAL at 02:09

## 2021-09-02 RX ADMIN — HYDROMORPHONE HYDROCHLORIDE 1 MG: 1 INJECTION, SOLUTION INTRAMUSCULAR; INTRAVENOUS; SUBCUTANEOUS at 02:09

## 2021-09-02 RX ADMIN — AMLODIPINE BESYLATE 5 MG: 5 TABLET ORAL at 09:09

## 2021-09-02 RX ADMIN — METHOCARBAMOL 1000 MG: 500 TABLET ORAL at 02:09

## 2021-09-02 RX ADMIN — ACETAMINOPHEN 1000 MG: 500 TABLET ORAL at 02:09

## 2021-09-02 RX ADMIN — FAMOTIDINE 20 MG: 20 TABLET, FILM COATED ORAL at 09:09

## 2021-09-02 RX ADMIN — ACETAMINOPHEN 1000 MG: 500 TABLET ORAL at 05:09

## 2021-09-02 RX ADMIN — PREGABALIN 150 MG: 150 CAPSULE ORAL at 09:09

## 2021-09-02 RX ADMIN — HYDROMORPHONE HYDROCHLORIDE 1 MG: 1 INJECTION, SOLUTION INTRAMUSCULAR; INTRAVENOUS; SUBCUTANEOUS at 08:09

## 2021-09-02 RX ADMIN — METHOCARBAMOL 1000 MG: 500 TABLET ORAL at 11:09

## 2021-09-02 RX ADMIN — OXYCODONE HYDROCHLORIDE 10 MG: 10 TABLET ORAL at 12:09

## 2021-09-02 RX ADMIN — METHOCARBAMOL 1000 MG: 500 TABLET ORAL at 09:09

## 2021-09-02 RX ADMIN — OXYCODONE HYDROCHLORIDE 10 MG: 10 TABLET ORAL at 05:09

## 2021-09-03 LAB
ANION GAP SERPL CALC-SCNC: 8 MMOL/L (ref 8–16)
BASOPHILS # BLD AUTO: 0.04 K/UL (ref 0–0.2)
BASOPHILS NFR BLD: 0.5 % (ref 0–1.9)
BUN SERPL-MCNC: 12 MG/DL (ref 8–23)
CALCIUM SERPL-MCNC: 9.9 MG/DL (ref 8.7–10.5)
CHLORIDE SERPL-SCNC: 98 MMOL/L (ref 95–110)
CO2 SERPL-SCNC: 28 MMOL/L (ref 23–29)
CREAT SERPL-MCNC: 0.8 MG/DL (ref 0.5–1.4)
DIFFERENTIAL METHOD: ABNORMAL
EOSINOPHIL # BLD AUTO: 0.3 K/UL (ref 0–0.5)
EOSINOPHIL NFR BLD: 3.3 % (ref 0–8)
ERYTHROCYTE [DISTWIDTH] IN BLOOD BY AUTOMATED COUNT: 12.2 % (ref 11.5–14.5)
EST. GFR  (AFRICAN AMERICAN): >60 ML/MIN/1.73 M^2
EST. GFR  (NON AFRICAN AMERICAN): >60 ML/MIN/1.73 M^2
GLUCOSE SERPL-MCNC: 110 MG/DL (ref 70–110)
HCT VFR BLD AUTO: 31 % (ref 40–54)
HGB BLD-MCNC: 9.9 G/DL (ref 14–18)
IMM GRANULOCYTES # BLD AUTO: 0.02 K/UL (ref 0–0.04)
IMM GRANULOCYTES NFR BLD AUTO: 0.3 % (ref 0–0.5)
LYMPHOCYTES # BLD AUTO: 1.7 K/UL (ref 1–4.8)
LYMPHOCYTES NFR BLD: 22.3 % (ref 18–48)
MCH RBC QN AUTO: 29.1 PG (ref 27–31)
MCHC RBC AUTO-ENTMCNC: 31.9 G/DL (ref 32–36)
MCV RBC AUTO: 91 FL (ref 82–98)
MONOCYTES # BLD AUTO: 0.7 K/UL (ref 0.3–1)
MONOCYTES NFR BLD: 9.5 % (ref 4–15)
NEUTROPHILS # BLD AUTO: 4.9 K/UL (ref 1.8–7.7)
NEUTROPHILS NFR BLD: 64.1 % (ref 38–73)
NRBC BLD-RTO: 0 /100 WBC
PLATELET # BLD AUTO: 377 K/UL (ref 150–450)
PMV BLD AUTO: 9.1 FL (ref 9.2–12.9)
POTASSIUM SERPL-SCNC: 4.2 MMOL/L (ref 3.5–5.1)
RBC # BLD AUTO: 3.4 M/UL (ref 4.6–6.2)
SODIUM SERPL-SCNC: 134 MMOL/L (ref 136–145)
WBC # BLD AUTO: 7.59 K/UL (ref 3.9–12.7)

## 2021-09-03 PROCEDURE — 85025 COMPLETE CBC W/AUTO DIFF WBC: CPT | Performed by: STUDENT IN AN ORGANIZED HEALTH CARE EDUCATION/TRAINING PROGRAM

## 2021-09-03 PROCEDURE — 36415 COLL VENOUS BLD VENIPUNCTURE: CPT | Performed by: STUDENT IN AN ORGANIZED HEALTH CARE EDUCATION/TRAINING PROGRAM

## 2021-09-03 PROCEDURE — 25000003 PHARM REV CODE 250: Performed by: STUDENT IN AN ORGANIZED HEALTH CARE EDUCATION/TRAINING PROGRAM

## 2021-09-03 PROCEDURE — 11000001 HC ACUTE MED/SURG PRIVATE ROOM

## 2021-09-03 PROCEDURE — 97535 SELF CARE MNGMENT TRAINING: CPT

## 2021-09-03 PROCEDURE — 80048 BASIC METABOLIC PNL TOTAL CA: CPT | Performed by: STUDENT IN AN ORGANIZED HEALTH CARE EDUCATION/TRAINING PROGRAM

## 2021-09-03 PROCEDURE — 97116 GAIT TRAINING THERAPY: CPT | Mod: CQ

## 2021-09-03 PROCEDURE — 97530 THERAPEUTIC ACTIVITIES: CPT | Mod: CQ

## 2021-09-03 PROCEDURE — 63600175 PHARM REV CODE 636 W HCPCS: Performed by: ORTHOPAEDIC SURGERY

## 2021-09-03 RX ADMIN — OXYCODONE HYDROCHLORIDE 10 MG: 10 TABLET ORAL at 01:09

## 2021-09-03 RX ADMIN — OXYCODONE HYDROCHLORIDE 10 MG: 10 TABLET ORAL at 05:09

## 2021-09-03 RX ADMIN — DOCUSATE SODIUM 100 MG: 100 CAPSULE, LIQUID FILLED ORAL at 08:09

## 2021-09-03 RX ADMIN — OXYCODONE HYDROCHLORIDE 10 MG: 10 TABLET ORAL at 04:09

## 2021-09-03 RX ADMIN — PREGABALIN 150 MG: 150 CAPSULE ORAL at 08:09

## 2021-09-03 RX ADMIN — METHOCARBAMOL 1000 MG: 500 TABLET ORAL at 10:09

## 2021-09-03 RX ADMIN — OXYCODONE HYDROCHLORIDE 10 MG: 10 TABLET ORAL at 09:09

## 2021-09-03 RX ADMIN — HYDROMORPHONE HYDROCHLORIDE 1 MG: 1 INJECTION, SOLUTION INTRAMUSCULAR; INTRAVENOUS; SUBCUTANEOUS at 07:09

## 2021-09-03 RX ADMIN — METHOCARBAMOL 1000 MG: 500 TABLET ORAL at 05:09

## 2021-09-03 RX ADMIN — HYDROMORPHONE HYDROCHLORIDE 1 MG: 1 INJECTION, SOLUTION INTRAMUSCULAR; INTRAVENOUS; SUBCUTANEOUS at 02:09

## 2021-09-03 RX ADMIN — CELECOXIB 100 MG: 100 CAPSULE ORAL at 08:09

## 2021-09-03 RX ADMIN — OXYCODONE HYDROCHLORIDE 10 MG: 10 TABLET ORAL at 08:09

## 2021-09-03 RX ADMIN — FAMOTIDINE 20 MG: 20 TABLET, FILM COATED ORAL at 08:09

## 2021-09-03 RX ADMIN — MELATONIN TAB 3 MG 6 MG: 3 TAB at 08:09

## 2021-09-03 RX ADMIN — HYDROMORPHONE HYDROCHLORIDE 1 MG: 1 INJECTION, SOLUTION INTRAMUSCULAR; INTRAVENOUS; SUBCUTANEOUS at 11:09

## 2021-09-03 RX ADMIN — HYDROMORPHONE HYDROCHLORIDE 1 MG: 1 INJECTION, SOLUTION INTRAMUSCULAR; INTRAVENOUS; SUBCUTANEOUS at 10:09

## 2021-09-03 RX ADMIN — AMLODIPINE BESYLATE 5 MG: 5 TABLET ORAL at 08:09

## 2021-09-03 RX ADMIN — HYDROMORPHONE HYDROCHLORIDE 1 MG: 1 INJECTION, SOLUTION INTRAMUSCULAR; INTRAVENOUS; SUBCUTANEOUS at 03:09

## 2021-09-03 RX ADMIN — DOCUSATE SODIUM 50MG AND SENNOSIDES 8.6MG 1 TABLET: 8.6; 5 TABLET, FILM COATED ORAL at 08:09

## 2021-09-03 RX ADMIN — METHOCARBAMOL 1000 MG: 500 TABLET ORAL at 08:09

## 2021-09-04 PROCEDURE — 11000001 HC ACUTE MED/SURG PRIVATE ROOM

## 2021-09-04 PROCEDURE — 25000003 PHARM REV CODE 250: Performed by: STUDENT IN AN ORGANIZED HEALTH CARE EDUCATION/TRAINING PROGRAM

## 2021-09-04 PROCEDURE — 63600175 PHARM REV CODE 636 W HCPCS: Performed by: ORTHOPAEDIC SURGERY

## 2021-09-04 PROCEDURE — 97116 GAIT TRAINING THERAPY: CPT | Mod: CQ

## 2021-09-04 RX ADMIN — CELECOXIB 100 MG: 100 CAPSULE ORAL at 08:09

## 2021-09-04 RX ADMIN — AMLODIPINE BESYLATE 5 MG: 5 TABLET ORAL at 08:09

## 2021-09-04 RX ADMIN — HYDROMORPHONE HYDROCHLORIDE 1 MG: 1 INJECTION, SOLUTION INTRAMUSCULAR; INTRAVENOUS; SUBCUTANEOUS at 03:09

## 2021-09-04 RX ADMIN — METHOCARBAMOL 1000 MG: 500 TABLET ORAL at 02:09

## 2021-09-04 RX ADMIN — HYDROMORPHONE HYDROCHLORIDE 1 MG: 1 INJECTION, SOLUTION INTRAMUSCULAR; INTRAVENOUS; SUBCUTANEOUS at 11:09

## 2021-09-04 RX ADMIN — OXYCODONE HYDROCHLORIDE 10 MG: 10 TABLET ORAL at 01:09

## 2021-09-04 RX ADMIN — OXYCODONE HYDROCHLORIDE 10 MG: 10 TABLET ORAL at 02:09

## 2021-09-04 RX ADMIN — METHOCARBAMOL 1000 MG: 500 TABLET ORAL at 08:09

## 2021-09-04 RX ADMIN — HYDROMORPHONE HYDROCHLORIDE 1 MG: 1 INJECTION, SOLUTION INTRAMUSCULAR; INTRAVENOUS; SUBCUTANEOUS at 07:09

## 2021-09-04 RX ADMIN — OXYCODONE HYDROCHLORIDE 10 MG: 10 TABLET ORAL at 06:09

## 2021-09-04 RX ADMIN — OXYCODONE HYDROCHLORIDE 10 MG: 10 TABLET ORAL at 08:09

## 2021-09-04 RX ADMIN — FAMOTIDINE 20 MG: 20 TABLET, FILM COATED ORAL at 08:09

## 2021-09-04 RX ADMIN — OXYCODONE HYDROCHLORIDE 10 MG: 10 TABLET ORAL at 10:09

## 2021-09-04 RX ADMIN — PREGABALIN 150 MG: 150 CAPSULE ORAL at 08:09

## 2021-09-04 RX ADMIN — DOCUSATE SODIUM 100 MG: 100 CAPSULE, LIQUID FILLED ORAL at 08:09

## 2021-09-04 RX ADMIN — MELATONIN TAB 3 MG 6 MG: 3 TAB at 07:09

## 2021-09-04 RX ADMIN — OXYCODONE HYDROCHLORIDE 10 MG: 10 TABLET ORAL at 04:09

## 2021-09-05 PROCEDURE — 25000003 PHARM REV CODE 250: Performed by: STUDENT IN AN ORGANIZED HEALTH CARE EDUCATION/TRAINING PROGRAM

## 2021-09-05 PROCEDURE — 11000001 HC ACUTE MED/SURG PRIVATE ROOM

## 2021-09-05 PROCEDURE — 63600175 PHARM REV CODE 636 W HCPCS: Performed by: ORTHOPAEDIC SURGERY

## 2021-09-05 RX ADMIN — FAMOTIDINE 20 MG: 20 TABLET, FILM COATED ORAL at 09:09

## 2021-09-05 RX ADMIN — AMLODIPINE BESYLATE 5 MG: 5 TABLET ORAL at 09:09

## 2021-09-05 RX ADMIN — HYDROMORPHONE HYDROCHLORIDE 1 MG: 1 INJECTION, SOLUTION INTRAMUSCULAR; INTRAVENOUS; SUBCUTANEOUS at 08:09

## 2021-09-05 RX ADMIN — OXYCODONE HYDROCHLORIDE 10 MG: 10 TABLET ORAL at 02:09

## 2021-09-05 RX ADMIN — OXYCODONE HYDROCHLORIDE 10 MG: 10 TABLET ORAL at 10:09

## 2021-09-05 RX ADMIN — HYDROMORPHONE HYDROCHLORIDE 1 MG: 1 INJECTION, SOLUTION INTRAMUSCULAR; INTRAVENOUS; SUBCUTANEOUS at 05:09

## 2021-09-05 RX ADMIN — HYDROMORPHONE HYDROCHLORIDE 1 MG: 1 INJECTION, SOLUTION INTRAMUSCULAR; INTRAVENOUS; SUBCUTANEOUS at 04:09

## 2021-09-05 RX ADMIN — HYDROMORPHONE HYDROCHLORIDE 1 MG: 1 INJECTION, SOLUTION INTRAMUSCULAR; INTRAVENOUS; SUBCUTANEOUS at 12:09

## 2021-09-05 RX ADMIN — METHOCARBAMOL 1000 MG: 500 TABLET ORAL at 02:09

## 2021-09-05 RX ADMIN — OXYCODONE HYDROCHLORIDE 10 MG: 10 TABLET ORAL at 06:09

## 2021-09-05 RX ADMIN — CELECOXIB 100 MG: 100 CAPSULE ORAL at 09:09

## 2021-09-05 RX ADMIN — MELATONIN TAB 3 MG 6 MG: 3 TAB at 08:09

## 2021-09-05 RX ADMIN — PREGABALIN 150 MG: 150 CAPSULE ORAL at 09:09

## 2021-09-05 RX ADMIN — METHOCARBAMOL 1000 MG: 500 TABLET ORAL at 09:09

## 2021-09-05 RX ADMIN — HYDROMORPHONE HYDROCHLORIDE 1 MG: 1 INJECTION, SOLUTION INTRAMUSCULAR; INTRAVENOUS; SUBCUTANEOUS at 09:09

## 2021-09-06 VITALS
WEIGHT: 189 LBS | SYSTOLIC BLOOD PRESSURE: 118 MMHG | DIASTOLIC BLOOD PRESSURE: 69 MMHG | HEIGHT: 75 IN | RESPIRATION RATE: 16 BRPM | BODY MASS INDEX: 23.5 KG/M2 | TEMPERATURE: 98 F | OXYGEN SATURATION: 99 % | HEART RATE: 76 BPM

## 2021-09-06 PROCEDURE — 63600175 PHARM REV CODE 636 W HCPCS: Performed by: ORTHOPAEDIC SURGERY

## 2021-09-06 PROCEDURE — 25000003 PHARM REV CODE 250: Performed by: STUDENT IN AN ORGANIZED HEALTH CARE EDUCATION/TRAINING PROGRAM

## 2021-09-06 RX ADMIN — METHOCARBAMOL 1000 MG: 500 TABLET ORAL at 09:09

## 2021-09-06 RX ADMIN — AMLODIPINE BESYLATE 5 MG: 5 TABLET ORAL at 09:09

## 2021-09-06 RX ADMIN — HYDROMORPHONE HYDROCHLORIDE 1 MG: 1 INJECTION, SOLUTION INTRAMUSCULAR; INTRAVENOUS; SUBCUTANEOUS at 09:09

## 2021-09-06 RX ADMIN — HYDROMORPHONE HYDROCHLORIDE 1 MG: 1 INJECTION, SOLUTION INTRAMUSCULAR; INTRAVENOUS; SUBCUTANEOUS at 01:09

## 2021-09-06 RX ADMIN — OXYCODONE HYDROCHLORIDE 10 MG: 10 TABLET ORAL at 07:09

## 2021-09-06 RX ADMIN — CELECOXIB 100 MG: 100 CAPSULE ORAL at 09:09

## 2021-09-06 RX ADMIN — FAMOTIDINE 20 MG: 20 TABLET, FILM COATED ORAL at 09:09

## 2021-09-06 RX ADMIN — OXYCODONE HYDROCHLORIDE 10 MG: 10 TABLET ORAL at 12:09

## 2021-09-06 RX ADMIN — HYDROMORPHONE HYDROCHLORIDE 1 MG: 1 INJECTION, SOLUTION INTRAMUSCULAR; INTRAVENOUS; SUBCUTANEOUS at 05:09

## 2021-09-10 DIAGNOSIS — Z98.1 S/P CERVICAL SPINAL FUSION: Primary | ICD-10-CM

## 2021-09-23 ENCOUNTER — TELEPHONE (OUTPATIENT)
Dept: ORTHOPEDICS | Facility: CLINIC | Age: 61
End: 2021-09-23

## 2021-09-23 ENCOUNTER — OFFICE VISIT (OUTPATIENT)
Dept: ORTHOPEDICS | Facility: CLINIC | Age: 61
End: 2021-09-23
Payer: MEDICAID

## 2021-09-23 ENCOUNTER — HOSPITAL ENCOUNTER (OUTPATIENT)
Dept: RADIOLOGY | Facility: HOSPITAL | Age: 61
Discharge: HOME | End: 2021-09-23
Attending: PHYSICIAN ASSISTANT
Payer: MEDICAID

## 2021-09-23 VITALS — BODY MASS INDEX: 23.5 KG/M2 | WEIGHT: 189 LBS | HEIGHT: 75 IN

## 2021-09-23 DIAGNOSIS — Z98.1 S/P CERVICAL SPINAL FUSION: Primary | ICD-10-CM

## 2021-09-23 DIAGNOSIS — Z98.1 S/P CERVICAL SPINAL FUSION: ICD-10-CM

## 2021-09-23 PROCEDURE — 99999 PR PBB SHADOW E&M-EST. PATIENT-LVL II: CPT | Mod: PBBFAC,,, | Performed by: ORTHOPAEDIC SURGERY

## 2021-09-23 PROCEDURE — 99212 OFFICE O/P EST SF 10 MIN: CPT | Mod: PBBFAC | Performed by: ORTHOPAEDIC SURGERY

## 2021-09-23 PROCEDURE — 99999 PR PBB SHADOW E&M-EST. PATIENT-LVL II: ICD-10-PCS | Mod: PBBFAC,,, | Performed by: ORTHOPAEDIC SURGERY

## 2021-09-23 PROCEDURE — 72040 XR CERVICAL SPINE AP LATERAL: ICD-10-PCS | Mod: 26,,, | Performed by: RADIOLOGY

## 2021-09-23 PROCEDURE — 72040 X-RAY EXAM NECK SPINE 2-3 VW: CPT | Mod: 26,,, | Performed by: RADIOLOGY

## 2021-09-23 PROCEDURE — 99024 PR POST-OP FOLLOW-UP VISIT: ICD-10-PCS | Mod: ,,, | Performed by: ORTHOPAEDIC SURGERY

## 2021-09-23 PROCEDURE — 72040 X-RAY EXAM NECK SPINE 2-3 VW: CPT | Mod: TC

## 2021-09-23 PROCEDURE — 99024 POSTOP FOLLOW-UP VISIT: CPT | Mod: ,,, | Performed by: ORTHOPAEDIC SURGERY

## 2021-09-23 RX ORDER — CYCLOBENZAPRINE HCL 10 MG
10 TABLET ORAL 3 TIMES DAILY PRN
Qty: 21 TABLET | Refills: 0 | Status: SHIPPED | OUTPATIENT
Start: 2021-09-23 | End: 2021-10-03

## 2021-09-23 RX ORDER — CELECOXIB 100 MG/1
100 CAPSULE ORAL 2 TIMES DAILY
Qty: 28 CAPSULE | Refills: 0 | Status: SHIPPED | OUTPATIENT
Start: 2021-09-23 | End: 2021-10-06 | Stop reason: SDUPTHER

## 2021-09-23 RX ORDER — GABAPENTIN 100 MG/1
100 CAPSULE ORAL 3 TIMES DAILY
Qty: 45 CAPSULE | Refills: 0 | Status: SHIPPED | OUTPATIENT
Start: 2021-09-23 | End: 2021-10-06 | Stop reason: SDUPTHER

## 2021-09-23 RX ORDER — POLYETHYLENE GLYCOL 3350 17 G/17G
17 POWDER, FOR SOLUTION ORAL DAILY
Qty: 510 G | Refills: 0 | Status: SHIPPED | OUTPATIENT
Start: 2021-09-23 | End: 2022-02-14 | Stop reason: SDUPTHER

## 2021-09-23 RX ORDER — OXYCODONE HYDROCHLORIDE 5 MG/1
5 TABLET ORAL EVERY 6 HOURS PRN
Qty: 24 TABLET | Refills: 0 | Status: SHIPPED | OUTPATIENT
Start: 2021-09-23 | End: 2021-10-06 | Stop reason: SDUPTHER

## 2021-09-27 ENCOUNTER — CLINICAL SUPPORT (OUTPATIENT)
Dept: REHABILITATION | Facility: HOSPITAL | Age: 61
End: 2021-09-27
Payer: MEDICAID

## 2021-09-27 DIAGNOSIS — Z98.1 S/P CERVICAL SPINAL FUSION: ICD-10-CM

## 2021-09-27 PROCEDURE — 97161 PT EVAL LOW COMPLEX 20 MIN: CPT

## 2021-09-27 PROCEDURE — 97110 THERAPEUTIC EXERCISES: CPT

## 2021-10-04 ENCOUNTER — TELEPHONE (OUTPATIENT)
Dept: ORTHOPEDICS | Facility: CLINIC | Age: 61
End: 2021-10-04

## 2021-10-04 ENCOUNTER — CLINICAL SUPPORT (OUTPATIENT)
Dept: REHABILITATION | Facility: HOSPITAL | Age: 61
End: 2021-10-04
Payer: MEDICAID

## 2021-10-04 DIAGNOSIS — R29.898 WEAKNESS OF BOTH HANDS: Primary | ICD-10-CM

## 2021-10-04 DIAGNOSIS — M54.12 CERVICAL RADICULAR PAIN: ICD-10-CM

## 2021-10-04 DIAGNOSIS — R29.898 WEAKNESS OF BOTH HANDS: ICD-10-CM

## 2021-10-04 DIAGNOSIS — M48.02 SPINAL STENOSIS, CERVICAL REGION: ICD-10-CM

## 2021-10-04 DIAGNOSIS — Z98.1 S/P CERVICAL SPINAL FUSION: Primary | ICD-10-CM

## 2021-10-04 PROCEDURE — 97110 THERAPEUTIC EXERCISES: CPT

## 2021-10-04 PROCEDURE — 97112 NEUROMUSCULAR REEDUCATION: CPT

## 2021-10-06 DIAGNOSIS — Z98.890 S/P SPINAL SURGERY: Primary | ICD-10-CM

## 2021-10-06 RX ORDER — OXYCODONE HYDROCHLORIDE 5 MG/1
5 TABLET ORAL EVERY 8 HOURS PRN
Qty: 21 TABLET | Refills: 0 | Status: SHIPPED | OUTPATIENT
Start: 2021-10-06 | End: 2021-10-07 | Stop reason: SDUPTHER

## 2021-10-06 RX ORDER — CELECOXIB 100 MG/1
100 CAPSULE ORAL 2 TIMES DAILY
Qty: 28 CAPSULE | Refills: 0 | Status: SHIPPED | OUTPATIENT
Start: 2021-10-06 | End: 2021-10-07 | Stop reason: SDUPTHER

## 2021-10-06 RX ORDER — GABAPENTIN 100 MG/1
100 CAPSULE ORAL 3 TIMES DAILY
Qty: 45 CAPSULE | Refills: 0 | Status: SHIPPED | OUTPATIENT
Start: 2021-10-06 | End: 2021-10-07 | Stop reason: SDUPTHER

## 2021-10-06 RX ORDER — ACETAMINOPHEN 500 MG
1000 TABLET ORAL EVERY 8 HOURS
Qty: 120 TABLET | Refills: 0 | Status: SHIPPED | OUTPATIENT
Start: 2021-10-06 | End: 2021-10-07 | Stop reason: SDUPTHER

## 2021-10-07 ENCOUNTER — CLINICAL SUPPORT (OUTPATIENT)
Dept: REHABILITATION | Facility: HOSPITAL | Age: 61
End: 2021-10-07
Payer: MEDICAID

## 2021-10-07 DIAGNOSIS — Z74.09 IMPAIRED FUNCTIONAL MOBILITY, BALANCE, GAIT, AND ENDURANCE: ICD-10-CM

## 2021-10-07 DIAGNOSIS — R53.1 WEAKNESS: ICD-10-CM

## 2021-10-07 DIAGNOSIS — Z98.1 S/P CERVICAL SPINAL FUSION: Primary | ICD-10-CM

## 2021-10-07 DIAGNOSIS — Z98.890 S/P SPINAL SURGERY: ICD-10-CM

## 2021-10-07 PROCEDURE — 97110 THERAPEUTIC EXERCISES: CPT

## 2021-10-07 PROCEDURE — 97112 NEUROMUSCULAR REEDUCATION: CPT

## 2021-10-07 RX ORDER — GABAPENTIN 100 MG/1
100 CAPSULE ORAL 3 TIMES DAILY
Qty: 45 CAPSULE | Refills: 0 | Status: SHIPPED | OUTPATIENT
Start: 2021-10-07 | End: 2021-10-08 | Stop reason: SDUPTHER

## 2021-10-07 RX ORDER — ACETAMINOPHEN 500 MG
1000 TABLET ORAL EVERY 8 HOURS
Qty: 120 TABLET | Refills: 0 | Status: SHIPPED | OUTPATIENT
Start: 2021-10-07 | End: 2021-10-08 | Stop reason: SDUPTHER

## 2021-10-07 RX ORDER — CELECOXIB 100 MG/1
100 CAPSULE ORAL 2 TIMES DAILY
Qty: 28 CAPSULE | Refills: 0 | Status: SHIPPED | OUTPATIENT
Start: 2021-10-07 | End: 2021-10-08 | Stop reason: SDUPTHER

## 2021-10-07 RX ORDER — OXYCODONE HYDROCHLORIDE 5 MG/1
5 TABLET ORAL EVERY 8 HOURS PRN
Qty: 21 TABLET | Refills: 0 | Status: SHIPPED | OUTPATIENT
Start: 2021-10-07 | End: 2021-10-08 | Stop reason: SDUPTHER

## 2021-10-08 DIAGNOSIS — Z98.890 S/P SPINAL SURGERY: ICD-10-CM

## 2021-10-08 RX ORDER — ACETAMINOPHEN 500 MG
1000 TABLET ORAL EVERY 8 HOURS
Qty: 120 TABLET | Refills: 0 | Status: SHIPPED | OUTPATIENT
Start: 2021-10-08 | End: 2022-10-03

## 2021-10-08 RX ORDER — CELECOXIB 100 MG/1
100 CAPSULE ORAL 2 TIMES DAILY
Qty: 28 CAPSULE | Refills: 0 | Status: SHIPPED | OUTPATIENT
Start: 2021-10-08 | End: 2021-10-29

## 2021-10-08 RX ORDER — OXYCODONE HYDROCHLORIDE 5 MG/1
5 TABLET ORAL EVERY 8 HOURS PRN
Qty: 21 TABLET | Refills: 0 | Status: SHIPPED | OUTPATIENT
Start: 2021-10-08 | End: 2022-10-03

## 2021-10-08 RX ORDER — OXYCODONE HYDROCHLORIDE 5 MG/1
5 TABLET ORAL EVERY 8 HOURS PRN
Qty: 21 TABLET | Refills: 0 | Status: SHIPPED | OUTPATIENT
Start: 2021-10-08 | End: 2021-10-08 | Stop reason: SDUPTHER

## 2021-10-08 RX ORDER — GABAPENTIN 100 MG/1
100 CAPSULE ORAL 3 TIMES DAILY
Qty: 45 CAPSULE | Refills: 0 | Status: SHIPPED | OUTPATIENT
Start: 2021-10-08 | End: 2021-10-29

## 2021-10-11 ENCOUNTER — CLINICAL SUPPORT (OUTPATIENT)
Dept: REHABILITATION | Facility: HOSPITAL | Age: 61
End: 2021-10-11
Payer: MEDICAID

## 2021-10-11 DIAGNOSIS — R53.1 WEAKNESS: ICD-10-CM

## 2021-10-11 DIAGNOSIS — Z98.1 S/P CERVICAL SPINAL FUSION: Primary | ICD-10-CM

## 2021-10-11 DIAGNOSIS — Z74.09 IMPAIRED FUNCTIONAL MOBILITY, BALANCE, GAIT, AND ENDURANCE: ICD-10-CM

## 2021-10-11 PROCEDURE — 97110 THERAPEUTIC EXERCISES: CPT

## 2021-10-11 PROCEDURE — 97140 MANUAL THERAPY 1/> REGIONS: CPT

## 2021-10-12 ENCOUNTER — TELEPHONE (OUTPATIENT)
Dept: ORTHOPEDICS | Facility: CLINIC | Age: 61
End: 2021-10-12

## 2021-10-12 DIAGNOSIS — Z98.890 S/P SPINAL SURGERY: ICD-10-CM

## 2021-10-12 RX ORDER — TIZANIDINE 2 MG/1
4 TABLET ORAL NIGHTLY PRN
Qty: 30 TABLET | Refills: 0 | Status: SHIPPED | OUTPATIENT
Start: 2021-10-12 | End: 2021-10-29

## 2021-10-14 ENCOUNTER — DOCUMENTATION ONLY (OUTPATIENT)
Dept: REHABILITATION | Facility: HOSPITAL | Age: 61
End: 2021-10-14

## 2021-10-14 ENCOUNTER — CLINICAL SUPPORT (OUTPATIENT)
Dept: REHABILITATION | Facility: HOSPITAL | Age: 61
End: 2021-10-14
Payer: MEDICAID

## 2021-10-14 DIAGNOSIS — Z78.9 DEFICITS IN ACTIVITIES OF DAILY LIVING: ICD-10-CM

## 2021-10-14 DIAGNOSIS — R29.898 WEAKNESS OF BOTH HANDS: ICD-10-CM

## 2021-10-14 PROCEDURE — 97530 THERAPEUTIC ACTIVITIES: CPT

## 2021-10-14 PROCEDURE — 97165 OT EVAL LOW COMPLEX 30 MIN: CPT

## 2021-10-14 PROCEDURE — 97110 THERAPEUTIC EXERCISES: CPT | Mod: CQ

## 2021-10-14 PROCEDURE — 97112 NEUROMUSCULAR REEDUCATION: CPT | Mod: CQ

## 2021-10-18 ENCOUNTER — CLINICAL SUPPORT (OUTPATIENT)
Dept: REHABILITATION | Facility: HOSPITAL | Age: 61
End: 2021-10-18
Payer: MEDICAID

## 2021-10-18 DIAGNOSIS — Z74.09 IMPAIRED FUNCTIONAL MOBILITY, BALANCE, GAIT, AND ENDURANCE: ICD-10-CM

## 2021-10-18 DIAGNOSIS — Z98.1 S/P CERVICAL SPINAL FUSION: Primary | ICD-10-CM

## 2021-10-18 DIAGNOSIS — Z78.9 DEFICITS IN ACTIVITIES OF DAILY LIVING: ICD-10-CM

## 2021-10-18 PROCEDURE — 97112 NEUROMUSCULAR REEDUCATION: CPT

## 2021-10-18 PROCEDURE — 97530 THERAPEUTIC ACTIVITIES: CPT | Performed by: OCCUPATIONAL THERAPIST

## 2021-10-18 PROCEDURE — 97110 THERAPEUTIC EXERCISES: CPT

## 2021-10-21 ENCOUNTER — CLINICAL SUPPORT (OUTPATIENT)
Dept: REHABILITATION | Facility: HOSPITAL | Age: 61
End: 2021-10-21
Payer: MEDICAID

## 2021-10-21 DIAGNOSIS — Z78.9 DEFICITS IN ACTIVITIES OF DAILY LIVING: ICD-10-CM

## 2021-10-21 DIAGNOSIS — R29.898 DECREASED STRENGTH OF UPPER EXTREMITY: Primary | ICD-10-CM

## 2021-10-21 DIAGNOSIS — Z98.1 S/P CERVICAL SPINAL FUSION: ICD-10-CM

## 2021-10-21 PROCEDURE — 97110 THERAPEUTIC EXERCISES: CPT | Mod: CQ

## 2021-10-22 ENCOUNTER — CLINICAL SUPPORT (OUTPATIENT)
Dept: REHABILITATION | Facility: HOSPITAL | Age: 61
End: 2021-10-22
Payer: MEDICAID

## 2021-10-22 DIAGNOSIS — T78.40XD HYPERSENSITIVITY, SUBSEQUENT ENCOUNTER: ICD-10-CM

## 2021-10-22 DIAGNOSIS — R52 PAIN: ICD-10-CM

## 2021-10-22 DIAGNOSIS — Z78.9 DEFICITS IN ACTIVITIES OF DAILY LIVING: ICD-10-CM

## 2021-10-22 DIAGNOSIS — M25.60 DECREASED RANGE OF MOTION: ICD-10-CM

## 2021-10-22 PROCEDURE — 97110 THERAPEUTIC EXERCISES: CPT | Performed by: OCCUPATIONAL THERAPIST

## 2021-10-22 PROCEDURE — 97530 THERAPEUTIC ACTIVITIES: CPT | Performed by: OCCUPATIONAL THERAPIST

## 2021-10-24 PROBLEM — M25.60 DECREASED RANGE OF MOTION: Status: ACTIVE | Noted: 2021-10-24

## 2021-10-24 PROBLEM — R52 PAIN: Status: ACTIVE | Noted: 2021-10-24

## 2021-10-24 PROBLEM — T78.40XA HYPERSENSITIVITY: Status: ACTIVE | Noted: 2021-10-24

## 2021-10-25 ENCOUNTER — TELEPHONE (OUTPATIENT)
Dept: REHABILITATION | Facility: HOSPITAL | Age: 61
End: 2021-10-25

## 2021-10-25 ENCOUNTER — CLINICAL SUPPORT (OUTPATIENT)
Dept: REHABILITATION | Facility: HOSPITAL | Age: 61
End: 2021-10-25
Payer: MEDICAID

## 2021-10-25 DIAGNOSIS — R29.898 DECREASED STRENGTH OF UPPER EXTREMITY: ICD-10-CM

## 2021-10-25 DIAGNOSIS — R52 PAIN: ICD-10-CM

## 2021-10-25 DIAGNOSIS — Z78.9 DEFICITS IN ACTIVITIES OF DAILY LIVING: ICD-10-CM

## 2021-10-25 DIAGNOSIS — M25.60 DECREASED RANGE OF MOTION: ICD-10-CM

## 2021-10-25 DIAGNOSIS — Z98.1 S/P CERVICAL SPINAL FUSION: ICD-10-CM

## 2021-10-25 DIAGNOSIS — T78.40XD HYPERSENSITIVITY, SUBSEQUENT ENCOUNTER: ICD-10-CM

## 2021-10-25 PROCEDURE — 97140 MANUAL THERAPY 1/> REGIONS: CPT | Performed by: OCCUPATIONAL THERAPIST

## 2021-10-25 PROCEDURE — 97110 THERAPEUTIC EXERCISES: CPT | Performed by: OCCUPATIONAL THERAPIST

## 2021-10-25 PROCEDURE — 97112 NEUROMUSCULAR REEDUCATION: CPT | Mod: CQ

## 2021-10-25 PROCEDURE — 97530 THERAPEUTIC ACTIVITIES: CPT | Performed by: OCCUPATIONAL THERAPIST

## 2021-10-25 PROCEDURE — 97110 THERAPEUTIC EXERCISES: CPT | Mod: CQ

## 2021-10-28 ENCOUNTER — CLINICAL SUPPORT (OUTPATIENT)
Dept: REHABILITATION | Facility: HOSPITAL | Age: 61
End: 2021-10-28
Payer: MEDICAID

## 2021-10-28 DIAGNOSIS — Z98.1 S/P CERVICAL SPINAL FUSION: ICD-10-CM

## 2021-10-28 DIAGNOSIS — Z78.9 DEFICITS IN ACTIVITIES OF DAILY LIVING: ICD-10-CM

## 2021-10-28 DIAGNOSIS — R29.898 DECREASED STRENGTH OF UPPER EXTREMITY: ICD-10-CM

## 2021-10-28 PROCEDURE — 97110 THERAPEUTIC EXERCISES: CPT | Mod: CQ

## 2021-10-29 RX ORDER — GABAPENTIN 100 MG/1
100 CAPSULE ORAL 3 TIMES DAILY
Qty: 90 CAPSULE | Refills: 0 | Status: SHIPPED | OUTPATIENT
Start: 2021-10-29 | End: 2021-10-30

## 2021-10-29 RX ORDER — CYCLOBENZAPRINE HCL 5 MG
5 TABLET ORAL 3 TIMES DAILY PRN
Qty: 90 TABLET | Refills: 0 | Status: SHIPPED | OUTPATIENT
Start: 2021-10-29 | End: 2021-11-08

## 2021-10-29 RX ORDER — MELOXICAM 15 MG/1
15 TABLET ORAL DAILY
Qty: 30 TABLET | Refills: 0 | Status: SHIPPED | OUTPATIENT
Start: 2021-10-29 | End: 2021-11-19

## 2021-11-02 ENCOUNTER — DOCUMENTATION ONLY (OUTPATIENT)
Dept: REHABILITATION | Facility: HOSPITAL | Age: 61
End: 2021-11-02
Payer: MEDICAID

## 2021-11-04 ENCOUNTER — CLINICAL SUPPORT (OUTPATIENT)
Dept: REHABILITATION | Facility: HOSPITAL | Age: 61
End: 2021-11-04
Payer: MEDICAID

## 2021-11-04 DIAGNOSIS — R29.898 DECREASED STRENGTH OF UPPER EXTREMITY: ICD-10-CM

## 2021-11-04 DIAGNOSIS — Z98.1 S/P CERVICAL SPINAL FUSION: ICD-10-CM

## 2021-11-04 DIAGNOSIS — Z78.9 DEFICITS IN ACTIVITIES OF DAILY LIVING: ICD-10-CM

## 2021-11-04 PROCEDURE — 97112 NEUROMUSCULAR REEDUCATION: CPT | Mod: CQ

## 2021-11-04 PROCEDURE — 97110 THERAPEUTIC EXERCISES: CPT | Mod: CQ

## 2021-11-08 ENCOUNTER — CLINICAL SUPPORT (OUTPATIENT)
Dept: REHABILITATION | Facility: HOSPITAL | Age: 61
End: 2021-11-08
Payer: MEDICAID

## 2021-11-08 DIAGNOSIS — R52 PAIN: ICD-10-CM

## 2021-11-08 DIAGNOSIS — T78.40XD HYPERSENSITIVITY, SUBSEQUENT ENCOUNTER: ICD-10-CM

## 2021-11-08 DIAGNOSIS — Z78.9 DEFICITS IN ACTIVITIES OF DAILY LIVING: ICD-10-CM

## 2021-11-08 DIAGNOSIS — M25.60 DECREASED RANGE OF MOTION: ICD-10-CM

## 2021-11-08 DIAGNOSIS — Z98.1 S/P CERVICAL SPINAL FUSION: ICD-10-CM

## 2021-11-08 DIAGNOSIS — R29.898 DECREASED STRENGTH OF UPPER EXTREMITY: ICD-10-CM

## 2021-11-08 PROCEDURE — 97112 NEUROMUSCULAR REEDUCATION: CPT

## 2021-11-08 PROCEDURE — 97530 THERAPEUTIC ACTIVITIES: CPT | Performed by: OCCUPATIONAL THERAPIST

## 2021-11-08 PROCEDURE — 97110 THERAPEUTIC EXERCISES: CPT

## 2021-11-10 ENCOUNTER — CLINICAL SUPPORT (OUTPATIENT)
Dept: REHABILITATION | Facility: HOSPITAL | Age: 61
End: 2021-11-10
Payer: MEDICAID

## 2021-11-10 DIAGNOSIS — T78.40XD HYPERSENSITIVITY, SUBSEQUENT ENCOUNTER: ICD-10-CM

## 2021-11-10 DIAGNOSIS — Z78.9 DEFICITS IN ACTIVITIES OF DAILY LIVING: ICD-10-CM

## 2021-11-10 DIAGNOSIS — R29.898 DECREASED STRENGTH OF UPPER EXTREMITY: ICD-10-CM

## 2021-11-10 DIAGNOSIS — Z98.1 S/P CERVICAL SPINAL FUSION: ICD-10-CM

## 2021-11-10 DIAGNOSIS — R52 PAIN: ICD-10-CM

## 2021-11-10 DIAGNOSIS — M25.60 DECREASED RANGE OF MOTION: ICD-10-CM

## 2021-11-10 PROCEDURE — 97530 THERAPEUTIC ACTIVITIES: CPT | Performed by: OCCUPATIONAL THERAPIST

## 2021-11-10 PROCEDURE — 97110 THERAPEUTIC EXERCISES: CPT

## 2021-11-10 PROCEDURE — 97112 NEUROMUSCULAR REEDUCATION: CPT

## 2021-11-15 ENCOUNTER — CLINICAL SUPPORT (OUTPATIENT)
Dept: REHABILITATION | Facility: HOSPITAL | Age: 61
End: 2021-11-15
Payer: MEDICAID

## 2021-11-15 DIAGNOSIS — T78.40XD HYPERSENSITIVITY, SUBSEQUENT ENCOUNTER: ICD-10-CM

## 2021-11-15 DIAGNOSIS — Z78.9 DEFICITS IN ACTIVITIES OF DAILY LIVING: ICD-10-CM

## 2021-11-15 DIAGNOSIS — R29.898 DECREASED STRENGTH OF UPPER EXTREMITY: ICD-10-CM

## 2021-11-15 DIAGNOSIS — R52 PAIN: ICD-10-CM

## 2021-11-15 DIAGNOSIS — M25.60 DECREASED RANGE OF MOTION: ICD-10-CM

## 2021-11-15 DIAGNOSIS — Z98.1 S/P CERVICAL SPINAL FUSION: ICD-10-CM

## 2021-11-15 PROCEDURE — 97110 THERAPEUTIC EXERCISES: CPT

## 2021-11-15 PROCEDURE — 97530 THERAPEUTIC ACTIVITIES: CPT | Performed by: OCCUPATIONAL THERAPIST

## 2021-11-15 PROCEDURE — 97112 NEUROMUSCULAR REEDUCATION: CPT

## 2021-11-17 ENCOUNTER — DOCUMENTATION ONLY (OUTPATIENT)
Dept: REHABILITATION | Facility: HOSPITAL | Age: 61
End: 2021-11-17
Payer: MEDICAID

## 2021-11-18 ENCOUNTER — HOSPITAL ENCOUNTER (OUTPATIENT)
Dept: RADIOLOGY | Facility: HOSPITAL | Age: 61
Discharge: HOME | End: 2021-11-18
Attending: ORTHOPAEDIC SURGERY
Payer: MEDICAID

## 2021-11-18 ENCOUNTER — OFFICE VISIT (OUTPATIENT)
Dept: ORTHOPEDICS | Facility: CLINIC | Age: 61
End: 2021-11-18
Payer: MEDICAID

## 2021-11-18 ENCOUNTER — TELEPHONE (OUTPATIENT)
Dept: ORTHOPEDICS | Facility: CLINIC | Age: 61
End: 2021-11-18
Payer: MEDICAID

## 2021-11-18 VITALS — HEIGHT: 75 IN | BODY MASS INDEX: 23.5 KG/M2 | WEIGHT: 189 LBS

## 2021-11-18 DIAGNOSIS — Z98.890 S/P SPINAL SURGERY: Primary | ICD-10-CM

## 2021-11-18 DIAGNOSIS — Z98.1 S/P CERVICAL SPINAL FUSION: ICD-10-CM

## 2021-11-18 DIAGNOSIS — Z98.1 S/P CERVICAL SPINAL FUSION: Primary | ICD-10-CM

## 2021-11-18 PROCEDURE — 99213 OFFICE O/P EST LOW 20 MIN: CPT | Mod: PBBFAC | Performed by: ORTHOPAEDIC SURGERY

## 2021-11-18 PROCEDURE — 72040 X-RAY EXAM NECK SPINE 2-3 VW: CPT | Mod: TC

## 2021-11-18 PROCEDURE — 99999 PR PBB SHADOW E&M-EST. PATIENT-LVL III: ICD-10-PCS | Mod: PBBFAC,,, | Performed by: ORTHOPAEDIC SURGERY

## 2021-11-18 PROCEDURE — 99024 POSTOP FOLLOW-UP VISIT: CPT | Mod: ,,, | Performed by: ORTHOPAEDIC SURGERY

## 2021-11-18 PROCEDURE — 99024 PR POST-OP FOLLOW-UP VISIT: ICD-10-PCS | Mod: ,,, | Performed by: ORTHOPAEDIC SURGERY

## 2021-11-18 PROCEDURE — 72040 X-RAY EXAM NECK SPINE 2-3 VW: CPT | Mod: 26,,, | Performed by: RADIOLOGY

## 2021-11-18 PROCEDURE — 72040 XR CERVICAL SPINE AP LATERAL: ICD-10-PCS | Mod: 26,,, | Performed by: RADIOLOGY

## 2021-11-18 PROCEDURE — 99999 PR PBB SHADOW E&M-EST. PATIENT-LVL III: CPT | Mod: PBBFAC,,, | Performed by: ORTHOPAEDIC SURGERY

## 2021-11-18 RX ORDER — TRAMADOL HYDROCHLORIDE 100 MG/1
100 TABLET, EXTENDED RELEASE ORAL DAILY PRN
Qty: 30 TABLET | Refills: 0 | Status: SHIPPED | OUTPATIENT
Start: 2021-11-18 | End: 2021-11-19

## 2021-11-18 RX ORDER — GABAPENTIN 300 MG/1
300 CAPSULE ORAL 3 TIMES DAILY
Qty: 90 CAPSULE | Refills: 1 | Status: SHIPPED | OUTPATIENT
Start: 2021-11-18 | End: 2021-12-27 | Stop reason: SDUPTHER

## 2021-11-18 RX ORDER — CYCLOBENZAPRINE HCL 10 MG
10 TABLET ORAL 3 TIMES DAILY PRN
Qty: 60 TABLET | Refills: 1 | Status: SHIPPED | OUTPATIENT
Start: 2021-11-18 | End: 2021-12-27 | Stop reason: SDUPTHER

## 2021-11-19 ENCOUNTER — TELEPHONE (OUTPATIENT)
Dept: ORTHOPEDICS | Facility: CLINIC | Age: 61
End: 2021-11-19
Payer: MEDICAID

## 2021-11-19 ENCOUNTER — CLINICAL SUPPORT (OUTPATIENT)
Dept: REHABILITATION | Facility: HOSPITAL | Age: 61
End: 2021-11-19
Payer: MEDICAID

## 2021-11-19 DIAGNOSIS — Z98.1 S/P CERVICAL SPINAL FUSION: ICD-10-CM

## 2021-11-19 DIAGNOSIS — Z98.890 S/P SPINAL SURGERY: ICD-10-CM

## 2021-11-19 DIAGNOSIS — R29.898 DECREASED STRENGTH OF UPPER EXTREMITY: ICD-10-CM

## 2021-11-19 DIAGNOSIS — Z78.9 DEFICITS IN ACTIVITIES OF DAILY LIVING: ICD-10-CM

## 2021-11-19 PROCEDURE — 97110 THERAPEUTIC EXERCISES: CPT | Mod: CQ

## 2021-11-19 PROCEDURE — 97112 NEUROMUSCULAR REEDUCATION: CPT | Mod: CQ

## 2021-11-19 RX ORDER — TRAMADOL HYDROCHLORIDE 100 MG/1
100 TABLET, EXTENDED RELEASE ORAL DAILY PRN
Qty: 30 TABLET | Refills: 0 | Status: SHIPPED | OUTPATIENT
Start: 2021-11-19 | End: 2022-01-20 | Stop reason: SDUPTHER

## 2021-11-22 ENCOUNTER — CLINICAL SUPPORT (OUTPATIENT)
Dept: REHABILITATION | Facility: HOSPITAL | Age: 61
End: 2021-11-22
Payer: MEDICAID

## 2021-11-22 ENCOUNTER — TELEPHONE (OUTPATIENT)
Dept: ORTHOPEDICS | Facility: CLINIC | Age: 61
End: 2021-11-22
Payer: MEDICAID

## 2021-11-22 DIAGNOSIS — M25.60 DECREASED RANGE OF MOTION: ICD-10-CM

## 2021-11-22 DIAGNOSIS — Z78.9 DEFICITS IN ACTIVITIES OF DAILY LIVING: ICD-10-CM

## 2021-11-22 DIAGNOSIS — R29.898 DECREASED STRENGTH OF UPPER EXTREMITY: ICD-10-CM

## 2021-11-22 DIAGNOSIS — Z98.1 S/P CERVICAL SPINAL FUSION: ICD-10-CM

## 2021-11-22 DIAGNOSIS — Z98.890 S/P SPINAL SURGERY: ICD-10-CM

## 2021-11-22 DIAGNOSIS — T78.40XD HYPERSENSITIVITY, SUBSEQUENT ENCOUNTER: ICD-10-CM

## 2021-11-22 DIAGNOSIS — R52 PAIN: ICD-10-CM

## 2021-11-22 PROCEDURE — 97110 THERAPEUTIC EXERCISES: CPT

## 2021-11-22 PROCEDURE — 97530 THERAPEUTIC ACTIVITIES: CPT | Performed by: OCCUPATIONAL THERAPIST

## 2021-11-23 ENCOUNTER — DOCUMENTATION ONLY (OUTPATIENT)
Dept: REHABILITATION | Facility: HOSPITAL | Age: 61
End: 2021-11-23
Payer: MEDICAID

## 2021-11-24 ENCOUNTER — CLINICAL SUPPORT (OUTPATIENT)
Dept: REHABILITATION | Facility: HOSPITAL | Age: 61
End: 2021-11-24
Payer: MEDICAID

## 2021-11-24 DIAGNOSIS — T78.40XD HYPERSENSITIVITY, SUBSEQUENT ENCOUNTER: ICD-10-CM

## 2021-11-24 DIAGNOSIS — M25.60 DECREASED RANGE OF MOTION: ICD-10-CM

## 2021-11-24 DIAGNOSIS — Z78.9 DEFICITS IN ACTIVITIES OF DAILY LIVING: ICD-10-CM

## 2021-11-24 DIAGNOSIS — R52 PAIN: ICD-10-CM

## 2021-11-24 PROCEDURE — 97530 THERAPEUTIC ACTIVITIES: CPT | Performed by: OCCUPATIONAL THERAPIST

## 2021-12-02 ENCOUNTER — CLINICAL SUPPORT (OUTPATIENT)
Dept: REHABILITATION | Facility: HOSPITAL | Age: 61
End: 2021-12-02
Payer: MEDICAID

## 2021-12-02 DIAGNOSIS — Z78.9 DEFICITS IN ACTIVITIES OF DAILY LIVING: ICD-10-CM

## 2021-12-02 DIAGNOSIS — R29.898 DECREASED STRENGTH OF UPPER EXTREMITY: ICD-10-CM

## 2021-12-02 DIAGNOSIS — Z98.1 S/P CERVICAL SPINAL FUSION: ICD-10-CM

## 2021-12-02 PROCEDURE — 97110 THERAPEUTIC EXERCISES: CPT | Mod: KX

## 2021-12-02 PROCEDURE — 97112 NEUROMUSCULAR REEDUCATION: CPT | Mod: KX

## 2021-12-09 ENCOUNTER — CLINICAL SUPPORT (OUTPATIENT)
Dept: REHABILITATION | Facility: HOSPITAL | Age: 61
End: 2021-12-09
Payer: MEDICAID

## 2021-12-09 DIAGNOSIS — Z78.9 DEFICITS IN ACTIVITIES OF DAILY LIVING: ICD-10-CM

## 2021-12-09 DIAGNOSIS — Z98.1 S/P CERVICAL SPINAL FUSION: ICD-10-CM

## 2021-12-09 DIAGNOSIS — R29.898 DECREASED STRENGTH OF UPPER EXTREMITY: ICD-10-CM

## 2021-12-09 PROCEDURE — 97112 NEUROMUSCULAR REEDUCATION: CPT

## 2021-12-09 PROCEDURE — 97110 THERAPEUTIC EXERCISES: CPT

## 2021-12-14 ENCOUNTER — CLINICAL SUPPORT (OUTPATIENT)
Dept: REHABILITATION | Facility: HOSPITAL | Age: 61
End: 2021-12-14
Payer: MEDICAID

## 2021-12-14 DIAGNOSIS — R29.898 DECREASED STRENGTH OF UPPER EXTREMITY: ICD-10-CM

## 2021-12-14 DIAGNOSIS — Z78.9 DEFICITS IN ACTIVITIES OF DAILY LIVING: ICD-10-CM

## 2021-12-14 DIAGNOSIS — Z98.1 S/P CERVICAL SPINAL FUSION: ICD-10-CM

## 2021-12-14 PROCEDURE — 97110 THERAPEUTIC EXERCISES: CPT

## 2021-12-14 PROCEDURE — 97112 NEUROMUSCULAR REEDUCATION: CPT

## 2021-12-15 ENCOUNTER — CLINICAL SUPPORT (OUTPATIENT)
Dept: REHABILITATION | Facility: HOSPITAL | Age: 61
End: 2021-12-15
Payer: MEDICAID

## 2021-12-15 DIAGNOSIS — M25.60 DECREASED RANGE OF MOTION: ICD-10-CM

## 2021-12-15 DIAGNOSIS — R52 PAIN: ICD-10-CM

## 2021-12-15 DIAGNOSIS — Z78.9 DEFICITS IN ACTIVITIES OF DAILY LIVING: ICD-10-CM

## 2021-12-15 DIAGNOSIS — R29.898 DECREASED STRENGTH OF UPPER EXTREMITY: ICD-10-CM

## 2021-12-15 DIAGNOSIS — T78.40XD HYPERSENSITIVITY, SUBSEQUENT ENCOUNTER: ICD-10-CM

## 2021-12-15 DIAGNOSIS — Z98.1 S/P CERVICAL SPINAL FUSION: ICD-10-CM

## 2021-12-15 PROCEDURE — 97530 THERAPEUTIC ACTIVITIES: CPT | Performed by: OCCUPATIONAL THERAPIST

## 2021-12-15 PROCEDURE — 97110 THERAPEUTIC EXERCISES: CPT

## 2021-12-15 PROCEDURE — 97112 NEUROMUSCULAR REEDUCATION: CPT

## 2021-12-21 ENCOUNTER — CLINICAL SUPPORT (OUTPATIENT)
Dept: REHABILITATION | Facility: HOSPITAL | Age: 61
End: 2021-12-21
Payer: MEDICAID

## 2021-12-21 DIAGNOSIS — R29.898 DECREASED STRENGTH OF UPPER EXTREMITY: ICD-10-CM

## 2021-12-21 DIAGNOSIS — Z78.9 DEFICITS IN ACTIVITIES OF DAILY LIVING: ICD-10-CM

## 2021-12-21 DIAGNOSIS — Z98.1 S/P CERVICAL SPINAL FUSION: ICD-10-CM

## 2021-12-21 PROCEDURE — 97110 THERAPEUTIC EXERCISES: CPT

## 2021-12-21 PROCEDURE — 97112 NEUROMUSCULAR REEDUCATION: CPT

## 2021-12-22 ENCOUNTER — CLINICAL SUPPORT (OUTPATIENT)
Dept: REHABILITATION | Facility: HOSPITAL | Age: 61
End: 2021-12-22
Payer: MEDICAID

## 2021-12-22 DIAGNOSIS — R52 PAIN: ICD-10-CM

## 2021-12-22 DIAGNOSIS — M25.60 DECREASED RANGE OF MOTION: ICD-10-CM

## 2021-12-22 DIAGNOSIS — R29.898 DECREASED STRENGTH OF UPPER EXTREMITY: ICD-10-CM

## 2021-12-22 DIAGNOSIS — Z78.9 DEFICITS IN ACTIVITIES OF DAILY LIVING: ICD-10-CM

## 2021-12-22 DIAGNOSIS — Z98.1 S/P CERVICAL SPINAL FUSION: ICD-10-CM

## 2021-12-22 DIAGNOSIS — T78.40XD HYPERSENSITIVITY, SUBSEQUENT ENCOUNTER: ICD-10-CM

## 2021-12-22 PROCEDURE — 97530 THERAPEUTIC ACTIVITIES: CPT | Performed by: OCCUPATIONAL THERAPIST

## 2021-12-22 PROCEDURE — 97112 NEUROMUSCULAR REEDUCATION: CPT | Mod: KX

## 2021-12-22 PROCEDURE — 97110 THERAPEUTIC EXERCISES: CPT | Mod: KX

## 2021-12-28 ENCOUNTER — CLINICAL SUPPORT (OUTPATIENT)
Dept: REHABILITATION | Facility: HOSPITAL | Age: 61
End: 2021-12-28
Payer: MEDICAID

## 2021-12-28 DIAGNOSIS — Z98.1 S/P CERVICAL SPINAL FUSION: ICD-10-CM

## 2021-12-28 DIAGNOSIS — R29.898 DECREASED STRENGTH OF UPPER EXTREMITY: ICD-10-CM

## 2021-12-28 DIAGNOSIS — Z78.9 DEFICITS IN ACTIVITIES OF DAILY LIVING: ICD-10-CM

## 2021-12-28 PROCEDURE — 97110 THERAPEUTIC EXERCISES: CPT | Mod: KX

## 2021-12-28 PROCEDURE — 97112 NEUROMUSCULAR REEDUCATION: CPT | Mod: KX

## 2021-12-29 ENCOUNTER — CLINICAL SUPPORT (OUTPATIENT)
Dept: REHABILITATION | Facility: HOSPITAL | Age: 61
End: 2021-12-29
Payer: MEDICAID

## 2021-12-29 DIAGNOSIS — Z78.9 DEFICITS IN ACTIVITIES OF DAILY LIVING: ICD-10-CM

## 2021-12-29 DIAGNOSIS — R29.898 DECREASED STRENGTH OF UPPER EXTREMITY: ICD-10-CM

## 2021-12-29 DIAGNOSIS — Z98.1 S/P CERVICAL SPINAL FUSION: ICD-10-CM

## 2021-12-29 PROCEDURE — 97112 NEUROMUSCULAR REEDUCATION: CPT

## 2021-12-29 PROCEDURE — 97110 THERAPEUTIC EXERCISES: CPT

## 2022-01-04 ENCOUNTER — CLINICAL SUPPORT (OUTPATIENT)
Dept: REHABILITATION | Facility: HOSPITAL | Age: 62
End: 2022-01-04
Payer: MEDICAID

## 2022-01-04 DIAGNOSIS — Z98.1 S/P CERVICAL SPINAL FUSION: ICD-10-CM

## 2022-01-04 DIAGNOSIS — R29.898 DECREASED STRENGTH OF UPPER EXTREMITY: ICD-10-CM

## 2022-01-04 DIAGNOSIS — Z78.9 DEFICITS IN ACTIVITIES OF DAILY LIVING: ICD-10-CM

## 2022-01-04 PROCEDURE — 97110 THERAPEUTIC EXERCISES: CPT

## 2022-01-04 NOTE — PROGRESS NOTES
[facility]  Physical Therapy Daily Treatment Note      Name: Valentín Field  Clinic Number: [id number]    Therapy Diagnosis:   Encounter Diagnoses   Name Primary?    S/P cervical spinal fusion     Deficits in activities of daily living     Decreased strength of upper extremity      Physician: Amalia Lim PA-C    Visit Date: 1/4/2022  Physician Orders: PT Eval and Treat  Medical Diagnosis from Referral: Bilateral upper extremity weakness S/P cervical fusion   Evaluation Date: 9/27/2021  Authorization Period Expiration: 12/31/21 (Extension Required)  Plan of Care Expiration: 11/22/2021  (Extended until 01/29/22)                        Progress Update: 11/06/2021                        FOTO: 3/ 3    Visit # / Visits authorized: 1/20 (+20 visits)                        PRECAUTIONS: Standard Precautions and Safety/fall precautions      MD Follow-up: Not scheduled at time of appt     Time In:1115  Time Out:1200  Total Appointment Time (timed & untimed codes): 40 minutes    SUBJECTIVE     Pt reports: difficulty due to muscle stiffness from weather and lack of medication. C  Compliance with Hep: Daily     Response to previous treatment: He felt good after last visit.    Functional change: He feels stronger but continues to have weak legs.     Pain: 4/10 worst: 6/10  Location: right leg   Pain Control: Medication and rest   Aggravating factors: Walking long distances     OBJECTIVE   Objective Measures updated at progress report unless specified otherwise     Treatment     Gym/Equipment Access: Yes  Time to Complete Exercises: Increase secondary to verbal/tactile cues      Valentín participated in neuromuscular re-education activities to improve: Balance, Coordination and Proprioception for 10 minutes. The following activities were included:  Exercise  Today  Comment    TKEs in parallel bars   3 x 10 purple cooks cord upper extremity support as needed with more weight on his right lower  extremity   Step ups/downs   3x10  Bilateral 4 inch step lateral steps avoid knee hyperextension bilateral hand support   Forward lunges in parallel bars   3x 10 bilateral hand support as needed   Sitting knee extension in swiss ball  3x10   Sit<>stand transfer training   3x10 from low mat sitting on airex pad without use of hands standing on turf   Long arch quads  3 x 10 3 pounds sitting on blue therex disk alternating legs   Hip flexion x 3 x 10 sitting on blue therex disk   March in place  2 x 10 parallel bars 2 hand support   Heel raises    2 sets of 10 repetition, 2 hand support on RW    Cone taps  2 minutes alternating legs hand support as needed in parallel bars        Valentín received therapeutic exercises to develop strength, endurance, ROM, flexibility, and posture for (30) minutes including: x = exercises performed      TherEx Today      NuStep x 6 minutes level 8 for endurence/strength    DL/ SL Single Leg shuttle     3x15 6 bands (increased)    Straight leg raise  x  3x10 (B)   Standing marches in parallel bars    3x10 w/ 2 pounds   Sidelying clamshells  x 3x10 (B)   Supine hip abd   3x10   Standing Marches    3x10 onto 6 in step   Toe raises on shuttle    2x20 Bilateral    Bridges  x 3x12 yellow band at knees   Heel slides  3 x 10 right only   Sitting calf raises  x  3x10 15lb DB   Box squats     20x with unilateral upper body assistance    Knee flexion  x 3 x 10 standing cues for technique   Isometric hamstring activation   5 contractions at 3 angles  right leg only   Patient Education/home exercises Posture Education  x     Sit to stand x 3 x 10 without hand support from chair cues to abduct knees with yellow band at knees   Single knee to chest   10 x 10 seconds   Lower truck rotation x 20 x 10 seconds   Tall kneeling hip extension   3x10   Lateral walks   40 steps (B) yellow band    SL leg press   3x12 (B) pin 5   Chin Tucks   20x        Open book  10 x 10 seconds   Heel raises  2 sets of 10  repetition, 2 hand support on rolling walker    Shuttle       3 x 10 6 bands double legs (added)  3 sets of 10 repetition, 5 cords, single leg       Plan for Next Visit: lower trunk rotation, piriformis stretches, open book exercises.    Home Exercises and Patient Education Provided     Education/Self-Care provided: (included in treatment) minutes   · Patient educated on the impairments noted above and the effects of physical therapy intervention to improve overall condition and QOL.   · Patient was educated on all the above exercise prior/during/after for proper posture, positioning, and execution for safe performance with home exercise program.  · Exercise Prescription:   ? 9/27/2021: EVAL- low   ? Discussed symptoms of over exercising and recommended to decrease reps   ? To use rolling walker for all ambulating to maximize safety and to not ambulate in home without rolling walker. Suggested to use rolling walker with less support as tolerated to strengthen his core and hip muscles while ambulating.      Written Home Exercises Provided: yes. Prefers: Electronic Copies  Exercises were reviewed and Valentín was able to demonstrate them prior to the end of the session.  Valentín demonstrated good understanding of the education provided.   1. Sitting hip abd  2. Sitting marches focus on foot placement  3. Tandem Stance (B)       See EMR under Patient Instructions for exercises provided during therapy sessions.    ASSESSMENT     Valentín Field tolerated PT session well with minimal complaints of pain or discomfort, secondary to poor motor coordination and muscle endurance. Objective findings are improving with measurements of ROM and functional mobility.  Therapy exercises were reviewed by revisiting exercises given from previous home exercise program while adding no new exercises.  Handouts were not issued during today's visit. Valentín demonstrated good understanding of new exercises and will continue to progress at home  until next follow-up. The patient continues to have difficulty with ADLs. Without continues skilled physical therapy interventions, patient will continue to be a fall risk.      Valentín is progressing well towards his goals.   Pt prognosis is Good.     Pt will continue to benefit from skilled outpatient physical therapy to address the deficits listed in the problem list box on initial evaluation, provide pt/family education and to maximize pt's level of independence in the home and community environment.     Pt's spiritual, cultural and educational needs considered and pt agreeable to plan of care and goals.    Anticipated barriers to physical therapy: PMHx    GOALS:  SHORT TERM GOALS: 4 weeks, (10/25/21) 01/04/2022   1. Recent signs and systems trend is improving in order to progress towards LTG's. MET 11/22/2021   2. Patient will be independent with HEP in order to further progress and return to maximal function. MET 11/22/2021   3. Pain rating at Worst: 5/10 in order to progress towards increased independence with activity.  PC  11/22/2021   4. Patient will be able to correct postural deviations in sitting and standing, to decrease pain and promote postural awareness for injury prevention.  MET 11/22/2021      LONG TERM GOALS: 8 weeks, (11/22/21) 9/27/2021   1. Patient will return to normal ADL, recreational, and work related activities with less pain and limitation.   PC  11/22/2021   2. Patient will improve AROM to stated goals in order to return to maximal functional potential.   PC  11/22/2021   3. Patient will improve Strength to stated goals of appropriate musculature in order to improve functional independence.   PC  11/22/2021   4. Pain Rating at Best: 1/10 to improve Quality of Life.   PC  11/22/2021   5. Patient will meet predicted functional outcome (FOTO) score: 56% to increase self-worth & perceived functional ability.  PC  11/22/2021   6. Patient will have met/partially met personal goal of: returning  back to being independent with ADLs such as ambulation in the community.   PC  11/22/2021        PC = progressing/continue  PM= partially met  DC= discontinue    PLAN     Continue Plan of Care (POC) and progress per patient tolerance. Extension of POC to 01/29/22  See Treatment section for exercise progression.    Campos Chambers, PT, DPT

## 2022-01-05 ENCOUNTER — CLINICAL SUPPORT (OUTPATIENT)
Dept: REHABILITATION | Facility: HOSPITAL | Age: 62
End: 2022-01-05
Payer: MEDICAID

## 2022-01-05 DIAGNOSIS — R52 PAIN: ICD-10-CM

## 2022-01-05 DIAGNOSIS — Z78.9 DEFICITS IN ACTIVITIES OF DAILY LIVING: ICD-10-CM

## 2022-01-05 DIAGNOSIS — R29.898 DECREASED STRENGTH OF UPPER EXTREMITY: ICD-10-CM

## 2022-01-05 DIAGNOSIS — M25.60 DECREASED RANGE OF MOTION: ICD-10-CM

## 2022-01-05 DIAGNOSIS — Z98.1 S/P CERVICAL SPINAL FUSION: ICD-10-CM

## 2022-01-05 DIAGNOSIS — T78.40XD HYPERSENSITIVITY, SUBSEQUENT ENCOUNTER: ICD-10-CM

## 2022-01-05 PROCEDURE — 97530 THERAPEUTIC ACTIVITIES: CPT | Performed by: OCCUPATIONAL THERAPIST

## 2022-01-05 PROCEDURE — 97110 THERAPEUTIC EXERCISES: CPT

## 2022-01-05 PROCEDURE — 97112 NEUROMUSCULAR REEDUCATION: CPT

## 2022-01-05 NOTE — PROGRESS NOTES
OCCUPATIONAL THERAPY DAILY NOTE/ DISCHARGE SUMMARY    Name: Valentín Field  Clinic Number: [id number]    Therapy Diagnosis:   Encounter Diagnoses   Name Primary?    Decreased range of motion     Pain     Hypersensitivity, subsequent encounter     Deficits in activities of daily living      Physician: Elsie Wise,*    Visit Date: 1/5/2022  Physician Orders: OT Evaluation and Tx  Medical Diagnosis: Weakness of both hands [R29.898]    Evaluation Date: 10/14/2021  Insurance Authorization period Expiration: 10/14/2021 - 1/8/2022  Plan of Care Expiration Period: 12/15/2021 to 1/15/2022  FOTO : 2/3     Visit # / Visits Authortized: 12/12  Time In: 1005  Time Out: 1050  Total Billable Time: 45 minutes     Precautions: Standard     SUBJECTIVE     Today, pt reports: severe bilateral hand pain secondary to the cold. He takes a hot bath for 15-20 minutes to help decrease bilateral hand pain. Reports more in bilateral wrists and lifting gallon jugs for exercises at home.    He/She was compliant with home exercise program at least 1x per day.  Response to previous treatment: increased wrist pain.  Functional change: self care    Pre-Treatment Pain: 8/10 to bilateral hands  Post-Treatment Pain: 5/10  Location: hands    SENSATION: update 1/5/2022  monofilaments testing: right hand  4.31  diminished protective sensation of digits 2-5  4.93   Thumb - loss of protective sensation    Left hand: 3.84 diminished protective sensation of all digits    CMS Impairment/Limitation/Restriction for FOTO hands Survey    Therapist reviewed FOTO scores for Valentín Field on 1/5/2022.   FOTO documents entered into [employer] - see Media section.    Limitation Score: 45%        TREATMENT    and Pinch Strength (in pounds, psi's): updated on 1/5/2022    Left Right    II 50  To 65# 35  To 45#   Lateral 19  18   Tripod 13 to 16 10   Tip 9 8      Muscle strength: right hand  Wrist flexion/extension: 4+/5 improved to 4+/4/5/5  Wrist  ulnar/radial deviation: 4+/5  Improved to 4+/5 /5/5  Forearm supination/pronation: 4+/5    Current Functional Status:              Home/Living environment: lives with their family                            Limitation of Functional Status as follows:              ADLs/IADLs: severe Difficulty with bathing and housework  Is now moderate difficulty              Unable to cut meat, unable to do buttons and zippers is now moderate difficulty              Grooming with mild difficulty     Driving: unable                 Leisure: n/a    Valentín received therapeutic exercises to develop strength, endurance, ROM and flexibility for 30 minutes including:    Exercise 1/5/2022   Abduction/adduction on table with rubber band left hand and without rubber band right hand   10x each bilageral hands   Pulley exercises for flexion/abduction 3 minutes each   Wrist passive range of motion  2/15 second holds not today    Digit extension 10x bilateral hands   Opposition of the thumb to all digits 10x  Bilateral hands   Blue sponge for lumbrical strengthening exercises 10/10 second hold   Elbow flexion/extension with 3# weight 30x   Adduction of all digits with red foam 10/10 second holds   UBE 4.0 6 minutes 3 minutes forward and 3 minutes backward    Wrist curls 3# all planes  Forearm rotation: 2# 3 sets 13/10   BEHIND THE back stretch 3/30 second holds not today            Valentín participated in dynamic functional therapeutic activities to improve functional performance for 15  minutes, including:    Exercise 1/5/2022   Forearm supination/pronation with hammer 2#   Rope and dowel with 2#  3 minutes   Resisted dowel exercises: key pinch 3 minutes not today   isopheres right hand 3 minutes   Not today    Marbles and coins for in hand manipulation  3 minutes  Not today   digi flexion red/green bilateral hands 2x 15 repetitiions   handmaster  purple 20x  Bilateral hands   Key pinch/1 and 2 point pinch with yellow clothespin 10-20x bilateral  hands         Home Exercises Provided and Patient Education Provided     Education/Self-Care provided: (1 minutes) during treatment   Patient educated on biomechanical justification for therapeutic exercise and importance of compliance with HEP in order to improve overall impairments and QOL    Patient educated on no pain with exercises.  - recommended 2-3# pound weights to decrease bilateral wrist pain.  -recommended that he not use hot pack secondary to diminished protective sensation of bilateral hands and be careful when cooking.    Written Home Exercises Provided: yes.  Exercises were reviewed and Valentín was able to demonstrate them prior to the end of the session.  Valentín demonstrated good  understanding of the education provided.     See EMR under Patient Instructions for exercises provided 10/18/2021.    ASSESSMENT   Pt tolerated exercise well with reports of increased fatigue but no increased pain. Pt demonstrated good understanding of exercises and required minimal cueing to maintain proper form.  Patient reports that he is doing his  strengthening exercises at home, but has not made any progress in the last few months. He has not changed in his reported pain symptoms of bilateral hands. He did meet predicted FOTO goals, but his results tend to vary between testing. No significant changes in self care as patient has decreased sensation that impairs his fine motor skills. Wrist strength has improved however he is reporting increased pain that may be from lifting too much weight at home. His range of motion of bilateral hands and wrists is within normal limits.     Valentín is not progressing well towards his goals.   Pt prognosis is Good.        GOALS:     Short Term Goals:  6 weeks                                                                   updated  1/5//2021   1. Pain: Pt will demonstrate improved pain by reports of less than or equal to 5/10 worst pain on the verbal rating scale in order to  progress toward maximal functional ability and improve QOL. Not met 1/5/2022   2. Mobility: Patient will improve AROM to 50% of stated goals, listed in objective measures above, in order to progress towards independence with functional activities.   Met 11/10/2021   3. Strength: Patient will improve strength to 50% of stated goals, listed in objective measures above, in order to progress towards independence with functional activities.   Not met 1/5/2022   4. Self Care: Patient will demonstrate improved self care skills listed in objective measure above,in order to progress towards independence with functional activities.   Met 11/18/2021   5. HEP: Patient will demonstrate independence with HEP in order to progress toward functional independence.  Met 1/5/2022      Long Term Goals:  12 weeks                                                                          UPDATED  1/5/2022   1. Pain: Pt will demonstrate improved pain by reports of less than or equal to 0/10 worst pain on the verbal rating scale in order to progress toward maximal functional ability and improve QOL.    Not Met  1/5/2022   2. Function: Patient will demonstrate improved function as indicated by a functional limitation score of less than or equal to 33 out of 100 on FOTO. Not met 1/5/2022   3. Mobility: Patient will improve AROM to stated goals, listed in objective measures above, in order to return to maximal functional potential and improve quality of life. Met 1/5/2022   4. Strength: Patient will improve strength to stated goals, listed in objective measures above, in order to improve functional independence and quality of life.  Not met  1/5/2022   5. Self Care: Patient will demonstrate increased self care skills to an independent level or modified independent level with adaptive equipment as needed. Not met 1/5/2022   6. Patient will return to normal ADL's, IADL's, community involvement, recreational activities, and work-related activities  with less than or equal to 0/10 pain and maximal function.   Not met 1/5/2022          PLAN   DISCHARGE OUTPATIENT OCCUPATIONAL THERAPY.    Cee Rojo, OTR, GAROR

## 2022-01-05 NOTE — PROGRESS NOTES
[facility]  Physical Therapy Daily Treatment Note      Name: Valentín Field  Clinic Number: [id number]    Therapy Diagnosis:   Encounter Diagnoses   Name Primary?    S/P cervical spinal fusion     Deficits in activities of daily living     Decreased strength of upper extremity      Physician: Amalia Lim PA-C    Visit Date: 1/5/2022  Physician Orders: PT Eval and Treat  Medical Diagnosis from Referral: Bilateral upper extremity weakness S/P cervical fusion   Evaluation Date: 9/27/2021  Authorization Period Expiration: 12/31/21 (Extension Required)  Plan of Care Expiration: 11/22/2021  (Extended until 01/29/22)                        Progress Update: 11/06/2021                        FOTO: 3/ 3    Visit # / Visits authorized: 2/20 (+20 visits)                        PRECAUTIONS: Standard Precautions and Safety/fall precautions      MD Follow-up: Not scheduled at time of appt     Time In:1045  Time Out:1130  Total Appointment Time (timed & untimed codes): 40 minutes    SUBJECTIVE     Pt reports: difficulty due to muscle stiffness from weather and lack of medication. C  Compliance with Hep: Daily     Response to previous treatment: He felt good after last visit.    Functional change: He feels stronger but continues to have weak legs.     Pain: 4/10 worst: 6/10  Location: right leg   Pain Control: Medication and rest   Aggravating factors: Walking long distances     OBJECTIVE   Objective Measures updated at progress report unless specified otherwise     Treatment     Gym/Equipment Access: Yes  Time to Complete Exercises: Increase secondary to verbal/tactile cues      Valentín participated in neuromuscular re-education activities to improve: Balance, Coordination and Proprioception for 25 minutes. The following activities were included:  Exercise  Today  Comment    TKEs in parallel bars  x 4 x 10 purple cooks cord upper extremity support as needed with more weight on his right lower  extremity   Step ups/downs  x 3x10  Bilateral 4 inch step lateral steps avoid knee hyperextension bilateral hand support   Forward lunges in parallel bars  x 3x 10 bilateral hand support as needed   Sitting knee extension in swiss ball  3x10   Sit<>stand transfer training   3x10 from low mat sitting on airex pad without use of hands standing on turf   Long arch quads  3 x 10 3 pounds sitting on blue therex disk alternating legs   Hip flexion x 3 x 10 sitting on blue therex disk   March in place x 2 x 10 parallel bars 1 hand support *on foam mat*    Heel raises    2 sets of 10 repetition, 2 hand support on RW    Cone taps  2 minutes alternating legs hand support as needed in parallel bars        Valentín received therapeutic exercises to develop strength, endurance, ROM, flexibility, and posture for (15) minutes including: x = exercises performed      TherEx Today      NuStep x 6 minutes level 8 for endurence/strength    DL/ SL Single Leg shuttle     3x15 6 bands (increased)    Straight leg raise    3x10 (B)   Standing marches in parallel bars    3x10 w/ 2 pounds   Sidelying clamshells   3x10 (B)   Supine hip abd   3x10   Standing Marches    3x10 onto 6 in step   Toe raises on shuttle    2x20 Bilateral    Bridges   3x12 yellow band at knees   Heel slides  3 x 10 right only   Sitting calf raises  x  3x10 25lb DB   Box squats     20x with unilateral upper body assistance    Knee flexion   3 x 10 standing cues for technique   Isometric hamstring activation   5 contractions at 3 angles  right leg only   Patient Education/home exercises Posture Education  x     Sit to stand x 3 x 10 without hand support from chair cues to abduct knees with yellow band at knees   Single knee to chest   10 x 10 seconds   Lower truck rotation  20 x 10 seconds   Tall kneeling hip extension   3x10   Lateral walks   40 steps (B) yellow band    SL leg press   3x12 (B) pin 5   Chin Tucks   20x        Open book  10 x 10 seconds   Heel raises  2 sets  of 10 repetition, 2 hand support on rolling walker    Shuttle       3 x 10 6 bands double legs (added)  3 sets of 10 repetition, 5 cords, single leg       Plan for Next Visit: lower trunk rotation, piriformis stretches, open book exercises.    Home Exercises and Patient Education Provided     Education/Self-Care provided: (included in treatment) minutes   · Patient educated on the impairments noted above and the effects of physical therapy intervention to improve overall condition and QOL.   · Patient was educated on all the above exercise prior/during/after for proper posture, positioning, and execution for safe performance with home exercise program.  · Exercise Prescription:   ? 9/27/2021: EVAL- low   ? Discussed symptoms of over exercising and recommended to decrease reps   ? To use rolling walker for all ambulating to maximize safety and to not ambulate in home without rolling walker. Suggested to use rolling walker with less support as tolerated to strengthen his core and hip muscles while ambulating.      Written Home Exercises Provided: yes. Prefers: Electronic Copies  Exercises were reviewed and Valentín was able to demonstrate them prior to the end of the session.  Valentín demonstrated good understanding of the education provided.   1. Sitting hip abd  2. Sitting marches focus on foot placement  3. Tandem Stance (B)       See EMR under Patient Instructions for exercises provided during therapy sessions.    ASSESSMENT     Valentín Field tolerated PT session well with minimal complaints of pain or discomfort, secondary to poor motor coordination and muscle endurance. Objective findings are improving with measurements of ROM and functional mobility.  Therapy exercises were reviewed by revisiting exercises given from previous home exercise program while adding no new exercises.  Handouts were not issued during today's visit. Valentín demonstrated good understanding of new exercises and will continue to progress at  home until next follow-up. The patient continues to have difficulty with ADLs. Without continues skilled physical therapy interventions, patient will continue to be a fall risk.      Valentín is progressing well towards his goals.   Pt prognosis is Good.     Pt will continue to benefit from skilled outpatient physical therapy to address the deficits listed in the problem list box on initial evaluation, provide pt/family education and to maximize pt's level of independence in the home and community environment.     Pt's spiritual, cultural and educational needs considered and pt agreeable to plan of care and goals.    Anticipated barriers to physical therapy: PMHx    GOALS:  SHORT TERM GOALS: 4 weeks, (10/25/21) 01/06/2022   1. Recent signs and systems trend is improving in order to progress towards LTG's. MET 11/22/2021   2. Patient will be independent with HEP in order to further progress and return to maximal function. MET 11/22/2021   3. Pain rating at Worst: 5/10 in order to progress towards increased independence with activity.  PC  11/22/2021   4. Patient will be able to correct postural deviations in sitting and standing, to decrease pain and promote postural awareness for injury prevention.  MET 11/22/2021      LONG TERM GOALS: 8 weeks, (11/22/21) 9/27/2021   1. Patient will return to normal ADL, recreational, and work related activities with less pain and limitation.   PC  11/22/2021   2. Patient will improve AROM to stated goals in order to return to maximal functional potential.   PC  11/22/2021   3. Patient will improve Strength to stated goals of appropriate musculature in order to improve functional independence.   PC  11/22/2021   4. Pain Rating at Best: 1/10 to improve Quality of Life.   PC  11/22/2021   5. Patient will meet predicted functional outcome (FOTO) score: 56% to increase self-worth & perceived functional ability.  PC  11/22/2021   6. Patient will have met/partially met personal goal of:  returning back to being independent with ADLs such as ambulation in the community.   PC  11/22/2021        PC = progressing/continue  PM= partially met  DC= discontinue    PLAN     Continue Plan of Care (POC) and progress per patient tolerance. Extension of POC to 01/29/22  See Treatment section for exercise progression.    Campos Chambers, PT, DPT

## 2022-01-11 ENCOUNTER — CLINICAL SUPPORT (OUTPATIENT)
Dept: REHABILITATION | Facility: HOSPITAL | Age: 62
End: 2022-01-11
Payer: MEDICAID

## 2022-01-11 ENCOUNTER — DOCUMENTATION ONLY (OUTPATIENT)
Dept: REHABILITATION | Facility: HOSPITAL | Age: 62
End: 2022-01-11

## 2022-01-11 DIAGNOSIS — Z78.9 DEFICITS IN ACTIVITIES OF DAILY LIVING: ICD-10-CM

## 2022-01-11 DIAGNOSIS — Z98.1 S/P CERVICAL SPINAL FUSION: ICD-10-CM

## 2022-01-11 DIAGNOSIS — R29.898 DECREASED STRENGTH OF UPPER EXTREMITY: ICD-10-CM

## 2022-01-11 PROCEDURE — 97110 THERAPEUTIC EXERCISES: CPT | Mod: CQ

## 2022-01-11 PROCEDURE — 97112 NEUROMUSCULAR REEDUCATION: CPT | Mod: CQ

## 2022-01-11 NOTE — PROGRESS NOTES
[facility] OUTPATIENT THERAPY AND WELLNESS  Physical Therapy Daily Treatment Note      Name: Valentín Field  Clinic Number: [id number]    Therapy Diagnosis:   Encounter Diagnoses   Name Primary?    S/P cervical spinal fusion     Deficits in activities of daily living     Decreased strength of upper extremity      Physician: Amalia Lim PA-C    Visit Date: 1/11/2022  Physician Orders: PT Eval and Treat  Medical Diagnosis from Referral: Bilateral upper extremity weakness S/P cervical fusion   Evaluation Date: 9/27/2021  Authorization Period Expiration: 12/31/21 (Extension Required)  Plan of Care Expiration: 11/22/2021  (Extended until 01/29/22)                        Progress Update: 11/06/2021                        FOTO: 3/ 3    Visit # / Visits authorized: 3/20 (+20 visits)                        PRECAUTIONS: Standard Precautions and Safety/fall precautions      MD Follow-up: Not scheduled at time of appt     Time In:1115  Time Out:1200  Total Appointment Time (timed & untimed codes): 38 minutes    SUBJECTIVE     Pt reports: muscle stiffness from weather and lack of medication. No radiating pain down the leg anymore and feeling stronger in his legs but weakness in his arms.     Compliance with Hep: Daily     Response to previous treatment: He felt good after last visit.    Functional change: He feels stronger but continues to have weak legs.     Pain: 8/10 worst: 6/10  Location: bilateral upper back stiffness   Pain Control: Medication and rest   Aggravating factors: Walking long distances     OBJECTIVE   Objective Measures updated at progress report unless specified otherwise     Treatment     Gym/Equipment Access: Yes  Time to Complete Exercises: Increase secondary to verbal/tactile cues      Valentín participated in neuromuscular re-education activities to improve: Balance, Coordination and Proprioception for 27 minutes. The following activities were included:  Exercise  Today  Comment    TKEs in parallel  bars   4 x 10 purple cooks cord upper extremity support as needed with more weight on his right lower extremity   Step ups/downs   3x10  Bilateral 4 inch step lateral steps avoid knee hyperextension bilateral hand support   Forward lunges in parallel bars   3x 10 bilateral hand support as needed   Sitting knee extension in swiss ball  3x10   Sit<>stand transfer training   3x10 from low mat with shoulder overhead press with 3 pound dumbbell   Long arch quads  3 x 10 3 pounds sitting on blue therex disk alternating legs   Hip flexion  3 x 10 sitting on blue therex disk   March in place  2 x 10 parallel bars 1 hand support *on foam mat*    Heel raises    2 sets of 10 repetition, 2 hand support on RW    Cone taps  2 minutes alternating legs hand support as needed in parallel bars   Shoulder active range standing X  x   Flexion 1 pound alternating arms x 30  Abduction  Horizontal abduction   Elbow flexion standing x 3 x 10 alternating arms 3 pounds                  Valentín received therapeutic exercises to develop strength, endurance, ROM, flexibility, and posture for (11) minutes including: x = exercises performed      TherEx Today      NuStep x 6 minutes level 6 for endurence/strength    DL/ SL Single Leg shuttle     3x15 6 bands (increased)    Straight leg raise    3x10 (B)   Standing marches in parallel bars    3x10 w/ 2 pounds   Sidelying clamshells   3x10 (B)   Supine hip abd   3x10   Standing Marches    3x10 onto 6 in step   Toe raises on shuttle    2x20 Bilateral    Bridges   3x12 yellow band at knees   Heel slides  3 x 10 right only   Sitting calf raises    3x10 25lb DB   Box squats     20x with unilateral upper body assistance    Knee flexion   3 x 10 standing cues for technique   Isometric hamstring activation   5 contractions at 3 angles  right leg only   Patient Education/home exercises Posture Education       Sit to stand X 3 x 10 without hand support from chair cues to abduct knees with yellow band at knees    Single knee to chest   10 x 10 seconds   Lower truck rotation  20 x 10 seconds   Tall kneeling hip extension   3x10   Lateral walks   40 steps (B) yellow band    SL leg press   3x12 (B) pin 5   Chin Tucks   20x        Open book  10 x 10 seconds   Heel raises  2 sets of 10 repetition, 2 hand support on rolling walker    Shuttle       3 x 10 6 bands double legs (added)  3 sets of 10 repetition, 5 cords, single leg       Plan for Next Visit: lower trunk rotation, piriformis stretches, open book exercises.    Home Exercises and Patient Education Provided     Education/Self-Care provided: (included in treatment) minutes   · Patient educated on the impairments noted above and the effects of physical therapy intervention to improve overall condition and QOL.   · Patient was educated on all the above exercise prior/during/after for proper posture, positioning, and execution for safe performance with home exercise program.  · Exercise Prescription:   ? 9/27/2021: EVAL- low   ? Discussed symptoms of over exercising and recommended to decrease reps   ? To use rolling walker for all ambulating to maximize safety and to not ambulate in home without rolling walker. Suggested to use rolling walker with less support as tolerated to strengthen his core and hip muscles while ambulating.      Written Home Exercises Provided: yes. Prefers: Electronic Copies  Exercises were reviewed and Valentín was able to demonstrate them prior to the end of the session.  Valentín demonstrated good understanding of the education provided.   1. Sitting hip abd  2. Sitting marches focus on foot placement  3. Tandem Stance (B)       See EMR under Patient Instructions for exercises provided during therapy sessions.    ASSESSMENT     Valentín Field tolerated PT session with no complaints of pain. Therapy session focused on standing balance while strengthening upper extremities with narrow base of support with stand by assist.      Valentín is progressing well  towards his goals.   Pt prognosis is Good.     Pt will continue to benefit from skilled outpatient physical therapy to address the deficits listed in the problem list box on initial evaluation, provide pt/family education and to maximize pt's level of independence in the home and community environment.     Pt's spiritual, cultural and educational needs considered and pt agreeable to plan of care and goals.    Anticipated barriers to physical therapy: PMHx    GOALS:  SHORT TERM GOALS: 4 weeks, (10/25/21) 01/11/2022   1. Recent signs and systems trend is improving in order to progress towards LTG's. MET 11/22/2021   2. Patient will be independent with HEP in order to further progress and return to maximal function. MET 11/22/2021   3. Pain rating at Worst: 5/10 in order to progress towards increased independence with activity.  PC  11/22/2021   4. Patient will be able to correct postural deviations in sitting and standing, to decrease pain and promote postural awareness for injury prevention.  MET 11/22/2021      LONG TERM GOALS: 8 weeks, (11/22/21) 9/27/2021   1. Patient will return to normal ADL, recreational, and work related activities with less pain and limitation.   PC  11/22/2021   2. Patient will improve AROM to stated goals in order to return to maximal functional potential.   PC  11/22/2021   3. Patient will improve Strength to stated goals of appropriate musculature in order to improve functional independence.   PC  11/22/2021   4. Pain Rating at Best: 1/10 to improve Quality of Life.   PC  11/22/2021   5. Patient will meet predicted functional outcome (FOTO) score: 56% to increase self-worth & perceived functional ability.  PC  11/22/2021   6. Patient will have met/partially met personal goal of: returning back to being independent with ADLs such as ambulation in the community.   PC  11/22/2021        PC = progressing/continue  PM= partially met  DC= discontinue    PLAN     Continue Plan of Care (POC) and  progress per patient tolerance. Extension of POC to 01/29/22  See Treatment section for exercise progression.    Cj Gonzalez, PTA

## 2022-01-12 ENCOUNTER — CLINICAL SUPPORT (OUTPATIENT)
Dept: REHABILITATION | Facility: HOSPITAL | Age: 62
End: 2022-01-12
Payer: MEDICAID

## 2022-01-12 ENCOUNTER — TELEPHONE (OUTPATIENT)
Dept: ORTHOPEDICS | Facility: CLINIC | Age: 62
End: 2022-01-12
Payer: MEDICAID

## 2022-01-12 DIAGNOSIS — R29.898 DECREASED STRENGTH OF UPPER EXTREMITY: ICD-10-CM

## 2022-01-12 DIAGNOSIS — Z78.9 DEFICITS IN ACTIVITIES OF DAILY LIVING: ICD-10-CM

## 2022-01-12 DIAGNOSIS — Z98.1 S/P CERVICAL SPINAL FUSION: ICD-10-CM

## 2022-01-12 DIAGNOSIS — M51.36 DDD (DEGENERATIVE DISC DISEASE), LUMBAR: ICD-10-CM

## 2022-01-12 DIAGNOSIS — M51.34 DDD (DEGENERATIVE DISC DISEASE), THORACIC: ICD-10-CM

## 2022-01-12 DIAGNOSIS — M50.30 DDD (DEGENERATIVE DISC DISEASE), CERVICAL: Primary | ICD-10-CM

## 2022-01-12 PROCEDURE — 97110 THERAPEUTIC EXERCISES: CPT

## 2022-01-12 PROCEDURE — 97112 NEUROMUSCULAR REEDUCATION: CPT

## 2022-01-12 NOTE — PROGRESS NOTES
[facility] [place] [facility]  Physical Therapy Daily Treatment Note      Name: Valentín Field  Clinic Number: [id number]    Therapy Diagnosis:   Encounter Diagnoses   Name Primary?    S/P cervical spinal fusion     Deficits in activities of daily living     Decreased strength of upper extremity      Physician: Amalia Lim PA-C    Visit Date: 1/12/2022  Physician Orders: PT Eval and Treat  Medical Diagnosis from Referral: Bilateral upper extremity weakness S/P cervical fusion   Evaluation Date: 9/27/2021  Authorization Period Expiration: 12/31/21 (Extension Required)  Plan of Care Expiration: 11/22/2021  (Extended until 01/29/22)                        Progress Update: 11/06/2021                        FOTO: 3/ 3    Visit # / Visits authorized: 2/20 (+20 visits)                        PRECAUTIONS: Standard Precautions and Safety/fall precautions      MD Follow-up: Not scheduled at time of appt     Time In:1045  Time Out:1130  Total Appointment Time (timed & untimed codes): 40 minutes    SUBJECTIVE     Pt reports: muscle stiffness but overall feeling better.     Compliance with Hep: Daily   Response to previous treatment: He felt good after last visit.    Functional change: He feels stronger but continues to have weak legs.     Pain: 4/10 worst: 6/10  Location: right leg   Pain Control: Medication and rest   Aggravating factors: Walking long distances     OBJECTIVE   Objective Measures updated at progress report unless specified otherwise     Treatment     Gym/Equipment Access: Yes  Time to Complete Exercises: Increase secondary to verbal/tactile cues      Valentín participated in neuromuscular re-education activities to improve: Balance, Coordination and Proprioception for 25 minutes. The following activities were included:  Exercise  Today  Comment    TKEs in parallel bars  x 4 x 10 purple cooks cord. Step up onto 1 inch box   Step ups/downs  x 3x10  Bilateral 4 inch step lateral steps avoid knee  hyperextension bilateral hand support   Forward lunges in parallel bars  x 3x 10 bilateral hand support as needed   Sitting knee extension in swiss ball  3x10   Sit<>stand transfer training   3x10 from low mat sitting on airex pad without use of hands standing on turf   Long arch quads  3 x 10 3 pounds sitting on blue therex disk alternating legs   Hip flexion x 3 x 10 sitting on blue therex disk   March in place x 2 x 10 parallel bars 1 hand support *on foam mat*    Heel raises    2 sets of 10 repetition, 2 hand support on RW    Cone taps  2 minutes alternating legs hand support as needed in parallel bars        Valentín received therapeutic exercises to develop strength, endurance, ROM, flexibility, and posture for (15) minutes including: x = exercises performed      TherEx Today      NuStep x 6 minutes level 8 for endurence/strength    DL/ SL Single Leg shuttle     3x15 6 bands (increased)    Straight leg raise    3x10 (B)   Standing marches in parallel bars    3x10 w/ 2 pounds   Sidelying clamshells   3x10 (B)   Supine hip abd   3x10   Standing Marches    3x10 onto 6 in step   Toe raises on shuttle    2x20 Bilateral    Bridges  x 3x12 yellow band at knees   Heel slides  3 x 10 right only   Sitting calf raises    3x10 25lb DB   Box squats     20x with unilateral upper body assistance    Knee flexion   3 x 10 standing cues for technique   Isometric hamstring activation   5 contractions at 3 angles  right leg only   Patient Education/home exercises Posture Education  x     Sit to stand x 3 x 10 without hand support from chair cues to abduct knees with yellow band at knees   Single knee to chest   10 x 10 seconds   Lower truck rotation  20 x 10 seconds   Tall kneeling hip extension   3x10   Lateral walks   40 steps (B) yellow band    SL leg press   3x12 (B) pin 5   Chin Tucks   20x        Open book  10 x 10 seconds   Heel raises  2 sets of 10 repetition, 2 hand support on rolling walker    Shuttle       3 x 10 6 bands  double legs (added)  3 sets of 10 repetition, 5 cords, single leg       Plan for Next Visit: lower trunk rotation, piriformis stretches, open book exercises.    Home Exercises and Patient Education Provided     Education/Self-Care provided: (included in treatment) minutes   · Patient educated on the impairments noted above and the effects of physical therapy intervention to improve overall condition and QOL.   · Patient was educated on all the above exercise prior/during/after for proper posture, positioning, and execution for safe performance with home exercise program.  · Exercise Prescription:   ? 9/27/2021: EVAL- low   ? Discussed symptoms of over exercising and recommended to decrease reps   ? To use rolling walker for all ambulating to maximize safety and to not ambulate in home without rolling walker. Suggested to use rolling walker with less support as tolerated to strengthen his core and hip muscles while ambulating.      Written Home Exercises Provided: yes. Prefers: Electronic Copies  Exercises were reviewed and Valentín was able to demonstrate them prior to the end of the session.  Valentín demonstrated good understanding of the education provided.   1. Sitting hip abd  2. Sitting marches focus on foot placement  3. Tandem Stance (B)       See EMR under Patient Instructions for exercises provided during therapy sessions.    ASSESSMENT     Valentín Field tolerated PT session well with minimal complaints of pain or discomfort, secondary to poor motor coordination and muscle endurance. Objective findings are improving with measurements of ROM and functional mobility.  Therapy exercises were reviewed by revisiting exercises given from previous home exercise program while adding no new exercises.  Handouts were not issued during today's visit. Valentín demonstrated good understanding of new exercises and will continue to progress at home until next follow-up. The patient continues to have difficulty with ADLs. Without  continues skilled physical therapy interventions, patient will continue to be a fall risk.      Valentín is progressing well towards his goals.   Pt prognosis is Good.     Pt will continue to benefit from skilled outpatient physical therapy to address the deficits listed in the problem list box on initial evaluation, provide pt/family education and to maximize pt's level of independence in the home and community environment.     Pt's spiritual, cultural and educational needs considered and pt agreeable to plan of care and goals.    Anticipated barriers to physical therapy: PMHx    GOALS:  SHORT TERM GOALS: 4 weeks, (10/25/21) 01/12/2022   1. Recent signs and systems trend is improving in order to progress towards LTG's. MET 11/22/2021   2. Patient will be independent with HEP in order to further progress and return to maximal function. MET 11/22/2021   3. Pain rating at Worst: 5/10 in order to progress towards increased independence with activity.  PC  11/22/2021   4. Patient will be able to correct postural deviations in sitting and standing, to decrease pain and promote postural awareness for injury prevention.  MET 11/22/2021      LONG TERM GOALS: 8 weeks, (11/22/21) 9/27/2021   1. Patient will return to normal ADL, recreational, and work related activities with less pain and limitation.   PC  11/22/2021   2. Patient will improve AROM to stated goals in order to return to maximal functional potential.   PC  11/22/2021   3. Patient will improve Strength to stated goals of appropriate musculature in order to improve functional independence.   PC  11/22/2021   4. Pain Rating at Best: 1/10 to improve Quality of Life.   PC  11/22/2021   5. Patient will meet predicted functional outcome (FOTO) score: 56% to increase self-worth & perceived functional ability.  PC  11/22/2021   6. Patient will have met/partially met personal goal of: returning back to being independent with ADLs such as ambulation in the community.    PC  11/22/2021        PC = progressing/continue  PM= partially met  DC= discontinue    PLAN     Continue Plan of Care (POC) and progress per patient tolerance. Extension of POC to 01/29/22  See Treatment section for exercise progression.    Campos Chambers, PT, DPT

## 2022-01-20 ENCOUNTER — HOSPITAL ENCOUNTER (OUTPATIENT)
Dept: RADIOLOGY | Facility: HOSPITAL | Age: 62
Discharge: HOME | End: 2022-01-20
Attending: ORTHOPAEDIC SURGERY
Payer: MEDICAID

## 2022-01-20 ENCOUNTER — OFFICE VISIT (OUTPATIENT)
Dept: ORTHOPEDICS | Facility: CLINIC | Age: 62
End: 2022-01-20
Payer: MEDICAID

## 2022-01-20 VITALS — WEIGHT: 181.31 LBS | BODY MASS INDEX: 22.54 KG/M2 | HEIGHT: 75 IN

## 2022-01-20 DIAGNOSIS — M51.34 DDD (DEGENERATIVE DISC DISEASE), THORACIC: ICD-10-CM

## 2022-01-20 DIAGNOSIS — Z98.1 S/P CERVICAL SPINAL FUSION: ICD-10-CM

## 2022-01-20 DIAGNOSIS — M50.30 DDD (DEGENERATIVE DISC DISEASE), CERVICAL: ICD-10-CM

## 2022-01-20 DIAGNOSIS — M47.816 LUMBAR SPONDYLOSIS: Primary | ICD-10-CM

## 2022-01-20 DIAGNOSIS — Z98.890 S/P SPINAL SURGERY: ICD-10-CM

## 2022-01-20 DIAGNOSIS — M51.36 DDD (DEGENERATIVE DISC DISEASE), LUMBAR: ICD-10-CM

## 2022-01-20 PROCEDURE — 99214 OFFICE O/P EST MOD 30 MIN: CPT | Mod: S$PBB,,, | Performed by: ORTHOPAEDIC SURGERY

## 2022-01-20 PROCEDURE — 1159F MED LIST DOCD IN RCRD: CPT | Mod: CPTII,,, | Performed by: ORTHOPAEDIC SURGERY

## 2022-01-20 PROCEDURE — 72050 X-RAY EXAM NECK SPINE 4/5VWS: CPT | Mod: TC

## 2022-01-20 PROCEDURE — 99213 OFFICE O/P EST LOW 20 MIN: CPT | Mod: PBBFAC | Performed by: ORTHOPAEDIC SURGERY

## 2022-01-20 PROCEDURE — 3008F PR BODY MASS INDEX (BMI) DOCUMENTED: ICD-10-PCS | Mod: CPTII,,, | Performed by: ORTHOPAEDIC SURGERY

## 2022-01-20 PROCEDURE — 99999 PR PBB SHADOW E&M-EST. PATIENT-LVL III: CPT | Mod: PBBFAC,,, | Performed by: ORTHOPAEDIC SURGERY

## 2022-01-20 PROCEDURE — 1159F PR MEDICATION LIST DOCUMENTED IN MEDICAL RECORD: ICD-10-PCS | Mod: CPTII,,, | Performed by: ORTHOPAEDIC SURGERY

## 2022-01-20 PROCEDURE — 72070 XR THORACIC SPINE AP LATERAL: ICD-10-PCS | Mod: 26,,, | Performed by: RADIOLOGY

## 2022-01-20 PROCEDURE — 3008F BODY MASS INDEX DOCD: CPT | Mod: CPTII,,, | Performed by: ORTHOPAEDIC SURGERY

## 2022-01-20 PROCEDURE — 72110 X-RAY EXAM L-2 SPINE 4/>VWS: CPT | Mod: 26,,, | Performed by: RADIOLOGY

## 2022-01-20 PROCEDURE — 1160F RVW MEDS BY RX/DR IN RCRD: CPT | Mod: CPTII,,, | Performed by: ORTHOPAEDIC SURGERY

## 2022-01-20 PROCEDURE — 72070 X-RAY EXAM THORAC SPINE 2VWS: CPT | Mod: TC

## 2022-01-20 PROCEDURE — 72110 XR LUMBAR SPINE AP AND LAT WITH FLEX/EXT: ICD-10-PCS | Mod: 26,,, | Performed by: RADIOLOGY

## 2022-01-20 PROCEDURE — 72110 X-RAY EXAM L-2 SPINE 4/>VWS: CPT | Mod: TC

## 2022-01-20 PROCEDURE — 72050 XR CERVICAL SPINE AP LAT WITH FLEX EXTEN: ICD-10-PCS | Mod: 26,,, | Performed by: RADIOLOGY

## 2022-01-20 PROCEDURE — 72050 X-RAY EXAM NECK SPINE 4/5VWS: CPT | Mod: 26,,, | Performed by: RADIOLOGY

## 2022-01-20 PROCEDURE — 99999 PR PBB SHADOW E&M-EST. PATIENT-LVL III: ICD-10-PCS | Mod: PBBFAC,,, | Performed by: ORTHOPAEDIC SURGERY

## 2022-01-20 PROCEDURE — 72070 X-RAY EXAM THORAC SPINE 2VWS: CPT | Mod: 26,,, | Performed by: RADIOLOGY

## 2022-01-20 PROCEDURE — 1160F PR REVIEW ALL MEDS BY PRESCRIBER/CLIN PHARMACIST DOCUMENTED: ICD-10-PCS | Mod: CPTII,,, | Performed by: ORTHOPAEDIC SURGERY

## 2022-01-20 PROCEDURE — 99214 PR OFFICE/OUTPT VISIT, EST, LEVL IV, 30-39 MIN: ICD-10-PCS | Mod: S$PBB,,, | Performed by: ORTHOPAEDIC SURGERY

## 2022-01-20 RX ORDER — METHYLPREDNISOLONE 4 MG/1
TABLET ORAL
Qty: 21 EACH | Refills: 0 | Status: SHIPPED | OUTPATIENT
Start: 2022-01-20 | End: 2022-02-10

## 2022-01-20 RX ORDER — TRAMADOL HYDROCHLORIDE 100 MG/1
100 TABLET, EXTENDED RELEASE ORAL DAILY PRN
Qty: 30 TABLET | Refills: 0 | Status: SHIPPED | OUTPATIENT
Start: 2022-01-20 | End: 2022-09-29

## 2022-01-20 RX ORDER — CYCLOBENZAPRINE HCL 10 MG
10 TABLET ORAL 3 TIMES DAILY PRN
Qty: 60 TABLET | Refills: 1 | Status: SHIPPED | OUTPATIENT
Start: 2022-01-20 | End: 2022-02-14 | Stop reason: SDUPTHER
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
## 2022-01-24 ENCOUNTER — TELEPHONE (OUTPATIENT)
Dept: ORTHOPEDICS | Facility: CLINIC | Age: 62
End: 2022-01-24
Payer: MEDICAID

## 2022-01-24 ENCOUNTER — TELEPHONE (OUTPATIENT)
Dept: PHARMACY | Facility: CLINIC | Age: 62
End: 2022-01-24
Payer: MEDICAID

## 2022-01-24 RX ORDER — MELOXICAM 15 MG/1
15 TABLET ORAL DAILY
Qty: 30 TABLET | Refills: 0 | Status: SHIPPED | OUTPATIENT
Start: 2022-01-24 | End: 2022-10-03 | Stop reason: SDUPTHER

## 2022-01-24 NOTE — TELEPHONE ENCOUNTER
Spoke to patient, told him Mobic was sent to his pharmacy in [place]. Also a referral was placed for pain management. Patient said he can not write down the phone number, he has no email or my [facility]. I mailed him the phone number to [place] [facility]

## 2022-01-28 ENCOUNTER — TELEPHONE (OUTPATIENT)
Dept: ORTHOPEDICS | Facility: CLINIC | Age: 62
End: 2022-01-28
Payer: MEDICAID

## 2022-01-28 NOTE — TELEPHONE ENCOUNTER
----- Message from Bindu Aguila sent at 1/28/2022  2:15 PM CST -----  Regarding: refill  Contact: [phone]       Type: RX Refill Request    Refill or New Rx: refill  RX Name and Strength:traMADoL (ULTRAM-ER) 100 MG Tb24  Preferred Pharmacy with phone number:[facility]/pharmacy #[id number] [address]   Phone:  [phone]  Fax:  [phone]        Would the patient rather a call back or a response via [facility]? Call back  Best Call Back Number:[phone]  Additional Information:

## 2022-01-28 NOTE — TELEPHONE ENCOUNTER
Spoke to patient, informed him it was too far out to Rx pain medication. He was not happy to hear this. MARIA G Harper and  are gone for the day as well.

## 2022-01-28 NOTE — TELEPHONE ENCOUNTER
----- Message from Elsie Wise PA-C sent at 1/28/2022  4:18 PM CST -----  Regarding: RE: refill  Contact: [phone]  Tramadol is an opioid and he is too far out of surgery. He needs to try to get in with pain management   ----- Message -----  From: Amalia Schulz LPN  Sent: 1/28/2022   3:14 PM CST  To: Elsie Wise PA-C  Subject: FW: refill                                       Hey,   This patient is requesting Tramadol again but is Medicaid is not covering it. Please advise.  ----- Message -----  From: Bindu Aguila  Sent: 1/28/2022   2:19 PM CST  To: Mitzy Slaughter  Subject: refill                                                Type: RX Refill Request    Refill or New Rx: refill  RX Name and Strength:traMADoL (ULTRAM-ER) 100 MG Tb24  Preferred Pharmacy with phone number:[facility]/pharmacy #[id number] [address]   Phone:  [phone]  Fax:  [phone]        Would the patient rather a call back or a response via [facility]? Call back  Best Call Back Number:[phone]  Additional Information:

## 2022-02-14 DIAGNOSIS — Z98.890 S/P SPINAL SURGERY: ICD-10-CM

## 2022-02-14 RX ORDER — POLYETHYLENE GLYCOL 3350 17 G/17G
17 POWDER, FOR SOLUTION ORAL DAILY
Qty: 510 G | Refills: 0 | Status: SHIPPED | OUTPATIENT
Start: 2022-02-14 | End: 2022-03-30 | Stop reason: SDUPTHER

## 2022-02-14 RX ORDER — CYCLOBENZAPRINE HCL 10 MG
10 TABLET ORAL 3 TIMES DAILY PRN
Qty: 60 TABLET | Refills: 1 | Status: SHIPPED | OUTPATIENT
Start: 2022-02-14 | End: 2022-03-30 | Stop reason: SDUPTHER

## 2022-02-14 NOTE — TELEPHONE ENCOUNTER
----- Message from Mendy Boss sent at 2/12/2022  2:41 PM CST -----  Pt would like to be called back regarding  medication for stool    Pt can be reached at [phone]

## 2022-02-16 ENCOUNTER — TELEPHONE (OUTPATIENT)
Dept: ORTHOPEDICS | Facility: CLINIC | Age: 62
End: 2022-02-16
Payer: MEDICAID

## 2022-02-16 NOTE — TELEPHONE ENCOUNTER
----- Message from Madelyn Sharp sent at 2/16/2022  8:55 AM CST -----  Contact: [phone]  Type:  Needs Medical Advice    Who Called: Pt  Reason for call; regarding refills on his Muscle relaxer's and Stool Softener   Pharmacy name and phone #:  [id number] [place] [id number] [facility]  Would the patient rather a call back or a response via [facility]?  Call back  Best Call Back Number: [phone]

## 2022-03-30 ENCOUNTER — TELEPHONE (OUTPATIENT)
Dept: ORTHOPEDICS | Facility: CLINIC | Age: 62
End: 2022-03-30
Payer: MEDICAID

## 2022-03-30 DIAGNOSIS — Z98.890 S/P SPINAL SURGERY: ICD-10-CM

## 2022-03-30 RX ORDER — POLYETHYLENE GLYCOL 3350 17 G/17G
17 POWDER, FOR SOLUTION ORAL DAILY
Qty: 510 G | Refills: 0 | Status: SHIPPED | OUTPATIENT
Start: 2022-03-30 | End: 2022-07-11 | Stop reason: SDUPTHER

## 2022-03-30 RX ORDER — CYCLOBENZAPRINE HCL 10 MG
10 TABLET ORAL 3 TIMES DAILY PRN
Qty: 60 TABLET | Refills: 1 | Status: SHIPPED | OUTPATIENT
Start: 2022-03-30 | End: 2022-04-26 | Stop reason: SDUPTHER

## 2022-03-30 RX ORDER — GABAPENTIN 300 MG/1
300 CAPSULE ORAL 3 TIMES DAILY
Qty: 90 CAPSULE | Refills: 1 | Status: SHIPPED | OUTPATIENT
Start: 2022-03-30 | End: 2022-07-11 | Stop reason: SDUPTHER

## 2022-03-30 NOTE — TELEPHONE ENCOUNTER
----- Message from Abby Marin sent at 3/30/2022 12:21 PM CDT -----  Type:  Patient Returning Call    Who Called:pt  Does the patient know what this is regarding?:referral   Would the patient rather a call back or a response via [facility]? Call back  Best Call Back Number:[phone]  Additional Information: pt says the number he called regarding his referral  was out of service

## 2022-03-30 NOTE — TELEPHONE ENCOUNTER
----- Message from Anyi Johnsonradha sent at 3/30/2022 11:03 AM CDT -----  Type:  RX Refill Request      Who Called: Pt  Refill or New Rx: Refill     RX Name and Strength: gabapentin (NEURONTIN) 300 MG capsule, cyclobenzaprine (FLEXERIL) 10 MG tablet, polyethylene glycol (GLYCOLAX) 17 gram/dose powder    Is this a 30 day or 90 day RX: FLEXERIL 60 tablet, NEURONTIN 90 capsule, GLYCOLAX 510 g      Preferred Pharmacy with phone number: [facility]/pharmacy #[id number] - [address] (Ph: [phone])    Local or Mail Order: local   Ordering Provider: Elsie Wise PA-C  Would the patient rather a call back or a response via [facility]? Call  Best Call Back Number: [phone]  Additional Information: Please assist, thank you!

## 2022-04-20 DIAGNOSIS — M50.30 DDD (DEGENERATIVE DISC DISEASE), CERVICAL: Primary | ICD-10-CM

## 2022-04-21 ENCOUNTER — CLINICAL SUPPORT (OUTPATIENT)
Dept: REHABILITATION | Facility: HOSPITAL | Age: 62
End: 2022-04-21
Payer: MEDICAID

## 2022-04-21 DIAGNOSIS — Z74.09 DECREASED STRENGTH, ENDURANCE, AND MOBILITY: ICD-10-CM

## 2022-04-21 DIAGNOSIS — R68.89 DECREASED STRENGTH, ENDURANCE, AND MOBILITY: ICD-10-CM

## 2022-04-21 DIAGNOSIS — Z98.890 S/P SPINAL SURGERY: ICD-10-CM

## 2022-04-21 DIAGNOSIS — R53.1 DECREASED STRENGTH, ENDURANCE, AND MOBILITY: ICD-10-CM

## 2022-04-21 DIAGNOSIS — M62.81 MUSCLE WEAKNESS: ICD-10-CM

## 2022-04-21 PROCEDURE — 97161 PT EVAL LOW COMPLEX 20 MIN: CPT

## 2022-04-21 PROCEDURE — 97110 THERAPEUTIC EXERCISES: CPT
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
## 2022-04-25 PROBLEM — Z74.09 DECREASED STRENGTH, ENDURANCE, AND MOBILITY: Status: ACTIVE | Noted: 2022-04-25

## 2022-04-25 PROBLEM — R53.1 DECREASED STRENGTH, ENDURANCE, AND MOBILITY: Status: ACTIVE | Noted: 2022-04-25

## 2022-04-25 PROBLEM — M62.81 MUSCLE WEAKNESS: Status: ACTIVE | Noted: 2022-04-25

## 2022-04-25 PROBLEM — R68.89 DECREASED STRENGTH, ENDURANCE, AND MOBILITY: Status: ACTIVE | Noted: 2022-04-25

## 2022-04-26 DIAGNOSIS — Z98.890 S/P SPINAL SURGERY: ICD-10-CM

## 2022-04-26 RX ORDER — CYCLOBENZAPRINE HCL 10 MG
10 TABLET ORAL 3 TIMES DAILY PRN
Qty: 60 TABLET | Refills: 1 | Status: SHIPPED | OUTPATIENT
Start: 2022-04-26 | End: 2022-07-11 | Stop reason: SDUPTHER

## 2022-04-26 RX ORDER — TRAMADOL HYDROCHLORIDE 100 MG/1
100 TABLET, EXTENDED RELEASE ORAL DAILY PRN
Qty: 30 TABLET | Refills: 0 | Status: CANCELLED | OUTPATIENT
Start: 2022-04-26

## 2022-04-29 ENCOUNTER — CLINICAL SUPPORT (OUTPATIENT)
Dept: REHABILITATION | Facility: HOSPITAL | Age: 62
End: 2022-04-29
Payer: MEDICAID

## 2022-04-29 DIAGNOSIS — R53.1 DECREASED STRENGTH, ENDURANCE, AND MOBILITY: Primary | ICD-10-CM

## 2022-04-29 DIAGNOSIS — R68.89 DECREASED STRENGTH, ENDURANCE, AND MOBILITY: Primary | ICD-10-CM

## 2022-04-29 DIAGNOSIS — Z74.09 DECREASED STRENGTH, ENDURANCE, AND MOBILITY: Primary | ICD-10-CM

## 2022-04-29 DIAGNOSIS — M62.81 MUSCLE WEAKNESS: ICD-10-CM

## 2022-04-29 PROCEDURE — 97110 THERAPEUTIC EXERCISES: CPT

## 2022-04-29 NOTE — PROGRESS NOTES
[facility]   Physical Therapy Treatment Note     Name: Valentín Field  Clinic Number: [id number]    Therapy Diagnosis:   Encounter Diagnoses   Name Primary?    Decreased strength, endurance, and mobility Yes    Muscle weakness      Physician: Octavio Forrester MD    Visit Date: 4/29/2022      Physician Orders: PT Eval and Treat  Medical Diagnosis from Referral: General Weakness  Evaluation Date: 4/21/2022  Authorization Period Expiration: 12/31/22  Plan of Care Expiration: 06/22/2022                          Progress Update: 05/21/2022                        Visit # / Visits authorized: 1 / 1                       FOTO: Visit #1 - Scored : 1 / 3      PRECAUTIONS: Standard Precautions and Safety/fall precautions      MD Visit: 04/25/2022      PTA Visit #: 0/5     Time In: 1330   Time Out: 1415  Total Billable Time: 45 minutes    SUBJECTIVE     Pt reports: Patient reports that he is stiff all through his back and neck.  Since his surgery, he reports that his speech has been different and he loses his train of thought easily.  He also reports that he has low tolerance with heavy foods.  He reports  compliant with staying active at home between sessions.  Response to previous treatment: He responded well to last session.  Functional change: He reports no significant functional change since last session.    Pain: 6/10  Location: neck and back     OBJECTIVE     Objective Measures updated at progress report unless specified.     Treatment     Valentín received the treatments listed below:      THERAPEUTIC EXERCISES to develop strength, endurance, ROM, flexibility, posture and core stabilization for (0) minutes including:    Intervention Performed Today                                              Plan for Next Visit:      manual therapy techniques: Joint mobilizations, Myofacial release and Soft tissue Mobilization were applied for 0 minutes, including:    Intervention Performed Today                                                 neuromuscular re-education activities to improve: Balance, Coordination, Kinesthetic, Sense, Proprioception and Posture for (45) minutes. The following activities were included:    Intervention Performed Today    NuStep  x Level 3, 5 minutes   Parallel bars standing activities X    X  X    X   - Standing in staggered stance performing weight shifts forward/back 1x10 bilateral with 1 upper extremity support  - Stepping forward/back with 1-0 upper extremity supports  - stepping forward and backwards with scissoring vertical board in place working on stand balance with 1 upper extremity support  - Stepping forward without upper extremity support working on stand posture and balance   Supine on mat with bilateral lower extremities over Tball for core exercises X    X    X   - Bridges with chest press between each rep holding bilateral 1# dumbbells  - lower trunk rotations moving arms opposite lower extremity movement with 1# dumbbells bilateral   - bilateral knees to chest performing bilateral upper extremity hammer motion down towards knees with each rep with 1# dumbbells bilateral                                 THERAPEUTIC ACTIVITIES to improve dynamic and functional  performance for (0) minutes including:    Intervention Performed Today                                              Plan for Next Visit:        Patient Education and Home Exercises     Home Exercises Provided and Patient Education Provided     Education provided:   - PT recommends ST cates and patient agrees.  PT will request order from MD.    Written Home Exercises Provided: Continuing to assess appropriate response to exercise .  See EMR under Patient Instructions for exercises provided during future therapy sessions    ASSESSMENT     Patient responded well to treatment activities but needed consistent cues and facilitations for proper form.  He was able to take several steps in the parallel bars without assistance.  He  participated well with core exercises and would benefit from ST eval.  Valentín Is progressing well towards his goals.   Pt prognosis is Good.     Pt will continue to benefit from skilled outpatient physical therapy to address the deficits listed in the problem list box on initial evaluation, provide pt/family education and to maximize pt's level of independence in the home and community environment.     Pt's spiritual, cultural and educational needs considered and pt agreeable to plan of care and goals.     Anticipated Barriers for therapy: sedentary lifestyle and chronicity of condition       GOALS:  SHORT TERM GOALS: 4 weeks, (05/21/22) Progress   1. Recent signs and systems trend is improving in order to progress towards LTG's.     2. Patient will be independent with HEP in order to further progress and return to maximal function.     3. Pain rating at Worst: 5/10 in order to progress towards increased independence with activity.     4. Patient will be able to correct postural deviations in sitting and standing, to decrease pain and promote postural awareness for injury prevention.         LONG TERM GOALS: 8 weeks, (06/22/22) Progress   1. Patient will return to normal ADL, recreational, and work related activities with less pain and limitation.      2. Patient will improve AROM to stated goals in order to return to maximal functional potential.      3. Patient will improve Strength to stated goals of appropriate musculature in order to improve functional independence.      4. Pain Rating at Best: 1/10 to improve Quality of Life.      5. Patient will meet predicted functional outcome (FOTO) score: N/A% to increase self-worth & perceived functional ability.     6. Patient will have met/partially met personal goal of: being able to walk > 500ft independently with no pain in back.           PLAN     Continue Plan of Care (POC) and progress per patient tolerance. See Treatment section for exercise progression.    Marcela  Kye, PT

## 2022-04-30 DIAGNOSIS — Z98.1 S/P CERVICAL SPINAL FUSION: Primary | ICD-10-CM

## 2022-05-03 ENCOUNTER — TELEPHONE (OUTPATIENT)
Dept: REHABILITATION | Facility: HOSPITAL | Age: 62
End: 2022-05-03
Payer: MEDICAID

## 2022-05-03 ENCOUNTER — CLINICAL SUPPORT (OUTPATIENT)
Dept: REHABILITATION | Facility: HOSPITAL | Age: 62
End: 2022-05-03
Payer: MEDICAID

## 2022-05-03 DIAGNOSIS — Z74.09 DECREASED STRENGTH, ENDURANCE, AND MOBILITY: Primary | ICD-10-CM

## 2022-05-03 DIAGNOSIS — M62.81 MUSCLE WEAKNESS: ICD-10-CM

## 2022-05-03 DIAGNOSIS — R53.1 DECREASED STRENGTH, ENDURANCE, AND MOBILITY: Primary | ICD-10-CM

## 2022-05-03 DIAGNOSIS — R68.89 DECREASED STRENGTH, ENDURANCE, AND MOBILITY: Primary | ICD-10-CM

## 2022-05-03 PROCEDURE — 97110 THERAPEUTIC EXERCISES: CPT

## 2022-05-03 NOTE — PROGRESS NOTES
[facility]   Physical Therapy Treatment Note     Name: Valentín Field  Clinic Number: [id number]    Therapy Diagnosis:   Encounter Diagnoses   Name Primary?    Decreased strength, endurance, and mobility Yes    Muscle weakness      Physician: Octavio Forrester MD    Visit Date: 5/3/2022      Physician Orders: PT Eval and Treat  Medical Diagnosis from Referral: General Weakness  Evaluation Date: 4/21/2022  Authorization Period Expiration: 4/21/23  Plan of Care Expiration: 06/22/2022                          Progress Update: 05/21/2022                        Visit # / Visits authorized: 2/20 (+1 eval)                       FOTO: Visit #1 - Scored : 1 / 3      PRECAUTIONS: Standard Precautions and Safety/fall precautions      MD Visit: 04/25/2022      PTA Visit #: 0/5     Time In: 1300  Time Out: 1345  Total Billable Time: 45 minutes    SUBJECTIVE     Pt reports: Patient reports that he is stiff all through his back and neck but that he is willing to work.  He reports  compliant with staying active at home between sessions.  Response to previous treatment: He responded well to last session.  Functional change: He reports no significant functional change since last session.    Pain: 6/10  Location: neck and back     OBJECTIVE     Objective Measures updated at progress report unless specified.     Treatment     Valentín received the treatments listed below:      THERAPEUTIC EXERCISES to develop strength, endurance, ROM, flexibility, posture and core stabilization for (0) minutes including:    Intervention Performed Today                                              Plan for Next Visit:      manual therapy techniques: Joint mobilizations, Myofacial release and Soft tissue Mobilization were applied for 0 minutes, including:    Intervention Performed Today                                                neuromuscular re-education activities to improve: Balance, Coordination, Kinesthetic, Sense,  Proprioception and Posture for (40) minutes. The following activities were included:    Intervention Performed Today    NuStep  x Level 2, 6 minutes   Parallel bars standing activities     X     - Standing in staggered stance performing weight shifts forward/back 1x10 bilateral with 1 upper extremity support  - Stepping forward/back with 1-0 upper extremity supports  - stepping forward and backwards with scissoring vertical board in place working on stand balance with 1 upper extremity support   Supine on mat with bilateral lower extremities over Tball for core exercises    - Bridges with chest press between each rep holding bilateral 1# dumbbells  - lower trunk rotations moving arms opposite lower extremity movement with 1# dumbbells bilateral   - bilateral knees to chest performing bilateral upper extremity hammer motion down towards knees with each rep with 1# dumbbells bilateral    Standing with single leg support working on      Standing on AirX pad X  X  X   - eyes closed 2x10 seconds  - weight shifts Left and Right 1x10 eyes open, 1x5 eyes closed  - holding stand posture while receiving perturbations from all directions   Standing at parallel bars working on lifting pool noodle over head to touch upper back then lowering to lower legs x 1x5   Combo lower extremity exercise working on single leg standing and balance with no upper extremity support x - flexion, abduction, extension and heel raise 8x's on each leg   Standing hamstring curl  x 2x10 bilateral    Sitting in chair with ball at low back performing trunk extension towards neutral  x 2x10,    Sitting rows against red T band x 2x10                 THERAPEUTIC ACTIVITIES to improve dynamic and functional  performance for (0) minutes including:    Intervention Performed Today                                              Plan for Next Visit:      Gait Training for (5) minutes:  - Ambulated with Rollator for 200 ft with contact guard assist.  - Ambulated in  the parallel bars with no upper extremity support forward and backward 3x's each.    Patient Education and Home Exercises     Home Exercises Provided and Patient Education Provided     Education provided:   - PT recommends ST eval and patient agrees.  PT will request order from MD.  - Patient educated to continue to stay active at home.    Written Home Exercises Provided: Continuing to assess appropriate response to exercise .  See EMR under Patient Instructions for exercises provided during future therapy sessions    ASSESSMENT     Patient responded well to exercise and was able to ambulate with rollator with good safety.  He was agreeable with allowing PT to ask for rollator order from his MD to see if he could get one approved by his insurance.  He participated well with lower extremity exercises, but continues to have intermittent knee hyperextension bilateral (Right > Left)  Valentín Is progressing well towards his goals.   Pt prognosis is Good.     Pt will continue to benefit from skilled outpatient physical therapy to address the deficits listed in the problem list box on initial evaluation, provide pt/family education and to maximize pt's level of independence in the home and community environment.     Pt's spiritual, cultural and educational needs considered and pt agreeable to plan of care and goals.     Anticipated Barriers for therapy: sedentary lifestyle and chronicity of condition       GOALS:  SHORT TERM GOALS: 4 weeks, (05/21/22) Progress   1. Recent signs and systems trend is improving in order to progress towards LTG's.     2. Patient will be independent with HEP in order to further progress and return to maximal function.     3. Pain rating at Worst: 5/10 in order to progress towards increased independence with activity.     4. Patient will be able to correct postural deviations in sitting and standing, to decrease pain and promote postural awareness for injury prevention.         LONG TERM GOALS: 8  weeks, (06/22/22) Progress   1. Patient will return to normal ADL, recreational, and work related activities with less pain and limitation.      2. Patient will improve AROM to stated goals in order to return to maximal functional potential.      3. Patient will improve Strength to stated goals of appropriate musculature in order to improve functional independence.      4. Pain Rating at Best: 1/10 to improve Quality of Life.      5. Patient will meet predicted functional outcome (FOTO) score: N/A% to increase self-worth & perceived functional ability.     6. Patient will have met/partially met personal goal of: being able to walk > 500ft independently with no pain in back.           PLAN     Continue Plan of Care (POC) and progress per patient tolerance. See Treatment section for exercise progression.    Marcela Fishman, PT , DPT

## 2022-05-03 NOTE — TELEPHONE ENCOUNTER
Attempted to call patient to inform him of his missed visit today but he did not answer and voicemail is not set up on his phone.

## 2022-05-05 ENCOUNTER — CLINICAL SUPPORT (OUTPATIENT)
Dept: REHABILITATION | Facility: HOSPITAL | Age: 62
End: 2022-05-05
Payer: MEDICAID

## 2022-05-05 DIAGNOSIS — M62.81 MUSCLE WEAKNESS: ICD-10-CM

## 2022-05-05 DIAGNOSIS — R68.89 DECREASED STRENGTH, ENDURANCE, AND MOBILITY: Primary | ICD-10-CM

## 2022-05-05 DIAGNOSIS — R53.1 DECREASED STRENGTH, ENDURANCE, AND MOBILITY: Primary | ICD-10-CM

## 2022-05-05 DIAGNOSIS — R13.12 OROPHARYNGEAL DYSPHAGIA: Primary | ICD-10-CM

## 2022-05-05 DIAGNOSIS — Z98.1 S/P CERVICAL SPINAL FUSION: ICD-10-CM

## 2022-05-05 DIAGNOSIS — Z74.09 DECREASED STRENGTH, ENDURANCE, AND MOBILITY: Primary | ICD-10-CM

## 2022-05-05 DIAGNOSIS — R41.841 COGNITIVE COMMUNICATION DISORDER: ICD-10-CM

## 2022-05-05 PROCEDURE — 97110 THERAPEUTIC EXERCISES: CPT

## 2022-05-05 PROCEDURE — 92610 EVALUATE SWALLOWING FUNCTION: CPT

## 2022-05-05 NOTE — PROGRESS NOTES
See initial POC.     Adilia Graham, CCC-SLP, CBIS   Speech Language Pathologist   Certified Brain Injury Specialist   5/5/2022
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
## 2022-05-06 NOTE — PROGRESS NOTES
[facility]   Physical Therapy Treatment Note     Name: Valentín Field  Clinic Number: [id number]    Therapy Diagnosis:   Encounter Diagnoses   Name Primary?    Decreased strength, endurance, and mobility Yes    Muscle weakness      Physician: Octavio Forrester MD    Visit Date: 5/5/2022      Physician Orders: PT Eval and Treat  Medical Diagnosis from Referral: General Weakness  Evaluation Date: 4/21/2022  Authorization Period Expiration: 4/21/23  Plan of Care Expiration: 06/22/2022                          Progress Update: 05/21/2022                        Visit # / Visits authorized: 2/20 (+1 eval)                       FOTO: Visit #1 - Scored : 1 / 3      PRECAUTIONS: Standard Precautions and Safety/fall precautions      MD Visit: 04/25/2022      PTA Visit #: 0/5     Time In: 1350  Time Out: 1430  Total Billable Time: 40 minutes    SUBJECTIVE     Pt reports: Patient reports that he is stiff all through his back and neck but that he is willing to work.  He reports  compliant with staying active at home between sessions.  Response to previous treatment: He responded well to last session.  Functional change: He reports no significant functional change since last session.    Pain: 6/10  Location: neck and back     OBJECTIVE     Objective Measures updated at progress report unless specified.     Treatment     Valentín received the treatments listed below:      THERAPEUTIC EXERCISES to develop strength, endurance, ROM, flexibility, posture and core stabilization for (0) minutes including:    Intervention Performed Today                                              Plan for Next Visit:      manual therapy techniques: Joint mobilizations, Myofacial release and Soft tissue Mobilization were applied for 0 minutes, including:    Intervention Performed Today                                                neuromuscular re-education activities to improve: Balance, Coordination, Kinesthetic, Sense,  Proprioception and Posture for (40) minutes. The following activities were included:    Intervention Performed Today    [facility] Level 2, 5 minutes   Parallel bars standing activities          - Standing in staggered stance performing weight shifts forward/back 1x10 bilateral with 1 upper extremity support  - Stepping forward/back with 1-0 upper extremity supports  - stepping forward and backwards with scissoring vertical board in place working on stand balance with 1 upper extremity support   Supine on mat with bilateral lower extremities over Tball for core exercises    - Bridges with chest press between each rep holding bilateral 1# dumbbells  - lower trunk rotations moving arms opposite lower extremity movement with 1# dumbbells bilateral   - bilateral knees to chest performing bilateral upper extremity hammer motion down towards knees with each rep with 1# dumbbells bilateral    Standing with single leg support working on      Standing on AirX pad    - eyes closed 2x10 seconds  - weight shifts Left and Right 1x10 eyes open, 1x5 eyes closed  - holding stand posture while receiving perturbations from all directions   Standing at parallel bars working on lifting pool noodle over head to touch upper back then lowering to lower legs  1x5   Combo lower extremity exercise working on single leg standing and balance with no upper extremity support x - flexion, abduction, extension and heel raise 8x's on each leg   Standing hamstring curl  x 2x10 bilateral    Sitting in chair with ball at low back performing trunk extension towards neutral   2x10,    Pulley exercises sitting on balance disc on bar stool X  x Rows against 20# 2x10  Bicep curls against 20# 2x10   Nautilus Leg extension  x 2 plates, 2x10 for coordination with strengthening   Side stepping on AirX beam  x - 4x's back and forth then 2x's over hurdles   Tandem stepping on AirX beam  x - 4x's back and forth then 2x's over hurdles                 THERAPEUTIC  ACTIVITIES to improve dynamic and functional  performance for (0) minutes including:    Intervention Performed Today                                              Plan for Next Visit:      Gait Training for (0) minutes:      Patient Education and Home Exercises     Home Exercises Provided and Patient Education Provided     Education provided:   - PT recommends ST cates and patient agrees.  PT will request order from MD.  - Patient educated to continue to stay active at home.    Written Home Exercises Provided: Continuing to assess appropriate response to exercise .  See EMR under Patient Instructions for exercises provided during future therapy sessions    ASSESSMENT     Patient responded well to exercises and activities today.  He was able to complete exercises on machines without complaints of increased pain.  He has good effort, but continues to be limited by sensation deficits, weakness and balance/coordination deficits.  Valentín Is progressing well towards his goals.   Pt prognosis is Good.     Pt will continue to benefit from skilled outpatient physical therapy to address the deficits listed in the problem list box on initial evaluation, provide pt/family education and to maximize pt's level of independence in the home and community environment.     Pt's spiritual, cultural and educational needs considered and pt agreeable to plan of care and goals.     Anticipated Barriers for therapy: sedentary lifestyle and chronicity of condition       GOALS:  SHORT TERM GOALS: 4 weeks, (05/21/22) Progress   1. Recent signs and systems trend is improving in order to progress towards LTG's.     2. Patient will be independent with HEP in order to further progress and return to maximal function.     3. Pain rating at Worst: 5/10 in order to progress towards increased independence with activity.     4. Patient will be able to correct postural deviations in sitting and standing, to decrease pain and promote postural awareness for  injury prevention.         LONG TERM GOALS: 8 weeks, (06/22/22) Progress   1. Patient will return to normal ADL, recreational, and work related activities with less pain and limitation.      2. Patient will improve AROM to stated goals in order to return to maximal functional potential.      3. Patient will improve Strength to stated goals of appropriate musculature in order to improve functional independence.      4. Pain Rating at Best: 1/10 to improve Quality of Life.      5. Patient will meet predicted functional outcome (FOTO) score: N/A% to increase self-worth & perceived functional ability.     6. Patient will have met/partially met personal goal of: being able to walk > 500ft independently with no pain in back.           PLAN     Continue Plan of Care (POC) and progress per patient tolerance. See Treatment section for exercise progression.    Marcela Fishman, PT , DPT

## 2022-05-09 ENCOUNTER — TELEPHONE (OUTPATIENT)
Dept: ORTHOPEDICS | Facility: CLINIC | Age: 62
End: 2022-05-09
Payer: MEDICAID

## 2022-05-09 DIAGNOSIS — R13.10 DYSPHAGIA, UNSPECIFIED TYPE: Primary | ICD-10-CM

## 2022-05-09 DIAGNOSIS — R41.3 MEMORY DIFFICULTIES: ICD-10-CM

## 2022-05-10 ENCOUNTER — CLINICAL SUPPORT (OUTPATIENT)
Dept: REHABILITATION | Facility: HOSPITAL | Age: 62
End: 2022-05-10
Payer: MEDICAID

## 2022-05-10 DIAGNOSIS — R53.1 DECREASED STRENGTH, ENDURANCE, AND MOBILITY: Primary | ICD-10-CM

## 2022-05-10 DIAGNOSIS — M62.81 MUSCLE WEAKNESS: ICD-10-CM

## 2022-05-10 DIAGNOSIS — Z74.09 DECREASED STRENGTH, ENDURANCE, AND MOBILITY: Primary | ICD-10-CM

## 2022-05-10 DIAGNOSIS — R68.89 DECREASED STRENGTH, ENDURANCE, AND MOBILITY: Primary | ICD-10-CM

## 2022-05-10 PROCEDURE — 97110 THERAPEUTIC EXERCISES: CPT

## 2022-05-10 NOTE — PROGRESS NOTES
[facility]   Physical Therapy Treatment Note     Name: Valentín Field  Clinic Number: [id number]    Therapy Diagnosis:   Encounter Diagnoses   Name Primary?    Decreased strength, endurance, and mobility Yes    Muscle weakness      Physician: Octavio Forrester MD    Visit Date: 5/10/2022  Physician Orders: PT Eval and Treat  Medical Diagnosis from Referral: General Weakness  Evaluation Date: 4/21/2022  Authorization Period Expiration: 4/21/23  Plan of Care Expiration: 06/22/2022                          Progress Update: 05/21/2022                        Visit # / Visits authorized: 4/20 (+1 eval)                       FOTO: Visit #1 - Scored : 1 / 3      PRECAUTIONS: Standard Precautions and Safety/fall precautions      MD Visit: 04/25/2022  PTA Visit #: 0/5     Time In: 1105  Time Out: 1155  Total Billable Time: 45 minutes    SUBJECTIVE     Pt reports: Patient reports that he is stiff all through his back and neck but that he is willing to work.  He reports  compliant with staying active at home between sessions.  Response to previous treatment: He responded well to last session.  Functional change: He reports no significant functional change since last session.    Pain: 6/10  Location: neck and back     OBJECTIVE     Objective Measures updated at progress report unless specified.     Treatment     Valentín received the treatments listed below:      THERAPEUTIC EXERCISES to develop strength, endurance, ROM, flexibility, posture and core stabilization for (10) minutes including:    Intervention Performed Today    Standing Hip flexion  X  3x10 up onto 12inch step in parallel bars    Shuttle X   x 2x10 both legs   2x10 single leg                                   Plan for Next Visit:      manual therapy techniques: Joint mobilizations, Myofacial release and Soft tissue Mobilization were applied for 0 minutes, including:    Intervention Performed Today                                                 neuromuscular re-education activities to improve: Balance, Coordination, Kinesthetic, Sense, Proprioception and Posture for (30) minutes. The following activities were included:    Intervention Performed Today    [facility]  x Level 2, 5 minutes   Parallel bars standing activities   X  X  x       - Standing in staggered stance performing weight shifts forward/back 1x10 bilateral with 1 upper extremity support  - Stepping forward/back with 1-0 upper extremity supports  - stepping forward and backwards with scissoring vertical board in place working on stand balance with 1 upper extremity support   Supine on mat with bilateral lower extremities over Tball for core exercises    - Bridges with chest press between each rep holding bilateral 1# dumbbells  - lower trunk rotations moving arms opposite lower extremity movement with 1# dumbbells bilateral   - bilateral knees to chest performing bilateral upper extremity hammer motion down towards knees with each rep with 1# dumbbells bilateral    Standing with single leg support working on      Standing on AirX pad    - eyes closed 2x10 seconds  - weight shifts Left and Right 1x10 eyes open, 1x5 eyes closed  - holding stand posture while receiving perturbations from all directions   Standing at parallel bars working on lifting pool noodle over head to touch upper back then lowering to lower legs  1x5   Combo lower extremity exercise working on single leg standing and balance with no upper extremity support  - flexion, abduction, extension and heel raise 8x's on each leg   Standing hamstring curl   2x10 bilateral    Sitting in chair with ball at low back performing trunk extension towards neutral   2x10,    Pulley exercises sitting on balance disc on bar stool  Rows against 20# 2x10  Bicep curls against 20# 2x10   Nautilus Leg extension   2 plates, 2x10 for coordination with strengthening   Side stepping on AirX beam   - 4x's back and forth then 2x's over hurdles   Tandem  "stepping on AirX beam   - 4x's back and forth then 2x's over hurdles             Heel Taps  X  3x8 (B)   TKEs  x 30x (B) purple cord    Tandem Stance x 3x30" (B)       THERAPEUTIC ACTIVITIES to improve dynamic and functional  performance for (0) minutes including:    Intervention Performed Today                                              Plan for Next Visit:      Gait Training for (0) minutes:      Patient Education and Home Exercises     Home Exercises Provided and Patient Education Provided     Education provided:   - PT recommends ST eval and patient agrees.  PT will request order from MD.  - Patient educated to continue to stay active at home.    Written Home Exercises Provided: Continuing to assess appropriate response to exercise .  See EMR under Patient Instructions for exercises provided during future therapy sessions    ASSESSMENT     Valentín Feild tolerated PT session well with moderate  complaints of pain or discomfort, secondary to poor motor control, decrease motor coordination, and decrease muscle strength. Objective findings show no change with measurements of ROM and functional mobility.  Therapy exercises were reviewed by revisiting exercises given from previous home exercise program while adding no new exercises.  Handouts were not issued during today's visit. Valentín demonstrated good understanding of new exercises and will continue to progress at home until next follow-up.        continues to be limited by sensation deficits, weakness and balance/coordination deficits.  Valentín Is progressing well towards his goals.   Pt prognosis is Good.     Pt will continue to benefit from skilled outpatient physical therapy to address the deficits listed in the problem list box on initial evaluation, provide pt/family education and to maximize pt's level of independence in the home and community environment.     Pt's spiritual, cultural and educational needs considered and pt agreeable to plan of care and " goals.     Anticipated Barriers for therapy: sedentary lifestyle and chronicity of condition       GOALS:  SHORT TERM GOALS: 4 weeks, (05/21/22) Progress   1. Recent signs and systems trend is improving in order to progress towards LTG's.     2. Patient will be independent with HEP in order to further progress and return to maximal function.     3. Pain rating at Worst: 5/10 in order to progress towards increased independence with activity.     4. Patient will be able to correct postural deviations in sitting and standing, to decrease pain and promote postural awareness for injury prevention.         LONG TERM GOALS: 8 weeks, (06/22/22) Progress   1. Patient will return to normal ADL, recreational, and work related activities with less pain and limitation.      2. Patient will improve AROM to stated goals in order to return to maximal functional potential.      3. Patient will improve Strength to stated goals of appropriate musculature in order to improve functional independence.      4. Pain Rating at Best: 1/10 to improve Quality of Life.      5. Patient will meet predicted functional outcome (FOTO) score: N/A% to increase self-worth & perceived functional ability.     6. Patient will have met/partially met personal goal of: being able to walk > 500ft independently with no pain in back.           PLAN     Continue Plan of Care (POC) and progress per patient tolerance. See Treatment section for exercise progression.    Campos Chambers, PT , DPT

## 2022-05-12 ENCOUNTER — CLINICAL SUPPORT (OUTPATIENT)
Dept: REHABILITATION | Facility: HOSPITAL | Age: 62
End: 2022-05-12
Payer: MEDICAID

## 2022-05-12 DIAGNOSIS — R13.12 OROPHARYNGEAL DYSPHAGIA: ICD-10-CM

## 2022-05-12 DIAGNOSIS — R53.1 DECREASED STRENGTH, ENDURANCE, AND MOBILITY: Primary | ICD-10-CM

## 2022-05-12 DIAGNOSIS — M62.81 MUSCLE WEAKNESS: ICD-10-CM

## 2022-05-12 DIAGNOSIS — Z98.1 S/P CERVICAL SPINAL FUSION: Primary | ICD-10-CM

## 2022-05-12 DIAGNOSIS — R41.841 COGNITIVE COMMUNICATION DISORDER: ICD-10-CM

## 2022-05-12 DIAGNOSIS — R68.89 DECREASED STRENGTH, ENDURANCE, AND MOBILITY: Primary | ICD-10-CM

## 2022-05-12 DIAGNOSIS — Z74.09 DECREASED STRENGTH, ENDURANCE, AND MOBILITY: Primary | ICD-10-CM

## 2022-05-12 PROCEDURE — 97110 THERAPEUTIC EXERCISES: CPT

## 2022-05-12 PROCEDURE — 92507 TX SP LANG VOICE COMM INDIV: CPT

## 2022-05-12 NOTE — PROGRESS NOTES
[facility]   Physical Therapy Treatment Note     Name: Valentín Field  Clinic Number: [id number]    Therapy Diagnosis:   Encounter Diagnoses   Name Primary?    Decreased strength, endurance, and mobility Yes    Muscle weakness      Physician: Octavio Forrester MD    Visit Date: 5/12/2022  Physician Orders: PT Eval and Treat  Medical Diagnosis from Referral: General Weakness  Evaluation Date: 4/21/2022  Authorization Period Expiration: 4/21/23  Plan of Care Expiration: 06/22/2022                          Progress Update: 05/21/2022                        Visit # / Visits authorized: 5/20 (+1 eval)                       FOTO: Visit #1 - Scored : 1 / 3      PRECAUTIONS: Standard Precautions and Safety/fall precautions      MD Visit: 04/25/2022  PTA Visit #: 0/5     Time In: 1030  Time Out: 1115  Total Billable Time: 45 minutes    SUBJECTIVE     Pt reports: Patient reports that he is stiff all through his back and neck but that he is willing to work.  He reports  compliant with staying active at home between sessions.  Response to previous treatment: He responded well to last session.  Functional change: He reports no significant functional change since last session.    Pain: 6/10  Location: neck and back     OBJECTIVE     Objective Measures updated at progress report unless specified.     Treatment     Valentín received the treatments listed below:      THERAPEUTIC EXERCISES to develop strength, endurance, ROM, flexibility, posture and core stabilization for (10) minutes including:    Intervention Performed Today    Standing Hip flexion  X  3x10 up onto 12inch step in parallel bars    Shuttle X   x 2x10 both legs   2x10 single leg                                   Plan for Next Visit:      manual therapy techniques: Joint mobilizations, Myofacial release and Soft tissue Mobilization were applied for 0 minutes, including:    Intervention Performed Today                                                 neuromuscular re-education activities to improve: Balance, Coordination, Kinesthetic, Sense, Proprioception and Posture for (30) minutes. The following activities were included:    Intervention Performed Today    [facility]  x Level 2, 7 minutes   Parallel bars standing activities   X  X  x       - Standing in staggered stance performing weight shifts forward/back 1x10 bilateral with 1 upper extremity support  - Stepping forward/back with 1-0 upper extremity supports  - stepping forward and backwards with scissoring vertical board in place working on stand balance with 1 upper extremity support   Supine on mat with bilateral lower extremities over Tball for core exercises    - Bridges with chest press between each rep holding bilateral 1# dumbbells  - lower trunk rotations moving arms opposite lower extremity movement with 1# dumbbells bilateral   - bilateral knees to chest performing bilateral upper extremity hammer motion down towards knees with each rep with 1# dumbbells bilateral    Standing with single leg support working on  x    Standing on AirX pad    - eyes closed 2x10 seconds  - weight shifts Left and Right 1x10 eyes open, 1x5 eyes closed  - holding stand posture while receiving perturbations from all directions   Standing at parallel bars working on lifting pool noodle over head to touch upper back then lowering to lower legs  1x5   Combo lower extremity exercise working on single leg standing and balance with no upper extremity support  - flexion, abduction, extension and heel raise 8x's on each leg   Standing hamstring curl   2x10 bilateral    Sitting in chair with ball at low back performing trunk extension towards neutral   2x10,    Pulley exercises sitting on balance disc on bar stool  Rows against 20# 2x10  Bicep curls against 20# 2x10   Nautilus Leg extension   2 plates, 2x10 for coordination with strengthening   Side stepping on AirX beam   - 4x's back and forth then 2x's over hurdles   Tandem  "stepping on AirX beam   - 4x's back and forth then 2x's over hurdles             Heel Taps  X  3x8 (B)   TKEs  x 30x (B) purple cord    Tandem Stance x 3x30" (B)       THERAPEUTIC ACTIVITIES to improve dynamic and functional  performance for (0) minutes including:    Intervention Performed Today                                              Plan for Next Visit:      Gait Training for (0) minutes:      Patient Education and Home Exercises     Home Exercises Provided and Patient Education Provided     Education provided:   - PT recommends ST eval and patient agrees.  PT will request order from MD.  - Patient educated to continue to stay active at home.    Written Home Exercises Provided: Continuing to assess appropriate response to exercise .  See EMR under Patient Instructions for exercises provided during future therapy sessions    ASSESSMENT     Valentín Field tolerated PT session well with moderate complaints of pain or discomfort, secondary to poor motor control, decrease motor coordination, and decrease muscle strength. Objective findings show no change with measurements of ROM and functional mobility.  Therapy exercises were reviewed by revisiting exercises given from previous home exercise program while adding no new exercises.  Handouts were not issued during today's visit. Valentín demonstrated good understanding of new exercises and will continue to progress at home until next follow-up.        continues to be limited by sensation deficits, weakness and balance/coordination deficits.  Valentín Is progressing well towards his goals.   Pt prognosis is Good.     Pt will continue to benefit from skilled outpatient physical therapy to address the deficits listed in the problem list box on initial evaluation, provide pt/family education and to maximize pt's level of independence in the home and community environment.     Pt's spiritual, cultural and educational needs considered and pt agreeable to plan of care and " goals.     Anticipated Barriers for therapy: sedentary lifestyle and chronicity of condition       GOALS:  SHORT TERM GOALS: 4 weeks, (05/21/22) Progress   1. Recent signs and systems trend is improving in order to progress towards LTG's.     2. Patient will be independent with HEP in order to further progress and return to maximal function.     3. Pain rating at Worst: 5/10 in order to progress towards increased independence with activity.     4. Patient will be able to correct postural deviations in sitting and standing, to decrease pain and promote postural awareness for injury prevention.         LONG TERM GOALS: 8 weeks, (06/22/22) Progress   1. Patient will return to normal ADL, recreational, and work related activities with less pain and limitation.      2. Patient will improve AROM to stated goals in order to return to maximal functional potential.      3. Patient will improve Strength to stated goals of appropriate musculature in order to improve functional independence.      4. Pain Rating at Best: 1/10 to improve Quality of Life.      5. Patient will meet predicted functional outcome (FOTO) score: N/A% to increase self-worth & perceived functional ability.     6. Patient will have met/partially met personal goal of: being able to walk > 500ft independently with no pain in back.           PLAN     Continue Plan of Care (POC) and progress per patient tolerance. See Treatment section for exercise progression.    Campos Chambers, PT , DPT
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
## 2022-05-13 PROBLEM — R41.841 COGNITIVE COMMUNICATION DISORDER: Status: ACTIVE | Noted: 2022-05-13

## 2022-05-13 PROBLEM — R13.12 OROPHARYNGEAL DYSPHAGIA: Status: ACTIVE | Noted: 2022-05-13

## 2022-05-16 ENCOUNTER — CLINICAL SUPPORT (OUTPATIENT)
Dept: REHABILITATION | Facility: HOSPITAL | Age: 62
End: 2022-05-16
Payer: MEDICAID

## 2022-05-16 DIAGNOSIS — R41.841 COGNITIVE COMMUNICATION DISORDER: ICD-10-CM

## 2022-05-16 DIAGNOSIS — Z98.1 S/P CERVICAL SPINAL FUSION: ICD-10-CM

## 2022-05-16 DIAGNOSIS — R13.12 OROPHARYNGEAL DYSPHAGIA: Primary | ICD-10-CM

## 2022-05-16 PROCEDURE — 92507 TX SP LANG VOICE COMM INDIV: CPT

## 2022-05-16 NOTE — PROGRESS NOTES
[facility]  Speech Therapy Treatment Note- Neurological Rehabilitation and Dysphagia     Date: 5/16/2022     Name: Valentín Field   MRN: [id number]   Therapy Diagnosis:   Encounter Diagnoses   Name Primary?    Oropharyngeal dysphagia Yes    Cognitive communication disorder     S/P cervical spinal fusion      Physician: Octavio Forrester MD  Physician Orders: [id number] - AMB REFERRAL/CONSULT TO SPEECH THERAPY  Medical Diagnosis: Z98.1 (ICD-10-CM) - S/P cervical spinal fusion    Visit #/ Visits Authorized: 2/4  Date of Evaluation:  5/5/2022  Insurance Authorization Period: 5/12/2022 - 8/10/2022  Plan of Care Expiration Date:  7/15/2022  Extended Plan of Care:  NA  Progress Note: 6/5/2022   Visits Cancelled: 0  Visits No Show: 0    Time In:  10:15 AM   Time Out:  11:00 PM   Total Billable Time: 45 minutes      Precautions: Standard  Subjective:   Patient reports: no significant changes since last session.   Home exercise program established today.   Response to previous treatment: Patient agreeable to see Neurology regarding symptoms.   Pain Scale:  9/10 on a Visual Analog Scale currently.   Pain Location: whole body  Objective:         UNTIMED  Procedure Min.   Speech- Language- Voice Therapy  45   Dysphagia Therapy  0   Total Timed Units: 0  Total Untimed Units: 1  Charges Billed/Number of units: 1      Short Term Goals: (5 weeks) Current Progress:   Patient will complete formal cognitive-communication assessment.    GOAL MET  5/12/2022       GOAL MET 5/12/2022   Patient will complete formal language assessment.      Discontinued  Not formally addressed. Discontinue due to primary concerns with memory.    Patient will complete a modified barium swallow study to assess swallow safety and efficiency and to determine the least restrictive diet.     Progressing/ Not Met 5/16/2022   Scheduled for 6/6/2022.    Patient will sustain attention to complete moderate to complex reasoning tasks for 2 minutes with  "one request for clarification to increase sustained attention.   Sustained attention to moderate reasoning task for 2 minutes with maximum cueing required for accuracy.    Patient will complete selective attention tasks (auditory or visual) with 90% accuracy independently increase selective attention.     Patient completed tasks with soft background noise from the gym. Required maximum cueing for accuracy.    Patient will complete short term recall tasks after a 5 minutes delay with 90% accuracy  independently  with use of memory strategies to improve recall of information and generalization of memory strategies.   Not formally addressed.    Patient will independently predict time and accuracy of performance on trials within 10% of actual performance in 90% of opportunities.    Not formally addressed.    Patient will complete a functional task to improve attention, memory and/or executive functions (I.e. sample bill paying activity, recipe, or multiple choice comprehension questions to 1 paragraphs) with 80% accuracy and natural environment noise distractions (TV news background, music, etc.).   Completed "pot luck" task with maximum cueing required for accuracy.    Patient will be educated regarding cognitive deficits as applicable to the patient's life for increased awareness and will execute returned demonstration of information with 80% accuracy.    Education provided regarding hierarchy of neuropsychological functions. Patient verbalized understanding.    Patient will use external aids to compensate for deficits in attention, memory, and thought organization with 90% accuracy independently   Patient given two calendars (May/June)         Patient Education/Response:   Patient was educated on his assessment results. Education provided regarding the hierarchy of neuropsychological functions. He verbalized understanding     Home program established: Will be established next session  Exercises were reviewed and " Valentín was able to demonstrate them prior to the end of the session.  Valentín demonstrated fair  understanding of the education provided.     See Electronic Medical Record under Patient Instructions for exercises provided throughout therapy.  Assessment:   Valentín is progressing well towards his goals.  He shows motivation to complete all therapy tasks and is receptive to all education provided. Current goals remain appropriate and will be updated as needed.     Patient prognosis is Good. Patient will continue to benefit from skilled outpatient speech and language therapy to address the deficits listed in the problem list on initial evaluation, provide patient/family education and to maximize patient's level of independence in the home and community environment.   Medical necessity is demonstrated by the following IMPAIRMENTS:  Dysphagia impacts quality of life and puts the patient at risk for aspiration related pneumonia.   Cognitive-communication deficits negatively impact quality of life, safety and independence during activities of daily living, and participation in social and community interactions.   Barriers to Therapy: Unknown etiology of deficits   Patient's spiritual, cultural and educational needs considered and patient agreeable to plan of care and goals.  Plan:   Continue Plan of Care with focus on attention, memory, executive functions. MBSS scheduled for 6/6/2022.     Adilia Graham, CCC-SLP, CBIS   Speech Language Pathologist   Certified Brain Injury Specialist   5/16/2022

## 2022-05-17 ENCOUNTER — CLINICAL SUPPORT (OUTPATIENT)
Dept: REHABILITATION | Facility: HOSPITAL | Age: 62
End: 2022-05-17
Payer: MEDICAID

## 2022-05-17 ENCOUNTER — TELEPHONE (OUTPATIENT)
Dept: NEUROLOGY | Facility: CLINIC | Age: 62
End: 2022-05-17
Payer: MEDICAID

## 2022-05-17 DIAGNOSIS — Z74.09 DECREASED STRENGTH, ENDURANCE, AND MOBILITY: Primary | ICD-10-CM

## 2022-05-17 DIAGNOSIS — R53.1 DECREASED STRENGTH, ENDURANCE, AND MOBILITY: Primary | ICD-10-CM

## 2022-05-17 DIAGNOSIS — R68.89 DECREASED STRENGTH, ENDURANCE, AND MOBILITY: Primary | ICD-10-CM

## 2022-05-17 DIAGNOSIS — M62.81 MUSCLE WEAKNESS: ICD-10-CM

## 2022-05-17 PROCEDURE — 97110 THERAPEUTIC EXERCISES: CPT

## 2022-05-17 NOTE — TELEPHONE ENCOUNTER
Contacted pt appt scheduled 6/9/2022 at 1 pm NP with Lali Fernández . , Reminder mailed . Pt confirmed appt .

## 2022-05-17 NOTE — TELEPHONE ENCOUNTER
----- Message from Lara Juarez NP sent at 5/17/2022  8:21 AM CDT -----  Regarding: FW: Neurology Referral  Can you schedule this patient? Thanks!  ----- Message -----  From: Adilia Graham CCC-ROLF  Sent: 5/16/2022   2:56 PM CDT  To: Lali Fernández NP, Lara Juarez NP  Subject: Neurology Referral                               Colin Fernández and Lara Juarez,     I am seeing this patient, Valentín Field, for speech therapy and he presents with several cognitive issues. I strongly recommended that he see Neurology - he has a referral in. Can you forward to your staff to get him scheduled please?     Thank you!     Adilia Graham CCC-SLP, CBIS   Speech Language Pathologist   Certified Brain Injury Specialist   5/16/2022

## 2022-05-17 NOTE — PROGRESS NOTES
[facility]   Physical Therapy Treatment Note + Progress Note     Name: Valentín Field  Clinic Number: [id number]    Therapy Diagnosis:   Encounter Diagnoses   Name Primary?    Decreased strength, endurance, and mobility Yes    Muscle weakness      Physician: Octavio Forrester MD    Visit Date: 5/17/2022  Physician Orders: PT Eval and Treat  Medical Diagnosis from Referral: General Weakness  Evaluation Date: 4/21/2022  Authorization Period Expiration: 4/21/23  Plan of Care Expiration: 06/22/2022                          Progress Update: 05/21/2022                        Visit # / Visits authorized: 6/20 (+1 eval)                       FOTO: Visit #1 - Scored : 1 / 3      PRECAUTIONS: Standard Precautions and Safety/fall precautions      MD Visit: 04/25/2022  PTA Visit #: 0/5     Time In: 1115  Time Out: 1200  Total Billable Time: 40 minutes    SUBJECTIVE     Pt reports: Patient reports that he is stiff all through his back and neck but that he is willing to work.  He reports  compliant with staying active at home between sessions.  Response to previous treatment: He responded well to last session.  Functional change: He reports no significant functional change since last session.    Pain: 6/10  Location: neck and back     OBJECTIVE     Objective Measures updated at progress report unless specified.     RANGE OF MOTION:     Cervical Spine/ Right  9/27/2021 Left     9/27/2021 Pain/Dysfunction with Movement Goal   Cervical Flexion (60) 50%   With Pain  100%   Initial: 50%   Cervical Extension (90) 25%   With Pain 100%   Initial: 25%   Cervical Side Bending (45) 50% 50% With Pain 100%   Initial: 25% (B)   Cervical Rotation (75) 25% 25% With Pain Pain Free   Initial: 25%      Lumbar AROM/PROM Spine  Right  9/27/2021 Left     9/27/2021 Pain/Dysfunction with Movement Goal   Lumbar Flexion (60) 50%   With Pain  100%   Initial: 50%      Lumbar Extension (30) 50%   With Pain   100%   Initial: 50%    Lumbar Side Bending (25) 50% 50% With Pain  100%   Initial: 50%    Lumbar Rotation 50% 50% With Pain  Pain Free         NEURO SCREEN:     Neuro Testing Right  9/27/2021 Left  9/27/2021 Details   Reflexes  Not Tested  Not Tested     Dermatomes Impaired  Impaired     Myotome Deficits Impaired  Impaired      Tone Normal Normal     Spasticity Not Tested Not Tested           FUNCTIONAL SCREEN:     Upper Extremity ROM Details STRENGTH Details   Shoulder 150 No pain  Grossly 4/5 discomfort   Elbow WNL No Pain  Grossly 4/5 No pain    Hand/Wrist WNL No pain  Grossly 4/5 No pain      Lower Extremity STRENGTH Details   Hip Grossly 3/5 discomfort   Knee Grossly 4/5 No pain    Foot/Ankle Grossly 4/5 No pain        Treatment     Valentín received the treatments listed below:      THERAPEUTIC EXERCISES to develop strength, endurance, ROM, flexibility, posture and core stabilization for (10) minutes including:    Intervention Performed Today    Standing Hip flexion  X  3x10 up onto 12inch step in parallel bars    Shuttle X   x 2x10 both legs   2x10 single leg                                   Plan for Next Visit:      manual therapy techniques: Joint mobilizations, Myofacial release and Soft tissue Mobilization were applied for 0 minutes, including:    Intervention Performed Today                                                neuromuscular re-education activities to improve: Balance, Coordination, Kinesthetic, Sense, Proprioception and Posture for (30) minutes. The following activities were included:    Intervention Performed Today    NuStep  x Level 2, 7 minutes   Parallel bars standing activities   X  X  x       - Standing in staggered stance performing weight shifts forward/back 1x10 bilateral with 1 upper extremity support  - Stepping forward/back with 1-0 upper extremity supports  - stepping forward and backwards with scissoring vertical board in place working on stand balance with 1 upper extremity support   Supine on mat  "with bilateral lower extremities over Tball for core exercises    - Bridges with chest press between each rep holding bilateral 1# dumbbells  - lower trunk rotations moving arms opposite lower extremity movement with 1# dumbbells bilateral   - bilateral knees to chest performing bilateral upper extremity hammer motion down towards knees with each rep with 1# dumbbells bilateral    Standing with single leg support working on  x    Standing on AirX pad    - eyes closed 2x10 seconds  - weight shifts Left and Right 1x10 eyes open, 1x5 eyes closed  - holding stand posture while receiving perturbations from all directions   Standing at parallel bars working on lifting pool noodle over head to touch upper back then lowering to lower legs  1x5   Combo lower extremity exercise working on single leg standing and balance with no upper extremity support  - flexion, abduction, extension and heel raise 8x's on each leg   Standing hamstring curl   2x10 bilateral    Sitting in chair with ball at low back performing trunk extension towards neutral   2x10,    Pulley exercises sitting on balance disc on bar stool  Rows against 20# 2x10  Bicep curls against 20# 2x10   Nautilus Leg extension   2 plates, 2x10 for coordination with strengthening   Side stepping on AirX beam   - 4x's back and forth then 2x's over hurdles   Tandem stepping on AirX beam   - 4x's back and forth then 2x's over hurdles             Heel Taps  X  3x8 (B)   TKEs  x 30x (B) purple cord    Tandem Stance x 3x30" (B)       THERAPEUTIC ACTIVITIES to improve dynamic and functional  performance for (0) minutes including:    Intervention Performed Today                                              Plan for Next Visit:      Gait Training for (0) minutes:    Patient Education and Home Exercises     Home Exercises Provided and Patient Education Provided     Education provided:   - PT recommends ST darryn and patient agrees.  PT will request order from MD.  - Patient educated " to continue to stay active at home.    Written Home Exercises Provided: Continuing to assess appropriate response to exercise .  See EMR under Patient Instructions for exercises provided during future therapy sessions    ASSESSMENT     Valentín Field tolerated PT session well with moderate complaints of pain or discomfort, secondary to poor motor control, decrease motor coordination, and decrease muscle strength. Objective findings show no change with measurements of ROM and functional mobility.  Therapy exercises were reviewed by revisiting exercises given from previous home exercise program while adding no new exercises.  Handouts were not issued during today's visit. Valentín demonstrated good understanding of new exercises and will continue to progress at home until next follow-up.        continues to be limited by sensation deficits, weakness and balance/coordination deficits.  Valentín Is progressing well towards his goals.   Pt prognosis is Good.     Pt will continue to benefit from skilled outpatient physical therapy to address the deficits listed in the problem list box on initial evaluation, provide pt/family education and to maximize pt's level of independence in the home and community environment.     Pt's spiritual, cultural and educational needs considered and pt agreeable to plan of care and goals.     Anticipated Barriers for therapy: sedentary lifestyle and chronicity of condition       GOALS:  SHORT TERM GOALS: 4 weeks, (05/21/22) Progress   1. Recent signs and systems trend is improving in order to progress towards LTG's. PC   2. Patient will be independent with HEP in order to further progress and return to maximal function. PC    3. Pain rating at Worst: 5/10 in order to progress towards increased independence with activity. PC    4. Patient will be able to correct postural deviations in sitting and standing, to decrease pain and promote postural awareness for injury prevention.   PC      LONG TERM  GOALS: 8 weeks, (06/22/22) Progress   1. Patient will return to normal ADL, recreational, and work related activities with less pain and limitation.   PC   2. Patient will improve AROM to stated goals in order to return to maximal functional potential.   PC   3. Patient will improve Strength to stated goals of appropriate musculature in order to improve functional independence.   PC   4. Pain Rating at Best: 1/10 to improve Quality of Life.      5. Patient will meet predicted functional outcome (FOTO) score: N/A% to increase self-worth & perceived functional ability.  PC   6. Patient will have met/partially met personal goal of: being able to walk > 500ft independently with no pain in back.   PC        PLAN     Continue Plan of Care (POC) and progress per patient tolerance. See Treatment section for exercise progression.    Campos Chambers, PT , DPT

## 2022-05-23 ENCOUNTER — OFFICE VISIT (OUTPATIENT)
Dept: OTOLARYNGOLOGY | Facility: CLINIC | Age: 62
End: 2022-05-23
Payer: MEDICAID

## 2022-05-23 VITALS — WEIGHT: 184 LBS | BODY MASS INDEX: 23 KG/M2

## 2022-05-23 DIAGNOSIS — R13.10 DYSPHAGIA, UNSPECIFIED TYPE: ICD-10-CM

## 2022-05-23 DIAGNOSIS — R13.12 OROPHARYNGEAL DYSPHAGIA: Primary | ICD-10-CM

## 2022-05-23 PROCEDURE — 1159F PR MEDICATION LIST DOCUMENTED IN MEDICAL RECORD: ICD-10-PCS | Mod: CPTII,,, | Performed by: OTOLARYNGOLOGY

## 2022-05-23 PROCEDURE — 1159F MED LIST DOCD IN RCRD: CPT | Mod: CPTII,,, | Performed by: OTOLARYNGOLOGY

## 2022-05-23 PROCEDURE — 99213 OFFICE O/P EST LOW 20 MIN: CPT | Mod: PBBFAC | Performed by: OTOLARYNGOLOGY

## 2022-05-23 PROCEDURE — 3008F BODY MASS INDEX DOCD: CPT | Mod: CPTII,,, | Performed by: OTOLARYNGOLOGY

## 2022-05-23 PROCEDURE — 1160F PR REVIEW ALL MEDS BY PRESCRIBER/CLIN PHARMACIST DOCUMENTED: ICD-10-PCS | Mod: CPTII,,, | Performed by: OTOLARYNGOLOGY

## 2022-05-23 PROCEDURE — 31575 DIAGNOSTIC LARYNGOSCOPY: CPT | Mod: S$PBB,,, | Performed by: OTOLARYNGOLOGY

## 2022-05-23 PROCEDURE — 31575 DIAGNOSTIC LARYNGOSCOPY: CPT | Mod: PBBFAC | Performed by: OTOLARYNGOLOGY

## 2022-05-23 PROCEDURE — 99203 PR OFFICE/OUTPT VISIT, NEW, LEVL III, 30-44 MIN: ICD-10-PCS | Mod: 25,S$PBB,, | Performed by: OTOLARYNGOLOGY

## 2022-05-23 PROCEDURE — 99203 OFFICE O/P NEW LOW 30 MIN: CPT | Mod: 25,S$PBB,, | Performed by: OTOLARYNGOLOGY

## 2022-05-23 PROCEDURE — 99999 PR PBB SHADOW E&M-EST. PATIENT-LVL III: CPT | Mod: PBBFAC,,, | Performed by: OTOLARYNGOLOGY

## 2022-05-23 PROCEDURE — 3008F PR BODY MASS INDEX (BMI) DOCUMENTED: ICD-10-PCS | Mod: CPTII,,, | Performed by: OTOLARYNGOLOGY

## 2022-05-23 PROCEDURE — 99999 PR PBB SHADOW E&M-EST. PATIENT-LVL III: ICD-10-PCS | Mod: PBBFAC,,, | Performed by: OTOLARYNGOLOGY

## 2022-05-23 PROCEDURE — 1160F RVW MEDS BY RX/DR IN RCRD: CPT | Mod: CPTII,,, | Performed by: OTOLARYNGOLOGY

## 2022-05-23 PROCEDURE — 31575 PR LARYNGOSCOPY, FLEXIBLE; DIAGNOSTIC: ICD-10-PCS | Mod: S$PBB,,, | Performed by: OTOLARYNGOLOGY

## 2022-05-23 RX ORDER — AMLODIPINE BESYLATE 10 MG/1
10 TABLET ORAL
COMMUNITY
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
## 2022-05-23 NOTE — ASSESSMENT & PLAN NOTE
Status post spine surgery.  Esophagram revealed poor esophageal contractility.  Exam unremarkable.  I await the results of his upcoming modified barium swallow.  We will contact him with further recommendations if applicable.

## 2022-05-24 ENCOUNTER — TELEPHONE (OUTPATIENT)
Dept: ORTHOPEDICS | Facility: CLINIC | Age: 62
End: 2022-05-24
Payer: MEDICAID

## 2022-05-24 NOTE — TELEPHONE ENCOUNTER
----- Message from Venecia Devlin sent at 5/24/2022 12:30 PM CDT -----  Regarding: call back  Contact: pt  Pt requesting a call back in regards to Therapy       Please call and advise    Phone [phone]

## 2022-05-24 NOTE — TELEPHONE ENCOUNTER
Spoke to patient, informed him therapy was denied by his Medicaid insurance. Suggested he call and find out why. Told him to let us know if we have to do anything from our end.

## 2022-05-27 ENCOUNTER — OFFICE VISIT (OUTPATIENT)
Dept: GASTROENTEROLOGY | Facility: CLINIC | Age: 62
End: 2022-05-27
Payer: MEDICAID

## 2022-05-27 VITALS
HEART RATE: 83 BPM | SYSTOLIC BLOOD PRESSURE: 130 MMHG | WEIGHT: 179.88 LBS | BODY MASS INDEX: 22.49 KG/M2 | DIASTOLIC BLOOD PRESSURE: 90 MMHG

## 2022-05-27 DIAGNOSIS — R13.12 OROPHARYNGEAL DYSPHAGIA: Primary | ICD-10-CM

## 2022-05-27 DIAGNOSIS — K22.4 DYSKINESIA OF ESOPHAGUS: ICD-10-CM

## 2022-05-27 PROCEDURE — 1160F PR REVIEW ALL MEDS BY PRESCRIBER/CLIN PHARMACIST DOCUMENTED: ICD-10-PCS | Mod: CPTII,,, | Performed by: NURSE PRACTITIONER

## 2022-05-27 PROCEDURE — 99203 OFFICE O/P NEW LOW 30 MIN: CPT | Mod: S$PBB,,, | Performed by: NURSE PRACTITIONER

## 2022-05-27 PROCEDURE — 1160F RVW MEDS BY RX/DR IN RCRD: CPT | Mod: CPTII,,, | Performed by: NURSE PRACTITIONER

## 2022-05-27 PROCEDURE — 1159F MED LIST DOCD IN RCRD: CPT | Mod: CPTII,,, | Performed by: NURSE PRACTITIONER

## 2022-05-27 PROCEDURE — 3008F PR BODY MASS INDEX (BMI) DOCUMENTED: ICD-10-PCS | Mod: CPTII,,, | Performed by: NURSE PRACTITIONER

## 2022-05-27 PROCEDURE — 99214 OFFICE O/P EST MOD 30 MIN: CPT | Mod: PBBFAC,PO | Performed by: NURSE PRACTITIONER

## 2022-05-27 PROCEDURE — 99999 PR PBB SHADOW E&M-EST. PATIENT-LVL IV: CPT | Mod: PBBFAC,,, | Performed by: NURSE PRACTITIONER

## 2022-05-27 PROCEDURE — 3008F BODY MASS INDEX DOCD: CPT | Mod: CPTII,,, | Performed by: NURSE PRACTITIONER

## 2022-05-27 PROCEDURE — 3080F PR MOST RECENT DIASTOLIC BLOOD PRESSURE >= 90 MM HG: ICD-10-PCS | Mod: CPTII,,, | Performed by: NURSE PRACTITIONER

## 2022-05-27 PROCEDURE — 99203 PR OFFICE/OUTPT VISIT, NEW, LEVL III, 30-44 MIN: ICD-10-PCS | Mod: S$PBB,,, | Performed by: NURSE PRACTITIONER

## 2022-05-27 PROCEDURE — 1159F PR MEDICATION LIST DOCUMENTED IN MEDICAL RECORD: ICD-10-PCS | Mod: CPTII,,, | Performed by: NURSE PRACTITIONER

## 2022-05-27 PROCEDURE — 3075F PR MOST RECENT SYSTOLIC BLOOD PRESS GE 130-139MM HG: ICD-10-PCS | Mod: CPTII,,, | Performed by: NURSE PRACTITIONER

## 2022-05-27 PROCEDURE — 99999 PR PBB SHADOW E&M-EST. PATIENT-LVL IV: ICD-10-PCS | Mod: PBBFAC,,, | Performed by: NURSE PRACTITIONER

## 2022-05-27 PROCEDURE — 3075F SYST BP GE 130 - 139MM HG: CPT | Mod: CPTII,,, | Performed by: NURSE PRACTITIONER

## 2022-05-27 PROCEDURE — 3080F DIAST BP >= 90 MM HG: CPT | Mod: CPTII,,, | Performed by: NURSE PRACTITIONER

## 2022-05-27 RX ORDER — HYDROCODONE BITARTRATE AND ACETAMINOPHEN 5; 325 MG/1; MG/1
1 TABLET ORAL DAILY
Status: ON HOLD | COMMUNITY
Start: 2022-05-18 | End: 2023-07-19 | Stop reason: HOSPADM

## 2022-05-27 RX ORDER — HYDROCHLOROTHIAZIDE 25 MG/1
TABLET ORAL
COMMUNITY

## 2022-05-27 RX ORDER — OMEPRAZOLE 40 MG/1
40 CAPSULE, DELAYED RELEASE ORAL DAILY
Qty: 30 CAPSULE | Refills: 2 | Status: SHIPPED | OUTPATIENT
Start: 2022-05-27 | End: 2024-02-05
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
## 2022-06-07 ENCOUNTER — CLINICAL SUPPORT (OUTPATIENT)
Dept: REHABILITATION | Facility: HOSPITAL | Age: 62
End: 2022-06-07
Payer: MEDICAID

## 2022-06-07 DIAGNOSIS — R53.1 DECREASED STRENGTH, ENDURANCE, AND MOBILITY: Primary | ICD-10-CM

## 2022-06-07 DIAGNOSIS — M62.81 MUSCLE WEAKNESS: ICD-10-CM

## 2022-06-07 DIAGNOSIS — Z74.09 DECREASED STRENGTH, ENDURANCE, AND MOBILITY: Primary | ICD-10-CM

## 2022-06-07 DIAGNOSIS — R68.89 DECREASED STRENGTH, ENDURANCE, AND MOBILITY: Primary | ICD-10-CM

## 2022-06-07 PROCEDURE — 97110 THERAPEUTIC EXERCISES: CPT

## 2022-06-07 NOTE — PROGRESS NOTES
[facility]   Physical Therapy Treatment Note     Name: Valentín Field  Clinic Number: [id number]    Therapy Diagnosis:   Encounter Diagnoses   Name Primary?    Decreased strength, endurance, and mobility Yes    Muscle weakness      Physician: Octavio Forrester MD    Visit Date: 6/7/2022  Physician Orders: PT Eval and Treat  Medical Diagnosis from Referral: General Weakness  Evaluation Date: 4/21/2022  Authorization Period Expiration: 4/21/23  Plan of Care Expiration: 06/22/2022                          Progress Update: 06/17/2022                        Visit # / Visits authorized: 7/20 (+1 eval)                       FOTO: Visit #1 - Scored : 1 / 3      PRECAUTIONS: Standard Precautions and Safety/fall precautions      MD Visit: 04/25/2022  PTA Visit #: 0/5     Time In: 1147  Time Out: 1230  Total Billable Time:  43 minutes    SUBJECTIVE     Pt reports: Patient reports that he is stiff all through his back and neck but that he is willing to work.  He reports  compliant with staying active at home between sessions.  Response to previous treatment: He responded well to last session.  Functional change: He reports no significant functional change since last session.    Pain: 9/10  Location: neck and back     OBJECTIVE     Objective Measures updated at progress report unless specified.     RANGE OF MOTION:     Cervical Spine/ Right  9/27/2021 Left     9/27/2021 Pain/Dysfunction with Movement Goal   Cervical Flexion (60) 50%   With Pain  100%   Initial: 50%   Cervical Extension (90) 25%   With Pain 100%   Initial: 25%   Cervical Side Bending (45) 50% 50% With Pain 100%   Initial: 25% (B)   Cervical Rotation (75) 25% 25% With Pain Pain Free   Initial: 25%      Lumbar AROM/PROM Spine  Right  9/27/2021 Left     9/27/2021 Pain/Dysfunction with Movement Goal   Lumbar Flexion (60) 50%   With Pain  100%   Initial: 50%      Lumbar Extension (30) 50%   With Pain   100%   Initial: 50%   Lumbar Side  Bending (25) 50% 50% With Pain  100%   Initial: 50%    Lumbar Rotation 50% 50% With Pain  Pain Free         NEURO SCREEN:     Neuro Testing Right  9/27/2021 Left  9/27/2021 Details   Reflexes  Not Tested  Not Tested     Dermatomes Impaired  Impaired     Myotome Deficits Impaired  Impaired      Tone Normal Normal     Spasticity Not Tested Not Tested           FUNCTIONAL SCREEN:     Upper Extremity ROM Details STRENGTH Details   Shoulder 150 No pain  Grossly 4/5 discomfort   Elbow WNL No Pain  Grossly 4/5 No pain    Hand/Wrist WNL No pain  Grossly 4/5 No pain      Lower Extremity STRENGTH Details   Hip Grossly 3/5 discomfort   Knee Grossly 4/5 No pain    Foot/Ankle Grossly 4/5 No pain        Treatment     Valentín received the treatments listed below:      THERAPEUTIC EXERCISES to develop strength, endurance, ROM, flexibility, posture and core stabilization for (18) minutes including:    Intervention Performed Today    Standing Hip flexion   3x10 up onto 12inch step in parallel bars    Shuttle X   x 2x20 both legs 4 bands  1x15 single leg 3 bands   Standing lower extremity exercises  X  X  X  X  X  X 2# ankle weights bilateral for each:   - hip flexion over airEx beam 1x10 bilateral   - hip abduction over gibson 1x10 bilateral   - hip extension over gibson 1x10 bilateral   - heel raise on Air Ex beam 1x10 bilateral   - Alternating tapping foot to 8 inch step 1x10 bilateral                               Plan for Next Visit:      manual therapy techniques: Joint mobilizations, Myofacial release and Soft tissue Mobilization were applied for 0 minutes, including:    Intervention Performed Today                                                neuromuscular re-education activities to improve: Balance, Coordination, Kinesthetic, Sense, Proprioception and Posture for (25) minutes. The following activities were included:    Intervention Performed Today    NuStep  x Level 2, 7 minutes   Parallel bars standing activities          -  "Standing in staggered stance performing weight shifts forward/back 1x10 bilateral with 1 upper extremity support  - Stepping forward/back with 1-0 upper extremity supports  - stepping forward and backwards with scissoring vertical board in place working on stand balance with 1 upper extremity support   Supine on mat with bilateral lower extremities over Tball for core exercises    - Bridges with chest press between each rep holding bilateral 1# dumbbells  - lower trunk rotations moving arms opposite lower extremity movement with 1# dumbbells bilateral   - bilateral knees to chest performing bilateral upper extremity hammer motion down towards knees with each rep with 1# dumbbells bilateral    Standing with single leg support working on      Standing on AirX pad     X   - eyes closed 2x10 seconds  - weight shifts Left and Right 1x10 eyes open, 1x5 eyes closed  - holding stand posture while receiving perturbations from all directions   Standing at parallel bars working on lifting pool noodle over head to touch upper back then lowering to lower legs  1x5   Combo lower extremity exercise working on single leg standing and balance with no upper extremity support  - flexion, abduction, extension and heel raise 8x's on each leg   Standing hamstring curl   2x10 bilateral    Sitting in chair with ball at low back performing trunk extension towards neutral  x 2x10,    Pulley exercises sitting on balance disc on bar stool  Rows against 20# 2x10  Bicep curls against 20# 2x10   Nautilus Leg extension   2 plates, 2x10 for coordination with strengthening   Side stepping on AirX beam  x - 4x's back and forth then 2x's over hurdles   Tandem stepping on AirX beam  x - 4x's back and forth then 2x's over hurdles             Heel Taps   3x8 (B)   TKEs   30x (B) purple cord    Tandem Stance  3x30" (B)       THERAPEUTIC ACTIVITIES to improve dynamic and functional  performance for (0) minutes including:    Intervention Performed Today     "                                          Plan for Next Visit:      Gait Training for (0) minutes:    Patient Education and Home Exercises     Home Exercises Provided and Patient Education Provided     Education provided:   - PT recommends ST cates and patient agrees.  PT will request order from MD.  - Patient educated to continue to stay active at home.  - Patient educated on the benefit of a Right knee cage to prevent hyperextension and protect his knee joint    Written Home Exercises Provided: Patient educated to continue with current Home Exercise Program. See EMR under Patient Instructions for exercises provided during future therapy sessions    ASSESSMENT     Valentín responded well to PT treatment despite his high complaints of pain.  He had improved quality of movement with Right knee cage for standing activities and may benefit from a knee cage if he continues to hyperextend his Right knee with single leg standing tasks and gait.  He would benefit from continued PT at this time.       Valentín Is progressing well towards his goals.   Pt prognosis is Good.     Pt will continue to benefit from skilled outpatient physical therapy to address the deficits listed in the problem list box on initial evaluation, provide pt/family education and to maximize pt's level of independence in the home and community environment.     Pt's spiritual, cultural and educational needs considered and pt agreeable to plan of care and goals.     Anticipated Barriers for therapy: sedentary lifestyle and chronicity of condition       GOALS:  SHORT TERM GOALS: 4 weeks, (05/21/22) Progress   1. Recent signs and systems trend is improving in order to progress towards LTG's. PC   2. Patient will be independent with HEP in order to further progress and return to maximal function. PC    3. Pain rating at Worst: 5/10 in order to progress towards increased independence with activity. PC    4. Patient will be able to correct postural deviations in  sitting and standing, to decrease pain and promote postural awareness for injury prevention.   PC      LONG TERM GOALS: 8 weeks, (06/22/22) Progress   1. Patient will return to normal ADL, recreational, and work related activities with less pain and limitation.   PC   2. Patient will improve AROM to stated goals in order to return to maximal functional potential.   PC   3. Patient will improve Strength to stated goals of appropriate musculature in order to improve functional independence.   PC   4. Pain Rating at Best: 1/10 to improve Quality of Life.      5. Patient will meet predicted functional outcome (FOTO) score: N/A% to increase self-worth & perceived functional ability.  PC   6. Patient will have met/partially met personal goal of: being able to walk > 500ft independently with no pain in back.   PC        PLAN     Continue Plan of Care (POC) and progress per patient tolerance. See Treatment section for exercise progression.    Marcela Fishman, PT , DPT

## 2022-06-09 ENCOUNTER — CLINICAL SUPPORT (OUTPATIENT)
Dept: REHABILITATION | Facility: HOSPITAL | Age: 62
End: 2022-06-09
Payer: MEDICAID

## 2022-06-09 DIAGNOSIS — M62.81 MUSCLE WEAKNESS: ICD-10-CM

## 2022-06-09 DIAGNOSIS — Z74.09 DECREASED STRENGTH, ENDURANCE, AND MOBILITY: Primary | ICD-10-CM

## 2022-06-09 DIAGNOSIS — R68.89 DECREASED STRENGTH, ENDURANCE, AND MOBILITY: Primary | ICD-10-CM

## 2022-06-09 DIAGNOSIS — R13.12 OROPHARYNGEAL DYSPHAGIA: Primary | ICD-10-CM

## 2022-06-09 DIAGNOSIS — Z98.1 S/P CERVICAL SPINAL FUSION: ICD-10-CM

## 2022-06-09 DIAGNOSIS — R53.1 DECREASED STRENGTH, ENDURANCE, AND MOBILITY: Primary | ICD-10-CM

## 2022-06-09 DIAGNOSIS — R41.841 COGNITIVE COMMUNICATION DISORDER: ICD-10-CM

## 2022-06-09 PROCEDURE — 97110 THERAPEUTIC EXERCISES: CPT | Mod: 59

## 2022-06-09 PROCEDURE — 92507 TX SP LANG VOICE COMM INDIV: CPT

## 2022-06-09 NOTE — PROGRESS NOTES
"[facility]  Speech Therapy Progress Note- Neurological Rehabilitation and Dysphagia     Date: 6/9/2022     Name: Valentín Field   MRN: [id number]   Therapy Diagnosis:   Encounter Diagnoses   Name Primary?    Oropharyngeal dysphagia Yes    Cognitive communication disorder     S/P cervical spinal fusion      Physician: Octavio Forrester MD  Physician Orders: [id number] - AMB REFERRAL/CONSULT TO SPEECH THERAPY  Medical Diagnosis: Z98.1 (ICD-10-CM) - S/P cervical spinal fusion    Visit #/ Visits Authorized: 3/4  Date of Evaluation:  5/5/2022  Insurance Authorization Period: 5/12/2022 - 8/10/2022  Plan of Care Expiration Date:  7/15/2022  Extended Plan of Care:  NA  Progress Note: 6/5/2022   Visits Cancelled: 1  Visits No Show: 1    Time In:  10:30 AM   Time Out:  11:10 PM   Total Billable Time: 40 minutes      Precautions: Standard  Subjective:   Patient reports: He is not compliant with all of his medications. Clinician discussed importance of medication compliance and advised patient to discuss further with his primary care physician.   Response to previous treatment: Patient recalled previous education provided.   Pain Scale:  8/10 on a Visual Analog Scale currently.   Pain Location: "whole body" "back"   Objective:         UNTIMED  Procedure Min.   Speech- Language- Voice Therapy  40   Dysphagia Therapy  0   Total Timed Units: 0  Total Untimed Units: 1  Charges Billed/Number of units: 1      Short Term Goals: (5 weeks) Current Progress:   Patient will complete formal cognitive-communication assessment.    GOAL MET  5/12/2022       GOAL MET 5/12/2022   Patient will complete formal language assessment.      Discontinued  Not formally addressed. Discontinue due to primary concerns with memory.    Patient will complete a modified barium swallow study to assess swallow safety and efficiency and to determine the least restrictive diet.     Progressing/ Not Met 6/9/2022   Patient unable to attend previously " scheduled MBSS due to transportation issues. Will be rescheduling.    Patient will sustain attention to complete moderate to complex reasoning tasks for 2 minutes with one request for clarification to increase sustained attention.   Sustained attention to simple reasoning task for 2 minutes with moderate cueing required for accuracy.    Patient will complete selective attention tasks (auditory or visual) with 90% accuracy independently increase selective attention.     Not formally addressed.     Patient will complete short term recall tasks after a 5 minutes delay with 90% accuracy  independently  with use of memory strategies to improve recall of information and generalization of memory strategies.   Not formally addressed.    Patient will independently predict time and accuracy of performance on trials within 10% of actual performance in 90% of opportunities.    Not formally addressed.    Patient will complete a functional task to improve attention, memory and/or executive functions (I.e. sample bill paying activity, recipe, or multiple choice comprehension questions to 1 paragraphs) with 80% accuracy and natural environment noise distractions (TV news background, music, etc.).   Patient given a blank calendar and a separate page with his appointments. He wrote in his appointments on the calendar with 100% accuracy.     Patient will be educated regarding cognitive deficits as applicable to the patient's life for increased awareness and will execute returned demonstration of information with 80% accuracy.    Education provided again regarding hierarchy of neuropsychological functions. Patient verbalized understanding.    Patient will use external aids to compensate for deficits in attention, memory, and thought organization with 90% accuracy independently   Patient given a blank calendar and a separate page with his appointments. He wrote in his appointments on the calendar with 100% accuracy.           Patient  Education/Response:   Education provided regarding the hierarchy of neuropsychological functions and the importance of completing a neurology appointment. He had a Neurology appointment scheduled for today at 1:00 at [facility] but was unaware of this until clinician mentioned it. He was unable to get transportation to his neurology appointment despite clinician and patient trying to contact different services. He reported he will be rescheduling this.     Home program established: Will be established next session  Exercises were reviewed and Valentín was able to demonstrate them prior to the end of the session.  Valentín demonstrated fair  understanding of the education provided.     See Electronic Medical Record under Patient Instructions for exercises provided throughout therapy.  Assessment:   PROGRESS:   Valentín is progressing well towards his goals.  He shows motivation to complete all therapy tasks and is receptive to all education provided. Dysphagia continues to persist. MBSS order is in and patient will be scheduled to further assess. He continues to complain of memory deficits. It has been strongly advised that he consult Neurology and complete imaging to determine underlying etiology. Current goals remain appropriate and will be updated as needed.     Patient prognosis is Good. Patient will continue to benefit from skilled outpatient speech and language therapy to address the deficits listed in the problem list on initial evaluation, provide patient/family education and to maximize patient's level of independence in the home and community environment.   Medical necessity is demonstrated by the following IMPAIRMENTS:  Dysphagia impacts quality of life and puts the patient at risk for aspiration related pneumonia.   Cognitive-communication deficits negatively impact quality of life, safety and independence during activities of daily living, and participation in social and community interactions.   Barriers to Therapy:  Unknown etiology of deficits; transportation   Patient's spiritual, cultural and educational needs considered and patient agreeable to plan of care and goals.  Plan:   Continue Plan of Care with focus on attention, memory, executive functions. Complete MBSS. Complete consultation with Neurology.     Adilia Graham, CCC-SLP, CBIS   Speech Language Pathologist   Certified Brain Injury Specialist   6/9/2022

## 2022-06-09 NOTE — PROGRESS NOTES
[facility]   Physical Therapy Treatment Note     Name: Valentín Field  Clinic Number: [id number]    Therapy Diagnosis:   Encounter Diagnoses   Name Primary?    Decreased strength, endurance, and mobility Yes    Muscle weakness      Physician: Octavio Forrester MD    Visit Date: 6/9/2022  Physician Orders: PT Eval and Treat  Medical Diagnosis from Referral: General Weakness  Evaluation Date: 4/21/2022  Authorization Period Expiration: 4/21/23  Plan of Care Expiration: 06/22/2022                          Progress Update: 06/17/2022                        Visit # / Visits authorized: 8/20 (+1 eval)                       FOTO: Visit #1 - Scored : 1 / 3      PRECAUTIONS: Standard Precautions and Safety/fall precautions      MD Visit: 04/25/2022  PTA Visit #: 0/5     Time In: 1120  Time Out: 1202  Total Billable Time:  42 minutes    SUBJECTIVE     Pt reports: Patient reports that he feeling ok but his throat hurt.   He reports  compliant with staying active at home between sessions.  Response to previous treatment: He responded well to last session.  Functional change: He reports no significant functional change since last session.    Pain: 8/10  Location: shoulders and back     OBJECTIVE     Objective Measures updated at progress report unless specified.     RANGE OF MOTION:     Cervical Spine/ Right  9/27/2021 Left     9/27/2021 Pain/Dysfunction with Movement Goal   Cervical Flexion (60) 50%   With Pain  100%   Initial: 50%   Cervical Extension (90) 25%   With Pain 100%   Initial: 25%   Cervical Side Bending (45) 50% 50% With Pain 100%   Initial: 25% (B)   Cervical Rotation (75) 25% 25% With Pain Pain Free   Initial: 25%      Lumbar AROM/PROM Spine  Right  9/27/2021 Left     9/27/2021 Pain/Dysfunction with Movement Goal   Lumbar Flexion (60) 50%   With Pain  100%   Initial: 50%      Lumbar Extension (30) 50%   With Pain   100%   Initial: 50%   Lumbar Side Bending (25) 50% 50% With Pain  100%    Initial: 50%    Lumbar Rotation 50% 50% With Pain  Pain Free         NEURO SCREEN:     Neuro Testing Right  9/27/2021 Left  9/27/2021 Details   Reflexes  Not Tested  Not Tested     Dermatomes Impaired  Impaired     Myotome Deficits Impaired  Impaired      Tone Normal Normal     Spasticity Not Tested Not Tested           FUNCTIONAL SCREEN:     Upper Extremity ROM Details STRENGTH Details   Shoulder 150 No pain  Grossly 4/5 discomfort   Elbow WNL No Pain  Grossly 4/5 No pain    Hand/Wrist WNL No pain  Grossly 4/5 No pain      Lower Extremity STRENGTH Details   Hip Grossly 3/5 discomfort   Knee Grossly 4/5 No pain    Foot/Ankle Grossly 4/5 No pain        Treatment     Valentín received the treatments listed below:      THERAPEUTIC EXERCISES to develop strength, endurance, ROM, flexibility, posture and core stabilization for (42) minutes including:    Intervention Performed Today    Standing Hip flexion   3x10 up onto 12inch step in parallel bars    Shuttle X   x 2x20 both legs 4 bands  2x10 single leg 3 bands   Standing lower extremity exercises  X    X  X  X   2# ankle weight Left, 1# Right:   - hip flexion over airEx beam 1x10 bilateral   - hip abduction over gibson 1x10 bilateral   - hip extension over gibson 1x10 bilateral   - heel raise on Air Ex beam 1x10 bilateral   - Alternating tapping foot to 8 inch step 1x10 bilateral    NuStep  x Level 3 for 5 minutes   Seated lower extremity exercises x long arc quad with 2# Left and 1# Right 1x15 bilateral    Seated hamstring curl against green band  x 1x20 bilateral                Plan for Next Visit:      manual therapy techniques: Joint mobilizations, Myofacial release and Soft tissue Mobilization were applied for 0 minutes, including:    Intervention Performed Today                                                neuromuscular re-education activities to improve: Balance, Coordination, Kinesthetic, Sense, Proprioception and Posture for (25) minutes. The following  "activities were included:    Intervention Performed Today    Nuep  x Level 2, 7 minutes   Parallel bars standing activities          - Standing in staggered stance performing weight shifts forward/back 1x10 bilateral with 1 upper extremity support  - Stepping forward/back with 1-0 upper extremity supports  - stepping forward and backwards with scissoring vertical board in place working on stand balance with 1 upper extremity support   Supine on mat with bilateral lower extremities over Tball for core exercises    - Bridges with chest press between each rep holding bilateral 1# dumbbells  - lower trunk rotations moving arms opposite lower extremity movement with 1# dumbbells bilateral   - bilateral knees to chest performing bilateral upper extremity hammer motion down towards knees with each rep with 1# dumbbells bilateral    Standing with single leg support working on      Standing on AirX pad     X   - eyes closed 2x10 seconds  - weight shifts Left and Right 1x10 eyes open, 1x5 eyes closed  - holding stand posture while receiving perturbations from all directions   Standing at parallel bars working on lifting pool noodle over head to touch upper back then lowering to lower legs  1x5   Combo lower extremity exercise working on single leg standing and balance with no upper extremity support  - flexion, abduction, extension and heel raise 8x's on each leg   Standing hamstring curl   2x10 bilateral    Sitting in chair with ball at low back performing trunk extension towards neutral  x 2x10,    Pulley exercises sitting on balance disc on bar stool  Rows against 20# 2x10  Bicep curls against 20# 2x10   Nautilus Leg extension   2 plates, 2x10 for coordination with strengthening   Side stepping on AirX beam  x - 4x's back and forth then 2x's over hurdles   Tandem stepping on AirX beam  x - 4x's back and forth then 2x's over hurdles             Heel Taps   3x8 (B)   TKEs   30x (B) purple cord    Tandem Stance  3x30" (B) "       THERAPEUTIC ACTIVITIES to improve dynamic and functional  performance for (0) minutes including:    Intervention Performed Today                                              Plan for Next Visit:      Gait Training for (0) minutes:    Patient Education and Home Exercises     Home Exercises Provided and Patient Education Provided     Education provided:   - PT recommends ST darryn and patient agrees.  PT will request order from MD.  - Patient educated to continue to stay active at home.  - Patient educated on the benefit of a Right knee cage to prevent hyperextension and protect his knee joint    Written Home Exercises Provided: Patient educated to continue with current Home Exercise Program. See EMR under Patient Instructions for exercises provided during future therapy sessions    ASSESSMENT     Valentín responded well to PT treatment despite his high complaints of pain.  He had improved quality of movement with Right knee cage for standing activities and may benefit from a knee cage if he continues to hyperextend his Right knee with single leg standing tasks and gait.  He had increased difficulty maintaining stand balance and control performing standing exercises on turf today.       Valentín Is progressing well towards his goals.   Pt prognosis is Good.     Pt will continue to benefit from skilled outpatient physical therapy to address the deficits listed in the problem list box on initial evaluation, provide pt/family education and to maximize pt's level of independence in the home and community environment.     Pt's spiritual, cultural and educational needs considered and pt agreeable to plan of care and goals.     Anticipated Barriers for therapy: sedentary lifestyle and chronicity of condition       GOALS:  SHORT TERM GOALS: 4 weeks, (05/21/22) Progress   1. Recent signs and systems trend is improving in order to progress towards LTG's. PC   2. Patient will be independent with HEP in order to further progress  and return to maximal function. PC    3. Pain rating at Worst: 5/10 in order to progress towards increased independence with activity. PC    4. Patient will be able to correct postural deviations in sitting and standing, to decrease pain and promote postural awareness for injury prevention.   PC      LONG TERM GOALS: 8 weeks, (06/22/22) Progress   1. Patient will return to normal ADL, recreational, and work related activities with less pain and limitation.   PC   2. Patient will improve AROM to stated goals in order to return to maximal functional potential.   PC   3. Patient will improve Strength to stated goals of appropriate musculature in order to improve functional independence.   PC   4. Pain Rating at Best: 1/10 to improve Quality of Life.      5. Patient will meet predicted functional outcome (FOTO) score: N/A% to increase self-worth & perceived functional ability.  PC   6. Patient will have met/partially met personal goal of: being able to walk > 500ft independently with no pain in back.   PC        PLAN     Continue Plan of Care (POC) and progress per patient tolerance. See Treatment section for exercise progression.    Marcela Fishman, PT , DPT

## 2022-06-17 ENCOUNTER — CLINICAL SUPPORT (OUTPATIENT)
Dept: REHABILITATION | Facility: HOSPITAL | Age: 62
End: 2022-06-17
Payer: MEDICAID

## 2022-06-17 DIAGNOSIS — R53.1 DECREASED STRENGTH, ENDURANCE, AND MOBILITY: Primary | ICD-10-CM

## 2022-06-17 DIAGNOSIS — Z74.09 DECREASED STRENGTH, ENDURANCE, AND MOBILITY: Primary | ICD-10-CM

## 2022-06-17 DIAGNOSIS — R68.89 DECREASED STRENGTH, ENDURANCE, AND MOBILITY: Primary | ICD-10-CM

## 2022-06-17 DIAGNOSIS — M62.81 MUSCLE WEAKNESS: ICD-10-CM

## 2022-06-17 PROCEDURE — 97110 THERAPEUTIC EXERCISES: CPT

## 2022-06-17 NOTE — PROGRESS NOTES
[facility]   Physical Therapy Treatment Note     Name: Valentín Field  Clinic Number: [id number]    Therapy Diagnosis:   Encounter Diagnoses   Name Primary?    Decreased strength, endurance, and mobility Yes    Muscle weakness      Physician: Octavio Forrester MD    Visit Date: 6/17/2022  Physician Orders: PT Eval and Treat  Medical Diagnosis from Referral: General Weakness  Evaluation Date: 4/21/2022  Authorization Period Expiration: 4/21/23  Plan of Care Expiration: 06/22/2022                          Progress Update: 06/17/2022                        Visit # / Visits authorized: 9/20 (+1 eval)                       FOTO: Visit #1 - Scored : 1 / 3      PRECAUTIONS: Standard Precautions and Safety/fall precautions      MD Visit: 04/25/2022  PTA Visit #: 0/5     Time In: 0850  Time Out: 0945  Total Billable Time:  55 minutes    SUBJECTIVE     Pt reports: Patient reports that he feeling ok today.  He went back to see his orthopedic MD and has decided to move forward with having a cervical injection.  He reports  compliant with staying active at home between sessions.  Response to previous treatment: He responded well to last session.  Functional change: He reports no significant functional change since last session.    Pain: 8/10  Location: shoulders and back     OBJECTIVE     Objective Measures updated at progress report unless specified.     RANGE OF MOTION:     Cervical Spine/ Right  9/27/2021 Left     9/27/2021 Pain/Dysfunction with Movement Goal   Cervical Flexion (60) 50%   With Pain  100%   Initial: 50%   Cervical Extension (90) 25%   With Pain 100%   Initial: 25%   Cervical Side Bending (45) 50% 50% With Pain 100%   Initial: 25% (B)   Cervical Rotation (75) 25% 25% With Pain Pain Free   Initial: 25%      Lumbar AROM/PROM Spine  Right  9/27/2021 Left     9/27/2021 Pain/Dysfunction with Movement Goal   Lumbar Flexion (60) 50%   With Pain  100%   Initial: 50%      Lumbar Extension (30)  50%   With Pain   100%   Initial: 50%   Lumbar Side Bending (25) 50% 50% With Pain  100%   Initial: 50%    Lumbar Rotation 50% 50% With Pain  Pain Free         NEURO SCREEN:     Neuro Testing Right  9/27/2021 Left  9/27/2021 Details   Reflexes  Not Tested  Not Tested     Dermatomes Impaired  Impaired     Myotome Deficits Impaired  Impaired      Tone Normal Normal     Spasticity Not Tested Not Tested           FUNCTIONAL SCREEN:     Upper Extremity ROM Details STRENGTH Details   Shoulder 150 No pain  Grossly 4/5 discomfort   Elbow WNL No Pain  Grossly 4/5 No pain    Hand/Wrist WNL No pain  Grossly 4/5 No pain      Lower Extremity STRENGTH Details   Hip Grossly 3/5 discomfort   Knee Grossly 4/5 No pain    Foot/Ankle Grossly 4/5 No pain        Treatment     Valentín received the treatments listed below:      THERAPEUTIC EXERCISES to develop strength, endurance, ROM, flexibility, posture and core stabilization for (45) minutes including:    Intervention Performed Today    Standing Hip flexion   3x10 up onto 12inch step in parallel bars    Shuttle  2x20 both legs 4 bands  2x10 single leg 3 bands   Standing lower extremity exercises, Right knee cage donned to improve posture of lower extremity X  X  X  X  X  X   2# ankle weight bilateral :   - hip flexion over gibson 1x10 bilateral   - hip abduction over gibson 1x10 bilateral   - hip extension over gibson 1x10 bilateral   - heel raise on Air Ex beam 2x15 bilateral   - hamstring curl 1x10 bilateral   - Alternating tapping foot to 8 inch step 1x10 bilateral    NuStep  x Level 2-3 for 9 minutes   Seated lower extremity exercises  long arc quad with 2# Left and 1# Right 1x15 bilateral    Seated hamstring curl against green band   1x20 bilateral    Sitting trunk extension over ball at back x 2x10   Sitting hamstring/heelcord stretch x 2x30 seconds bilateral    Standing heel cord stretch  x 3x15 seconds      Plan for Next Visit:      manual therapy techniques: Joint mobilizations,  Myofacial release and Soft tissue Mobilization were applied for 0 minutes, including:    Intervention Performed Today                                                neuromuscular re-education activities to improve: Balance, Coordination, Kinesthetic, Sense, Proprioception and Posture for (10) minutes. The following activities were included:    Intervention Performed Today    NuSt   Level 2, 7 minutes   Parallel bars standing activities          - Standing in staggered stance performing weight shifts forward/back 1x10 bilateral with 1 upper extremity support  - Stepping forward/back with 1-0 upper extremity supports  - stepping forward and backwards with scissoring vertical board in place working on stand balance with 1 upper extremity support   Supine on mat with bilateral lower extremities over Tball for core exercises    - Bridges with chest press between each rep holding bilateral 1# dumbbells  - lower trunk rotations moving arms opposite lower extremity movement with 1# dumbbells bilateral   - bilateral knees to chest performing bilateral upper extremity hammer motion down towards knees with each rep with 1# dumbbells bilateral    Standing with single leg support working on      Standing on AirX pad        - eyes closed 2x10 seconds  - weight shifts Left and Right 1x10 eyes open, 1x5 eyes closed  - holding stand posture while receiving perturbations from all directions   Standing at parallel bars working on lifting pool noodle over head to touch upper back then lowering to lower legs  1x5   Combo lower extremity exercise working on single leg standing and balance with no upper extremity support  - flexion, abduction, extension and heel raise 8x's on each leg   Standing hamstring curl   2x10 bilateral    Sitting in chair with ball at low back performing trunk extension towards neutral   2x10,    Pulley exercises sitting on balance disc on bar stool  Rows against 20# 2x10  Bicep curls against 20# 2x10   Nautilus  "Leg extension   2 plates, 2x10 for coordination with strengthening   Side stepping on AirX beam  x - 6x's back and forth then 2x's over hurdles   Tandem stepping on AirX beam  x - 6x's back and forth then 2x's over hurdles             Heel Taps   3x8 (B)   TKEs   30x (B) purple cord    Tandem Stance  3x30" (B)       THERAPEUTIC ACTIVITIES to improve dynamic and functional  performance for (0) minutes including:    Intervention Performed Today                                              Plan for Next Visit:      Gait Training for (0) minutes:    Patient Education and Home Exercises     Home Exercises Provided and Patient Education Provided     Education provided:   - PT recommends ST eval and patient agrees.  PT will request order from MD.  - Patient educated to continue to stay active at home.  - Patient educated on the benefit of a Right knee cage to prevent hyperextension and protect his knee joint    Written Home Exercises Provided: Patient educated to continue with current Home Exercise Program. See EMR under Patient Instructions for exercises provided during future therapy sessions    ASSESSMENT     Valentín responded well to PT treatment despite his high complaints of pain.  He demonstrated improved stand balance performing several stand tasks with 1-0 upper extremity support.  He also demonstrated improved stand endurance with exercises today.  He has fair - good potential to make gains with PT treatment.        Valentín Is progressing well towards his goals.   Pt prognosis is Good.     Pt will continue to benefit from skilled outpatient physical therapy to address the deficits listed in the problem list box on initial evaluation, provide pt/family education and to maximize pt's level of independence in the home and community environment.     Pt's spiritual, cultural and educational needs considered and pt agreeable to plan of care and goals.     Anticipated Barriers for therapy: sedentary lifestyle and chronicity " of condition       GOALS:  SHORT TERM GOALS: 4 weeks, (05/21/22) Progress   1. Recent signs and systems trend is improving in order to progress towards LTG's. PC   2. Patient will be independent with HEP in order to further progress and return to maximal function. PC    3. Pain rating at Worst: 5/10 in order to progress towards increased independence with activity. PC    4. Patient will be able to correct postural deviations in sitting and standing, to decrease pain and promote postural awareness for injury prevention.   PC      LONG TERM GOALS: 8 weeks, (06/22/22) Progress   1. Patient will return to normal ADL, recreational, and work related activities with less pain and limitation.   PC   2. Patient will improve AROM to stated goals in order to return to maximal functional potential.   PC   3. Patient will improve Strength to stated goals of appropriate musculature in order to improve functional independence.   PC   4. Pain Rating at Best: 1/10 to improve Quality of Life.      5. Patient will meet predicted functional outcome (FOTO) score: N/A% to increase self-worth & perceived functional ability.  PC   6. Patient will have met/partially met personal goal of: being able to walk > 500ft independently with no pain in back.   PC        PLAN     Continue Plan of Care (POC) and progress per patient tolerance. See Treatment section for exercise progression.    Marcela Fishman, PT , DPT

## 2022-06-20 ENCOUNTER — CLINICAL SUPPORT (OUTPATIENT)
Dept: REHABILITATION | Facility: HOSPITAL | Age: 62
End: 2022-06-20
Payer: MEDICAID

## 2022-06-20 ENCOUNTER — TELEPHONE (OUTPATIENT)
Dept: ORTHOPEDICS | Facility: CLINIC | Age: 62
End: 2022-06-20
Payer: MEDICAID

## 2022-06-20 ENCOUNTER — HOSPITAL ENCOUNTER (OUTPATIENT)
Dept: RADIOLOGY | Facility: HOSPITAL | Age: 62
Discharge: HOME | End: 2022-06-20
Attending: ORTHOPAEDIC SURGERY
Payer: MEDICAID

## 2022-06-20 ENCOUNTER — TELEPHONE (OUTPATIENT)
Dept: NEUROLOGY | Facility: CLINIC | Age: 62
End: 2022-06-20
Payer: MEDICAID

## 2022-06-20 DIAGNOSIS — R13.12 OROPHARYNGEAL DYSPHAGIA: Primary | ICD-10-CM

## 2022-06-20 DIAGNOSIS — Z98.1 S/P CERVICAL SPINAL FUSION: ICD-10-CM

## 2022-06-20 DIAGNOSIS — R41.841 COGNITIVE COMMUNICATION DISORDER: ICD-10-CM

## 2022-06-20 DIAGNOSIS — R68.89 DECREASED STRENGTH, ENDURANCE, AND MOBILITY: Primary | ICD-10-CM

## 2022-06-20 DIAGNOSIS — R13.10 DYSPHAGIA, UNSPECIFIED TYPE: ICD-10-CM

## 2022-06-20 DIAGNOSIS — R53.1 DECREASED STRENGTH, ENDURANCE, AND MOBILITY: Primary | ICD-10-CM

## 2022-06-20 DIAGNOSIS — Z74.09 DECREASED STRENGTH, ENDURANCE, AND MOBILITY: Primary | ICD-10-CM

## 2022-06-20 DIAGNOSIS — M62.81 MUSCLE WEAKNESS: ICD-10-CM

## 2022-06-20 DIAGNOSIS — Z98.1 S/P CERVICAL SPINAL FUSION: Primary | ICD-10-CM

## 2022-06-20 PROCEDURE — 25500020 PHARM REV CODE 255: Performed by: ORTHOPAEDIC SURGERY

## 2022-06-20 PROCEDURE — 74230 X-RAY XM SWLNG FUNCJ C+: CPT | Mod: TC

## 2022-06-20 PROCEDURE — 74230 X-RAY XM SWLNG FUNCJ C+: CPT | Mod: 26,,, | Performed by: RADIOLOGY

## 2022-06-20 PROCEDURE — 92507 TX SP LANG VOICE COMM INDIV: CPT

## 2022-06-20 PROCEDURE — 92611 MOTION FLUOROSCOPY/SWALLOW: CPT | Performed by: SPEECH-LANGUAGE PATHOLOGIST

## 2022-06-20 PROCEDURE — 92526 ORAL FUNCTION THERAPY: CPT | Performed by: SPEECH-LANGUAGE PATHOLOGIST

## 2022-06-20 PROCEDURE — 97110 THERAPEUTIC EXERCISES: CPT

## 2022-06-20 PROCEDURE — 74230 FL MODIFIED BARIUM SWALLOW SPEECH STUDY: ICD-10-PCS | Mod: 26,,, | Performed by: RADIOLOGY

## 2022-06-20 PROCEDURE — A9698 NON-RAD CONTRAST MATERIALNOC: HCPCS | Performed by: ORTHOPAEDIC SURGERY

## 2022-06-20 RX ADMIN — BARIUM SULFATE 68 ML: 0.81 POWDER, FOR SUSPENSION ORAL at 11:06

## 2022-06-20 NOTE — PROGRESS NOTES
[facility]   Physical Therapy Treatment Note + UP[facility]    Name: Valentín Field  Clinic Number: [id number]    Therapy Diagnosis:   Encounter Diagnoses   Name Primary?    Decreased strength, endurance, and mobility Yes    Muscle weakness      Physician: Octavio Forrester MD    Visit Date: 6/20/2022  Physician Orders: PT Eval and Treat  Medical Diagnosis from Referral: General Weakness  Evaluation Date: 4/21/2022  Authorization Period Expiration: 4/21/23  Plan of Care Expiration: 06/22/2022                          Progress Update: 07/20/2022                        Visit # / Visits authorized: 10/12 (+1 eval)                       FOTO: Visit #1 - Scored : 1 / 3      PRECAUTIONS: Standard Precautions and Safety/fall precautions      MD Visit: 04/25/2022  PTA Visit #: 0/5     Time In: 0946  Time Out: 1035  Total Billable Time:  49 minutes    SUBJECTIVE     Pt reports: Patient reports that he feeling ok today.  He went back to see his orthopedic MD and has decided to move forward with having a cervical injection.  He reports  compliant with staying active at home between sessions.  Response to previous treatment: He responded well to last session.  Functional change: He reports no significant functional change since last session.    Pain: 8/10  Location: shoulders and back     OBJECTIVE     Objective Measures updated at progress report unless specified.  Assessments for (20) Minutes:    Test 6/20/22    TUG 22 seconds    4 Square Step Test 14 seconds    5x Sit to stand Test 13 seconds           MMT 6/20/22  Muscle Groups Left   6/22/22 Right   6/22/22   Hip Flexion 4-/5 3+/5   Hip Extension 3+/5 3+/5   Knee Flexion 4-/5 3+/5   Knee Extension 5/5 4+/5   Dorsiflexion 4+/5 4+/5   Plantarflexion 5/5 5/5   Inversion 4+/5 4+/5   Eversion 3+/5 3+/5       Treatment     Valentín received the treatments listed below:      THERAPEUTIC EXERCISES to develop strength, endurance, ROM, flexibility, posture and core  stabilization for (26) minutes including:    Intervention Performed Today    Standing Hip flexion   3x10 up onto 12inch step in parallel bars    Shuttle  2x20 both legs 4 bands  2x10 single leg 3 bands   Standing lower extremity exercises, Right knee cage donned to improve posture of lower extremity    2# ankle weight bilateral :   - hip flexion over gibson 1x10 bilateral   - hip abduction over gibson 1x10 bilateral   - hip extension over gibson 1x10 bilateral   - heel raise on Air Ex beam 2x15 bilateral   - hamstring curl 1x10 bilateral   - Alternating tapping foot to 8 inch step 1x10 bilateral    NuStep  x Level 3 for 7.5 minutes   Seated lower extremity exercises  long arc quad with 2# Left and 1# Right 1x15 bilateral    Seated hamstring curl against green band   1x20 bilateral    Sitting trunk extension over ball at back  2x10   Sitting hamstring/heelcord stretch  2x30 seconds bilateral    Standing heel cord stretch   3x15 seconds    Reviewed over all lower extremity exercises for Home Exercise Program discussing and practicing set-up and sequence of each with green theraband resistance X  X  X  X  X  X - Seated hamstring curl  - Standing hip flexion  - Standing hip abduction  - Standing hip adduction  - Standing hip extension  - Seated ankle eversion                  * Moist Heat to low back while on NuStep*  Plan for Next Visit:      manual therapy techniques: Joint mobilizations, Myofacial release and Soft tissue Mobilization were applied for 0 minutes, including:    Intervention Performed Today                                                neuromuscular re-education activities to improve: Balance, Coordination, Kinesthetic, Sense, Proprioception and Posture for (3) minutes. The following activities were included:    Intervention Performed Today    NuStep   Level 2, 7 minutes   Parallel bars standing activities          - Standing in staggered stance performing weight shifts forward/back 1x10 bilateral with  "1 upper extremity support  - Stepping forward/back with 1-0 upper extremity supports  - stepping forward and backwards with scissoring vertical board in place working on stand balance with 1 upper extremity support   Supine on mat with bilateral lower extremities over Tball for core exercises    - Bridges with chest press between each rep holding bilateral 1# dumbbells  - lower trunk rotations moving arms opposite lower extremity movement with 1# dumbbells bilateral   - bilateral knees to chest performing bilateral upper extremity hammer motion down towards knees with each rep with 1# dumbbells bilateral    Standing with single leg support working on      Standing on AirX pad        - eyes closed 2x10 seconds  - weight shifts Left and Right 1x10 eyes open, 1x5 eyes closed  - holding stand posture while receiving perturbations from all directions   Standing at parallel bars working on lifting pool noodle over head to touch upper back then lowering to lower legs  1x5   Combo lower extremity exercise working on single leg standing and balance with no upper extremity support  - flexion, abduction, extension and heel raise 8x's on each leg   Standing hamstring curl   2x10 bilateral    Sitting in chair with ball at low back performing trunk extension towards neutral   2x10,    Pulley exercises sitting on balance disc on bar stool  Rows against 20# 2x10  Bicep curls against 20# 2x10   Nautilus Leg extension   2 plates, 2x10 for coordination with strengthening   Side stepping on AirX beam  x - 3x's back and forth    Tandem stepping on AirX beam  x - 2x's back and forth              Heel Taps   3x8 (B)   TKEs   30x (B) purple cord    Tandem Stance  3x30" (B)       THERAPEUTIC ACTIVITIES to improve dynamic and functional  performance for (0) minutes including:    Intervention Performed Today                                              Plan for Next Visit:      Gait Training for (0) minutes:    Patient Education and Home " Exercises     Home Exercises Provided and Patient Education Provided     Education provided:   - PT recommends ST eval and patient agrees.  PT will request order from MD.  - Patient educated to continue to stay active at home.  - Patient educated on the benefit of a Right knee cage to prevent hyperextension and protect his knee joint  - 6/20/22 Patient educated on results of testing today and Home Exercise Program.  He was also educated that the end of his Plan of Care is approaching and that PT would be requesting 4 more weeks of therapy.  He requested that PT move forward with requesting order for Right knee cage for home/community use.     Written Home Exercises Provided: Patient educated to continue with current Home Exercise Program. See EMR under Patient Instructions for exercises provided during future therapy sessions    ASSESSMENT     Valentín responded well to PT treatment despite his high complaints of pain.  He reports feeling stronger with improved endurance.  All measures were taken with assessments today and he still has weaknesses in his bilateral lower extremities and needs increased time with functional tests (results indicate a risk for falls).  He has made improvements with his balance and quality of standing/gait with Right knee cage.  He would benefit from a Right knee cage for home and community use.  Valentín would benefit from continued PT treatment to address these deficits and work towards improving his functional mobility safety and independence to decrease his risk of falls.       Valentín Is progressing well towards his goals.   Pt prognosis is Good.     Pt will continue to benefit from skilled outpatient physical therapy to address the deficits listed in the problem list box on initial evaluation, provide pt/family education and to maximize pt's level of independence in the home and community environment.     Pt's spiritual, cultural and educational needs considered and pt agreeable to plan of  care and goals.     Anticipated Barriers for therapy: sedentary lifestyle and chronicity of condition       GOALS:  SHORT TERM GOALS: 4 weeks Progress   1. Recent signs and systems trend is improving in order to progress towards LTG's. Met   2. Patient will be independent with HEP in order to further progress and return to maximal function. Met   3. Pain rating at Worst: 5/10 in order to progress towards increased independence with activity. PC    4. Patient will be able to correct postural deviations in sitting and standing, to decrease pain and promote postural awareness for injury prevention.  Met with Right knee cage      LONG TERM GOALS: 8 weeks Progress   1. Patient will return to normal ADL, recreational, and work related activities with less pain and limitation.   PC   2. Patient will improve AROM to stated goals in order to return to maximal functional potential.   PC   3. Patient will improve Strength to stated goals of appropriate musculature in order to improve functional independence.   PC   4. Pain Rating at Best: 1/10 to improve Quality of Life.      5. Patient will meet predicted functional outcome (FOTO) score: N/A% to increase self-worth & perceived functional ability.  PC   6. Patient will have met/partially met personal goal of: being able to walk > 500ft independently with no pain in back.   PC   7. Added Goal: Patient will demonstrate improved lower extremity strength by performing 5x sit to stand test in 10 seconds.    8. Added Goal: Patient will demonstrate improved coordination and safety with gait by performing TUG in 18 seconds.              PLAN     Continue Plan of Care (POC) and progress per patient tolerance. See Treatment section for exercise progression.    Marcela Fishman, PT , DPT

## 2022-06-20 NOTE — PROGRESS NOTES
See MBSS in plan of care.    Zenia Bermudez MA, CCC-SLP  Speech Language Pathologist  6/20/2022

## 2022-06-20 NOTE — TELEPHONE ENCOUNTER
----- Message from Octavio Forrester MD sent at 6/20/2022 12:05 PM CDT -----  It's ordered  ----- Message -----  From: Marcela Fishman PT  Sent: 6/20/2022  11:54 AM CDT  To: Octavio Forrester MD    Hi Dr. Forrester,  Can you please drop an order for a Right knee cage for Mr. Field.  He has been using the one we have in the gym and it significantly improves his gait quality and safety by preventing Right knee hyperextension.  Thank you!  Marcela Fishman, PT, DPT

## 2022-06-20 NOTE — TELEPHONE ENCOUNTER
Patient has been rescheduled with MARYBETH Fernández for 06/15/22 and has also been added to the waiting list. Reminder has been sent home.

## 2022-06-20 NOTE — PLAN OF CARE
Outpatient Therapy Updated Plan of Care     Visit Date: 6/20/2022  Name: Valentín Field  Clinic Number: [id number]    Therapy Diagnosis:   Encounter Diagnoses   Name Primary?    Decreased strength, endurance, and mobility Yes    Muscle weakness      Physician: Octavio Forrester MD    Physician Orders: PT Eval and Treat  Medical Diagnosis from Referral: General Weakness  Evaluation Date: 4/21/2022    Total Visits Received: 11 including evaluation  Cancelled Visits: 0  No Show Visits: 0    Current Certification Period:  4/21/22 to 6/22/22  Precautions:  Standard, falls   Visits from Evaluation Date:  10  Functional Level Prior to Evaluation:  Independent with all ADLs       Subjective     Update: Patient reports that he is walking better with improved balance and endurance.  He reports that is benefiting from therapy and feels stronger.  He still has back pain daily and would like to continue with therapy to continue to work towards improving his strength and functional mobility independence.     Objective     Update:   Objective Measures updated at progress report unless specified.  Assessments for (20) Minutes:    Test 6/20/22    TUG 22 seconds    4 Square Step Test 14 seconds    5x Sit to stand Test 13 seconds           MMT 6/20/22  Muscle Groups Left   6/22/22 Right   6/22/22   Hip Flexion 4-/5 3+/5   Hip Extension 3+/5 3+/5   Knee Flexion 4-/5 3+/5   Knee Extension 5/5 4+/5   Dorsiflexion 4+/5 4+/5   Plantarflexion 5/5 5/5   Inversion 4+/5 4+/5   Eversion 3+/5 3+/5       Assessment      Update:   Valentín responded well to PT treatment despite his high complaints of pain.  He reports feeling stronger with improved endurance.  All measures were taken with assessments today and he still has weaknesses in his bilateral lower extremities and needs increased time with functional tests (results indicate a risk for falls).  He has made improvements with his balance and quality of standing/gait with Right knee cage.  He  would benefit from a Right knee cage for home and community use.  Valentín would benefit from continued PT treatment to address these deficits and work towards improving his functional mobility safety and independence to decrease his risk of falls.       GOALS:  SHORT TERM GOALS: 4 weeks Progress   1. Recent signs and systems trend is improving in order to progress towards LTG's. Met   2. Patient will be independent with HEP in order to further progress and return to maximal function. Met   3. Pain rating at Worst: 5/10 in order to progress towards increased independence with activity. PC    4. Patient will be able to correct postural deviations in sitting and standing, to decrease pain and promote postural awareness for injury prevention.  Met with Right knee cage      LONG TERM GOALS: 8 weeks Progress   1. Patient will return to normal ADL, recreational, and work related activities with less pain and limitation.   PC   2. Patient will improve AROM to stated goals in order to return to maximal functional potential.   PC   3. Patient will improve Strength to stated goals of appropriate musculature in order to improve functional independence.   PC   4. Pain Rating at Best: 1/10 to improve Quality of Life.      5. Patient will meet predicted functional outcome (FOTO) score: N/A% to increase self-worth & perceived functional ability.  PC   6. Patient will have met/partially met personal goal of: being able to walk > 500ft independently with no pain in back.   PC   7. Added Goal: Patient will demonstrate improved lower extremity strength by performing 5x sit to stand test in 10 seconds.    8. Added Goal: Patient will demonstrate improved coordination and safety with gait by performing TUG in 18 seconds.                Reasons for Recertification of Therapy:   To work towards improved functional mobility independence and safety.    Plan     Updated Certification Period: 6/20/2022 to 7/22/22  Recommended Treatment Plan: 2  times per week for 4 weeks: Electrical Stimulation Attended, Gait Training, Manual Therapy, Moist Heat/ Ice, Neuromuscular Re-ed, Orthotic Management and Training, Patient Education, Self Care, Therapeutic Activities, Therapeutic Exercise and Dry Needling  Other Recommendations: Right knee cage     Marcela Fishman, PT, DPT  6/20/2022      I CERTIFY THE NEED FOR THESE SERVICES FURNISHED UNDER THIS PLAN OF TREATMENT AND WHILE UNDER MY CARE    Physician's comments:        Physician's Signature: ___________________________________________________
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
## 2022-06-20 NOTE — PROGRESS NOTES
"[facility]  Speech Therapy Daily Note- Neurological Rehabilitation and Dysphagia     Date: 6/20/2022     Name: Valentín Field   MRN: [id number]   Therapy Diagnosis:   Encounter Diagnoses   Name Primary?    Oropharyngeal dysphagia Yes    Cognitive communication disorder     S/P cervical spinal fusion      Physician: Octavio Forrester MD  Physician Orders: [id number] - AMB REFERRAL/CONSULT TO SPEECH THERAPY  Medical Diagnosis: Z98.1 (ICD-10-CM) - S/P cervical spinal fusion    Visit #/ Visits Authorized: 4/4  Date of Evaluation:  5/5/2022  Insurance Authorization Period: 5/12/2022 - 8/10/2022  Plan of Care Expiration Date:  7/15/2022  Extended Plan of Care:  NA  Progress Note: 7/11/2022   Visits Cancelled: 1  Visits No Show: 1    Time In:  9:00 AM   Time Out:  9:45 AM   Total Billable Time: 45 minutes      Precautions: Standard  Subjective:   Patient reports: No significant changes since last session. Patient is scheduled for a MBSS today at 1:00 PM   Response to previous treatment: Patient recalled previous education provided.   Pain Scale:  8/10 on a Visual Analog Scale currently.   Pain Location: "whole body" "back"   Objective:         UNTIMED  Procedure Min.   Speech- Language- Voice Therapy  45   Dysphagia Therapy  0   Total Timed Units: 0  Total Untimed Units: 1  Charges Billed/Number of units: 1      Short Term Goals: (5 weeks) Current Progress:   Patient will complete formal cognitive-communication assessment.    GOAL MET  5/12/2022       GOAL MET 5/12/2022   Patient will complete formal language assessment.      Discontinued  Not formally addressed. Discontinue due to primary concerns with memory.    Patient will complete a modified barium swallow study to assess swallow safety and efficiency and to determine the least restrictive diet.     Progressing/ Not Met 6/20/2022   MBSS scheduled 6/20/2022 at 1:00 PM.    Patient will sustain attention to complete moderate to complex reasoning tasks for 2 " "minutes with one request for clarification to increase sustained attention.   Sustained attention to simple reasoning task for 5 minutes with moderate cueing required for accuracy.    Patient will complete selective attention tasks (auditory or visual) with 90% accuracy independently increase selective attention.      Not formally addressed.     Patient will complete short term recall tasks after a 5 minutes delay with 90% accuracy  independently  with use of memory strategies to improve recall of information and generalization of memory strategies.   Not formally addressed.    Patient will independently predict time and accuracy of performance on trials within 10% of actual performance in 90% of opportunities.    Not formally addressed.    Patient will complete a functional task to improve attention, memory and/or executive functions (I.e. sample bill paying activity, recipe, or multiple choice comprehension questions to 1 paragraphs) with 80% accuracy and natural environment noise distractions (TV news background, music, etc.).   "Daily Planner" task completed with 100% accuracy moderate-maximum cueing. Patient required consistent cueing for reasoning, staying on task, and thought flexibility.    Patient will be educated regarding cognitive deficits as applicable to the patient's life for increased awareness and will execute returned demonstration of information with 80% accuracy.    Education provided again regarding hierarchy of neuropsychological functions. Patient verbalized understanding.    Patient will use external aids to compensate for deficits in attention, memory, and thought organization with 90% accuracy independently   Patient provided June calendar with his appointments today. He reported he needs to obtain a yearly calendar. Clinician and patient discussed the importance of writing down appointments/events.           Patient Education/Response:   Education provided regarding the purpose of speech " therapy. He verbalized understanding.     Home program established: Will be established next session  Exercises were reviewed and Valentín was able to demonstrate them prior to the end of the session.  Valentín demonstrated fair  understanding of the education provided.     See Electronic Medical Record under Patient Instructions for exercises provided throughout therapy.  Assessment:   Valentín is progressing well towards his goals.  He did recall his MBSS appointment today at 1:00. Dysphagia goals may be added pending results. Current goals remain appropriate and will be updated as needed.     Patient prognosis is Good. Patient will continue to benefit from skilled outpatient speech and language therapy to address the deficits listed in the problem list on initial evaluation, provide patient/family education and to maximize patient's level of independence in the home and community environment.   Medical necessity is demonstrated by the following IMPAIRMENTS:  Dysphagia impacts quality of life and puts the patient at risk for aspiration related pneumonia.   Cognitive-communication deficits negatively impact quality of life, safety and independence during activities of daily living, and participation in social and community interactions.   Barriers to Therapy: Unknown etiology of deficits; transportation   Patient's spiritual, cultural and educational needs considered and patient agreeable to plan of care and goals.  Plan:   Continue Plan of Care with focus on attention, memory, executive functions. Complete MBSS. Complete consultation with Neurology.     Adilia Graham, CCC-SLP, CBIS   Speech Language Pathologist   Certified Brain Injury Specialist   6/20/2022

## 2022-06-21 DIAGNOSIS — Z98.1 S/P CERVICAL SPINAL FUSION: Primary | ICD-10-CM

## 2022-06-23 ENCOUNTER — CLINICAL SUPPORT (OUTPATIENT)
Dept: REHABILITATION | Facility: HOSPITAL | Age: 62
End: 2022-06-23
Payer: MEDICAID

## 2022-06-23 DIAGNOSIS — R41.841 COGNITIVE COMMUNICATION DISORDER: ICD-10-CM

## 2022-06-23 DIAGNOSIS — Z98.1 S/P CERVICAL SPINAL FUSION: ICD-10-CM

## 2022-06-23 DIAGNOSIS — R68.89 DECREASED STRENGTH, ENDURANCE, AND MOBILITY: Primary | ICD-10-CM

## 2022-06-23 DIAGNOSIS — R53.1 DECREASED STRENGTH, ENDURANCE, AND MOBILITY: Primary | ICD-10-CM

## 2022-06-23 DIAGNOSIS — Z74.09 DECREASED STRENGTH, ENDURANCE, AND MOBILITY: Primary | ICD-10-CM

## 2022-06-23 DIAGNOSIS — R13.12 OROPHARYNGEAL DYSPHAGIA: Primary | ICD-10-CM

## 2022-06-23 DIAGNOSIS — M62.81 MUSCLE WEAKNESS: ICD-10-CM

## 2022-06-23 PROCEDURE — 97110 THERAPEUTIC EXERCISES: CPT

## 2022-06-23 PROCEDURE — 92507 TX SP LANG VOICE COMM INDIV: CPT

## 2022-06-23 NOTE — PROGRESS NOTES
"[facility]  Speech Therapy Daily Note- Neurological Rehabilitation and Dysphagia     Date: 6/23/2022     Name: Valentín Field   MRN: [id number]   Therapy Diagnosis:   Encounter Diagnoses   Name Primary?    Oropharyngeal dysphagia Yes    Cognitive communication disorder     S/P cervical spinal fusion      Physician: Octavio Forrester MD  Physician Orders: [id number] - AMB REFERRAL/CONSULT TO SPEECH THERAPY  Medical Diagnosis: Z98.1 (ICD-10-CM) - S/P cervical spinal fusion    Visit #/ Visits Authorized: 5/4  Date of Evaluation:  5/5/2022  Insurance Authorization Period: 5/12/2022 - 8/10/2022  Plan of Care Expiration Date:  7/15/2022  Extended Plan of Care:  NA  Progress Note: 7/11/2022   Visits Cancelled: 1  Visits No Show: 1    Time In:  10:10 AM   Time Out:  11:00  AM   Total Billable Time: 50 minutes      Precautions: Standard  Subjective:   Patient reports: Patient completed his MBSS. See POC for full report. Dysphagia goals have been added.   Response to previous treatment: Dysphagia treatment initiated today.   Pain Scale:  8/10 on a Visual Analog Scale currently.   Pain Location: "whole body" "back"   Objective:         UNTIMED  Procedure Min.   Speech- Language- Voice Therapy 50   Dysphagia Therapy  0   Total Timed Units: 0  Total Untimed Units: 1  Charges Billed/Number of units: 1      Short Term Goals: (5 weeks) Current Progress:   Patient will complete formal cognitive-communication assessment.    GOAL MET  5/12/2022       GOAL MET 5/12/2022   Patient will complete formal language assessment.      Discontinued  Not formally addressed. Discontinue due to primary concerns with memory.    Patient will complete a modified barium swallow study to assess swallow safety and efficiency and to determine the least restrictive diet.       COMPLETED 6/20/2022    Patient will sustain attention to complete moderate to complex reasoning tasks for 2 minutes with one request for clarification to increase " sustained attention.   Not formally addressed.    Patient will complete selective attention tasks (auditory or visual) with 90% accuracy independently increase selective attention.      Not formally addressed.     Patient will complete short term recall tasks after a 5 minutes delay with 90% accuracy  independently  with use of memory strategies to improve recall of information and generalization of memory strategies.   Not formally addressed.    Patient will independently predict time and accuracy of performance on trials within 10% of actual performance in 90% of opportunities.    Not formally addressed.    Patient will complete a functional task to improve attention, memory and/or executive functions (I.e. sample bill paying activity, recipe, or multiple choice comprehension questions to 1 paragraphs) with 80% accuracy and natural environment noise distractions (TV news background, music, etc.).   Not formally addressed.   Patient will be educated regarding cognitive deficits as applicable to the patient's life for increased awareness and will execute returned demonstration of information with 80% accuracy.    Not formally addressed   Patient will use external aids to compensate for deficits in attention, memory, and thought organization with 90% accuracy independently   Patient reported using his phone calendar monthly to recall appointments and set up transportation for appointments.    NEW GOAL: Patient will complete 50-75 effortful swallows per session in order to improve swallowing function. Completed X50 today given a model and moderate cueing for accuracy.    NEW GOAL: Patient will complete 20-30 Mendelsohn maneuvers per session in order to improve swallowing function. Completed X [id number] specific instruction, a model, and moderate cueing for accuracy.    NEW GOAL: Patient will 3 sets of 1 minutes CTAR holds per session in order to improve swallowing function. Completed X2 given specific instruction, a  model, and moderate cueing for accuracy.    NEW GOAL: Patient will complete 30 CTAR repetitionsper session in order to improve swallowing function. Completed X20 given specific instruction, a model, and moderate cueing for accuracy.    NEW GOAL: Patient participate in swallowing education with returned demonstration of information.  Patient and clinician reviewed his MBSS today, anatomy and physiology. Patient demonstrated understanding by asking relevant questions.        Patient Education/Response:   Education provided regarding swallowing anatomy and physiology, the purposes of dysphagia exercises, and results from his MBSS. He verbalized understanding.     Home program established: yes-Patient instructed to complete dysphagia exercises at home daily.   Exercises were reviewed and Valentín was able to demonstrate them prior to the end of the session.  Valentín demonstrated fair  understanding of the education provided.     See Electronic Medical Record under Patient Instructions for exercises provided throughout therapy.  Assessment:   Valentín is progressing well towards his goals.  New dysphagia goals have been added and exercises completed today. Current goals remain appropriate and will be updated as needed.     Patient prognosis is Good. Patient will continue to benefit from skilled outpatient speech and language therapy to address the deficits listed in the problem list on initial evaluation, provide patient/family education and to maximize patient's level of independence in the home and community environment.   Medical necessity is demonstrated by the following IMPAIRMENTS:  Dysphagia impacts quality of life and puts the patient at risk for aspiration related pneumonia.   Cognitive-communication deficits negatively impact quality of life, safety and independence during activities of daily living, and participation in social and community interactions.   Barriers to Therapy: Unknown etiology of deficits;  transportation   Patient's spiritual, cultural and educational needs considered and patient agreeable to plan of care and goals.  Plan:   Continue Plan of Care with focus on attention, memory, executive functions, and dysphagia exercises. Complete consultation with Neurology.     Adilia Graham, CCC-SLP, CBIS   Speech Language Pathologist   Certified Brain Injury Specialist   6/23/2022

## 2022-06-24 NOTE — PROGRESS NOTES
[facility]   Physical Therapy Treatment Note + UP[facility]    Name: Valentín Field  Clinic Number: [id number]    Therapy Diagnosis:   Encounter Diagnoses   Name Primary?    Decreased strength, endurance, and mobility Yes    Muscle weakness      Physician: Octavio Forrester MD  Visit Date: 6/23/2022  Physician Orders: PT Eval and Treat  Medical Diagnosis from Referral: General Weakness  Evaluation Date: 4/21/2022  Authorization Period Expiration: 4/21/23  Plan of Care Expiration: 06/22/2022                          Progress Update: 07/20/2022                        Visit # / Visits authorized: 11/12 (+1 eval)                       FOTO: Visit #1 - Scored : 1 / 3      PRECAUTIONS: Standard Precautions and Safety/fall precautions      MD Visit: 04/25/2022  PTA Visit #: 0/5     Time In: 0915  Time Out: 1000  Total Billable Time:  45 minutes    SUBJECTIVE     Pt reports: Patient reports improvements with sit<>stand transfers.  He went back to see his orthopedic MD and has decided to move forward with having a cervical injection.  He reports  compliant with staying active at home between sessions.  Response to previous treatment: He responded well to last session.  Functional change: He reports no significant functional change since last session.    Pain: 7/10  Location: shoulders and back     OBJECTIVE     Objective Measures updated at progress report unless specified.  Assessments for (20) Minutes:  X- Not Tested     Test 6/20/22  Initial Eval   TUG 22 seconds 35.9 seconds    4 Square Step Test 14 seconds X    5x Sit to stand Test 13 seconds 38.8 seconds           MMT 6/20/22  Muscle Groups Left   6/22/22 Right   6/22/22   Hip Flexion 4-/5 3+/5   Hip Extension 3+/5 3+/5   Knee Flexion 4-/5 3+/5   Knee Extension 5/5 4+/5   Dorsiflexion 4+/5 4+/5   Plantarflexion 5/5 5/5   Inversion 4+/5 4+/5   Eversion 3+/5 3+/5       Treatment     Valentín received the treatments listed below:      THERAPEUTIC EXERCISES  to develop strength, endurance, ROM, flexibility, posture and core stabilization for (25) minutes including:    Intervention Performed Today    Standing Hip flexion  x 3x10 up onto 12inch step in parallel bars    Shuttle xx 2x20 both legs 4 bands  2x10 single leg 3 bands   Standing lower extremity exercises, Right knee cage donned to improve posture of lower extremity    2# ankle weight bilateral :   - hip flexion over gibson 1x10 bilateral   - hip abduction over gibson 1x10 bilateral   - hip extension over gibson 1x10 bilateral   - heel raise on Air Ex beam 2x15 bilateral   - hamstring curl 1x10 bilateral   - Alternating tapping foot to 8 inch step 1x10 bilateral    NuStep  x Level 3 for 7 minutes   Seated lower extremity exercises  long arc quad with 2# Left and 1# Right 1x15 bilateral    Seated hamstring curl against green band   1x20 bilateral    Sitting trunk extension over ball at back  2x10   Sitting hamstring/heelcord stretch  2x30 seconds bilateral    Standing heel cord stretch  x 3x15 seconds    Reviewed over all lower extremity exercises for Home Exercise Program discussing and practicing set-up and sequence of each with green theraband resistance X  X  X  X  X  X - Seated hamstring curl  - Standing hip flexion  - Standing hip abduction  - Standing hip adduction  - Standing hip extension  - Seated ankle eversion                  * Moist Heat to low back while on NuStep*  Plan for Next Visit:      manual therapy techniques: Joint mobilizations, Myofacial release and Soft tissue Mobilization were applied for 0 minutes, including:    Intervention Performed Today                                                neuromuscular re-education activities to improve: Balance, Coordination, Kinesthetic, Sense, Proprioception and Posture for (15) minutes. The following activities were included:    Intervention Performed Today    NuStep   Level 2, 7 minutes   Parallel bars standing activities x  x  x     - Standing in  "staggered stance performing weight shifts forward/back 1x10 bilateral with 1 upper extremity support  - Stepping forward/back with 1-0 upper extremity supports  - stepping forward and backwards with scissoring vertical board in place working on stand balance with 1 upper extremity support   Supine on mat with bilateral lower extremities over Tball for core exercises    - Bridges with chest press between each rep holding bilateral 1# dumbbells  - lower trunk rotations moving arms opposite lower extremity movement with 1# dumbbells bilateral   - bilateral knees to chest performing bilateral upper extremity hammer motion down towards knees with each rep with 1# dumbbells bilateral    Standing with single leg support working on      Standing on AirX pad        - eyes closed 2x10 seconds  - weight shifts Left and Right 1x10 eyes open, 1x5 eyes closed  - holding stand posture while receiving perturbations from all directions   Standing at parallel bars working on lifting pool noodle over head to touch upper back then lowering to lower legs  1x5   Combo lower extremity exercise working on single leg standing and balance with no upper extremity support  - flexion, abduction, extension and heel raise 8x's on each leg   Standing hamstring curl   2x10 bilateral    Sitting in chair with ball at low back performing trunk extension towards neutral   2x10,    Pulley exercises sitting on balance disc on bar stool  Rows against 20# 2x10  Bicep curls against 20# 2x10   Nautilus Leg extension   2 plates, 2x10 for coordination with strengthening   Side stepping on AirX beam   - 3x's back and forth    Tandem stepping on AirX beam   - 2x's back and forth              Heel Taps  x 3x8 (B)   TKEs  x 30x (B) purple cord    Tandem Stance  3x30" (B)       THERAPEUTIC ACTIVITIES to improve dynamic and functional  performance for (0) minutes including:    Intervention Performed Today                                              Plan for Next " Visit:      Gait Training for (0) minutes:    Patient Education and Home Exercises     Home Exercises Provided and Patient Education Provided     Education provided:   - PT recommends ST yumiko[employer] and patient agrees.  PT will request order from MD.  - Patient educated to continue to stay active at home.  - Patient educated on the benefit of a Right knee cage to prevent hyperextension and protect his knee joint  - 6/20/22 Patient educated on results of testing today and Home Exercise Program.  He was also educated that the end of his Plan of Care is approaching and that PT would be requesting 4 more weeks of therapy.  He requested that PT move forward with requesting order for Right knee cage for home/community use.     Written Home Exercises Provided: Patient educated to continue with current Home Exercise Program. See EMR under Patient Instructions for exercises provided during future therapy sessions    ASSESSMENT     Valentín responded well to PT treatment despite his high complaints of pain.   Valentín Field tolerated PT session well with moderate  complaints of pain or discomfort, secondary to poor motor control, decrease muscle endurance, and decrease muscle strength. Objective findings are improving with measurements of functional mobility.  Therapy exercises were reviewed by revisiting exercises given from previous home exercise program while adding more dynamic standing balance activities.  Handouts were not issued during today's visit. Valentín demonstrated good understanding of new exercises and will continue to progress at home until next follow-up.         Valentín Is progressing well towards his goals.   Pt prognosis is Good.     Pt will continue to benefit from skilled outpatient physical therapy to address the deficits listed in the problem list box on initial evaluation, provide pt/family education and to maximize pt's level of independence in the home and community environment.     Pt's spiritual, cultural and  educational needs considered and pt agreeable to plan of care and goals.     Anticipated Barriers for therapy: sedentary lifestyle and chronicity of condition       GOALS:  SHORT TERM GOALS: 4 weeks Progress   1. Recent signs and systems trend is improving in order to progress towards LTG's. Met   2. Patient will be independent with HEP in order to further progress and return to maximal function. Met   3. Pain rating at Worst: 5/10 in order to progress towards increased independence with activity. PC    4. Patient will be able to correct postural deviations in sitting and standing, to decrease pain and promote postural awareness for injury prevention.  Met with Right knee cage      LONG TERM GOALS: 8 weeks Progress   1. Patient will return to normal ADL, recreational, and work related activities with less pain and limitation.   PC   2. Patient will improve AROM to stated goals in order to return to maximal functional potential.   PC   3. Patient will improve Strength to stated goals of appropriate musculature in order to improve functional independence.   PC   4. Pain Rating at Best: 1/10 to improve Quality of Life.      5. Patient will meet predicted functional outcome (FOTO) score: N/A% to increase self-worth & perceived functional ability.  PC   6. Patient will have met/partially met personal goal of: being able to walk > 500ft independently with no pain in back.   PC   7. Added Goal: Patient will demonstrate improved lower extremity strength by performing 5x sit to stand test in 10 seconds.    8. Added Goal: Patient will demonstrate improved coordination and safety with gait by performing TUG in 18 seconds.              PLAN     Continue Plan of Care (POC) and progress per patient tolerance. See Treatment section for exercise progression.    Campos Chambers, PT , DPT

## 2022-06-27 ENCOUNTER — CLINICAL SUPPORT (OUTPATIENT)
Dept: REHABILITATION | Facility: HOSPITAL | Age: 62
End: 2022-06-27
Payer: MEDICAID

## 2022-06-27 DIAGNOSIS — R53.1 DECREASED STRENGTH, ENDURANCE, AND MOBILITY: Primary | ICD-10-CM

## 2022-06-27 DIAGNOSIS — R13.12 OROPHARYNGEAL DYSPHAGIA: Primary | ICD-10-CM

## 2022-06-27 DIAGNOSIS — R68.89 DECREASED STRENGTH, ENDURANCE, AND MOBILITY: Primary | ICD-10-CM

## 2022-06-27 DIAGNOSIS — R41.841 COGNITIVE COMMUNICATION DISORDER: ICD-10-CM

## 2022-06-27 DIAGNOSIS — M62.81 MUSCLE WEAKNESS: ICD-10-CM

## 2022-06-27 DIAGNOSIS — Z74.09 DECREASED STRENGTH, ENDURANCE, AND MOBILITY: Primary | ICD-10-CM

## 2022-06-27 DIAGNOSIS — Z98.1 S/P CERVICAL SPINAL FUSION: ICD-10-CM

## 2022-06-27 PROCEDURE — 97110 THERAPEUTIC EXERCISES: CPT | Mod: 59

## 2022-06-27 PROCEDURE — 92507 TX SP LANG VOICE COMM INDIV: CPT

## 2022-06-27 NOTE — PROGRESS NOTES
"[facility]  Speech Therapy Daily Note- Neurological Rehabilitation and Dysphagia     Date: 6/27/2022     Name: Valentín Field   MRN: [id number]   Therapy Diagnosis:   Encounter Diagnoses   Name Primary?    Oropharyngeal dysphagia Yes    Cognitive communication disorder     S/P cervical spinal fusion      Physician: Octavio Forrester MD  Physician Orders: [id number] - AMB REFERRAL/CONSULT TO SPEECH THERAPY  Medical Diagnosis: Z98.1 (ICD-10-CM) - S/P cervical spinal fusion    Visit #/ Visits Authorized: 6/4  Date of Evaluation:  5/5/2022  Insurance Authorization Period: 5/12/2022 - 8/10/2022  Plan of Care Expiration Date:  7/15/2022  Extended Plan of Care:  NA  Progress Note: 7/11/2022   Visits Cancelled: 1  Visits No Show: 1    Time In:  9:00 AM   Time Out:  9:45 AM   Total Billable Time: 45 minutes      Precautions: Standard  Subjective:   Patient reports: No significant changes since last session.   Response to previous treatment: Patient recalled 2 dysphagia exercises.   Pain Scale:  7/10 on a Visual Analog Scale currently.   Pain Location: "shoulders" "back" "legs"   Objective:         UNTIMED  Procedure Min.   Speech- Language- Voice Therapy 45   Dysphagia Therapy  0   Total Timed Units: 0  Total Untimed Units: 1  Charges Billed/Number of units: 1      Short Term Goals: (5 weeks) Current Progress:   Patient will complete formal cognitive-communication assessment.      GOAL MET  5/12/2022         GOAL MET 5/12/2022   Patient will complete formal language assessment.      Discontinued  Not formally addressed. Discontinue due to primary concerns with memory.    Patient will complete a modified barium swallow study to assess swallow safety and efficiency and to determine the least restrictive diet.       COMPLETED 6/20/2022    Patient will sustain attention to complete moderate to complex reasoning tasks for 2 minutes with one request for clarification to increase sustained attention.   Not formally " addressed.    Patient will complete selective attention tasks (auditory or visual) with 90% accuracy independently increase selective attention.      Not formally addressed.     Patient will complete short term recall tasks after a 5 minutes delay with 90% accuracy  independently  with use of memory strategies to improve recall of information and generalization of memory strategies.   Not formally addressed.    Patient will independently predict time and accuracy of performance on trials within 10% of actual performance in 90% of opportunities.    Not formally addressed.    Patient will complete a functional task to improve attention, memory and/or executive functions (I.e. sample bill paying activity, recipe, or multiple choice comprehension questions to 1 paragraphs) with 80% accuracy and natural environment noise distractions (TV news background, music, etc.).   Completed a scheduling task with consistent moderate-maximum cueing for accuracy.   Patient requires consistent cueing for alternating attention, short term memory, and thought flexibility.    Patient will be educated regarding cognitive deficits as applicable to the patient's life for increased awareness and will execute returned demonstration of information with 80% accuracy.    Not formally addressed   Patient will use external aids to compensate for deficits in attention, memory, and thought organization with 90% accuracy independently   Patient reported using his phone calendar monthly to recall appointments and set up transportation for appointments.    Patient will complete 50-75 effortful swallows per session in order to improve swallowing function. Completed X60 today given a model and moderate cueing for accuracy.    Patient will complete 20-30 Mendelsohn maneuvers per session in order to improve swallowing function. Completed X30 given specific instruction, a model, and moderate cueing for accuracy.    Patient will 3 sets of 1 minutes CTAR  "holds per session in order to improve swallowing function. Not completed today due to patient report of "sharp" pain in the throat while completing repetitions today.    Patient will complete 30 CTAR repetitionsper session in order to improve swallowing function. Completed X10 although discontinued due to patient report of "sharp" pain in the throat while completing these today.    Patient participate in swallowing education with returned demonstration of information.  Additional education provided today.        Patient Education/Response:   Education provided regarding swallowing anatomy and physiology, the purposes of dysphagia exercises, and results from his MBSS. He verbalized understanding.     Home program established: yes-Patient instructed to complete dysphagia exercises at home daily.   Exercises were reviewed and Valentín was able to demonstrate them prior to the end of the session.  Valentín demonstrated fair  understanding of the education provided.     See Electronic Medical Record under Patient Instructions for exercises provided throughout therapy.  Assessment:   Valentín is progressing well towards his goals. Increased number of effortful swallows and Mendelsohn maneuvers completed today.  Current goals remain appropriate and will be updated as needed.     Patient prognosis is Good. Patient will continue to benefit from skilled outpatient speech and language therapy to address the deficits listed in the problem list on initial evaluation, provide patient/family education and to maximize patient's level of independence in the home and community environment.   Medical necessity is demonstrated by the following IMPAIRMENTS:  Dysphagia impacts quality of life and puts the patient at risk for aspiration related pneumonia.   Cognitive-communication deficits negatively impact quality of life, safety and independence during activities of daily living, and participation in social and community interactions.   Barriers " to Therapy: Unknown etiology of deficits; transportation   Patient's spiritual, cultural and educational needs considered and patient agreeable to plan of care and goals.  Plan:   Continue Plan of Care with focus on attention, memory, executive functions, and dysphagia exercises. Complete consultation with Neurology.     Adilia Graham, CCC-SLP, CBIS   Speech Language Pathologist   Certified Brain Injury Specialist   6/27/2022

## 2022-06-27 NOTE — PROGRESS NOTES
[facility]   Physical Therapy Treatment Note    Name: Valentín Field  Clinic Number: [id number]    Therapy Diagnosis:   No diagnosis found.  Physician: Octavio Forrester MD  Visit Date: 6/27/2022  Physician Orders: PT Eval and Treat  Medical Diagnosis from Referral: General Weakness  Evaluation Date: 4/21/2022  Authorization Period Expiration: 4/21/23  Plan of Care Expiration: 06/22/2022                          Progress Update: 07/20/2022                        Visit # / Visits authorized: 12/12 (+1 eval)                       FOTO: Visit #1 - Scored : 1 / 3      PRECAUTIONS: Standard Precautions and Safety/fall precautions      MD Visit: 04/25/2022  PTA Visit #: 0/5     Time In: 0946  Time Out: 1030  Total Billable Time:  44 minutes    SUBJECTIVE     Pt reports: Patient reports that he is doing ok.  He still has low back pain and tightness.  He reports  compliant with staying active at home between sessions.  Response to previous treatment: He responded well to last session.  Functional change: He reports no significant functional change since last session.    Pain: 5/10 - 7/10  Location: shoulders and back     OBJECTIVE     Objective Measures updated at progress report unless specified.  Assessments for () Minutes:  X- Not Tested     Test 6/20/22  Initial Eval   TUG 22 seconds 35.9 seconds    4 Square Step Test 14 seconds X    5x Sit to stand Test 13 seconds 38.8 seconds           MMT 6/20/22  Muscle Groups Left   6/22/22 Right   6/22/22   Hip Flexion 4-/5 3+/5   Hip Extension 3+/5 3+/5   Knee Flexion 4-/5 3+/5   Knee Extension 5/5 4+/5   Dorsiflexion 4+/5 4+/5   Plantarflexion 5/5 5/5   Inversion 4+/5 4+/5   Eversion 3+/5 3+/5       Treatment     Valentín received the treatments listed below:      THERAPEUTIC EXERCISES to develop strength, endurance, ROM, flexibility, posture and core stabilization for (36) minutes including:    Intervention Performed Today    Standing Hip flexion   3x10 up  onto 12inch step in parallel bars    Shuttle X  X 2x20 both legs 4 bands  2x10 single leg 3 bands   Standing lower extremity exercises, Right knee cage donned to improve posture of lower extremity X  X  X  X  X     2# ankle weight bilateral :   - hip flexion over gibson 1x10 bilateral   - hip abduction over gibson 1x10 bilateral   - hip extension over gibson 1x10 bilateral   - heel raise on Air Ex [id number] bilateral   - hamstring curl 1x10 bilateral   - Alternating tapping foot to 8 inch step 1x10 bilateral    NuStep  x Level 3 for 7 minutes   Seated lower extremity exercises  long arc quad with 2# Left and 1# Right 1x15 bilateral    Seated hamstring curl against green band   1x20 bilateral    Sitting trunk extension over ball at back  2x10   Sitting hamstring/heelcord stretch  2x30 seconds bilateral    Standing heel cord stretch  x 2x15 seconds bilateral    Reviewed over all lower extremity exercises for Home Exercise Program discussing and practicing set-up and sequence of each with green theraband resistance  - Seated hamstring curl  - Standing hip flexion  - Standing hip abduction  - Standing hip adduction  - Standing hip extension  - Seated ankle eversion                  Neoprene wrap donned to low back for standing exercises to decrease pain and increase support  Right knee cage donned for standing lower extremity exercises.   Plan for Next Visit:      manual therapy techniques: Joint mobilizations, Myofacial release and Soft tissue Mobilization were applied for 0 minutes, including:    Intervention Performed Today                                                neuromuscular re-education activities to improve: Balance, Coordination, Kinesthetic, Sense, Proprioception and Posture for (8) minutes. The following activities were included:    Intervention Performed Today    NuStep   Level 2, 7 minutes   Parallel bars standing activities      - Standing in staggered stance performing weight shifts forward/back 1x10  "bilateral with 1 upper extremity support  - Stepping forward/back with 1-0 upper extremity supports  - stepping forward and backwards with scissoring vertical board in place working on stand balance with 1 upper extremity support   Supine on mat with bilateral lower extremities over Tball for core exercises    - Bridges with chest press between each rep holding bilateral 1# dumbbells  - lower trunk rotations moving arms opposite lower extremity movement with 1# dumbbells bilateral   - bilateral knees to chest performing bilateral upper extremity hammer motion down towards knees with each rep with 1# dumbbells bilateral         Standing on AirX pad        - eyes closed 2x10 seconds  - weight shifts Left and Right 1x10 eyes open, 1x5 eyes closed  - holding stand posture while receiving perturbations from all directions   Standing at parallel bars working on lifting pool noodle over head to touch upper back then lowering to lower legs  1x5   Combo lower extremity exercise working on single leg standing and balance with no upper extremity support  - flexion, abduction, extension and heel raise 8x's on each leg   Standing hamstring curl   2x10 bilateral    Sitting in chair with ball at low back performing trunk extension towards neutral   2x10,    Pulley exercises sitting on balance disc on bar stool  Rows against 20# 2x10  Bicep curls against 20# 2x10   Nautilus Leg extension   2 plates, 2x10 for coordination with strengthening   Side stepping on AirX beam  x - 2x's back and forth    Tandem stepping on AirX beam  x - 2x's back and forth    Holding tandem stance x 5 seconds, 3x's each leg        Heel Taps   3x8 (B)   TKEs   30x (B) purple cord    Tandem Stance  3x30" (B)       THERAPEUTIC ACTIVITIES to improve dynamic and functional  performance for (0) minutes including:    Intervention Performed Today                                              Plan for Next Visit:      Gait Training for (0) minutes:    Patient " Education and Home Exercises     Home Exercises Provided and Patient Education Provided     Education provided:   - PT recommends ST eval and patient agrees.  PT will request order from MD.  - Patient educated to continue to stay active at home.  - Patient educated on the benefit of a Right knee cage to prevent hyperextension and protect his knee joint  - 6/20/22 Patient educated on results of testing today and Home Exercise Program.  He was also educated that the end of his Plan of Care is approaching and that PT would be requesting 4 more weeks of therapy.  He requested that PT move forward with requesting order for Right knee cage for home/community use.   - 6/27/22 PT discussed out of pocket cost related to order of knee cage.  He would like to consider all other options at this time.  Written Home Exercises Provided: Patient educated to continue with current Home Exercise Program. See EMR under Patient Instructions for exercises provided during future therapy sessions    ASSESSMENT     Valentín responded well to PT today with good effort.  He responded well to low back wrap to provide support and may benefit from a simple low back brace.  He continues to be limited by lower extremity weakness, balance deficits and muscle imbalances which contribute to his trunk rigidity/tightness.         Valentín Is progressing well towards his goals.   Pt prognosis is Good.     Pt will continue to benefit from skilled outpatient physical therapy to address the deficits listed in the problem list box on initial evaluation, provide pt/family education and to maximize pt's level of independence in the home and community environment.     Pt's spiritual, cultural and educational needs considered and pt agreeable to plan of care and goals.     Anticipated Barriers for therapy: sedentary lifestyle and chronicity of condition       GOALS:  SHORT TERM GOALS: 4 weeks Progress   1. Recent signs and systems trend is improving in order to  progress towards LTG's. Met   2. Patient will be independent with HEP in order to further progress and return to maximal function. Met   3. Pain rating at Worst: 5/10 in order to progress towards increased independence with activity. PC    4. Patient will be able to correct postural deviations in sitting and standing, to decrease pain and promote postural awareness for injury prevention.  Met with Right knee cage      LONG TERM GOALS: 8 weeks Progress   1. Patient will return to normal ADL, recreational, and work related activities with less pain and limitation.   PC   2. Patient will improve AROM to stated goals in order to return to maximal functional potential.   PC   3. Patient will improve Strength to stated goals of appropriate musculature in order to improve functional independence.   PC   4. Pain Rating at Best: 1/10 to improve Quality of Life.      5. Patient will meet predicted functional outcome (FOTO) score: N/A% to increase self-worth & perceived functional ability.  PC   6. Patient will have met/partially met personal goal of: being able to walk > 500ft independently with no pain in back.   PC   7. Added Goal: Patient will demonstrate improved lower extremity strength by performing 5x sit to stand test in 10 seconds.    8. Added Goal: Patient will demonstrate improved coordination and safety with gait by performing TUG in 18 seconds.              PLAN     Continue Plan of Care (POC) and progress per patient tolerance. See Treatment section for exercise progression.    Marcela Fishman, PT , DPT

## 2022-06-30 ENCOUNTER — CLINICAL SUPPORT (OUTPATIENT)
Dept: REHABILITATION | Facility: HOSPITAL | Age: 62
End: 2022-06-30
Payer: MEDICAID

## 2022-06-30 DIAGNOSIS — M62.81 MUSCLE WEAKNESS: ICD-10-CM

## 2022-06-30 DIAGNOSIS — R53.1 DECREASED STRENGTH, ENDURANCE, AND MOBILITY: Primary | ICD-10-CM

## 2022-06-30 DIAGNOSIS — R68.89 DECREASED STRENGTH, ENDURANCE, AND MOBILITY: Primary | ICD-10-CM

## 2022-06-30 DIAGNOSIS — Z98.1 S/P CERVICAL SPINAL FUSION: ICD-10-CM

## 2022-06-30 DIAGNOSIS — R13.12 OROPHARYNGEAL DYSPHAGIA: Primary | ICD-10-CM

## 2022-06-30 DIAGNOSIS — Z74.09 DECREASED STRENGTH, ENDURANCE, AND MOBILITY: Primary | ICD-10-CM

## 2022-06-30 DIAGNOSIS — R41.841 COGNITIVE COMMUNICATION DISORDER: ICD-10-CM

## 2022-06-30 PROCEDURE — 97110 THERAPEUTIC EXERCISES: CPT

## 2022-06-30 PROCEDURE — 92507 TX SP LANG VOICE COMM INDIV: CPT | Mod: KX

## 2022-06-30 NOTE — PROGRESS NOTES
[facility]  Speech Therapy Daily Note- Neurological Rehabilitation and Dysphagia     Date: 6/30/2022     Name: Valentín Field   MRN: [id number]   Therapy Diagnosis:   Encounter Diagnoses   Name Primary?    Oropharyngeal dysphagia Yes    Cognitive communication disorder     S/P cervical spinal fusion      Physician: Octavio Forrester MD  Physician Orders: [id number] - AMB REFERRAL/CONSULT TO SPEECH THERAPY  Medical Diagnosis: Z98.1 (ICD-10-CM) - S/P cervical spinal fusion    Visit #/ Visits Authorized: 7/4  Date of Evaluation:  5/5/2022  Insurance Authorization Period: 5/12/2022 - 8/10/2022  Plan of Care Expiration Date:  7/15/2022  Extended Plan of Care:  NA  Progress Note: 7/11/2022   Visits Cancelled: 1  Visits No Show: 1    Time In:  10:15 AM   Time Out:  10:50 AM   Total Billable Time: 35 minutes      Precautions: Standard  Subjective:   Patient reports: No significant changes since last session. Patient is scheduled with Neurology on 7/15/2022.   Response to previous treatment: Patient reported he is doing his swallowing exercises at home.   Pain Scale:  7/10 on a Visual Analog Scale currently.   Pain Location: shoulders/back   Objective:         UNTIMED  Procedure Min.   Speech- Language- Voice Therapy 35   Dysphagia Therapy  0   Total Timed Units: 0  Total Untimed Units: 1  Charges Billed/Number of units: 1      Short Term Goals: (5 weeks) Current Progress:   Patient will complete formal cognitive-communication assessment.      GOAL MET  5/12/2022         GOAL MET 5/12/2022   Patient will complete formal language assessment.      Discontinued  Not formally addressed. Discontinue due to primary concerns with memory.    Patient will complete a modified barium swallow study to assess swallow safety and efficiency and to determine the least restrictive diet.       COMPLETED 6/20/2022    Patient will sustain attention to complete moderate to complex reasoning tasks for 2 minutes with one request for  clarification to increase sustained attention.   Not formally addressed.    Patient will complete selective attention tasks (auditory or visual) with 90% accuracy independently increase selective attention.      Not formally addressed.     Patient will complete short term recall tasks after a 5 minutes delay with 90% accuracy  independently  with use of memory strategies to improve recall of information and generalization of memory strategies.   Not formally addressed.    Patient will independently predict time and accuracy of performance on trials within 10% of actual performance in 90% of opportunities.    Not formally addressed.    Patient will complete a functional task to improve attention, memory and/or executive functions (I.e. sample bill paying activity, recipe, or multiple choice comprehension questions to 1 paragraphs) with 80% accuracy and natural environment noise distractions (TV news background, music, etc.).   Not formally addressed.    Patient will be educated regarding cognitive deficits as applicable to the patient's life for increased awareness and will execute returned demonstration of information with 80% accuracy.    Not formally addressed.    Patient will use external aids to compensate for deficits in attention, memory, and thought organization with 90% accuracy independently   Patient reported using his phone calendar monthly to recall appointments and set up transportation for appointments. He was given a list of appointments for the month of July.    Patient will complete 50-75 effortful swallows per session in order to improve swallowing function. Completed X75 today given a model and moderate cueing for accuracy.    Patient will complete 20-30 Mendelsohn maneuvers per session in order to improve swallowing function. Completed X20 given specific instruction, a model, and moderate cueing for accuracy.    Patient will 3 sets of 1 minutes CTAR holds per session in order to improve  "swallowing function. Completed X1 although discontinued due to patient report of "sharp" pain in the back of the neck while completing these today.   Patient will complete 30 CTAR repetitionsper session in order to improve swallowing function. Completed X10 although discontinued due to patient report of "sharp" pain in the back of the neck while completing these today.    Patient participate in swallowing education with returned demonstration of information.  Additional education provided today.        Patient Education/Response:   Education provided regarding swallowing anatomy and physiology and the purposes of dysphagia exercises. He verbalized understanding.     Home program established: yes-Patient instructed to complete dysphagia exercises at home daily.   Exercises were reviewed and Valentín was able to demonstrate them prior to the end of the session.  Valentín demonstrated fair  understanding of the education provided.     See Electronic Medical Record under Patient Instructions for exercises provided throughout therapy.  Assessment:   Valentín is progressing well towards his goals. He continues to complain of pain with CTAR exercises. It may be beneficial to increase repetitions of Mendelsohn maneuvers and effortful swallows to compensate.  Current goals remain appropriate and will be updated as needed.     Patient prognosis is Good. Patient will continue to benefit from skilled outpatient speech and language therapy to address the deficits listed in the problem list on initial evaluation, provide patient/family education and to maximize patient's level of independence in the home and community environment.   Medical necessity is demonstrated by the following IMPAIRMENTS:  Dysphagia impacts quality of life and puts the patient at risk for aspiration related pneumonia.   Cognitive-communication deficits negatively impact quality of life, safety and independence during activities of daily living, and participation in " social and community interactions.   Barriers to Therapy: Unknown etiology of deficits; transportation   Patient's spiritual, cultural and educational needs considered and patient agreeable to plan of care and goals.  Plan:   Continue Plan of Care with focus on attention, memory, executive functions, and dysphagia exercises. Complete consultation with Neurology.     Adilia Graham, CCC-SLP, CBIS   Speech Language Pathologist   Certified Brain Injury Specialist   6/30/2022

## 2022-06-30 NOTE — PROGRESS NOTES
[facility]   Physical Therapy Treatment Note    Name: Valentín Field  Clinic Number: [id number]    Therapy Diagnosis:   Encounter Diagnoses   Name Primary?    Decreased strength, endurance, and mobility Yes    Muscle weakness      Physician: Octavio Forrester MD  Visit Date: 6/30/2022  Physician Orders: PT Eval and Treat  Medical Diagnosis from Referral: General Weakness  Evaluation Date: 4/21/2022  Authorization Period Expiration: 4/21/23  Plan of Care Expiration: 06/22/2022                          Progress Update: 07/20/2022                        Visit # / Visits authorized: 13/12 (+1 eval)                       FOTO: Visit #1 - Scored : 1 / 3      PRECAUTIONS: Standard Precautions and Safety/fall precautions      MD Visit: 04/25/2022  PTA Visit #: 0/5     Time In: 0915  Time Out: 1000  Total Billable Time:  44 minutes    SUBJECTIVE     Pt reports: Patient reports that he is doing ok.  He still has low back pain and tightness.  He reports  compliant with staying active at home between sessions.  Response to previous treatment: He responded well to last session.  Functional change: He reports no significant functional change since last session.    Pain:  7/10  Location: back     OBJECTIVE     Objective Measures updated at progress report unless specified.   (latest Measurable goals)   X- Not Tested     Test 6/20/22  Initial Eval   TUG 22 seconds 35.9 seconds    4 Square Step Test 14 seconds X    5x Sit to stand Test 13 seconds 38.8 seconds           MMT 6/20/22  Muscle Groups Left   6/22/22 Right   6/22/22   Hip Flexion 4-/5 3+/5   Hip Extension 3+/5 3+/5   Knee Flexion 4-/5 3+/5   Knee Extension 5/5 4+/5   Dorsiflexion 4+/5 4+/5   Plantarflexion 5/5 5/5   Inversion 4+/5 4+/5   Eversion 3+/5 3+/5       Treatment     Valentín received the treatments listed below:      THERAPEUTIC EXERCISES to develop strength, endurance, ROM, flexibility, posture and core stabilization for (15) minutes  including:    Intervention Performed Today    Standing Hip flexion  x 3x10 up onto 12inch step in parallel bars    Shuttle X  X 2x20 both legs 4 bands  2x10 single leg 3 bands   Standing lower extremity exercises, Right knee cage donned to improve posture of lower extremity              2# ankle weight bilateral :   - hip flexion over gibson 1x10 bilateral   - hip abduction over gibson 1x10 bilateral   - hip extension over gibson 1x10 bilateral   - heel raise on Air Ex [id number] bilateral   - hamstring curl 1x10 bilateral   - Alternating tapping foot to 8 inch step 1x10 bilateral    NuStep  x Level 3 for 6 minutes   Seated lower extremity exercises  long arc quad with 2# Left and 1# Right 1x15 bilateral    Seated hamstring curl against green band   1x20 bilateral    Sitting trunk extension over ball at back  2x10   Sitting hamstring/heelcord stretch  2x30 seconds bilateral    Standing heel cord stretch  x 2x15 seconds bilateral    Reviewed over all lower extremity exercises for Home Exercise Program discussing and practicing set-up and sequence of each with green theraband resistance  - Seated hamstring curl  - Standing hip flexion  - Standing hip abduction  - Standing hip adduction  - Standing hip extension  - Seated ankle eversion                  Neoprene wrap donned to low back for standing exercises to decrease pain and increase support  Right knee cage donned for standing lower extremity exercises.   Plan for Next Visit:      manual therapy techniques: Joint mobilizations, Myofacial release and Soft tissue Mobilization were applied for 0 minutes, including:    Intervention Performed Today                                                neuromuscular re-education activities to improve: Balance, Coordination, Kinesthetic, Sense, Proprioception and Posture for (25) minutes. The following activities were included:    Intervention Performed Today    NuStep   Level 2, 7 minutes   Parallel bars standing activities       "- Standing in staggered stance performing weight shifts forward/back 1x10 bilateral with 1 upper extremity support  - Stepping forward/back with 1-0 upper extremity supports  - stepping forward and backwards with scissoring vertical board in place working on stand balance with 1 upper extremity support   Supine on mat with bilateral lower extremities over Tball for core exercises    - Bridges with chest press between each rep holding bilateral 1# dumbbells  - lower trunk rotations moving arms opposite lower extremity movement with 1# dumbbells bilateral   - bilateral knees to chest performing bilateral upper extremity hammer motion down towards knees with each rep with 1# dumbbells bilateral         Standing on AirX pad        - eyes closed 2x10 seconds  - weight shifts Left and Right 1x10 eyes open, 1x5 eyes closed  - holding stand posture while receiving perturbations from all directions   Standing at parallel bars working on lifting pool noodle over head to touch upper back then lowering to lower legs  1x5   Combo lower extremity exercise working on single leg standing and balance with no upper extremity support  - flexion, abduction, extension and heel raise 8x's on each leg   Standing hamstring curl   2x10 bilateral    Sitting in chair with ball at low back performing trunk extension towards neutral   2x10,    Pulley exercises sitting on balance disc on bar stool  Rows against 20# 2x10  Bicep curls against 20# 2x10   Nautilus Leg extension   2 plates, 2x10 for coordination with strengthening   Side stepping on AirX beam  x - 2x's back and forth    Tandem stepping on AirX beam  x - 2x's back and forth    Holding tandem stance  5 seconds, 3x's each leg        Heel Taps  x 3x8 (B)   TKEs  x 30x (B) purple cord    Tandem Stance x 3x30" (B)       THERAPEUTIC ACTIVITIES to improve dynamic and functional  performance for (0) minutes including:    Intervention Performed Today                                          "     Plan for Next Visit:      Gait Training for (0) minutes:    Patient Education and Home Exercises     Home Exercises Provided and Patient Education Provided     Education provided:   - Patient educated to continue to stay active at home.  - Patient educated on the benefit of a Right knee cage to prevent hyperextension and protect his knee joint  - 6/20/22 Patient educated on results of testing today and Home Exercise Program.  He was also educated that the end of his Plan of Care is approaching and that PT would be requesting 4 more weeks of therapy.  He requested that PT move forward with requesting order for Right knee cage for home/community use.   - 6/27/22 PT discussed out of pocket cost related to order of knee cage.  He would like to consider all other options at this time.  Written Home Exercises Provided: Patient educated to continue with current Home Exercise Program. See EMR under Patient Instructions for exercises provided during future therapy sessions    ASSESSMENT     Valentín continues to display improvement with mobility, but he still continues to have problems with upright posture and motor control of knee with functional movements such as ambulation and navigating objects on the ground  He responded well to low back wrap to provide support and may benefit from a simple low back brace.  He continues to be limited by lower extremity weakness, balance deficits and muscle imbalances which contribute to his trunk rigidity/tightness.        Vlaentín Is progressing well towards his goals.   Pt prognosis is Good.     Pt will continue to benefit from skilled outpatient physical therapy to address the deficits listed in the problem list box on initial evaluation, provide pt/family education and to maximize pt's level of independence in the home and community environment.     Pt's spiritual, cultural and educational needs considered and pt agreeable to plan of care and goals.     Anticipated Barriers for  therapy: sedentary lifestyle and chronicity of condition       GOALS:  SHORT TERM GOALS: 4 weeks Progress   1. Recent signs and systems trend is improving in order to progress towards LTG's. Met   2. Patient will be independent with HEP in order to further progress and return to maximal function. Met   3. Pain rating at Worst: 5/10 in order to progress towards increased independence with activity. PC    4. Patient will be able to correct postural deviations in sitting and standing, to decrease pain and promote postural awareness for injury prevention.  Met with Right knee cage      LONG TERM GOALS: 8 weeks Progress   1. Patient will return to normal ADL, recreational, and work related activities with less pain and limitation.   PC   2. Patient will improve AROM to stated goals in order to return to maximal functional potential.   PC   3. Patient will improve Strength to stated goals of appropriate musculature in order to improve functional independence.   PC   4. Pain Rating at Best: 1/10 to improve Quality of Life.      5. Patient will meet predicted functional outcome (FOTO) score: N/A% to increase self-worth & perceived functional ability.  PC   6. Patient will have met/partially met personal goal of: being able to walk > 500ft independently with no pain in back.   PC   7. Added Goal: Patient will demonstrate improved lower extremity strength by performing 5x sit to stand test in 10 seconds.    8. Added Goal: Patient will demonstrate improved coordination and safety with gait by performing TUG in 18 seconds.              PLAN     Continue Plan of Care (POC) and progress per patient tolerance. See Treatment section for exercise progression.    Campos Chambers, PT , DPT

## 2022-07-07 ENCOUNTER — CLINICAL SUPPORT (OUTPATIENT)
Dept: REHABILITATION | Facility: HOSPITAL | Age: 62
End: 2022-07-07
Payer: MEDICAID

## 2022-07-07 DIAGNOSIS — R68.89 DECREASED STRENGTH, ENDURANCE, AND MOBILITY: Primary | ICD-10-CM

## 2022-07-07 DIAGNOSIS — R13.12 OROPHARYNGEAL DYSPHAGIA: Primary | ICD-10-CM

## 2022-07-07 DIAGNOSIS — M62.81 MUSCLE WEAKNESS: ICD-10-CM

## 2022-07-07 DIAGNOSIS — Z74.09 DECREASED STRENGTH, ENDURANCE, AND MOBILITY: Primary | ICD-10-CM

## 2022-07-07 DIAGNOSIS — R53.1 DECREASED STRENGTH, ENDURANCE, AND MOBILITY: Primary | ICD-10-CM

## 2022-07-07 DIAGNOSIS — Z98.1 S/P CERVICAL SPINAL FUSION: ICD-10-CM

## 2022-07-07 DIAGNOSIS — R41.841 COGNITIVE COMMUNICATION DISORDER: ICD-10-CM

## 2022-07-07 PROCEDURE — 92507 TX SP LANG VOICE COMM INDIV: CPT

## 2022-07-07 PROCEDURE — 97112 NEUROMUSCULAR REEDUCATION: CPT | Mod: 59

## 2022-07-07 PROCEDURE — 97110 THERAPEUTIC EXERCISES: CPT | Mod: 59

## 2022-07-07 NOTE — PROGRESS NOTES
[facility]  Speech Therapy Progress Note- Neurological Rehabilitation and Dysphagia     Date: 7/7/2022     Name: Valentín Field   MRN: [id number]   Therapy Diagnosis:   Encounter Diagnoses   Name Primary?    Oropharyngeal dysphagia Yes    Cognitive communication disorder     S/P cervical spinal fusion      Physician: Octavio Forrester MD  Physician Orders: [id number] - AMB REFERRAL/CONSULT TO SPEECH THERAPY  Medical Diagnosis: Z98.1 (ICD-10-CM) - S/P cervical spinal fusion    Visit #/ Visits Authorized: 8/4  Date of Evaluation:  5/5/2022  Insurance Authorization Period: 5/12/2022 - 8/10/2022  Plan of Care Expiration Date:  7/15/2022  Extended Plan of Care:  NA  Progress Note: 7/7/2022   Visits Cancelled: 1  Visits No Show: 1    Time In:  10:50 AM   Time Out:  11:35 AM   Total Billable Time: 45 minutes      Precautions: Standard  Subjective:   Patient reports: He reported he is completing his swallowing exercises. Patient is scheduled with Neurology on 7/15/2022.   Response to previous treatment: Recalls swallowing exercises.   Pain Scale:  7/10 on a Visual Analog Scale currently.   Pain Location: shoulders/back   Objective:         UNTIMED  Procedure Min.   Speech- Language- Voice Therapy 45   Dysphagia Therapy  0   Total Timed Units: 0  Total Untimed Units: 1  Charges Billed/Number of units: 1      Short Term Goals: (5 weeks) Current Progress:   Patient will complete formal cognitive-communication assessment.      GOAL MET  5/12/2022         GOAL MET 5/12/2022   Patient will complete formal language assessment.      Discontinued  Not formally addressed. Discontinue due to primary concerns with memory.    Patient will complete a modified barium swallow study to assess swallow safety and efficiency and to determine the least restrictive diet.       COMPLETED 6/20/2022    Patient will sustain attention to complete moderate to complex reasoning tasks for 2 minutes with one request for clarification to  "increase sustained attention.   Not formally addressed.    Patient will complete selective attention tasks (auditory or visual) with 90% accuracy independently increase selective attention.      Visual selective attention task completed with 100% accuracy.     Patient will complete short term recall tasks after a 5 minutes delay with 90% accuracy  independently  with use of memory strategies to improve recall of information and generalization of memory strategies.   Recall of visual scene after a 5 minute delay: 100% accuracy independently     Recall of four related words after a 5 minute delay: 80% accuracy independently (patient recalled 3/4 words independently then stated "jacket" instead of the original word "coat")     Recall of four unrelated words after a 5 minute delay: 75% accuracy independently     Recall of four numbers after a 5 minute delay: 100% accuracy independently (although not in the correct order)    Patient will independently predict time and accuracy of performance on trials within 10% of actual performance in 90% of opportunities.    Not formally addressed.    Patient will complete a functional task to improve attention, memory and/or executive functions (I.e. sample bill paying activity, recipe, or multiple choice comprehension questions to 1 paragraphs) with 80% accuracy and natural environment noise distractions (TV news background, music, etc.).   Completed task (multiple choice comprehension questions) with 77% accuracy independently.    Patient will be educated regarding cognitive deficits as applicable to the patient's life for increased awareness and will execute returned demonstration of information with 80% accuracy.    Discussed today. Patient and clinician discussed the reasoning behind his upcoming visit with the Neurologist.    Patient will use external aids to compensate for deficits in attention, memory, and thought organization with 90% accuracy independently   Patient " reported using his phone calendar monthly to recall appointments and set up transportation for appointments. He reported he has his list of appointments for the month at home.    Patient will complete 50-75 effortful swallows per session in order to improve swallowing function. Completed X50 today given a model and moderate cueing for accuracy.    Patient will complete 20-30 Mendelsohn maneuvers per session in order to improve swallowing function. Completed X20 given specific instruction, a model, and moderate cueing for accuracy.    Patient will 3 sets of 1 minutes CTAR holds per session in order to improve swallowing function. Discontinue due to reports of sharp pain.   Patient will complete 30 CTAR repetitionsper session in order to improve swallowing function. Discontinue due to reports of sharp pain.    Patient participate in swallowing education with returned demonstration of information.  Additional education provided today.        Patient Education/Response:   Education provided regarding cognitive deficits and the reasoning behind his upcoming Neurology appointment. He verbalized understanding.     Home program established: yes-Patient instructed to complete dysphagia exercises at home daily.   Exercises were reviewed and Valentín was able to demonstrate them prior to the end of the session.  Valentín demonstrated fair  understanding of the education provided.     See Electronic Medical Record under Patient Instructions for exercises provided throughout therapy.  Assessment:   Valentín is progressing well towards his goals. CTAR exercises have been discontinued due to reports of pain. Current goals remain appropriate and will be updated as needed.     Patient prognosis is Good. Patient will continue to benefit from skilled outpatient speech and language therapy to address the deficits listed in the problem list on initial evaluation, provide patient/family education and to maximize patient's level of independence in  the home and community environment.   Medical necessity is demonstrated by the following IMPAIRMENTS:  Dysphagia impacts quality of life and puts the patient at risk for aspiration related pneumonia.   Cognitive-communication deficits negatively impact quality of life, safety and independence during activities of daily living, and participation in social and community interactions.   Barriers to Therapy: Unknown etiology of deficits; transportation   Patient's spiritual, cultural and educational needs considered and patient agreeable to plan of care and goals.  Plan:   Continue Plan of Care with focus on attention, memory, executive functions, and dysphagia exercises. Complete consultation with Neurology.     Adilia Graham, CCC-SLP, CBIS   Speech Language Pathologist   Certified Brain Injury Specialist   7/7/2022

## 2022-07-07 NOTE — PROGRESS NOTES
[facility]   Physical Therapy Treatment Note + Updated Plan of Care     Name: Valentín Field  Clinic Number: [id number]    Therapy Diagnosis:   Encounter Diagnoses   Name Primary?    Decreased strength, endurance, and mobility Yes    Muscle weakness      Physician: Octavio Forrester MD  Visit Date: 7/7/2022  Physician Orders: PT Eval and Treat  Medical Diagnosis from Referral: General Weakness  Evaluation Date: 4/21/2022  Authorization Period Expiration: 4/21/23  Plan of Care Expiration: 06/22/2022  (Extend 07/20/22)                         Progress Update: 07/20/2022                        Visit # / Visits authorized: 14/12 (+1 eval)                       FOTO: Visit #1 - Scored : 1 / 3      PRECAUTIONS: Standard Precautions and Safety/fall precautions      MD Visit: 04/25/2022  PTA Visit #: 0/5     Time In: 0950  Time Out: 1035  Total Billable Time:  44 minutes    SUBJECTIVE     Pt reports: Patient reports that he is doing ok.  He still has low back pain and tightness.  He reports  compliant with staying active at home between sessions.  Response to previous treatment: He responded well to last session.  Functional change: He reports no significant functional change since last session.    Pain:  5/10  Location: back     OBJECTIVE     Objective Measures updated at progress report unless specified.     Treatment     Valentín received the treatments listed below:      THERAPEUTIC EXERCISES to develop strength, endurance, ROM, flexibility, posture and core stabilization for (15) minutes including:    Intervention Performed Today    Standing Hip flexion  x 3x10 up onto 12inch step in parallel bars    Shuttle X  X 2x20 both legs 4 bands  2x10 single leg 3 bands   Standing lower extremity exercises, Right knee cage donned to improve posture of lower extremity              2# ankle weight bilateral :   - hip flexion over gibson 1x10 bilateral   - hip abduction over gibosn 1x10 bilateral   - hip  extension over gibson 1x10 bilateral   - heel raise on Air Ex [id number] bilateral   - hamstring curl 1x10 bilateral   - Alternating tapping foot to 8 inch step 1x10 bilateral    NuStep  x Level 3 for 6 minutes   Seated lower extremity exercises  long arc quad with 2# Left and 1# Right 1x15 bilateral    Seated hamstring curl against green band   1x20 bilateral    Sitting trunk extension over ball at back  2x10   Sitting hamstring/heelcord stretch  2x30 seconds bilateral    Standing heel cord stretch   2x15 seconds bilateral    Reviewed over all lower extremity exercises for Home Exercise Program discussing and practicing set-up and sequence of each with green theraband resistance  - Seated hamstring curl  - Standing hip flexion  - Standing hip abduction  - Standing hip adduction  - Standing hip extension  - Seated ankle eversion                  Neoprene wrap donned to low back for standing exercises to decrease pain and increase support  Right knee cage donned for standing lower extremity exercises.   Plan for Next Visit:      manual therapy techniques: Joint mobilizations, Myofacial release and Soft tissue Mobilization were applied for 0 minutes, including:    Intervention Performed Today                                                neuromuscular re-education activities to improve: Balance, Coordination, Kinesthetic, Sense, Proprioception and Posture for (25) minutes. The following activities were included:    Intervention Performed Today    NuStep   Level 2, 7 minutes   Parallel bars standing activities      - Standing in staggered stance performing weight shifts forward/back 1x10 bilateral with 1 upper extremity support  - Stepping forward/back with 1-0 upper extremity supports  - stepping forward and backwards with scissoring vertical board in place working on stand balance with 1 upper extremity support   Supine on mat with bilateral lower extremities over Tball for core exercises    - Bridges with chest press  "between each rep holding bilateral 1# dumbbells  - lower trunk rotations moving arms opposite lower extremity movement with 1# dumbbells bilateral   - bilateral knees to chest performing bilateral upper extremity hammer motion down towards knees with each rep with 1# dumbbells bilateral         Standing on AirX pad        - eyes closed 2x10 seconds  - weight shifts Left and Right 1x10 eyes open, 1x5 eyes closed  - holding stand posture while receiving perturbations from all directions   Standing at parallel bars working on lifting pool noodle over head to touch upper back then lowering to lower legs  1x5   Combo lower extremity exercise working on single leg standing and balance with no upper extremity support  - flexion, abduction, extension and heel raise 8x's on each leg   Standing hamstring curl   2x10 bilateral    Sitting in chair with ball at low back performing trunk extension towards neutral   2x10,    Pulley exercises sitting on balance disc on bar stool  Rows against 20# 2x10  Bicep curls against 20# 2x10   Nautilus Leg extension   2 plates, 2x10 for coordination with strengthening   Side stepping on AirX beam  x - 2x's back and forth    Tandem stepping on AirX beam  x - 2x's back and forth    Holding tandem stance  5 seconds, 3x's each leg   Sit<>stand  X  3x8 low mat + blue foam    Heel Taps  x 3x8 (B)   TKEs  x 30x (B) purple cord    Tandem Stance x 3x30" (B)       THERAPEUTIC ACTIVITIES to improve dynamic and functional  performance for (0) minutes including:    Intervention Performed Today                                              Plan for Next Visit:      Gait Training for (0) minutes:    Patient Education and Home Exercises     Home Exercises Provided and Patient Education Provided     Education provided:   - Patient educated to continue to stay active at home.  - Patient educated on the benefit of a Right knee cage to prevent hyperextension and protect his knee joint  - 6/20/22 Patient educated " on results of testing today and Home Exercise Program.  He was also educated that the end of his Plan of Care is approaching and that PT would be requesting 4 more weeks of therapy.  He requested that PT move forward with requesting order for Right knee cage for home/community use.   - 6/27/22 PT discussed out of pocket cost related to order of knee cage.  He would like to consider all other options at this time.  Written Home Exercises Provided: Patient educated to continue with current Home Exercise Program. See EMR under Patient Instructions for exercises provided during future therapy sessions    ASSESSMENT     Valentín Field tolerated PT session well with moderate complaints of pain or discomfort, secondary to poor motor control, decrease muscle strength, and decrease muscle endurance. Low back brace during mobility training helps with distal movement of limbs. Objective findings are improving with measurements of functional mobility.  Therapy exercises were reviewed by revisiting exercises given from previous home exercise program while adding no new exercises.  Handouts were not issued during today's visit. Valentín demonstrated good understanding of new exercises and will continue to progress at home until next follow-up.       Valentín Is progressing well towards his goals.   Pt prognosis is Good.     Pt will continue to benefit from skilled outpatient physical therapy to address the deficits listed in the problem list box on initial evaluation, provide pt/family education and to maximize pt's level of independence in the home and community environment.     Pt's spiritual, cultural and educational needs considered and pt agreeable to plan of care and goals.     Anticipated Barriers for therapy: sedentary lifestyle and chronicity of condition       GOALS:  SHORT TERM GOALS: 4 weeks Progress   1. Recent signs and systems trend is improving in order to progress towards LTG's. Met   2. Patient will be independent  with HEP in order to further progress and return to maximal function. Met   3. Pain rating at Worst: 5/10 in order to progress towards increased independence with activity. PC    4. Patient will be able to correct postural deviations in sitting and standing, to decrease pain and promote postural awareness for injury prevention.  Met with Right knee cage      LONG TERM GOALS: 8 weeks Progress   1. Patient will return to normal ADL, recreational, and work related activities with less pain and limitation.   PC   2. Patient will improve AROM to stated goals in order to return to maximal functional potential.   PC   3. Patient will improve Strength to stated goals of appropriate musculature in order to improve functional independence.   PC   4. Pain Rating at Best: 1/10 to improve Quality of Life.      5. Patient will meet predicted functional outcome (FOTO) score: N/A% to increase self-worth & perceived functional ability.  PC   6. Patient will have met/partially met personal goal of: being able to walk > 500ft independently with no pain in back.   PC   7. Added Goal: Patient will demonstrate improved lower extremity strength by performing 5x sit to stand test in 10 seconds.    8. Added Goal: Patient will demonstrate improved coordination and safety with gait by performing TUG in 18 seconds.              PLAN     Continue Plan of Care (POC) and progress per patient tolerance. See Treatment section for exercise progression.    (Extend 07/20/22)     Campos Chambers, PT , DPT

## 2022-07-08 ENCOUNTER — CLINICAL SUPPORT (OUTPATIENT)
Dept: REHABILITATION | Facility: HOSPITAL | Age: 62
End: 2022-07-08
Payer: MEDICAID

## 2022-07-08 DIAGNOSIS — R13.12 OROPHARYNGEAL DYSPHAGIA: Primary | ICD-10-CM

## 2022-07-08 DIAGNOSIS — R41.841 COGNITIVE COMMUNICATION DISORDER: ICD-10-CM

## 2022-07-08 DIAGNOSIS — Z98.1 S/P CERVICAL SPINAL FUSION: ICD-10-CM

## 2022-07-08 PROCEDURE — 97110 THERAPEUTIC EXERCISES: CPT | Mod: CQ

## 2022-07-08 NOTE — PROGRESS NOTES
[facility]   Physical Therapy Treatment Note + Updated Plan of Care     Name: Valentín Field  Clinic Number: [id number]    Therapy Diagnosis:   Encounter Diagnoses   Name Primary?    Oropharyngeal dysphagia Yes    Cognitive communication disorder     S/P cervical spinal fusion      Physician: Octavio Forrester MD  Visit Date: 7/8/2022  Physician Orders: PT Eval and Treat  Medical Diagnosis from Referral: General Weakness  Evaluation Date: 4/21/2022  Authorization Period Expiration: 4/21/23  Plan of Care Expiration: 06/22/2022  (Extend 07/20/22)                         Progress Update: 07/20/2022                        Visit # / Visits authorized: 14/12 (+1 eval)                       FOTO: Visit #1 - Scored : 1 / 3      PRECAUTIONS: Standard Precautions and Safety/fall precautions      MD Visit: 04/25/2022  PTA Visit #: 0/5     Time In: 0950  Time Out: 1035  Total Billable Time:  36 minutes Billing reflects [place] medicaid guidelines, billing all therapy as therapeutic-exercise    SUBJECTIVE     Pt reports:numbness in his shoulders. July 20 will be getting an epidural, and on July 15 he will be getting fitted for a right knee brace.      He reports  compliant with staying active at home between sessions.    Response to previous treatment: He responded well to last session.    Functional change: He reports no significant functional change since last session.    Pain:  7/10  Location: bilateral shoulders     OBJECTIVE     Objective Measures updated at progress report unless specified.     Treatment     Valentín received the treatments listed below:      THERAPEUTIC EXERCISES to develop strength, endurance, ROM, flexibility, posture and core stabilization for (31) minutes including:    Intervention Performed Today    Standing Hip flexion   3x10 up onto 12inch step in parallel bars    Shuttle    2x20 both legs 4 bands  2x10 single leg 3 bands   Standing lower extremity exercises, Right knee  cage donned to improve posture of lower extremity   x  x  x  x  x  x 2# ankle weight bilateral :   - hip flexion over gibson 1x10 bilateral   - hip abduction over gibson 1x10 bilateral   - hip extension over gibson 1x10 bilateral   - heel raise on AirEx beam 2 x 20 bilateral   - hamstring curl 1x10 bilateral   - Alternating tapping foot to 8 inch step 1x10 bilateral    NuStep  x Level 3 for 6 minutes   Seated lower extremity exercises  long arc quad with 2# Left and 1# Right 1x15 bilateral    Seated hamstring curl against green band  x 1x20 bilateral    Sitting trunk extension over ball at back x 2 x 10 3 second hold   Sitting hamstring/heelcord stretch  2 x 30 seconds bilateral    Standing heel cord stretch  x 2x15 seconds bilateral    Reviewed over all lower extremity exercises for Home Exercise Program discussing and practicing set-up and sequence of each with green theraband resistance  - Seated hamstring curl  - Standing hip flexion  - Standing hip abduction  - Standing hip adduction  - Standing hip extension  - Seated ankle eversion                  Neoprene wrap donned to low back for standing exercises to decrease pain and increase support  Right knee cage donned for standing lower extremity exercises.   Plan for Next Visit:      neuromuscular re-education activities to improve: Balance, Coordination, Kinesthetic, Sense, Proprioception and Posture for (5) minutes. The following activities were included:    Intervention Performed Today    NuStep   Level 2, 7 minutes   Parallel bars standing activities      - Standing in staggered stance performing weight shifts forward/back 1x10 bilateral with 1 upper extremity support  - Stepping forward/back with 1-0 upper extremity supports  - stepping forward and backwards with scissoring vertical board in place working on stand balance with 1 upper extremity support   Supine on mat with bilateral lower extremities over Tball for core exercises    - Bridges with chest  "press between each rep holding bilateral 1# dumbbells  - lower trunk rotations moving arms opposite lower extremity movement with 1# dumbbells bilateral   - bilateral knees to chest performing bilateral upper extremity hammer motion down towards knees with each rep with 1# dumbbells bilateral         Standing on AirX pad        - eyes closed 2x10 seconds  - weight shifts Left and Right 1x10 eyes open, 1x5 eyes closed  - holding stand posture while receiving perturbations from all directions   Standing at parallel bars working on lifting pool noodle over head to touch upper back then lowering to lower legs  1x5   Combo lower extremity exercise working on single leg standing and balance with no upper extremity support  - flexion, abduction, extension and heel raise 8x's on each leg   Standing hamstring curl   2x10 bilateral    Sitting in chair with ball at low back performing trunk extension towards neutral   2x10,    Pulley exercises sitting on balance disc on bar stool  Rows against 20# 2x10  Bicep curls against 20# 2x10   Nautilus Leg extension   2 plates, 2x10 for coordination with strengthening   Side stepping on AirX beam   - 2x's back and forth    Tandem stepping on AirX beam   - 2x's back and forth    Holding tandem stance  5 seconds, 3x's each leg   Sit<>stand   3x8 low mat + blue foam    Heel taps   3x8 (B)   Terminal knee extension  x 30x (Bilateral) purple cooks cord    Tandem Stance  3x30" (B)     Gait Training for (0) minutes:    Patient Education and Home Exercises     Home Exercises Provided and Patient Education Provided     Education provided:   - Patient educated to continue to stay active at home.  - Patient educated on the benefit of a Right knee cage to prevent hyperextension and protect his knee joint  - 6/20/22 Patient educated on results of testing today and Home Exercise Program.  He was also educated that the end of his Plan of Care is approaching and that PT would be requesting 4 more weeks " of therapy.  He requested that PT move forward with requesting order for Right knee cage for home/community use.   - 6/27/22 PT discussed out of pocket cost related to order of knee cage.  He would like to consider all other options at this time.  Written Home Exercises Provided: Patient educated to continue with current Home Exercise Program. See EMR under Patient Instructions for exercises provided during future therapy sessions    ASSESSMENT     Valentín Field tolerated this session with good quality. He demonstrated decreased bilateral foot clearance with stepping over small hurdles in backward and side to side directions.      Valentín Is progressing well towards his goals.   Pt prognosis is Good.     Pt will continue to benefit from skilled outpatient physical therapy to address the deficits listed in the problem list box on initial evaluation, provide pt/family education and to maximize pt's level of independence in the home and community environment.     Pt's spiritual, cultural and educational needs considered and pt agreeable to plan of care and goals.     Anticipated Barriers for therapy: sedentary lifestyle and chronicity of condition       GOALS:  SHORT TERM GOALS: 4 weeks Progress   1. Recent signs and systems trend is improving in order to progress towards LTG's. Met   2. Patient will be independent with HEP in order to further progress and return to maximal function. Met   3. Pain rating at Worst: 5/10 in order to progress towards increased independence with activity. PC    4. Patient will be able to correct postural deviations in sitting and standing, to decrease pain and promote postural awareness for injury prevention.  Met with Right knee cage      LONG TERM GOALS: 8 weeks Progress   1. Patient will return to normal ADL, recreational, and work related activities with less pain and limitation.   PC   2. Patient will improve AROM to stated goals in order to return to maximal functional potential.   PC    3. Patient will improve Strength to stated goals of appropriate musculature in order to improve functional independence.   PC   4. Pain Rating at Best: 1/10 to improve Quality of Life.      5. Patient will meet predicted functional outcome (FOTO) score: N/A% to increase self-worth & perceived functional ability.  PC   6. Patient will have met/partially met personal goal of: being able to walk > 500ft independently with no pain in back.   PC   7. Added Goal: Patient will demonstrate improved lower extremity strength by performing 5x sit to stand test in 10 seconds.    8. Added Goal: Patient will demonstrate improved coordination and safety with gait by performing TUG in 18 seconds.              PLAN     Continue Plan of Care (POC) and progress per patient tolerance. See Treatment section for exercise progression.    (Extend 07/20/22)     Cj Gonzalez PTA

## 2022-07-11 DIAGNOSIS — Z98.890 S/P SPINAL SURGERY: ICD-10-CM

## 2022-07-11 RX ORDER — POLYETHYLENE GLYCOL 3350 17 G/17G
17 POWDER, FOR SOLUTION ORAL DAILY
Qty: 510 G | Refills: 0 | Status: SHIPPED | OUTPATIENT
Start: 2022-07-11 | End: 2022-09-29 | Stop reason: SDUPTHER

## 2022-07-11 RX ORDER — GABAPENTIN 300 MG/1
300 CAPSULE ORAL 3 TIMES DAILY
Qty: 90 CAPSULE | Refills: 1 | Status: SHIPPED | OUTPATIENT
Start: 2022-07-11 | End: 2022-08-17

## 2022-07-11 RX ORDER — CYCLOBENZAPRINE HCL 10 MG
10 TABLET ORAL 3 TIMES DAILY PRN
Qty: 60 TABLET | Refills: 1 | Status: SHIPPED | OUTPATIENT
Start: 2022-07-11 | End: 2022-09-29 | Stop reason: SDUPTHER

## 2022-07-12 ENCOUNTER — CLINICAL SUPPORT (OUTPATIENT)
Dept: REHABILITATION | Facility: HOSPITAL | Age: 62
End: 2022-07-12
Payer: MEDICAID

## 2022-07-12 ENCOUNTER — TELEPHONE (OUTPATIENT)
Dept: ORTHOPEDICS | Facility: CLINIC | Age: 62
End: 2022-07-12
Payer: MEDICAID

## 2022-07-12 DIAGNOSIS — M62.81 MUSCLE WEAKNESS: ICD-10-CM

## 2022-07-12 DIAGNOSIS — Z74.09 DECREASED STRENGTH, ENDURANCE, AND MOBILITY: Primary | ICD-10-CM

## 2022-07-12 DIAGNOSIS — R53.1 DECREASED STRENGTH, ENDURANCE, AND MOBILITY: Primary | ICD-10-CM

## 2022-07-12 DIAGNOSIS — R68.89 DECREASED STRENGTH, ENDURANCE, AND MOBILITY: Primary | ICD-10-CM

## 2022-07-12 PROCEDURE — 97110 THERAPEUTIC EXERCISES: CPT

## 2022-07-12 NOTE — TELEPHONE ENCOUNTER
Spoke to patient, reports pharmacy did not fill the glycolax. I told him it was OTC but he said it was covered by his insurance before. I called the pharmacy and they were able to fill it through his insurance. Called patient to let him know. He was thankful.

## 2022-07-12 NOTE — PROGRESS NOTES
[facility]   Physical Therapy Treatment Note     Name: Valentín Field  Clinic Number: [id number]    Therapy Diagnosis:   Encounter Diagnoses   Name Primary?    Decreased strength, endurance, and mobility Yes    Muscle weakness      Physician: Octavio Forrester MD  Visit Date: 7/12/2022  Physician Orders: PT Eval and Treat  Medical Diagnosis from Referral: General Weakness  Evaluation Date: 4/21/2022  Authorization Period Expiration: 4/21/23  Plan of Care Expiration: 06/22/2022  (Extend 07/20/22)                         Progress Update: 07/20/2022                        Visit # / Visits authorized: 14/12 (+1 eval)                       FOTO: Visit #1 - Scored : 1 / 3      PRECAUTIONS: Standard Precautions and Safety/fall precautions      MD Visit: 04/25/2022  PTA Visit #: 0/5     Time In: 0915  Time Out: 1000  Total Billable Time:  40 minutes Billing reflects [place] medicaid guidelines, billing all therapy as therapeutic-exercise    SUBJECTIVE     Pt reports:increase low back discomfort with functional movement such as sit<>stand.       He reports  compliant with staying active at home between sessions.    Response to previous treatment: He responded well to last session.    Functional change: He reports no significant functional change since last session.    Pain:  7/10  Location: low back     OBJECTIVE     Objective Measures updated at progress report unless specified.     Treatment     Valentín received the treatments listed below:      THERAPEUTIC EXERCISES to develop strength, endurance, ROM, flexibility, posture and core stabilization for (15) minutes including:    Intervention Performed Today    Standing Hip flexion   3x10 up onto 12inch step in parallel bars    Shuttle X  x   2x20 both legs 4 bands  2x10 single leg 3 bands   Standing lower extremity exercises, Right knee cage donned to improve posture of lower extremity            2# ankle weight bilateral :   - hip flexion over  gibson 1x10 bilateral   - hip abduction over gibson 1x10 bilateral   - hip extension over gibson 1x10 bilateral   - heel raise on AirEx beam 2 x 20 bilateral   - hamstring curl 1x10 bilateral   - Alternating tapping foot to 8 inch step 1x10 bilateral    NuStep  x Level 3 for 6 minutes   Seated lower extremity exercises  long arc quad with 2# Left and 1# Right 1x15 bilateral    Seated hamstring curl against green band  x 1x20 bilateral    Sitting trunk extension over ball at back  2 x 10 3 second hold   Sitting hamstring/heelcord stretch  2 x 30 seconds bilateral    Standing heel cord stretch   2x15 seconds bilateral    Reviewed over all lower extremity exercises for Home Exercise Program discussing and practicing set-up and sequence of each with green theraband resistance  - Seated hamstring curl  - Standing hip flexion  - Standing hip abduction  - Standing hip adduction  - Standing hip extension  - Seated ankle eversion                  Neoprene wrap donned to low back for standing exercises to decrease pain and increase support  Right knee cage donned for standing lower extremity exercises.   Plan for Next Visit:      neuromuscular re-education activities to improve: Balance, Coordination, Kinesthetic, Sense, Proprioception and Posture for (25) minutes. The following activities were included:    Intervention Performed Today    NuStep   Level 2, 7 minutes   Parallel bars standing activities x  x  x - Standing in staggered stance performing weight shifts forward/back 1x10 bilateral with 1 upper extremity support  - Stepping forward/back with 1-0 upper extremity supports  - stepping forward and backwards with scissoring vertical board in place working on stand balance with 1 upper extremity support   Supine on mat with bilateral lower extremities over Tball for core exercises x   - Bridges with chest press between each rep holding bilateral 1# dumbbells  - lower trunk rotations moving arms opposite lower extremity  "movement with 1# dumbbells bilateral   - bilateral knees to chest performing bilateral upper extremity hammer motion down towards knees with each rep with 1# dumbbells bilateral         Standing on AirX pad        - eyes closed 2x10 seconds  - weight shifts Left and Right 1x10 eyes open, 1x5 eyes closed  - holding stand posture while receiving perturbations from all directions   Standing at parallel bars working on lifting pool noodle over head to touch upper back then lowering to lower legs  1x5   Combo lower extremity exercise working on single leg standing and balance with no upper extremity support  - flexion, abduction, extension and heel raise 8x's on each leg   Standing hamstring curl   2x10 bilateral    Sitting in chair with ball at low back performing trunk extension towards neutral   2x10,    Pulley exercises sitting on balance disc on bar stool  Rows against 20# 2x10  Bicep curls against 20# 2x10   Nautilus Leg extension   2 plates, 2x10 for coordination with strengthening   Side stepping on AirX beam   - 2x's back and forth    Tandem stepping on AirX beam  x - 2x's back and forth    Holding tandem stance  5 seconds, 3x's each leg   Sit<>stand  x 3x8 low mat + blue foam    Heel taps  x 3x8 (B)   Terminal knee extension  x 30x (Bilateral) purple cooks cord    Tandem Stance  3x30" (B)     Gait Training for (0) minutes:    Patient Education and Home Exercises     Home Exercises Provided and Patient Education Provided     Education provided:   - Patient educated to continue to stay active at home.  - Patient educated on the benefit of a Right knee cage to prevent hyperextension and protect his knee joint  - 6/20/22 Patient educated on results of testing today and Home Exercise Program.  He was also educated that the end of his Plan of Care is approaching and that PT would be requesting 4 more weeks of therapy.  He requested that PT move forward with requesting order for Right knee cage for home/community use. "   - 6/27/22 PT discussed out of pocket cost related to order of knee cage.  He would like to consider all other options at this time.  Written Home Exercises Provided: Patient educated to continue with current Home Exercise Program. See EMR under Patient Instructions for exercises provided during future therapy sessions    ASSESSMENT     Valentín Field tolerated this session with good quality. He demonstrated decreased bilateral foot clearance with stepping over small hurdles in backward and side to side directions.      Valentín Is progressing well towards his goals.   Pt prognosis is Good.     Pt will continue to benefit from skilled outpatient physical therapy to address the deficits listed in the problem list box on initial evaluation, provide pt/family education and to maximize pt's level of independence in the home and community environment.     Pt's spiritual, cultural and educational needs considered and pt agreeable to plan of care and goals.     Anticipated Barriers for therapy: sedentary lifestyle and chronicity of condition       GOALS:  SHORT TERM GOALS: 4 weeks Progress   1. Recent signs and systems trend is improving in order to progress towards LTG's. Met   2. Patient will be independent with HEP in order to further progress and return to maximal function. Met   3. Pain rating at Worst: 5/10 in order to progress towards increased independence with activity. PC    4. Patient will be able to correct postural deviations in sitting and standing, to decrease pain and promote postural awareness for injury prevention.  Met with Right knee cage      LONG TERM GOALS: 8 weeks Progress   1. Patient will return to normal ADL, recreational, and work related activities with less pain and limitation.   PC   2. Patient will improve AROM to stated goals in order to return to maximal functional potential.   PC   3. Patient will improve Strength to stated goals of appropriate musculature in order to improve functional  independence.   PC   4. Pain Rating at Best: 1/10 to improve Quality of Life.      5. Patient will meet predicted functional outcome (FOTO) score: N/A% to increase self-worth & perceived functional ability.  PC   6. Patient will have met/partially met personal goal of: being able to walk > 500ft independently with no pain in back.   PC   7. Added Goal: Patient will demonstrate improved lower extremity strength by performing 5x sit to stand test in 10 seconds.    8. Added Goal: Patient will demonstrate improved coordination and safety with gait by performing TUG in 18 seconds.              PLAN     Continue Plan of Care (POC) and progress per patient tolerance. See Treatment section for exercise progression.    (Extend 07/20/22)     Campos Chambers PT DPT

## 2022-07-14 ENCOUNTER — CLINICAL SUPPORT (OUTPATIENT)
Dept: REHABILITATION | Facility: HOSPITAL | Age: 62
End: 2022-07-14
Payer: MEDICAID

## 2022-07-14 DIAGNOSIS — R41.841 COGNITIVE COMMUNICATION DISORDER: ICD-10-CM

## 2022-07-14 DIAGNOSIS — R68.89 DECREASED STRENGTH, ENDURANCE, AND MOBILITY: Primary | ICD-10-CM

## 2022-07-14 DIAGNOSIS — R53.1 DECREASED STRENGTH, ENDURANCE, AND MOBILITY: Primary | ICD-10-CM

## 2022-07-14 DIAGNOSIS — R13.12 OROPHARYNGEAL DYSPHAGIA: Primary | ICD-10-CM

## 2022-07-14 DIAGNOSIS — M62.81 MUSCLE WEAKNESS: ICD-10-CM

## 2022-07-14 DIAGNOSIS — Z74.09 DECREASED STRENGTH, ENDURANCE, AND MOBILITY: Primary | ICD-10-CM

## 2022-07-14 DIAGNOSIS — Z98.1 S/P CERVICAL SPINAL FUSION: ICD-10-CM

## 2022-07-14 PROCEDURE — 92507 TX SP LANG VOICE COMM INDIV: CPT

## 2022-07-14 PROCEDURE — 97110 THERAPEUTIC EXERCISES: CPT | Mod: 59

## 2022-07-14 NOTE — PLAN OF CARE
[facility]  Speech Therapy Updated Plan of Care-Neurological Rehabilitation and Dysphagia         Date: 7/14/2022   Name: Valentín Field  Clinic Number: [id number]    Therapy Diagnosis:   Encounter Diagnoses   Name Primary?    Oropharyngeal dysphagia Yes    Cognitive communication disorder     S/P cervical spinal fusion      Physician: Octavio Forrester MD    Physician Orders: [id number] - AMB REFERRAL/CONSULT TO SPEECH THERAPY  Medical Diagnosis: Z98.1 (ICD-10-CM) - S/P cervical spinal fusion       Visit #/ Visits Authorized:  9/16  Evaluation Date: 5/5/2022  Insurance Authorization Period: 5/12/2022 - 9/30/2022   Plan of Care Expiration: 7/15/2022  New POC Certification Period:  7/15/2022 - 9/22/2022  Total Visits Received: 12    Precautions:Standard and Fall     Subjective     Update: Patient has been progressing towards his goals. He continues to show motivation for improvement and participates in all therapy tasks. Dysphagia goals were added following his MBSS on 6/20/2022 and patient has been compliant with all dysphagia exercises.     Objective     Update: see follow up note dated 7/14/2022    Assessment     Update: Valentín Field presents to [facility] status post medical diagnosis of S/P cervical spinal fusion. Demonstrates impairments including limitations as described in the problem list. Positive prognostic factors include patient motivation. Negative prognostic factors include unknown etiology of deficits. He presents with a cognitive-communication disorder characterized by decreased attention, memory, and executive functinos  Additionally, he presents with Mild-moderate pharyngeal-esophageal dysphagia per most recent MBSS. Barriers to therapy include unknown etiology of deficits and transportationPatient will benefit from skilled, outpatient rehabilitation speech therapy.    Rehab Potential: fair   Pt's spiritual, cultural, and educational needs considered and patient  agreeable to plan of care and goals.    Education: Plan of Care and role of SLP in care     Previous Short Term Goals Status:     Short Term Goals: (5 weeks)   Patient will complete formal cognitive-communication assessment.        GOAL MET  5/12/2022   Patient will complete formal language assessment.       Discontinued    Patient will complete a modified barium swallow study to assess swallow safety and efficiency and to determine the least restrictive diet.          Patient will sustain attention to complete moderate to complex reasoning tasks for 2 minutes with one request for clarification to increase sustained attention.      Patient will complete selective attention tasks (auditory or visual) with 90% accuracy independently increase selective attention.         Patient will complete short term recall tasks after a 5 minutes delay with 90% accuracy  independently  with use of memory strategies to improve recall of information and generalization of memory strategies.      Patient will independently predict time and accuracy of performance on trials within 10% of actual performance in 90% of opportunities.       Patient will complete a functional task to improve attention, memory and/or executive functions (I.e. sample bill paying activity, recipe, or multiple choice comprehension questions to 1 paragraphs) with 80% accuracy and natural environment noise distractions (TV news background, music, etc.).      Patient will be educated regarding cognitive deficits as applicable to the patient's life for increased awareness and will execute returned demonstration of information with 80% accuracy.       Patient will use external aids to compensate for deficits in attention, memory, and thought organization with 90% accuracy independently      Patient will complete 50-75 effortful swallows per session in order to improve swallowing function.   Patient will complete 20-30 Mendelsohn maneuvers per session in order to  improve swallowing function.   Patient will 3 sets of 1 minutes CTAR holds per session in order to improve swallowing function.   Patient will complete 30 CTAR repetitionsper session in order to improve swallowing function.   Patient participate in swallowing education with returned demonstration of information.             New Short Term Goals:     Short Term Goals: (5 weeks)   Patient will sustain attention to complete moderate to complex reasoning tasks for 2 minutes with one request for clarification to increase sustained attention.      Patient will complete selective attention tasks (auditory or visual) with 90% accuracy independently increase selective attention.         Patient will complete short term recall tasks after a 5 minutes delay with 90% accuracy  independently  with use of memory strategies to improve recall of information and generalization of memory strategies.      Patient will independently predict time and accuracy of performance on trials within 10% of actual performance in 90% of opportunities.       Patient will complete a functional task to improve attention, memory and/or executive functions (I.e. sample bill paying activity, recipe, or multiple choice comprehension questions to 1 paragraphs) with 80% accuracy and natural environment noise distractions (TV news background, music, etc.).      Patient will be educated regarding cognitive deficits as applicable to the patient's life for increased awareness and will execute returned demonstration of information with 80% accuracy.       Patient will use external aids to compensate for deficits in attention, memory, and thought organization with 90% accuracy independently      Patient will complete 50-75 effortful swallows per session in order to improve swallowing function.   Patient will complete 20-30 Mendelsohn maneuvers per session in order to improve swallowing function.   Patient participate in swallowing education with returned  demonstration of information.             Long Term Goal Status:   10 weeks   1. Patient will improve swallow function in order to consume the least restrictive diet and reduce the risk of aspiration related pneumonia.   2. Patient will improve cognitive-communication function in order to improve quality of life, promote participation in social and community interactions, and to promote independence and safety during activities of daily living.     Goals Previously Met:  Patient will complete formal cognitive-communication assessment.        GOAL MET  5/12/2022   Patient will complete a modified barium swallow study to assess swallow safety and efficiency and to determine the least restrictive diet.      GOAL MET              Reasons for Recertification of Therapy:   Cognitive-communication deficits negatively impact quality of life, independence and safety during activities of daily living.   Dysphagia negatively impacts quality of life and places the patient at risk for aspiration related pneumonia.     Plan     Updated Certification Period: 7/14/2022 to 9/22/2022    Recommended Treatment Plan: Patient will participate in the [facility] rehabilitation program for speech therapy 1-2 times per week to address his Cognition and Swallow deficits, to educate patient and their family, and to participate in a home exercise program.     Other recommendations: Complete consultation with Neurology to further assess possible etiology of deficits (patient is scheduled for 7/15/2022).      Therapist's Name:  Adilia Graham, CCC-SLP, CBIS   Speech Language Pathologist   Certified Brain Injury Specialist   7/14/2022    I CERTIFY THE NEED FOR THESE SERVICES FURNISHED UNDER THIS PLAN OF TREATMENT AND WHILE UNDER MY CARE      Physician Name: _______________________________    Physician Signature: ____________________________

## 2022-07-14 NOTE — PROGRESS NOTES
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 Weeks      America Fernández, MSN NP      Collaborating Provider: Nusrat Lee MD, FAAN Neurologist/Epileptologist

## 2022-07-14 NOTE — PROGRESS NOTES
"[facility]  Speech Therapy Daily Note- Neurological Rehabilitation and Dysphagia     Date: 7/14/2022     Name: Valentín Field   MRN: [id number]   Therapy Diagnosis:   Encounter Diagnoses   Name Primary?    Oropharyngeal dysphagia Yes    Cognitive communication disorder     S/P cervical spinal fusion      Physician: Octavio Forrester MD  Physician Orders: [id number] - AMB REFERRAL/CONSULT TO SPEECH THERAPY  Medical Diagnosis: Z98.1 (ICD-10-CM) - S/P cervical spinal fusion    Visit #/ Visits Authorized: 9/16  Date of Evaluation:  5/5/2022  Insurance Authorization Period: 5/12/2022 - 8/10/2022  Plan of Care Expiration Date:  See updated POC 7/14/2022  Extended Plan of Care:  NA  Progress Note: See updated POC 7/14/2022  Visits Cancelled: 1  Visits No Show: 1    Time In:  11:35 AM   Time Out:  12:15 AM   Total Billable Time: 40 minutes      Precautions: Standard  Subjective:   Patient reports: He reported he is completing his swallowing exercises. Patient is scheduled with Neurology on 7/15/2022.   Response to previous treatment: Recalls swallowing exercises.   Pain Scale:  7/10 on a Visual Analog Scale currently.   Pain Location: "All over" "throat" "headache"  Objective:         UNTIMED  Procedure Min.   Speech- Language- Voice Therapy 40   Dysphagia Therapy  0   Total Timed Units: 0  Total Untimed Units: 1  Charges Billed/Number of units: 1      Short Term Goals: (5 weeks) Current Progress:   Patient will complete formal cognitive-communication assessment.      GOAL MET  5/12/2022         GOAL MET 5/12/2022   Patient will complete formal language assessment.      Discontinued  Not formally addressed. Discontinue due to primary concerns with memory.    Patient will complete a modified barium swallow study to assess swallow safety and efficiency and to determine the least restrictive diet.       COMPLETED 6/20/2022    Patient will sustain attention to complete moderate to complex reasoning tasks for 2 " "minutes with one request for clarification to increase sustained attention.   Sustained attention task "checking a phone bill" completed with 100% accuracy with 2 requests for clarification. Patient rated this task a 1 on the relative scale of cognitive load.     "Calendar" task completed with 100% accuracy given moderate cueing. Patient rated this task a "four or five" on the relative scale of cognitive load.    Patient will complete selective attention tasks (auditory or visual) with 90% accuracy independently increase selective attention.      Not formally addressed    Patient will complete short term recall tasks after a 5 minutes delay with 90% accuracy  independently  with use of memory strategies to improve recall of information and generalization of memory strategies.   Short story recall (three sentences paragraph): 100% accuracy independently after five minutes    Patient will independently predict time and accuracy of performance on trials within 10% of actual performance in 90% of opportunities.    Not formally addressed.    Patient will complete a functional task to improve attention, memory and/or executive functions (I.e. sample bill paying activity, recipe, or multiple choice comprehension questions to 1 paragraphs) with 80% accuracy and natural environment noise distractions (TV news background, music, etc.).   "Checking a phone bill" task completed with 100% accuracy with therapy door opened for natural environmental noise distractions.    Patient will be educated regarding cognitive deficits as applicable to the patient's life for increased awareness and will execute returned demonstration of information with 80% accuracy.    Discussed today. Patient and clinician discussed the reasoning behind his upcoming visit with the Neurologist.    Patient will use external aids to compensate for deficits in attention, memory, and thought organization with 90% accuracy independently   Patient reported using " his phone calendar monthly to recall appointments and set up transportation for appointments. He reported he has his list of appointments for the month at home.    Patient will complete 50-75 effortful swallows per session in order to improve swallowing function. Completed X80 today given a model and moderate cueing for accuracy.    Patient will complete 20-30 Mendelsohn maneuvers per session in order to improve swallowing function. Completed X20 given specific instruction, a model, and moderate cueing for accuracy.    Patient will 3 sets of 1 minutes CTAR holds per session in order to improve swallowing function. Discontinue due to reports of sharp pain.   Patient will complete 30 CTAR repetitionsper session in order to improve swallowing function. Discontinue due to reports of sharp pain.    Patient participate in swallowing education with returned demonstration of information.  Additional education provided today.        Patient Education/Response:   Education provided regarding cognitive deficits and the reasoning behind his upcoming Neurology appointment. Patient educated on increased the number of effortful swallows per day. He verbalized understanding.     Home program established: yes-Patient instructed to complete dysphagia exercises at home daily.   Exercises were reviewed and Valentín was able to demonstrate them prior to the end of the session.  Valentín demonstrated fair  understanding of the education provided.     See Electronic Medical Record under Patient Instructions for exercises provided throughout therapy.  Assessment:   Valentín is progressing well towards his goals. Increase in number of effortful swallows completed today. Current goals remain appropriate and will be updated as needed.     Patient prognosis is Good. Patient will continue to benefit from skilled outpatient speech and language therapy to address the deficits listed in the problem list on initial evaluation, provide patient/family  education and to maximize patient's level of independence in the home and community environment.   Medical necessity is demonstrated by the following IMPAIRMENTS:  Dysphagia impacts quality of life and puts the patient at risk for aspiration related pneumonia.   Cognitive-communication deficits negatively impact quality of life, safety and independence during activities of daily living, and participation in social and community interactions.   Barriers to Therapy: Unknown etiology of deficits; transportation   Patient's spiritual, cultural and educational needs considered and patient agreeable to plan of care and goals.  Plan:   Continue Plan of Care with focus on attention, memory, executive functions, and dysphagia exercises. Complete consultation with Neurology.     Adilia Graham, CCC-SLP, CBIS   Speech Language Pathologist   Certified Brain Injury Specialist   7/14/2022

## 2022-07-14 NOTE — PROGRESS NOTES
[facility]   Physical Therapy Treatment Note     Name: Valentín Field  Clinic Number: [id number]    Therapy Diagnosis:   Encounter Diagnoses   Name Primary?    Decreased strength, endurance, and mobility Yes    Muscle weakness      Physician: Octavio Forrester MD  Visit Date: 7/14/2022  Physician Orders: PT Eval and Treat  Medical Diagnosis from Referral: General Weakness  Evaluation Date: 4/21/2022  Authorization Period Expiration: 4/21/23  Plan of Care Expiration: 06/22/2022  (Extend 07/20/22)                         Progress Update: 07/20/2022                        Visit # / Visits authorized: 17/12 (+1 eval)                       FOTO: Visit #1 - Scored : 1 / 3      PRECAUTIONS: Standard Precautions and Safety/fall precautions      MD Visit: 04/25/2022  PTA Visit #: 0/5     Time In: 1045  Time Out: 1130  Total Billable Time:  40 minutes Billing reflects [place] medicaid guidelines, billing all therapy as therapeutic-exercise    SUBJECTIVE     Pt reports:increase low back discomfort with functional movement such as sit<>stand.       He reports  compliant with staying active at home between sessions.    Response to previous treatment: He responded well to last session.    Functional change: He reports no significant functional change since last session.    Pain:  7/10  Location: low back     OBJECTIVE     Objective Measures updated at progress report unless specified.     Treatment     Valentín received the treatments listed below:      THERAPEUTIC EXERCISES to develop strength, endurance, ROM, flexibility, posture and core stabilization for (15) minutes including:    Intervention Performed Today    Standing Hip flexion   3x10 up onto 12inch step in parallel bars    Shuttle X  x   2x20 both legs 4 bands  2x10 single leg 3 bands   Standing lower extremity exercises, Right knee cage donned to improve posture of lower extremity            2# ankle weight bilateral :   - hip flexion over  gibson 1x10 bilateral   - hip abduction over gibson 1x10 bilateral   - hip extension over gibson 1x10 bilateral   - heel raise on AirEx beam 2 x 20 bilateral   - hamstring curl 1x10 bilateral   - Alternating tapping foot to 8 inch step 1x10 bilateral    NuStep  x Level 3 for 6 minutes   Seated lower extremity exercises  long arc quad with 2# Left and 1# Right 1x15 bilateral    Seated hamstring curl against green band  x 1x20 bilateral    Sitting trunk extension over ball at back  2 x 10 3 second hold   Sitting hamstring/heelcord stretch  2 x 30 seconds bilateral    Standing heel cord stretch   2x15 seconds bilateral    Reviewed over all lower extremity exercises for Home Exercise Program discussing and practicing set-up and sequence of each with green theraband resistance  - Seated hamstring curl  - Standing hip flexion  - Standing hip abduction  - Standing hip adduction  - Standing hip extension  - Seated ankle eversion                  Neoprene wrap donned to low back for standing exercises to decrease pain and increase support  Right knee cage donned for standing lower extremity exercises.   Plan for Next Visit:      neuromuscular re-education activities to improve: Balance, Coordination, Kinesthetic, Sense, Proprioception and Posture for (25) minutes. The following activities were included:    Intervention Performed Today    NuStep   Level 2, 7 minutes   Parallel bars standing activities x  x  x - Standing in staggered stance performing weight shifts forward/back 1x10 bilateral with 1 upper extremity support  - Stepping forward/back with 1-0 upper extremity supports  - stepping forward and backwards with scissoring vertical board in place working on stand balance with 1 upper extremity support   Supine on mat with bilateral lower extremities over Tball for core exercises x   - Bridges with chest press between each rep holding bilateral 1# dumbbells  - lower trunk rotations moving arms opposite lower extremity  "movement with 1# dumbbells bilateral   - bilateral knees to chest performing bilateral upper extremity hammer motion down towards knees with each rep with 1# dumbbells bilateral         Standing on AirX pad        - eyes closed 2x10 seconds  - weight shifts Left and Right 1x10 eyes open, 1x5 eyes closed  - holding stand posture while receiving perturbations from all directions   Standing at parallel bars working on lifting pool noodle over head to touch upper back then lowering to lower legs  1x5   Combo lower extremity exercise working on single leg standing and balance with no upper extremity support  - flexion, abduction, extension and heel raise 8x's on each leg   Standing hamstring curl   2x10 bilateral    Sitting in chair with ball at low back performing trunk extension towards neutral   2x10,    Pulley exercises sitting on balance disc on bar stool  Rows against 20# 2x10  Bicep curls against 20# 2x10   Nautilus Leg extension   2 plates, 2x10 for coordination with strengthening   Side stepping on AirX beam   - 2x's back and forth    Tandem stepping on AirX beam  x - 2x's back and forth    Holding tandem stance  5 seconds, 3x's each leg   Sit<>stand  x 3x8 low mat + blue foam    Heel taps  x 3x8 (B)   Terminal knee extension  x 30x (Bilateral) purple cooks cord    Tandem Stance  3x30" (B)     Gait Training for (0) minutes:    Patient Education and Home Exercises     Home Exercises Provided and Patient Education Provided     Education provided:   - Patient educated to continue to stay active at home.  - Patient educated on the benefit of a Right knee cage to prevent hyperextension and protect his knee joint  - 6/20/22 Patient educated on results of testing today and Home Exercise Program.  He was also educated that the end of his Plan of Care is approaching and that PT would be requesting 4 more weeks of therapy.  He requested that PT move forward with requesting order for Right knee cage for home/community use. "   - 6/27/22 PT discussed out of pocket cost related to order of knee cage.  He would like to consider all other options at this time.  Written Home Exercises Provided: Patient educated to continue with current Home Exercise Program. See EMR under Patient Instructions for exercises provided during future therapy sessions    ASSESSMENT     Valentín Field tolerated PT session well with moderate  complaints of pain or discomfort, secondary to poor motor control, decrease muscle strength, and decrease muscle endurance. Objective findings are improving with measurements of ROM and functional mobility.  Therapy exercises were reviewed by revisiting exercises given from previous home exercise program while adding no new exercises.  Handouts were not issued during today's visit. Valentín demonstrated good understanding of new exercises and will continue to progress at home until next follow-up.       Valentín Is progressing well towards his goals.   Pt prognosis is Good.     Pt will continue to benefit from skilled outpatient physical therapy to address the deficits listed in the problem list box on initial evaluation, provide pt/family education and to maximize pt's level of independence in the home and community environment.     Pt's spiritual, cultural and educational needs considered and pt agreeable to plan of care and goals.     Anticipated Barriers for therapy: sedentary lifestyle and chronicity of condition       GOALS:  SHORT TERM GOALS: 4 weeks Progress   1. Recent signs and systems trend is improving in order to progress towards LTG's. Met   2. Patient will be independent with HEP in order to further progress and return to maximal function. Met   3. Pain rating at Worst: 5/10 in order to progress towards increased independence with activity. PC    4. Patient will be able to correct postural deviations in sitting and standing, to decrease pain and promote postural awareness for injury prevention.  Met with Right knee  cage      LONG TERM GOALS: 8 weeks Progress   1. Patient will return to normal ADL, recreational, and work related activities with less pain and limitation.   PC   2. Patient will improve AROM to stated goals in order to return to maximal functional potential.   PC   3. Patient will improve Strength to stated goals of appropriate musculature in order to improve functional independence.   PC   4. Pain Rating at Best: 1/10 to improve Quality of Life.      5. Patient will meet predicted functional outcome (FOTO) score: N/A% to increase self-worth & perceived functional ability.  PC   6. Patient will have met/partially met personal goal of: being able to walk > 500ft independently with no pain in back.   PC   7. Added Goal: Patient will demonstrate improved lower extremity strength by performing 5x sit to stand test in 10 seconds.    8. Added Goal: Patient will demonstrate improved coordination and safety with gait by performing TUG in 18 seconds.              PLAN     Continue Plan of Care (POC) and progress per patient tolerance. See Treatment section for exercise progression.    (Extend 07/20/22)     Campos Chambers, PT DPT

## 2022-07-15 ENCOUNTER — LAB VISIT (OUTPATIENT)
Dept: LAB | Facility: HOSPITAL | Age: 62
End: 2022-07-15
Attending: NURSE PRACTITIONER
Payer: MEDICAID

## 2022-07-15 ENCOUNTER — OFFICE VISIT (OUTPATIENT)
Dept: NEUROLOGY | Facility: CLINIC | Age: 62
End: 2022-07-15
Payer: MEDICAID

## 2022-07-15 VITALS
WEIGHT: 177.25 LBS | HEART RATE: 92 BPM | RESPIRATION RATE: 16 BRPM | OXYGEN SATURATION: 98 % | BODY MASS INDEX: 22.04 KG/M2 | DIASTOLIC BLOOD PRESSURE: 85 MMHG | SYSTOLIC BLOOD PRESSURE: 131 MMHG | HEIGHT: 75 IN

## 2022-07-15 DIAGNOSIS — M46.42 DISCITIS OF CERVICAL REGION: ICD-10-CM

## 2022-07-15 DIAGNOSIS — G04.91 MYELITIS, UNSPECIFIED: ICD-10-CM

## 2022-07-15 DIAGNOSIS — R26.9 ABNORMALITY OF GAIT: ICD-10-CM

## 2022-07-15 DIAGNOSIS — G44.309 POST-CONCUSSION HEADACHE: Primary | ICD-10-CM

## 2022-07-15 DIAGNOSIS — R41.89 COGNITIVE CHANGE: ICD-10-CM

## 2022-07-15 DIAGNOSIS — R68.89 FORGETFULNESS: ICD-10-CM

## 2022-07-15 DIAGNOSIS — R41.3 MEMORY DIFFICULTIES: ICD-10-CM

## 2022-07-15 DIAGNOSIS — Z87.891 FORMER SMOKER: ICD-10-CM

## 2022-07-15 DIAGNOSIS — I63.81 LACUNAR INFARCTION: ICD-10-CM

## 2022-07-15 DIAGNOSIS — G44.309 POST-CONCUSSION HEADACHE: ICD-10-CM

## 2022-07-15 DIAGNOSIS — Z74.09 IMPAIRED FUNCTIONAL MOBILITY, BALANCE, GAIT, AND ENDURANCE: ICD-10-CM

## 2022-07-15 DIAGNOSIS — F11.90 OPIOID USE: ICD-10-CM

## 2022-07-15 DIAGNOSIS — Z78.9 ALCOHOL USE: ICD-10-CM

## 2022-07-15 DIAGNOSIS — R41.3 OTHER AMNESIA: ICD-10-CM

## 2022-07-15 DIAGNOSIS — G95.9 CERVICAL MYELOPATHY: ICD-10-CM

## 2022-07-15 DIAGNOSIS — Z86.73 HISTORY OF STROKE: ICD-10-CM

## 2022-07-15 DIAGNOSIS — R68.89 DECREASED STRENGTH, ENDURANCE, AND MOBILITY: ICD-10-CM

## 2022-07-15 DIAGNOSIS — Z74.09 DECREASED STRENGTH, ENDURANCE, AND MOBILITY: ICD-10-CM

## 2022-07-15 DIAGNOSIS — M25.60 DECREASED RANGE OF MOTION: ICD-10-CM

## 2022-07-15 DIAGNOSIS — Z98.1 S/P CERVICAL SPINAL FUSION: ICD-10-CM

## 2022-07-15 DIAGNOSIS — R52 PAIN: ICD-10-CM

## 2022-07-15 DIAGNOSIS — R53.1 DECREASED STRENGTH, ENDURANCE, AND MOBILITY: ICD-10-CM

## 2022-07-15 DIAGNOSIS — R41.841 COGNITIVE COMMUNICATION DISORDER: ICD-10-CM

## 2022-07-15 DIAGNOSIS — R29.898 DECREASED STRENGTH OF UPPER EXTREMITY: ICD-10-CM

## 2022-07-15 DIAGNOSIS — M48.02 CERVICAL SPINAL STENOSIS: ICD-10-CM

## 2022-07-15 DIAGNOSIS — M54.81 CERVICO-OCCIPITAL NEURALGIA OF RIGHT SIDE: ICD-10-CM

## 2022-07-15 DIAGNOSIS — G31.84 MILD COGNITIVE IMPAIRMENT: ICD-10-CM

## 2022-07-15 PROBLEM — Z87.898 HISTORY OF ALCOHOL USE: Status: ACTIVE | Noted: 2022-07-15

## 2022-07-15 LAB
FOLATE SERPL-MCNC: 11.8 NG/ML (ref 4–24)
HCYS SERPL-SCNC: 8.2 UMOL/L (ref 4–16.5)
TSH SERPL DL<=0.005 MIU/L-ACNC: 1.64 UIU/ML (ref 0.4–4)
VIT B12 SERPL-MCNC: 579 PG/ML (ref 210–950)

## 2022-07-15 PROCEDURE — 3075F PR MOST RECENT SYSTOLIC BLOOD PRESS GE 130-139MM HG: ICD-10-PCS | Mod: CPTII,,, | Performed by: NURSE PRACTITIONER

## 2022-07-15 PROCEDURE — 99999 PR PBB SHADOW E&M-EST. PATIENT-LVL V: CPT | Mod: PBBFAC,,, | Performed by: NURSE PRACTITIONER

## 2022-07-15 PROCEDURE — 84443 ASSAY THYROID STIM HORMONE: CPT | Performed by: NURSE PRACTITIONER

## 2022-07-15 PROCEDURE — 82746 ASSAY OF FOLIC ACID SERUM: CPT | Performed by: NURSE PRACTITIONER

## 2022-07-15 PROCEDURE — 1159F MED LIST DOCD IN RCRD: CPT | Mod: CPTII,,, | Performed by: NURSE PRACTITIONER

## 2022-07-15 PROCEDURE — 1160F PR REVIEW ALL MEDS BY PRESCRIBER/CLIN PHARMACIST DOCUMENTED: ICD-10-PCS | Mod: CPTII,,, | Performed by: NURSE PRACTITIONER

## 2022-07-15 PROCEDURE — 3075F SYST BP GE 130 - 139MM HG: CPT | Mod: CPTII,,, | Performed by: NURSE PRACTITIONER

## 2022-07-15 PROCEDURE — 1159F PR MEDICATION LIST DOCUMENTED IN MEDICAL RECORD: ICD-10-PCS | Mod: CPTII,,, | Performed by: NURSE PRACTITIONER

## 2022-07-15 PROCEDURE — 99417 PROLNG OP E/M EACH 15 MIN: CPT | Mod: S$PBB,,, | Performed by: NURSE PRACTITIONER

## 2022-07-15 PROCEDURE — 86592 SYPHILIS TEST NON-TREP QUAL: CPT | Performed by: NURSE PRACTITIONER

## 2022-07-15 PROCEDURE — 99215 OFFICE O/P EST HI 40 MIN: CPT | Mod: PBBFAC | Performed by: NURSE PRACTITIONER

## 2022-07-15 PROCEDURE — 86038 ANTINUCLEAR ANTIBODIES: CPT | Performed by: NURSE PRACTITIONER

## 2022-07-15 PROCEDURE — 99417 PR PROLONGED SVC, OUTPT, W/WO DIRECT PT CONTACT,  EA ADDTL 15 MIN: ICD-10-PCS | Mod: S$PBB,,, | Performed by: NURSE PRACTITIONER

## 2022-07-15 PROCEDURE — 3008F BODY MASS INDEX DOCD: CPT | Mod: CPTII,,, | Performed by: NURSE PRACTITIONER

## 2022-07-15 PROCEDURE — 87389 HIV-1 AG W/HIV-1&-2 AB AG IA: CPT | Performed by: NURSE PRACTITIONER

## 2022-07-15 PROCEDURE — 99205 OFFICE O/P NEW HI 60 MIN: CPT | Mod: S$PBB,,, | Performed by: NURSE PRACTITIONER

## 2022-07-15 PROCEDURE — 3008F PR BODY MASS INDEX (BMI) DOCUMENTED: ICD-10-PCS | Mod: CPTII,,, | Performed by: NURSE PRACTITIONER

## 2022-07-15 PROCEDURE — 36415 COLL VENOUS BLD VENIPUNCTURE: CPT | Performed by: NURSE PRACTITIONER

## 2022-07-15 PROCEDURE — 82607 VITAMIN B-12: CPT | Performed by: NURSE PRACTITIONER

## 2022-07-15 PROCEDURE — 3079F PR MOST RECENT DIASTOLIC BLOOD PRESSURE 80-89 MM HG: ICD-10-PCS | Mod: CPTII,,, | Performed by: NURSE PRACTITIONER

## 2022-07-15 PROCEDURE — 99999 PR PBB SHADOW E&M-EST. PATIENT-LVL V: ICD-10-PCS | Mod: PBBFAC,,, | Performed by: NURSE PRACTITIONER

## 2022-07-15 PROCEDURE — 1160F RVW MEDS BY RX/DR IN RCRD: CPT | Mod: CPTII,,, | Performed by: NURSE PRACTITIONER

## 2022-07-15 PROCEDURE — 83921 ORGANIC ACID SINGLE QUANT: CPT | Performed by: NURSE PRACTITIONER

## 2022-07-15 PROCEDURE — 99205 PR OFFICE/OUTPT VISIT, NEW, LEVL V, 60-74 MIN: ICD-10-PCS | Mod: S$PBB,,, | Performed by: NURSE PRACTITIONER

## 2022-07-15 PROCEDURE — 3079F DIAST BP 80-89 MM HG: CPT | Mod: CPTII,,, | Performed by: NURSE PRACTITIONER

## 2022-07-15 PROCEDURE — 83090 ASSAY OF HOMOCYSTEINE: CPT | Performed by: NURSE PRACTITIONER

## 2022-07-15 RX ORDER — AMITRIPTYLINE HYDROCHLORIDE 25 MG/1
25 TABLET, FILM COATED ORAL NIGHTLY
Qty: 30 TABLET | Refills: 11 | Status: SHIPPED | OUTPATIENT
Start: 2022-07-15 | End: 2023-01-20

## 2022-07-15 NOTE — PATIENT INSTRUCTIONS
Headaches are called Occipital Neuralgia treatment is Amitriptyline      Amitriptyline will help with headaches, insomnia ( sleep disturbance) and anxiety

## 2022-07-16 LAB — RPR SER QL: NORMAL

## 2022-07-18 LAB
ANA SER QL IF: NORMAL
HIV 1+2 AB+HIV1 P24 AG SERPL QL IA: NEGATIVE

## 2022-07-19 ENCOUNTER — CLINICAL SUPPORT (OUTPATIENT)
Dept: REHABILITATION | Facility: HOSPITAL | Age: 62
End: 2022-07-19
Payer: MEDICAID

## 2022-07-19 DIAGNOSIS — R53.1 DECREASED STRENGTH, ENDURANCE, AND MOBILITY: Primary | ICD-10-CM

## 2022-07-19 DIAGNOSIS — Z74.09 DECREASED STRENGTH, ENDURANCE, AND MOBILITY: Primary | ICD-10-CM

## 2022-07-19 DIAGNOSIS — M62.81 MUSCLE WEAKNESS: ICD-10-CM

## 2022-07-19 DIAGNOSIS — R68.89 DECREASED STRENGTH, ENDURANCE, AND MOBILITY: Primary | ICD-10-CM

## 2022-07-19 LAB — METHYLMALONATE SERPL-SCNC: 0.32 UMOL/L

## 2022-07-19 PROCEDURE — 97110 THERAPEUTIC EXERCISES: CPT

## 2022-07-19 PROCEDURE — 97112 NEUROMUSCULAR REEDUCATION: CPT
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
## 2022-07-19 NOTE — PROGRESS NOTES
Labs Reviewed:      07-    MMA 0.32 NL, IMANI -ve, RPR -ve, FA 11.8 NL, Vitamin B12 579 NL, HC 8.2 NL     Labs unremarkable

## 2022-07-19 NOTE — PROGRESS NOTES
[facility]   Physical Therapy Treatment Note     Name: Valentín Field  Clinic Number: [id number]    Therapy Diagnosis:   Encounter Diagnoses   Name Primary?    Decreased strength, endurance, and mobility Yes    Muscle weakness      Physician: Octavio Forrester MD  Visit Date: 7/19/2022  Physician Orders: PT Eval and Treat  Medical Diagnosis from Referral: General Weakness  Evaluation Date: 4/21/2022  Authorization Period Expiration: 4/21/23  Plan of Care Expiration: 06/22/2022  (Extend 07/20/22)                         Progress Update: 07/20/2022                        Visit # / Visits authorized: 18/24 (+1 eval)                       FOTO: Visit #1 - Scored : 1 / 3      PRECAUTIONS: Standard Precautions and Safety/fall precautions      MD Visit: 04/25/2022  PTA Visit #: 0/5     Time In: 1045  Time Out: 1130  Total Billable Time:  40 minutes Billing reflects [place] medicaid guidelines, billing all therapy as therapeutic-exercise    SUBJECTIVE     Pt reports:increase low back discomfort with functional movement such as sit<>stand.       He reports  compliant with staying active at home between sessions.    Response to previous treatment: He responded well to last session.    Functional change: He reports no significant functional change since last session.    Pain:  7/10  Location: low back     OBJECTIVE     Objective Measures updated at progress report unless specified.     Treatment     Valentín received the treatments listed below:      THERAPEUTIC EXERCISES to develop strength, endurance, ROM, flexibility, posture and core stabilization for (15) minutes including:    Intervention Performed Today    Standing Hip flexion  x 3x10 up onto 12inch step in parallel bars    Shuttle X  x   2x20 both legs 4 bands  2x10 single leg 3 bands   Standing lower extremity exercises, Right knee cage donned to improve posture of lower extremity            2# ankle weight bilateral :   - hip flexion over  gibson 1x10 bilateral   - hip abduction over gibson 1x10 bilateral   - hip extension over gibson 1x10 bilateral   - heel raise on AirEx beam 2 x 20 bilateral   - hamstring curl 1x10 bilateral   - Alternating tapping foot to 8 inch step 1x10 bilateral    NuStep  x Level 3 for 6 minutes   Seated lower extremity exercises  long arc quad with 2# Left and 1# Right 1x15 bilateral    Seated hamstring curl against green band   1x20 bilateral    Sitting trunk extension over ball at back  2 x 10 3 second hold   Sitting hamstring/heelcord stretch  2 x 30 seconds bilateral    Standing heel cord stretch   2x15 seconds bilateral    Reviewed over all lower extremity exercises for Home Exercise Program discussing and practicing set-up and sequence of each with green theraband resistance  - Seated hamstring curl  - Standing hip flexion  - Standing hip abduction  - Standing hip adduction  - Standing hip extension  - Seated ankle eversion                  Neoprene wrap donned to low back for standing exercises to decrease pain and increase support  Right knee cage donned for standing lower extremity exercises.   Plan for Next Visit:      neuromuscular re-education activities to improve: Balance, Coordination, Kinesthetic, Sense, Proprioception and Posture for (25) minutes. The following activities were included:    Intervention Performed Today    NuStep   Level 2, 7 minutes   Parallel bars standing activities x  x  x - Standing in staggered stance performing weight shifts forward/back 1x10 bilateral with 1 upper extremity support  - Stepping forward/back with 1-0 upper extremity supports  - stepping forward and backwards with scissoring vertical board in place working on stand balance with 1 upper extremity support   Supine on mat with bilateral lower extremities over Tball for core exercises x   - Bridges with chest press between each rep holding bilateral 1# dumbbells  - lower trunk rotations moving arms opposite lower extremity  "movement with 1# dumbbells bilateral   - bilateral knees to chest performing bilateral upper extremity hammer motion down towards knees with each rep with 1# dumbbells bilateral         Standing on AirX pad        - eyes closed 2x10 seconds  - weight shifts Left and Right 1x10 eyes open, 1x5 eyes closed  - holding stand posture while receiving perturbations from all directions   Standing at parallel bars working on lifting pool noodle over head to touch upper back then lowering to lower legs  1x5   Combo lower extremity exercise working on single leg standing and balance with no upper extremity support  - flexion, abduction, extension and heel raise 8x's on each leg   Standing hamstring curl   2x10 bilateral    Sitting in chair with ball at low back performing trunk extension towards neutral   2x10,    Pulley exercises sitting on balance disc on bar stool  Rows against 20# 2x10  Bicep curls against 20# 2x10   Nautilus Leg extension   2 plates, 2x10 for coordination with strengthening   Side stepping on AirX beam   - 2x's back and forth    Tandem stepping on AirX beam  x - 2x's back and forth    Holding tandem stance  5 seconds, 3x's each leg   Sit<>stand  x 3x8 low mat + blue foam    Heel taps  x 3x8 (B)   Terminal knee extension  x 30x (Bilateral) purple cooks cord    Tandem Stance  3x30" (B)     Gait Training for (0) minutes:    Patient Education and Home Exercises     Home Exercises Provided and Patient Education Provided     Education provided:   - Patient educated to continue to stay active at home.  - Patient educated on the benefit of a Right knee cage to prevent hyperextension and protect his knee joint  - 6/20/22 Patient educated on results of testing today and Home Exercise Program.  He was also educated that the end of his Plan of Care is approaching and that PT would be requesting 4 more weeks of therapy.  He requested that PT move forward with requesting order for Right knee cage for home/community use. "   - 6/27/22 PT discussed out of pocket cost related to order of knee cage.  He would like to consider all other options at this time.  Written Home Exercises Provided: Patient educated to continue with current Home Exercise Program. See EMR under Patient Instructions for exercises provided during future therapy sessions    ASSESSMENT     Valentín Field tolerated PT session well with moderate complaints of pain or discomfort, secondary to poor motor control, decrease muscle strength, and decrease muscle endurance. Objective findings are improving with measurements of ROM and functional mobility.  Therapy exercises were reviewed by revisiting exercises given from previous home exercise program while adding no new exercises.  Handouts were not issued during today's visit. Valentín demonstrated good understanding of new exercises and will continue to progress at home until next follow-up.       Valentín Is progressing well towards his goals.   Pt prognosis is Good.     Pt will continue to benefit from skilled outpatient physical therapy to address the deficits listed in the problem list box on initial evaluation, provide pt/family education and to maximize pt's level of independence in the home and community environment.     Pt's spiritual, cultural and educational needs considered and pt agreeable to plan of care and goals.     Anticipated Barriers for therapy: sedentary lifestyle and chronicity of condition       GOALS:  SHORT TERM GOALS: 4 weeks Progress   1. Recent signs and systems trend is improving in order to progress towards LTG's. Met   2. Patient will be independent with HEP in order to further progress and return to maximal function. Met   3. Pain rating at Worst: 5/10 in order to progress towards increased independence with activity. PC    4. Patient will be able to correct postural deviations in sitting and standing, to decrease pain and promote postural awareness for injury prevention.  Met with Right knee cage       LONG TERM GOALS: 8 weeks Progress   1. Patient will return to normal ADL, recreational, and work related activities with less pain and limitation.   PC   2. Patient will improve AROM to stated goals in order to return to maximal functional potential.   PC   3. Patient will improve Strength to stated goals of appropriate musculature in order to improve functional independence.   PC   4. Pain Rating at Best: 1/10 to improve Quality of Life.      5. Patient will meet predicted functional outcome (FOTO) score: N/A% to increase self-worth & perceived functional ability.  PC   6. Patient will have met/partially met personal goal of: being able to walk > 500ft independently with no pain in back.   PC   7. Added Goal: Patient will demonstrate improved lower extremity strength by performing 5x sit to stand test in 10 seconds.    8. Added Goal: Patient will demonstrate improved coordination and safety with gait by performing TUG in 18 seconds.              PLAN     Continue Plan of Care (POC) and progress per patient tolerance. See Treatment section for exercise progression.    (Extend 07/20/22)     Campos Chambers PT DPT

## 2022-07-21 ENCOUNTER — CLINICAL SUPPORT (OUTPATIENT)
Dept: REHABILITATION | Facility: HOSPITAL | Age: 62
End: 2022-07-21
Payer: MEDICAID

## 2022-07-21 DIAGNOSIS — M62.81 MUSCLE WEAKNESS: ICD-10-CM

## 2022-07-21 DIAGNOSIS — R41.841 COGNITIVE COMMUNICATION DISORDER: ICD-10-CM

## 2022-07-21 DIAGNOSIS — R53.1 DECREASED STRENGTH, ENDURANCE, AND MOBILITY: Primary | ICD-10-CM

## 2022-07-21 DIAGNOSIS — Z74.09 DECREASED STRENGTH, ENDURANCE, AND MOBILITY: Primary | ICD-10-CM

## 2022-07-21 DIAGNOSIS — R68.89 DECREASED STRENGTH, ENDURANCE, AND MOBILITY: Primary | ICD-10-CM

## 2022-07-21 DIAGNOSIS — R13.12 OROPHARYNGEAL DYSPHAGIA: Primary | ICD-10-CM

## 2022-07-21 DIAGNOSIS — Z98.1 S/P CERVICAL SPINAL FUSION: ICD-10-CM

## 2022-07-21 PROCEDURE — 97110 THERAPEUTIC EXERCISES: CPT

## 2022-07-21 PROCEDURE — 92507 TX SP LANG VOICE COMM INDIV: CPT | Mod: KX

## 2022-07-21 NOTE — PROGRESS NOTES
"[facility]  Speech Therapy Daily Note- Neurological Rehabilitation and Dysphagia     Date: 7/21/2022     Name: Valentín Field   MRN: [id number]   Therapy Diagnosis:   Encounter Diagnoses   Name Primary?    Oropharyngeal dysphagia Yes    Cognitive communication disorder     S/P cervical spinal fusion      Physician: Octavio Forrester MD  Physician Orders: [id number] - AMB REFERRAL/CONSULT TO SPEECH THERAPY  Medical Diagnosis: Z98.1 (ICD-10-CM) - S/P cervical spinal fusion    Visit #/ Visits Authorized: 10/16  Date of Evaluation:  5/5/2022  Insurance Authorization Period: 5/12/2022 - 8/10/2022  Plan of Care Expiration Date:  9/22/2022  Extended Plan of Care:  NA  Progress Note: See updated POC 7/14/2022  Visits Cancelled: 1  Visits No Show: 1    Time In:  11:00 AM   Time Out:  11:50 AM   Total Billable Time: 50 minutes      Precautions: Standard  Subjective:   Patient reports: He saw the Neurologist and "everything went well."   Response to previous treatment: He recalled his effortful swallow and Mendelsohn exercises.   Pain Scale:  8/10 on a Visual Analog Scale currently.   Pain Location: "shoulders" and "back"   Objective:         UNTIMED  Procedure Min.   Speech- Language- Voice Therapy 50   Dysphagia Therapy 0   Total Timed Units: 0  Total Untimed Units: 1  Charges Billed/Number of units: 1      Short Term Goals: (5 weeks) Current Progress:   Patient will complete formal cognitive-communication assessment.      GOAL MET  5/12/2022         GOAL MET 5/12/2022   Patient will complete formal language assessment.      Discontinued  Not formally addressed. Discontinue due to primary concerns with memory.    Patient will complete a modified barium swallow study to assess swallow safety and efficiency and to determine the least restrictive diet.       COMPLETED 6/20/2022    Patient will sustain attention to complete moderate to complex reasoning tasks for 2 minutes with one request for clarification to " "increase sustained attention.   Sustained attention for 5 minutes independently     MET X1   Patient will complete selective attention tasks (auditory or visual) with 90% accuracy independently increase selective attention.      Not formally addressed    Patient will complete short term recall tasks after a 5 minutes delay with 90% accuracy  independently  with use of memory strategies to improve recall of information and generalization of memory strategies.   Recall of visual scene   Immediate: 75% accuracy   Delayed:    Patient will independently predict time and accuracy of performance on trials within 10% of actual performance in 90% of opportunities.    Predicted time: 5 minutes   Predicted accuracy: 100%   Actual time: 5 minutes   Actual Accuracy: 87.5%    Patient will complete a functional task to improve attention, memory and/or executive functions (I.e. sample bill paying activity, recipe, or multiple choice comprehension questions to 1 paragraphs) with 80% accuracy and natural environment noise distractions (TV news background, music, etc.).   Sequencing task completed with 87.5% accuracy.    Patient will be educated regarding cognitive deficits as applicable to the patient's life for increased awareness and will execute returned demonstration of information with 80% accuracy.    Patient and clinician discussed his most recent visit with his Neurologist. He reported it "went well"   Patient will use external aids to compensate for deficits in attention, memory, and thought organization with 90% accuracy independently   Patient uses his phone and calendar printouts with his appointments on them.    Patient will complete 50-75 effortful swallows per session in order to improve swallowing function. Completed X120 today given a model and moderate cueing for accuracy.    Patient will complete 20-30 Mendelsohn maneuvers per session in order to improve swallowing function. Completed X30 given specific " instruction, a model, and moderate cueing for accuracy.    Patient will 3 sets of 1 minutes CTAR holds per session in order to improve swallowing function. Discontinue due to reports of sharp pain.   Patient will complete 30 CTAR repetitionsper session in order to improve swallowing function. Discontinue due to reports of sharp pain.    Patient participate in swallowing education with returned demonstration of information.  Additional education provided today.        Patient Education/Response:   Education provided regarding rationale behind swallowing exercises. Patient educated on increased the number of effortful swallows per day. He verbalized understanding.     Home program established: yes-Patient instructed to complete dysphagia exercises at home daily.   Exercises were reviewed and Valentín was able to demonstrate them prior to the end of the session.  Valentín demonstrated fair  understanding of the education provided.     See Electronic Medical Record under Patient Instructions for exercises provided throughout therapy.  Assessment:   Valentín is progressing well towards his goals. Increase in number of effortful swallows completed today. Current goals remain appropriate and will be updated as needed.     Patient prognosis is Good. Patient will continue to benefit from skilled outpatient speech and language therapy to address the deficits listed in the problem list on initial evaluation, provide patient/family education and to maximize patient's level of independence in the home and community environment.   Medical necessity is demonstrated by the following IMPAIRMENTS:  Dysphagia impacts quality of life and puts the patient at risk for aspiration related pneumonia.   Cognitive-communication deficits negatively impact quality of life, safety and independence during activities of daily living, and participation in social and community interactions.   Barriers to Therapy: Unknown etiology of deficits; transportation    Patient's spiritual, cultural and educational needs considered and patient agreeable to plan of care and goals.  Plan:   Continue Plan of Care with focus on attention, memory, executive functions, and dysphagia exercises. Complete consultation with Neurology.     Adilia Graham, CCC-SLP, CBIS   Speech Language Pathologist   Certified Brain Injury Specialist   7/21/2022

## 2022-07-21 NOTE — PROGRESS NOTES
[facility]   Physical Therapy Treatment Note + Updated Plan of Care     Name: Valentín Field  Clinic Number: [id number]    Therapy Diagnosis:   Encounter Diagnoses   Name Primary?    Decreased strength, endurance, and mobility Yes    Muscle weakness      Physician: Octavio Forrester MD  Visit Date: 7/21/2022  Physician Orders: PT Eval and Treat  Medical Diagnosis from Referral: General Weakness  Evaluation Date: 4/21/2022  Authorization Period Expiration: 4/21/23  Plan of Care Expiration: 06/22/2022  (Extend 08/20/22)                         Progress Update: 07/21/2022                        Visit # / Visits authorized: 19/24 (+1 eval)                       FOTO: Visit #1 - Scored : 1 / 3      PRECAUTIONS: Standard Precautions and Safety/fall precautions      MD Visit: 04/25/2022  PTA Visit #: 0/5     Time In: 1015  Time Out: 1100  Total Billable Time:  40 minutes Billing reflects [place] medicaid guidelines, billing all therapy as therapeutic-exercise    SUBJECTIVE     Pt reports:increase low back discomfort with functional movement such as sit<>stand.       He reports  compliant with staying active at home between sessions.    Response to previous treatment: He responded well to last session.    Functional change: He reports no significant functional change since last session.    Pain:  7/10  Location: low back     OBJECTIVE     Objective Measures updated at progress report unless specified.     MMT 7/21/22  Muscle Groups Left   6/22/22 Right   6/22/22   Hip Flexion 4-/5 3+/5   Hip Extension 4/5 3+/5   Knee Flexion 4-/5 3+/5   Knee Extension 5/5 4+/5   Dorsiflexion 4+/5 4+/5   Plantarflexion 5/5 5/5   Inversion 4+/5 4+/5   Eversion 3+/5 3+/5        Treatment     Valentín received the treatments listed below:      THERAPEUTIC EXERCISES to develop strength, endurance, ROM, flexibility, posture and core stabilization for (15) minutes including:    Intervention Performed Today    Standing Hip  flexion  x 3x10 up onto 12inch step in parallel bars    Shuttle X  x   2x20 both legs 4 bands  2x10 single leg 3 bands   Standing lower extremity exercises, Right knee cage donned to improve posture of lower extremity            2# ankle weight bilateral :   - hip flexion over gibson 1x10 bilateral   - hip abduction over gibson 1x10 bilateral   - hip extension over gibson 1x10 bilateral   - heel raise on AirEx beam 2 x 20 bilateral   - hamstring curl 1x10 bilateral   - Alternating tapping foot to 8 inch step 1x10 bilateral    NuStep  x Level 3 for 6 minutes   Seated lower extremity exercises  long arc quad with 2# Left and 1# Right 1x15 bilateral    Seated hamstring curl against green band   1x20 bilateral    Sitting trunk extension over ball at back  2 x 10 3 second hold   Sitting hamstring/heelcord stretch  2 x 30 seconds bilateral    Standing heel cord stretch   2x15 seconds bilateral    Reviewed over all lower extremity exercises for Home Exercise Program discussing and practicing set-up and sequence of each with green theraband resistance  - Seated hamstring curl  - Standing hip flexion  - Standing hip abduction  - Standing hip adduction  - Standing hip extension  - Seated ankle eversion        Re-assessment  X          Neoprene wrap donned to low back for standing exercises to decrease pain and increase support  Right knee cage donned for standing lower extremity exercises.   Plan for Next Visit:      neuromuscular re-education activities to improve: Balance, Coordination, Kinesthetic, Sense, Proprioception and Posture for (25) minutes. The following activities were included:    Intervention Performed Today    NuStep   Level 2, 7 minutes   Parallel bars standing activities x  x  x - Standing in staggered stance performing weight shifts forward/back 1x10 bilateral with 1 upper extremity support  - Stepping forward/back with 1-0 upper extremity supports  - stepping forward and backwards with scissoring vertical  "board in place working on stand balance with 1 upper extremity support   Supine on mat with bilateral lower extremities over Tball for core exercises x   - Bridges with chest press between each rep holding bilateral 1# dumbbells  - lower trunk rotations moving arms opposite lower extremity movement with 1# dumbbells bilateral   - bilateral knees to chest performing bilateral upper extremity hammer motion down towards knees with each rep with 1# dumbbells bilateral         Standing on AirX pad        - eyes closed 2x10 seconds  - weight shifts Left and Right 1x10 eyes open, 1x5 eyes closed  - holding stand posture while receiving perturbations from all directions   Standing at parallel bars working on lifting pool noodle over head to touch upper back then lowering to lower legs  1x5   Combo lower extremity exercise working on single leg standing and balance with no upper extremity support  - flexion, abduction, extension and heel raise 8x's on each leg   Standing hamstring curl   2x10 bilateral    Sitting in chair with ball at low back performing trunk extension towards neutral   2x10,    Pulley exercises sitting on balance disc on bar stool  Rows against 20# 2x10  Bicep curls against 20# 2x10   Nautilus Leg extension   2 plates, 2x10 for coordination with strengthening   Side stepping on AirX beam   - 2x's back and forth    Tandem stepping on AirX beam  x - 2x's back and forth    Holding tandem stance  5 seconds, 3x's each leg   Sit<>stand  x 3x8 low mat + blue foam    Heel taps  x 3x8 (B)   Terminal knee extension  x 30x (Bilateral) purple cooks cord    Tandem Stance  3x30" (B)     Gait Training for (0) minutes:    Patient Education and Home Exercises     Home Exercises Provided and Patient Education Provided     Education provided:   - Patient educated to continue to stay active at home.  - Patient educated on the benefit of a Right knee cage to prevent hyperextension and protect his knee joint  - 6/20/22 Patient " educated on results of testing today and Home Exercise Program.  He was also educated that the end of his Plan of Care is approaching and that PT would be requesting 4 more weeks of therapy.  He requested that PT move forward with requesting order for Right knee cage for home/community use.   - 6/27/22 PT discussed out of pocket cost related to order of knee cage.  He would like to consider all other options at this time.  Written Home Exercises Provided: Patient educated to continue with current Home Exercise Program. See EMR under Patient Instructions for exercises provided during future therapy sessions    ASSESSMENT     Valentín Field tolerated PT session well with moderate complaints of pain or discomfort, secondary to poor motor control, decrease muscle strength, and decrease muscle endurance. Objective findings are improving with measurements of ROM and functional mobility.  Therapy exercises were reviewed by revisiting exercises given from previous home exercise program while adding no new exercises.  Handouts were not issued during today's visit. Valentín demonstrated good understanding of new exercises and will continue to progress at home until next follow-up.       Valentín Is progressing well towards his goals.   Pt prognosis is Good.     Pt will continue to benefit from skilled outpatient physical therapy to address the deficits listed in the problem list box on initial evaluation, provide pt/family education and to maximize pt's level of independence in the home and community environment.     Pt's spiritual, cultural and educational needs considered and pt agreeable to plan of care and goals.     Anticipated Barriers for therapy: sedentary lifestyle and chronicity of condition       GOALS:  SHORT TERM GOALS: 4 weeks Progress   1. Recent signs and systems trend is improving in order to progress towards LTG's. Met   2. Patient will be independent with HEP in order to further progress and return to maximal  function. Met   3. Pain rating at Worst: 5/10 in order to progress towards increased independence with activity. PC    4. Patient will be able to correct postural deviations in sitting and standing, to decrease pain and promote postural awareness for injury prevention.  Met with Right knee cage      LONG TERM GOALS: 8 weeks Progress   1. Patient will return to normal ADL, recreational, and work related activities with less pain and limitation.   PC   2. Patient will improve AROM to stated goals in order to return to maximal functional potential.   PC   3. Patient will improve Strength to stated goals of appropriate musculature in order to improve functional independence.   PC   4. Pain Rating at Best: 1/10 to improve Quality of Life.      5. Patient will meet predicted functional outcome (FOTO) score: N/A% to increase self-worth & perceived functional ability.  PC   6. Patient will have met/partially met personal goal of: being able to walk > 500ft independently with no pain in back.   PC   7. Added Goal: Patient will demonstrate improved lower extremity strength by performing 5x sit to stand test in 10 seconds.    8. Added Goal: Patient will demonstrate improved coordination and safety with gait by performing TUG in 18 seconds.              PLAN     Continue Plan of Care (POC) and progress per patient tolerance. See Treatment section for exercise progression.    (Extend 08/20/22)     Campos Chambers PT DPT

## 2022-07-26 ENCOUNTER — CLINICAL SUPPORT (OUTPATIENT)
Dept: REHABILITATION | Facility: HOSPITAL | Age: 62
End: 2022-07-26
Payer: MEDICAID

## 2022-07-26 DIAGNOSIS — R68.89 DECREASED STRENGTH, ENDURANCE, AND MOBILITY: Primary | ICD-10-CM

## 2022-07-26 DIAGNOSIS — M62.81 MUSCLE WEAKNESS: ICD-10-CM

## 2022-07-26 DIAGNOSIS — Z74.09 DECREASED STRENGTH, ENDURANCE, AND MOBILITY: Primary | ICD-10-CM

## 2022-07-26 DIAGNOSIS — R53.1 DECREASED STRENGTH, ENDURANCE, AND MOBILITY: Primary | ICD-10-CM

## 2022-07-26 PROCEDURE — 97110 THERAPEUTIC EXERCISES: CPT

## 2022-07-26 NOTE — PROGRESS NOTES
[facility]   Physical Therapy Treatment Note     Name: Valentín Field  Clinic Number: [id number]    Therapy Diagnosis:   Encounter Diagnoses   Name Primary?    Decreased strength, endurance, and mobility Yes    Muscle weakness      Physician: Octavio Forrester MD  Visit Date: 7/26/2022  Physician Orders: PT Eval and Treat  Medical Diagnosis from Referral: General Weakness  Evaluation Date: 4/21/2022  Authorization Period Expiration: 4/21/23  Plan of Care Expiration: 06/22/2022  (Extend 08/20/22)                         Progress Update: 07/21/2022                        Visit # / Visits authorized: 20/24 (+1 eval)                       FOTO: Visit #1 - Scored : 1 / 3      PRECAUTIONS: Standard Precautions and Safety/fall precautions      MD Visit: 04/25/2022  PTA Visit #: 0/5     Time In: 1000  Time Out: 1045  Total Billable Time:  40 minutes Billing reflects [place] medicaid guidelines, billing all therapy as therapeutic-exercise    SUBJECTIVE     Pt reports:increase low back discomfort with functional movement such as sit<>stand.       He reports  compliant with staying active at home between sessions.    Response to previous treatment: He responded well to last session.    Functional change: He reports no significant functional change since last session.    Pain:  3/10  Location: low back     OBJECTIVE     Objective Measures updated at progress report unless specified.     (MMT 7/21/22)  Muscle Groups Left   6/22/22 Right   6/22/22   Hip Flexion 4-/5 3+/5   Hip Extension 4/5 3+/5   Knee Flexion 4-/5 3+/5   Knee Extension 5/5 4+/5   Dorsiflexion 4+/5 4+/5   Plantarflexion 5/5 5/5   Inversion 4+/5 4+/5   Eversion 3+/5 3+/5        Treatment     Valentín received the treatments listed below:      THERAPEUTIC EXERCISES to develop strength, endurance, ROM, flexibility, posture and core stabilization for (15) minutes including:    Intervention Performed Today    Standing Hip flexion  x 3x10 up  onto 12inch step in parallel bars    Shuttle X  x   2x20 both legs 4 bands  2x10 single leg 3 bands   Standing lower extremity exercises, Right knee cage donned to improve posture of lower extremity            2# ankle weight bilateral :   - hip flexion over gibson 1x10 bilateral   - hip abduction over gibson 1x10 bilateral   - hip extension over gibson 1x10 bilateral   - heel raise on AirEx beam 2 x 20 bilateral   - hamstring curl 1x10 bilateral   - Alternating tapping foot to 8 inch step 1x10 bilateral    NuStep  x Level 3 for 6 minutes   Seated lower extremity exercises  long arc quad with 2# Left and 1# Right 1x15 bilateral    Seated hamstring curl against green band   1x20 bilateral    Sitting trunk extension over ball at back  2 x 10 3 second hold   Sitting hamstring/heelcord stretch  2 x 30 seconds bilateral    Standing heel cord stretch   2x15 seconds bilateral    Reviewed over all lower extremity exercises for Home Exercise Program discussing and practicing set-up and sequence of each with green theraband resistance  - Seated hamstring curl  - Standing hip flexion  - Standing hip abduction  - Standing hip adduction  - Standing hip extension  - Seated ankle eversion        Re-assessment  X          Neoprene wrap donned to low back for standing exercises to decrease pain and increase support  Right knee cage donned for standing lower extremity exercises.   Plan for Next Visit:      neuromuscular re-education activities to improve: Balance, Coordination, Kinesthetic, Sense, Proprioception and Posture for (25) minutes. The following activities were included:    Intervention Performed Today    NuStep   Level 2, 7 minutes   Parallel bars standing activities x  x  x - Standing in staggered stance performing weight shifts forward/back 1x10 bilateral with 1 upper extremity support  - Stepping forward/back with 1-0 upper extremity supports  - stepping forward and backwards with scissoring vertical board in place  "working on stand balance with 1 upper extremity support   Supine on mat with bilateral lower extremities over Tball for core exercises x   - Bridges with chest press between each rep holding bilateral 1# dumbbells  - lower trunk rotations moving arms opposite lower extremity movement with 1# dumbbells bilateral   - bilateral knees to chest performing bilateral upper extremity hammer motion down towards knees with each rep with 1# dumbbells bilateral         Standing on AirX pad        - eyes closed 2x10 seconds  - weight shifts Left and Right 1x10 eyes open, 1x5 eyes closed  - holding stand posture while receiving perturbations from all directions   Standing at parallel bars working on lifting pool noodle over head to touch upper back then lowering to lower legs  1x5   Combo lower extremity exercise working on single leg standing and balance with no upper extremity support  - flexion, abduction, extension and heel raise 8x's on each leg   Standing hamstring curl   2x10 bilateral    Sitting in chair with ball at low back performing trunk extension towards neutral   2x10,    Pulley exercises sitting on balance disc on bar stool  Rows against 20# 2x10  Bicep curls against 20# 2x10   Nautilus Leg extension   2 plates, 2x10 for coordination with strengthening   Side stepping on AirX beam   - 2x's back and forth    Tandem stepping on AirX beam  x - 2x's back and forth    Holding tandem stance  5 seconds, 3x's each leg   Sit<>stand  x 3x8 low mat + blue foam    Heel taps  x 3x8 (B)   Terminal knee extension  x 30x (Bilateral) purple cooks cord    Tandem Stance  3x30" (B)     Gait Training for (0) minutes:    Patient Education and Home Exercises     Home Exercises Provided and Patient Education Provided     Education provided:   - Patient educated to continue to stay active at home.  - Patient educated on the benefit of a Right knee cage to prevent hyperextension and protect his knee joint  - 6/20/22 Patient educated on " results of testing today and Home Exercise Program.  He was also educated that the end of his Plan of Care is approaching and that PT would be requesting 4 more weeks of therapy.  He requested that PT move forward with requesting order for Right knee cage for home/community use.   - 6/27/22 PT discussed out of pocket cost related to order of knee cage.  He would like to consider all other options at this time.  Written Home Exercises Provided: Patient educated to continue with current Home Exercise Program. See EMR under Patient Instructions for exercises provided during future therapy sessions    ASSESSMENT     Valentín Field tolerated PT session well with moderate complaints of pain or discomfort, secondary to poor motor control, decrease muscle strength, and decrease muscle endurance. Objective findings are improving with measurements of ROM and functional mobility.  Therapy exercises were reviewed by revisiting exercises given from previous home exercise program while adding no new exercises.  Handouts were not issued during today's visit. Valentín demonstrated good understanding of new exercises and will continue to progress at home until next follow-up.       Valentín Is progressing well towards his goals.   Pt prognosis is Good.     Pt will continue to benefit from skilled outpatient physical therapy to address the deficits listed in the problem list box on initial evaluation, provide pt/family education and to maximize pt's level of independence in the home and community environment.     Pt's spiritual, cultural and educational needs considered and pt agreeable to plan of care and goals.     Anticipated Barriers for therapy: sedentary lifestyle and chronicity of condition       GOALS:  SHORT TERM GOALS: 4 weeks Progress   1. Recent signs and systems trend is improving in order to progress towards LTG's. Met   2. Patient will be independent with HEP in order to further progress and return to maximal function. Met    3. Pain rating at Worst: 5/10 in order to progress towards increased independence with activity. PC    4. Patient will be able to correct postural deviations in sitting and standing, to decrease pain and promote postural awareness for injury prevention.  Met with Right knee cage      LONG TERM GOALS: 8 weeks Progress   1. Patient will return to normal ADL, recreational, and work related activities with less pain and limitation.   PC   2. Patient will improve AROM to stated goals in order to return to maximal functional potential.   PC   3. Patient will improve Strength to stated goals of appropriate musculature in order to improve functional independence.   PC   4. Pain Rating at Best: 1/10 to improve Quality of Life.      5. Patient will meet predicted functional outcome (FOTO) score: N/A% to increase self-worth & perceived functional ability.  PC   6. Patient will have met/partially met personal goal of: being able to walk > 500ft independently with no pain in back.   PC   7. Added Goal: Patient will demonstrate improved lower extremity strength by performing 5x sit to stand test in 10 seconds.    8. Added Goal: Patient will demonstrate improved coordination and safety with gait by performing TUG in 18 seconds.              PLAN     Continue Plan of Care (POC) and progress per patient tolerance. See Treatment section for exercise progression.    (Extend 08/20/22)     Campos Chambers PT DPT

## 2022-07-28 ENCOUNTER — CLINICAL SUPPORT (OUTPATIENT)
Dept: REHABILITATION | Facility: HOSPITAL | Age: 62
End: 2022-07-28
Payer: MEDICAID

## 2022-07-28 DIAGNOSIS — Z74.09 DECREASED STRENGTH, ENDURANCE, AND MOBILITY: Primary | ICD-10-CM

## 2022-07-28 DIAGNOSIS — R41.841 COGNITIVE COMMUNICATION DISORDER: ICD-10-CM

## 2022-07-28 DIAGNOSIS — M62.81 MUSCLE WEAKNESS: ICD-10-CM

## 2022-07-28 DIAGNOSIS — R53.1 DECREASED STRENGTH, ENDURANCE, AND MOBILITY: Primary | ICD-10-CM

## 2022-07-28 DIAGNOSIS — Z98.1 S/P CERVICAL SPINAL FUSION: ICD-10-CM

## 2022-07-28 DIAGNOSIS — R68.89 DECREASED STRENGTH, ENDURANCE, AND MOBILITY: Primary | ICD-10-CM

## 2022-07-28 DIAGNOSIS — R13.12 OROPHARYNGEAL DYSPHAGIA: Primary | ICD-10-CM

## 2022-07-28 PROCEDURE — 92507 TX SP LANG VOICE COMM INDIV: CPT

## 2022-07-28 PROCEDURE — 97110 THERAPEUTIC EXERCISES: CPT | Mod: 59

## 2022-07-28 NOTE — PROGRESS NOTES
"[facility]  Speech Therapy Daily Note- Neurological Rehabilitation and Dysphagia     Date: 7/28/2022     Name: Valentín Field   MRN: [id number]   Therapy Diagnosis:   Encounter Diagnoses   Name Primary?    Oropharyngeal dysphagia Yes    Cognitive communication disorder     S/P cervical spinal fusion      Physician: Octavio Forrester MD  Physician Orders: [id number] - AMB REFERRAL/CONSULT TO SPEECH THERAPY  Medical Diagnosis: Z98.1 (ICD-10-CM) - S/P cervical spinal fusion    Visit #/ Visits Authorized: 11/16  Date of Evaluation:  5/5/2022  Insurance Authorization Period: 5/12/2022 - 8/10/2022  Plan of Care Expiration Date:  9/22/2022  Extended Plan of Care:  NA  Progress Note: 8/18/2022  Visits Cancelled: 1  Visits No Show: 1    Time In:  11:00 AM   Time Out:  11:44 AM   Total Billable Time: 44 minutes      Precautions: Standard  Subjective:   Patient reports: Some difficulty with medication management.   Response to previous treatment: Patient recalled swallowing exercises.   Pain Scale:  7/10 on a Visual Analog Scale currently.   Pain Location: "shoulders" "back" "legs" "arms"   Objective:         UNTIMED  Procedure Min.   Speech- Language- Voice Therapy 44   Dysphagia Therapy 0   Total Timed Units: 0  Total Untimed Units: 1  Charges Billed/Number of units: 1      Short Term Goals: (5 weeks) Current Progress:   Patient will complete formal cognitive-communication assessment.      GOAL MET  5/12/2022         GOAL MET 5/12/2022   Patient will complete formal language assessment.      Discontinued  Not formally addressed. Discontinue due to primary concerns with memory.    Patient will complete a modified barium swallow study to assess swallow safety and efficiency and to determine the least restrictive diet.       COMPLETED 6/20/2022    Patient will sustain attention to complete moderate to complex reasoning tasks for 2 minutes with one request for clarification to increase sustained attention.   Not " formally addressed     MET X1   Patient will complete selective attention tasks (auditory or visual) with 90% accuracy independently increase selective attention.      Not formally addressed    Patient will complete short term recall tasks after a 5 minutes delay with 90% accuracy  independently  with use of memory strategies to improve recall of information and generalization of memory strategies.   Not formally addressed.    Patient will independently predict time and accuracy of performance on trials within 10% of actual performance in 90% of opportunities.    Not formally addressed.    Patient will complete a functional task to improve attention, memory and/or executive functions (I.e. sample bill paying activity, recipe, or multiple choice comprehension questions to 1 paragraphs) with 80% accuracy and natural environment noise distractions (TV news background, music, etc.).   Patient and clinician discussed medication management in depth today. Clinician stressed the importance of discussing all medications or medications changes with his physician. Discussed the importance of taking medication as prescribed  and to discuss any concerns with his physician. He verbalized understanding.    Patient will be educated regarding cognitive deficits as applicable to the patient's life for increased awareness and will execute returned demonstration of information with 80% accuracy.    Additional education provided today and education provided regarding the importance of external aids to compensate.    Patient will use external aids to compensate for deficits in attention, memory, and thought organization with 90% accuracy independently   Patient uses his phone and calendar printouts with his appointments on them.     Patient will complete 50-75 effortful swallows per session in order to improve swallowing function. Completed X100 today given a model and moderate cueing for accuracy.    Patient will complete 20-30  Mendelsohn maneuvers per session in order to improve swallowing function. Completed X10 given specific instruction, a model, and moderate cueing for accuracy.    Patient will 3 sets of 1 minutes CTAR holds per session in order to improve swallowing function. Discontinue due to reports of sharp pain.   Patient will complete 30 CTAR repetitionsper session in order to improve swallowing function. Discontinue due to reports of sharp pain.    Patient participate in swallowing education with returned demonstration of information.  Additional education provided today.        Patient Education/Response:   Education provided regarding medication management and the importance of discussing all medication questions or concerns with his physician.  He verbalized understanding.     Home program established: yes-Patient instructed to complete dysphagia exercises at home daily.   Exercises were reviewed and Valentín was able to demonstrate them prior to the end of the session.  Valentín demonstrated fair  understanding of the education provided.     See Electronic Medical Record under Patient Instructions for exercises provided throughout therapy.  Assessment:   Valentín is progressing well towards his goals. Current goals remain appropriate and will be updated as needed.     Patient prognosis is Good. Patient will continue to benefit from skilled outpatient speech and language therapy to address the deficits listed in the problem list on initial evaluation, provide patient/family education and to maximize patient's level of independence in the home and community environment.   Medical necessity is demonstrated by the following IMPAIRMENTS:  Dysphagia impacts quality of life and puts the patient at risk for aspiration related pneumonia.   Cognitive-communication deficits negatively impact quality of life, safety and independence during activities of daily living, and participation in social and community interactions.   Barriers to  Therapy: Unknown etiology of deficits; transportation   Patient's spiritual, cultural and educational needs considered and patient agreeable to plan of care and goals.  Plan:   Continue Plan of Care with focus on attention, memory, executive functions, and dysphagia exercises.       Adilia Graham, CCC-SLP, CBIS   Speech Language Pathologist   Certified Brain Injury Specialist   7/28/2022

## 2022-07-28 NOTE — PROGRESS NOTES
[facility]   Physical Therapy Treatment Note     Name: Valentín Field  Clinic Number: [id number]    Therapy Diagnosis:   Encounter Diagnoses   Name Primary?    Decreased strength, endurance, and mobility Yes    Muscle weakness      Physician: Octavio Forrester MD  Visit Date: 7/28/2022  Physician Orders: PT Eval and Treat  Medical Diagnosis from Referral: General Weakness  Evaluation Date: 4/21/2022  Authorization Period Expiration: 4/21/23  Plan of Care Expiration: 06/22/2022  (Extend 08/20/22)                         Progress Update: 07/21/2022                        Visit # / Visits authorized: 21/24 (+1 eval)                       FOTO: Visit #1 - Scored : 1 / 3      PRECAUTIONS: Standard Precautions and Safety/fall precautions      MD Visit: 04/25/2022  PTA Visit #: 0/5     Time In: 1000  Time Out: 1045  Total Billable Time:  40 minutes Billing reflects [place] medicaid guidelines, billing all therapy as therapeutic-exercise    SUBJECTIVE     Pt reports:increase low back discomfort with functional movement such as sit<>stand.       He reports  compliant with staying active at home between sessions.    Response to previous treatment: He responded well to last session.    Functional change: He reports no significant functional change since last session.    Pain:  3/10  Location: low back     OBJECTIVE     Objective Measures updated at progress report unless specified.     (MMT 7/21/22)  Muscle Groups Left   6/22/22 Right   6/22/22   Hip Flexion 4-/5 3+/5   Hip Extension 4/5 3+/5   Knee Flexion 4-/5 3+/5   Knee Extension 5/5 4+/5   Dorsiflexion 4+/5 4+/5   Plantarflexion 5/5 5/5   Inversion 4+/5 4+/5   Eversion 3+/5 3+/5        Treatment     Valentín received the treatments listed below:      THERAPEUTIC EXERCISES to develop strength, endurance, ROM, flexibility, posture and core stabilization for (15) minutes including:    Intervention Performed Today    Standing Hip flexion  x 3x10 up  onto 12inch step in parallel bars    Shuttle X  x   2x20 both legs 4 bands  2x10 single leg 3 bands   Standing lower extremity exercises, Right knee cage donned to improve posture of lower extremity            2# ankle weight bilateral :   - hip flexion over gibson 1x10 bilateral   - hip abduction over gibson 1x10 bilateral   - hip extension over gibson 1x10 bilateral   - heel raise on AirEx beam 2 x 20 bilateral   - hamstring curl 1x10 bilateral   - Alternating tapping foot to 8 inch step 1x10 bilateral    NuStep  x Level 3 for 6 minutes   Seated lower extremity exercises  long arc quad with 2# Left and 1# Right 1x15 bilateral    Seated hamstring curl against green band   1x20 bilateral    Sitting trunk extension over ball at back  2 x 10 3 second hold   Sitting hamstring/heelcord stretch  2 x 30 seconds bilateral    Standing heel cord stretch   2x15 seconds bilateral    Reviewed over all lower extremity exercises for Home Exercise Program discussing and practicing set-up and sequence of each with green theraband resistance  - Seated hamstring curl  - Standing hip flexion  - Standing hip abduction  - Standing hip adduction  - Standing hip extension  - Seated ankle eversion        Re-assessment  X          Neoprene wrap donned to low back for standing exercises to decrease pain and increase support  Right knee cage donned for standing lower extremity exercises.   Plan for Next Visit:      neuromuscular re-education activities to improve: Balance, Coordination, Kinesthetic, Sense, Proprioception and Posture for (25) minutes. The following activities were included:    Intervention Performed Today    NuStep   Level 2, 7 minutes   Parallel bars standing activities x  x  x - Standing in staggered stance performing weight shifts forward/back 1x10 bilateral with 1 upper extremity support  - Stepping forward/back with 1-0 upper extremity supports  - stepping forward and backwards with scissoring vertical board in place  "working on stand balance with 1 upper extremity support   Supine on mat with bilateral lower extremities over Tball for core exercises x   - Bridges with chest press between each rep holding bilateral 1# dumbbells  - lower trunk rotations moving arms opposite lower extremity movement with 1# dumbbells bilateral   - bilateral knees to chest performing bilateral upper extremity hammer motion down towards knees with each rep with 1# dumbbells bilateral         Standing on AirX pad        - eyes closed 2x10 seconds  - weight shifts Left and Right 1x10 eyes open, 1x5 eyes closed  - holding stand posture while receiving perturbations from all directions   Standing at parallel bars working on lifting pool noodle over head to touch upper back then lowering to lower legs  1x5   Combo lower extremity exercise working on single leg standing and balance with no upper extremity support  - flexion, abduction, extension and heel raise 8x's on each leg   Standing hamstring curl   2x10 bilateral    Sitting in chair with ball at low back performing trunk extension towards neutral   2x10,    Pulley exercises sitting on balance disc on bar stool  Rows against 20# 2x10  Bicep curls against 20# 2x10   Nautilus Leg extension   2 plates, 2x10 for coordination with strengthening   Side stepping on AirX beam   - 2x's back and forth    Tandem stepping on AirX beam  x - 2x's back and forth    Holding tandem stance  5 seconds, 3x's each leg   Sit<>stand  x 3x8 low mat + blue foam    Heel taps  x 3x8 (B)   Terminal knee extension  x 30x (Bilateral) purple cooks cord    Tandem Stance  3x30" (B)     Gait Training for (0) minutes:    Patient Education and Home Exercises     Home Exercises Provided and Patient Education Provided     Education provided:   - Patient educated to continue to stay active at home.  - Patient educated on the benefit of a Right knee cage to prevent hyperextension and protect his knee joint  - 6/20/22 Patient educated on " results of testing today and Home Exercise Program.  He was also educated that the end of his Plan of Care is approaching and that PT would be requesting 4 more weeks of therapy.  He requested that PT move forward with requesting order for Right knee cage for home/community use.   - 6/27/22 PT discussed out of pocket cost related to order of knee cage.  He would like to consider all other options at this time.  Written Home Exercises Provided: Patient educated to continue with current Home Exercise Program. See EMR under Patient Instructions for exercises provided during future therapy sessions    ASSESSMENT     Valentín Field tolerated PT session well with min/mod complaints of pain or discomfort, secondary to poor motor control, decrease muscle strength, and decrease muscle endurance. Objective findings are improving with measurements of ROM and functional mobility.  Therapy exercises were reviewed by revisiting exercises given from previous home exercise program while adding no new exercises.  Handouts were not issued during today's visit. Valentín demonstrated good understanding of new exercises and will continue to progress at home until next follow-up.       Valentín Is progressing well towards his goals.   Pt prognosis is Good.     Pt will continue to benefit from skilled outpatient physical therapy to address the deficits listed in the problem list box on initial evaluation, provide pt/family education and to maximize pt's level of independence in the home and community environment.     Pt's spiritual, cultural and educational needs considered and pt agreeable to plan of care and goals.     Anticipated Barriers for therapy: sedentary lifestyle and chronicity of condition       GOALS:  SHORT TERM GOALS: 4 weeks Progress   1. Recent signs and systems trend is improving in order to progress towards LTG's. Met   2. Patient will be independent with HEP in order to further progress and return to maximal function. Met    3. Pain rating at Worst: 5/10 in order to progress towards increased independence with activity. PC    4. Patient will be able to correct postural deviations in sitting and standing, to decrease pain and promote postural awareness for injury prevention.  Met with Right knee cage      LONG TERM GOALS: 8 weeks Progress   1. Patient will return to normal ADL, recreational, and work related activities with less pain and limitation.   PC   2. Patient will improve AROM to stated goals in order to return to maximal functional potential.   PC   3. Patient will improve Strength to stated goals of appropriate musculature in order to improve functional independence.   PC   4. Pain Rating at Best: 1/10 to improve Quality of Life.      5. Patient will meet predicted functional outcome (FOTO) score: N/A% to increase self-worth & perceived functional ability.  PC   6. Patient will have met/partially met personal goal of: being able to walk > 500ft independently with no pain in back.   PC   7. Added Goal: Patient will demonstrate improved lower extremity strength by performing 5x sit to stand test in 10 seconds.    8. Added Goal: Patient will demonstrate improved coordination and safety with gait by performing TUG in 18 seconds.              PLAN     Continue Plan of Care (POC) and progress per patient tolerance. See Treatment section for exercise progression.    (Extend 08/20/22)     Campos Chambers PT DPT

## 2022-07-29 ENCOUNTER — TELEPHONE (OUTPATIENT)
Dept: NEUROLOGY | Facility: CLINIC | Age: 62
End: 2022-07-29
Payer: MEDICAID

## 2022-07-29 NOTE — TELEPHONE ENCOUNTER
----- Message from Lali Fernández NP sent at 7/28/2022  6:20 PM CDT -----  Regarding: FW: MRI and Neuropsych Eval  The orders have been placed. Thanks for your assistance Jonathan Alexander can you make sure this gentlemen MRI Brain WO and CNP get scheduled. I entered the order twice not sure what happened.       ----- Message -----  From: Adilia Graham CCC-SLP  Sent: 7/28/2022   3:34 PM CDT  To: Lali Fernández NP  Subject: MRI and Neuropsych Eval                          Colin Fernández,     You recently saw a patient that I have been seeing for speech therapy, Valentín Field. I see in your note you recommended an MRI and Neuropsychological testing however I don't see any orders have been placed. Valentín was unsure of how to make follow up appointments. Can you please place orders and I am happy to assist Valentín with scheduling at our next session.       Thanks,     Adilia Graham

## 2022-08-02 ENCOUNTER — CLINICAL SUPPORT (OUTPATIENT)
Dept: REHABILITATION | Facility: HOSPITAL | Age: 62
End: 2022-08-02
Payer: MEDICAID

## 2022-08-02 DIAGNOSIS — R68.89 DECREASED STRENGTH, ENDURANCE, AND MOBILITY: Primary | ICD-10-CM

## 2022-08-02 DIAGNOSIS — Z74.09 DECREASED STRENGTH, ENDURANCE, AND MOBILITY: Primary | ICD-10-CM

## 2022-08-02 DIAGNOSIS — M62.81 MUSCLE WEAKNESS: ICD-10-CM

## 2022-08-02 DIAGNOSIS — R53.1 DECREASED STRENGTH, ENDURANCE, AND MOBILITY: Primary | ICD-10-CM

## 2022-08-02 PROCEDURE — 97112 NEUROMUSCULAR REEDUCATION: CPT

## 2022-08-02 PROCEDURE — 97110 THERAPEUTIC EXERCISES: CPT

## 2022-08-02 NOTE — PROGRESS NOTES
[facility]   Physical Therapy Treatment Note     Name: Valentín Field  Clinic Number: [id number]    Therapy Diagnosis:   Encounter Diagnoses   Name Primary?    Decreased strength, endurance, and mobility Yes    Muscle weakness      Physician: Octavio Forrester MD  Visit Date: 8/2/2022  Physician Orders: PT Eval and Treat  Medical Diagnosis from Referral: General Weakness  Evaluation Date: 4/21/2022  Authorization Period Expiration: 4/21/23  Plan of Care Expiration: 06/22/2022  (Extend 08/20/22)                         Progress Update: 07/21/2022                        Visit # / Visits authorized: 22/24 (+1 eval)                       FOTO: Visit #1 - Scored : 1 / 3      PRECAUTIONS: Standard Precautions and Safety/fall precautions      MD Visit: 04/25/2022  PTA Visit #: 0/5     Time In: 1000  Time Out: 1045  Total Billable Time:  40 minutes Billing reflects [place] medicaid guidelines, billing all therapy as therapeutic-exercise    SUBJECTIVE     Pt reports:increase low back discomfort with functional movement such as sit<>stand.       He reports  compliant with staying active at home between sessions.    Response to previous treatment: He responded well to last session.    Functional change: He reports no significant functional change since last session.    Pain:  3/10  Location: low back     OBJECTIVE     Objective Measures updated at progress report unless specified.     Treatment     Valentín received the treatments listed below:      THERAPEUTIC EXERCISES to develop strength, endurance, ROM, flexibility, posture and core stabilization for (15) minutes including:    Intervention Performed Today    Standing Hip flexion  x 3x10 up onto 12inch step in parallel bars    Shuttle X  x   2x20 both legs 4 bands  2x10 single leg 3 bands   Standing lower extremity exercises, Right knee cage donned to improve posture of lower extremity            2# ankle weight bilateral :   - hip flexion over  gibson 1x10 bilateral   - hip abduction over gibson 1x10 bilateral   - hip extension over gibson 1x10 bilateral   - heel raise on AirEx beam 2 x 20 bilateral   - hamstring curl 1x10 bilateral   - Alternating tapping foot to 8 inch step 1x10 bilateral    NuStep  x Level 3 for 6 minutes   Seated lower extremity exercises  long arc quad with 2# Left and 1# Right 1x15 bilateral    Seated hamstring curl against green band   1x20 bilateral    Sitting trunk extension over ball at back  2 x 10 3 second hold   Sitting hamstring/heelcord stretch  2 x 30 seconds bilateral    Standing heel cord stretch   2x15 seconds bilateral    Reviewed over all lower extremity exercises for Home Exercise Program discussing and practicing set-up and sequence of each with green theraband resistance  - Seated hamstring curl  - Standing hip flexion  - Standing hip abduction  - Standing hip adduction  - Standing hip extension  - Seated ankle eversion        Re-assessment  X          Neoprene wrap donned to low back for standing exercises to decrease pain and increase support  Right knee cage donned for standing lower extremity exercises.   Plan for Next Visit:      neuromuscular re-education activities to improve: Balance, Coordination, Kinesthetic, Sense, Proprioception and Posture for (25) minutes. The following activities were included:    Intervention Performed Today    NuStep   Level 2, 7 minutes   Parallel bars standing activities x  x  x - Standing in staggered stance performing weight shifts forward/back 1x10 bilateral with 1 upper extremity support  - Stepping forward/back with 1-0 upper extremity supports  - stepping forward and backwards with scissoring vertical board in place working on stand balance with 1 upper extremity support   Supine on mat with bilateral lower extremities over Tball for core exercises x   - Bridges with chest press between each rep holding bilateral 1# dumbbells  - lower trunk rotations moving arms opposite  "lower extremity movement with 1# dumbbells bilateral   - bilateral knees to chest performing bilateral upper extremity hammer motion down towards knees with each rep with 1# dumbbells bilateral         Standing on AirX pad        - eyes closed 2x10 seconds  - weight shifts Left and Right 1x10 eyes open, 1x5 eyes closed  - holding stand posture while receiving perturbations from all directions   Standing at parallel bars working on lifting pool noodle over head to touch upper back then lowering to lower legs  1x5   Combo lower extremity exercise working on single leg standing and balance with no upper extremity support  - flexion, abduction, extension and heel raise 8x's on each leg   Standing hamstring curl   2x10 bilateral    Sitting in chair with ball at low back performing trunk extension towards neutral   2x10,    Pulley exercises sitting on balance disc on bar stool  Rows against 20# 2x10  Bicep curls against 20# 2x10   Nautilus Leg extension   2 plates, 2x10 for coordination with strengthening   Side stepping on AirX beam   - 2x's back and forth    Tandem stepping on AirX beam  x - 2x's back and forth    Holding tandem stance  5 seconds, 3x's each leg   Sit<>stand  x 3x8 low mat + blue foam    Heel taps  x 3x8 (B)   Terminal knee extension  x 30x (Bilateral) purple cooks cord    Tandem Stance  3x30" (B)     Gait Training for (0) minutes:    Patient Education and Home Exercises     Home Exercises Provided and Patient Education Provided     Education provided:   - Patient educated to continue to stay active at home.  - Patient educated on the benefit of a Right knee cage to prevent hyperextension and protect his knee joint  - 6/20/22 Patient educated on results of testing today and Home Exercise Program.  He was also educated that the end of his Plan of Care is approaching and that PT would be requesting 4 more weeks of therapy.  He requested that PT move forward with requesting order for Right knee cage for " home/community use.   - 6/27/22 PT discussed out of pocket cost related to order of knee cage.  He would like to consider all other options at this time.  Written Home Exercises Provided: Patient educated to continue with current Home Exercise Program. See EMR under Patient Instructions for exercises provided during future therapy sessions    ASSESSMENT     Valentín Field tolerated PT session well with min/mod complaints of pain or discomfort, secondary to poor motor control, decrease muscle strength, and decrease muscle endurance. Objective findings are improving with measurements of ROM and functional mobility.  Therapy exercises were reviewed by revisiting exercises given from previous home exercise program while adding no new exercises.  Handouts were not issued during today's visit. Valentín demonstrated good understanding of new exercises and will continue to progress at home until next follow-up.       Valentín Is progressing well towards his goals.   Pt prognosis is Good.     Pt will continue to benefit from skilled outpatient physical therapy to address the deficits listed in the problem list box on initial evaluation, provide pt/family education and to maximize pt's level of independence in the home and community environment.     Pt's spiritual, cultural and educational needs considered and pt agreeable to plan of care and goals.     Anticipated Barriers for therapy: sedentary lifestyle and chronicity of condition       GOALS:  SHORT TERM GOALS: 4 weeks Progress   1. Recent signs and systems trend is improving in order to progress towards LTG's. Met   2. Patient will be independent with HEP in order to further progress and return to maximal function. Met   3. Pain rating at Worst: 5/10 in order to progress towards increased independence with activity. PC    4. Patient will be able to correct postural deviations in sitting and standing, to decrease pain and promote postural awareness for injury prevention.  Met  with Right knee cage      LONG TERM GOALS: 8 weeks Progress   1. Patient will return to normal ADL, recreational, and work related activities with less pain and limitation.   PC   2. Patient will improve AROM to stated goals in order to return to maximal functional potential.   PC   3. Patient will improve Strength to stated goals of appropriate musculature in order to improve functional independence.   PC   4. Pain Rating at Best: 1/10 to improve Quality of Life.      5. Patient will meet predicted functional outcome (FOTO) score: N/A% to increase self-worth & perceived functional ability.  PC   6. Patient will have met/partially met personal goal of: being able to walk > 500ft independently with no pain in back.   PC   7. Added Goal: Patient will demonstrate improved lower extremity strength by performing 5x sit to stand test in 10 seconds.    8. Added Goal: Patient will demonstrate improved coordination and safety with gait by performing TUG in 18 seconds.              PLAN     Continue Plan of Care (POC) and progress per patient tolerance. See Treatment section for exercise progression.    (Extend 08/20/22)     Campos Chambers, PT DPT

## 2022-08-04 ENCOUNTER — CLINICAL SUPPORT (OUTPATIENT)
Dept: REHABILITATION | Facility: HOSPITAL | Age: 62
End: 2022-08-04
Payer: MEDICAID

## 2022-08-04 DIAGNOSIS — Z98.1 S/P CERVICAL SPINAL FUSION: ICD-10-CM

## 2022-08-04 DIAGNOSIS — R41.841 COGNITIVE COMMUNICATION DISORDER: ICD-10-CM

## 2022-08-04 DIAGNOSIS — R13.12 OROPHARYNGEAL DYSPHAGIA: Primary | ICD-10-CM

## 2022-08-04 PROCEDURE — 92507 TX SP LANG VOICE COMM INDIV: CPT

## 2022-08-04 NOTE — PROGRESS NOTES
"[facility]  Speech Therapy Daily Note- Neurological Rehabilitation and Dysphagia     Date: 8/4/2022     Name: Valentín Field   MRN: [id number]   Therapy Diagnosis:   Encounter Diagnoses   Name Primary?    Oropharyngeal dysphagia Yes    Cognitive communication disorder     S/P cervical spinal fusion      Physician: Octavio Forrester MD  Physician Orders: [id number] - AMB REFERRAL/CONSULT TO SPEECH THERAPY  Medical Diagnosis: Z98.1 (ICD-10-CM) - S/P cervical spinal fusion    Visit #/ Visits Authorized: 12/16  Date of Evaluation:  5/5/2022  Insurance Authorization Period: 5/12/2022 - 9/30/2022  Plan of Care Expiration Date:  9/22/2022  Extended Plan of Care:  NA  Progress Note: 8/18/2022  Visits Cancelled: 1  Visits No Show: 1    Time In:  9:45 AM   Time Out:  10:50 AM   Total Billable Time: 65 minutes      Precautions: Standard  Subjective:   Patient reports: He is unsure of what appointments he has coming up. Patient and clinician discussed his upcoming appointments and patient was provided a printout of his calendar with all appointments.   Response to previous treatment: "I do my swallowing exercises every day"   Pain Scale:  7/10 on a Visual Analog Scale currently.   Pain Location: "back" "shoulders"   Objective:         UNTIMED  Procedure Min.   Speech- Language- Voice Therapy 65   Dysphagia Therapy 0   Total Timed Units: 0  Total Untimed Units: 1  Charges Billed/Number of units: 1      Short Term Goals: (5 weeks) Current Progress:   Patient will complete formal cognitive-communication assessment.      GOAL MET  5/12/2022         GOAL MET 5/12/2022   Patient will complete formal language assessment.      Discontinued  Not formally addressed. Discontinue due to primary concerns with memory.    Patient will complete a modified barium swallow study to assess swallow safety and efficiency and to determine the least restrictive diet.       COMPLETED 6/20/2022    Patient will sustain attention to complete " "moderate to complex reasoning tasks for 2 minutes with one request for clarification to increase sustained attention.   Sustained attention to moderate reasoning task for 2 minutes with 1 request for clarification       MET X1 previously    Patient will complete selective attention tasks (auditory or visual) with 90% accuracy independently increase selective attention.      Visual selective attention task completed with 100% accuracy independently       MET X1   Patient will complete short term recall tasks after a 5 minutes delay with 90% accuracy  independently  with use of memory strategies to improve recall of information and generalization of memory strategies.   Four related words: recalled with 100% accuracy after a five minute delay     Four unrelated words: recalled with 25% accuracy after a five minute delay     Four numbers: recalled with 100% accuracy after a five minute delay    Patient will independently predict time and accuracy of performance on trials within 10% of actual performance in 90% of opportunities.    Predicted time: 5 minutes   Predicted accuracy: 100% accuracy   Actual time: 3 minutes   Actual accuracy: 100%   Patient will complete a functional task to improve attention, memory and/or executive functions (I.e. sample bill paying activity, recipe, or multiple choice comprehension questions to 1 paragraphs) with 80% accuracy and natural environment noise distractions (TV news background, music, etc.).   "Scheduling a vacation" completed with 100% accuracy given moderate cueing.    Patient will be educated regarding cognitive deficits as applicable to the patient's life for increased awareness and will execute returned demonstration of information with 80% accuracy.     GOAL MET 8/4/2022 Additional education provided today and education provided regarding the importance of external aids to compensate.     GOAL MET 8/4/2022   Patient will use external aids to compensate for deficits in " attention, memory, and thought organization with 90% accuracy independently   Patient uses his phone and calendar printouts with his appointments on them.  Clinician printed out patient's August and September calendars and reviewed them in depth. Patient asked questions regarding his upcoming appointments. All questions were answered.    Patient will complete 50-75 effortful swallows per session in order to improve swallowing function. Completed X100 today given a model and minimal cueing for accuracy.    Patient will complete 20-30 Mendelsohn maneuvers per session in order to improve swallowing function. Completed X30 given specific instruction, a model, and mminimal cueing for accuracy.    Patient will 3 sets of 1 minutes CTAR holds per session in order to improve swallowing function. Discontinue due to reports of sharp pain.   Patient will complete 30 CTAR repetitionsper session in order to improve swallowing function. Discontinue due to reports of sharp pain.    Patient participate in swallowing education with returned demonstration of information.     GOAL MET 8/4/2022       GOAL MET 8/4/2022       Patient Education/Response:   Education provided regarding upcoming appointments.  He verbalized understanding.     Home program established: yes-Patient instructed to complete dysphagia exercises at home daily.   Exercises were reviewed and Valentín was able to demonstrate them prior to the end of the session.  Valentín demonstrated fair  understanding of the education provided.     See Electronic Medical Record under Patient Instructions for exercises provided throughout therapy.  Assessment:   Valentín is progressing well towards his goals. He continues to complete all swallowing exercises. Current goals remain appropriate and will be updated as needed.   Neuropsychology consultation scheduled for 8/22/2022.   MRI scheduled for 9/9/2022.     Patient prognosis is Good. Patient will continue to benefit from skilled  outpatient speech and language therapy to address the deficits listed in the problem list on initial evaluation, provide patient/family education and to maximize patient's level of independence in the home and community environment.   Medical necessity is demonstrated by the following IMPAIRMENTS:  Dysphagia impacts quality of life and puts the patient at risk for aspiration related pneumonia.   Cognitive-communication deficits negatively impact quality of life, safety and independence during activities of daily living, and participation in social and community interactions.   Barriers to Therapy: Unknown etiology of deficits; transportation   Patient's spiritual, cultural and educational needs considered and patient agreeable to plan of care and goals.  Plan:   Continue Plan of Care with focus on attention, memory, executive functions, and dysphagia exercises.       Adilia Graham, CCC-SLP, CBIS   Speech Language Pathologist   Certified Brain Injury Specialist   8/4/2022

## 2022-08-11 ENCOUNTER — CLINICAL SUPPORT (OUTPATIENT)
Dept: REHABILITATION | Facility: HOSPITAL | Age: 62
End: 2022-08-11
Payer: MEDICAID

## 2022-08-11 DIAGNOSIS — R13.12 OROPHARYNGEAL DYSPHAGIA: Primary | ICD-10-CM

## 2022-08-11 DIAGNOSIS — R41.841 COGNITIVE COMMUNICATION DISORDER: ICD-10-CM

## 2022-08-11 DIAGNOSIS — Z98.1 S/P CERVICAL SPINAL FUSION: ICD-10-CM

## 2022-08-11 PROCEDURE — 92507 TX SP LANG VOICE COMM INDIV: CPT

## 2022-08-11 NOTE — PROGRESS NOTES
"[facility]  Speech Therapy Daily Note- Neurological Rehabilitation and Dysphagia     Date: 8/11/2022     Name: Valentín Field   MRN: [id number]   Therapy Diagnosis:   Encounter Diagnoses   Name Primary?    Oropharyngeal dysphagia Yes    Cognitive communication disorder     S/P cervical spinal fusion      Physician: Octavio Forrester MD  Physician Orders: [id number] - AMB REFERRAL/CONSULT TO SPEECH THERAPY  Medical Diagnosis: Z98.1 (ICD-10-CM) - S/P cervical spinal fusion    Visit #/ Visits Authorized: 13/16  Date of Evaluation:  5/5/2022  Insurance Authorization Period: 5/12/2022 - 9/30/2022  Plan of Care Expiration Date:  9/22/2022  Extended Plan of Care:  NA  Progress Note: 8/18/2022  Visits Cancelled: 1  Visits No Show: 1    Time In:  9:35 AM   Time Out:  10:20 AM   Total Billable Time: 45 minutes      Precautions: Standard  Subjective:   Patient reports: He will be receiving another back injection soon.   Response to previous treatment: "The sheet you made for me is really helping"   Pain Scale:  7/10 on a Visual Analog Scale currently.   Pain Location: "shoulders" "waist" "back"   Objective:         UNTIMED  Procedure Min.   Speech- Language- Voice Therapy 45   Dysphagia Therapy 0   Total Timed Units: 0  Total Untimed Units: 1  Charges Billed/Number of units: 1      Short Term Goals: (5 weeks) Current Progress:   Patient will complete formal cognitive-communication assessment.      GOAL MET  5/12/2022         GOAL MET 5/12/2022   Patient will complete formal language assessment.      Discontinued  Not formally addressed. Discontinue due to primary concerns with memory.    Patient will complete a modified barium swallow study to assess swallow safety and efficiency and to determine the least restrictive diet.       COMPLETED 6/20/2022    Patient will sustain attention to complete moderate to complex reasoning tasks for 2 minutes with one request for clarification to increase sustained attention.   " Sustained attention to moderate reasoning task for 5  minutes with 0 requests for clarification       MET X2   Patient will complete selective attention tasks (auditory or visual) with 90% accuracy independently increase selective attention.      Selective attention task completed with 100% accuracy   Restaurant ambiance background noise.    Selective attention task 2 completed with 80% accuracy with music playing in the background        MET X1   Patient will complete short term recall tasks after a 5 minutes delay with 90% accuracy  independently  with use of memory strategies to improve recall of information and generalization of memory strategies.   Not formally addressed.    Patient will independently predict time and accuracy of performance on trials within 10% of actual performance in 90% of opportunities.    Not formally addressed.    Patient will complete a functional task to improve attention, memory and/or executive functions (I.e. sample bill paying activity, recipe, or multiple choice comprehension questions to 1 paragraphs) with 80% accuracy and natural environment noise distractions (TV news background, music, etc.).   Not formally addressed.     Patient will be educated regarding cognitive deficits as applicable to the patient's life for increased awareness and will execute returned demonstration of information with 80% accuracy.     GOAL MET 8/4/2022 Additional education provided today and education provided regarding the importance of external aids to compensate.     GOAL MET 8/4/2022   Patient will use external aids to compensate for deficits in attention, memory, and thought organization with 90% accuracy independently   Patient uses his phone and calendar printouts with his appointments on them.   Patient will complete 50-75 effortful swallows per session in order to improve swallowing function. Completed X100 today given a model and minimal cueing for accuracy.    Patient will complete 20-30  Mendelsohn maneuvers per session in order to improve swallowing function. Completed X30 given specific instruction, a model, and mminimal cueing for accuracy.    Patient will 3 sets of 1 minutes CTAR holds per session in order to improve swallowing function. Discontinue due to reports of sharp pain.   Patient will complete 30 CTAR repetitionsper session in order to improve swallowing function. Discontinue due to reports of sharp pain.    Patient participate in swallowing education with returned demonstration of information.     GOAL MET 8/4/2022       GOAL MET 8/4/2022       Patient Education/Response:   Education provided regarding upcoming appointments.  He verbalized understanding.     Home program established: yes-Patient instructed to complete dysphagia exercises at home daily.   Exercises were reviewed and Valentín was able to demonstrate them prior to the end of the session.  Valentín demonstrated fair  understanding of the education provided.     See Electronic Medical Record under Patient Instructions for exercises provided throughout therapy.  Assessment:   Valentín is progressing well towards his goals. He continues to complete all swallowing exercises. Current goals remain appropriate and will be updated as needed.   Neuropsychology consultation scheduled for 8/22/2022.   MRI scheduled for 9/9/2022.     Patient prognosis is Good. Patient will continue to benefit from skilled outpatient speech and language therapy to address the deficits listed in the problem list on initial evaluation, provide patient/family education and to maximize patient's level of independence in the home and community environment.   Medical necessity is demonstrated by the following IMPAIRMENTS:  Dysphagia impacts quality of life and puts the patient at risk for aspiration related pneumonia.   Cognitive-communication deficits negatively impact quality of life, safety and independence during activities of daily living, and participation in  social and community interactions.   Barriers to Therapy: Unknown etiology of deficits; transportation   Patient's spiritual, cultural and educational needs considered and patient agreeable to plan of care and goals.  Plan:   Continue Plan of Care with focus on attention, memory, executive functions, and dysphagia exercises.       Adilia Graham, CCC-SLP, CBIS   Speech Language Pathologist   Certified Brain Injury Specialist   8/11/2022

## 2022-08-15 ENCOUNTER — CLINICAL SUPPORT (OUTPATIENT)
Dept: REHABILITATION | Facility: HOSPITAL | Age: 62
End: 2022-08-15
Payer: MEDICAID

## 2022-08-15 DIAGNOSIS — R68.89 DECREASED STRENGTH, ENDURANCE, AND MOBILITY: Primary | ICD-10-CM

## 2022-08-15 DIAGNOSIS — R53.1 DECREASED STRENGTH, ENDURANCE, AND MOBILITY: Primary | ICD-10-CM

## 2022-08-15 DIAGNOSIS — Z74.09 DECREASED STRENGTH, ENDURANCE, AND MOBILITY: Primary | ICD-10-CM

## 2022-08-15 DIAGNOSIS — M62.81 MUSCLE WEAKNESS: ICD-10-CM

## 2022-08-15 PROCEDURE — 97112 NEUROMUSCULAR REEDUCATION: CPT | Mod: CQ

## 2022-08-15 PROCEDURE — 97110 THERAPEUTIC EXERCISES: CPT | Mod: CQ

## 2022-08-15 NOTE — PROGRESS NOTES
[facility]   Physical Therapy Treatment Note     Name: Valentín Field  Clinic Number: [id number]    Therapy Diagnosis:   Encounter Diagnoses   Name Primary?    Decreased strength, endurance, and mobility Yes    Muscle weakness      Physician: Octavio Forrester MD  Visit Date: 8/15/2022  Physician Orders: PT Eval and Treat  Medical Diagnosis from Referral: General Weakness  Evaluation Date: 4/21/2022  Authorization Period Expiration: 4/21/23  Plan of Care Expiration: 06/22/2022  (Extend 08/20/22)                         Progress Update: 07/21/2022                        Visit # / Visits authorized: 22/24 (+1 eval)                       FOTO: Visit #1 - Scored : 1 / 3      PRECAUTIONS: Standard Precautions and Safety/fall precautions      MD Visit: 04/25/2022  PTA Visit #: 1/5     Time In: 1500  Time Out: 1545  Total Billable Time:  40 minutes Billing reflects [place] medicaid guidelines, billing all therapy as therapeutic-exercise    SUBJECTIVE     Pt reports:having an injection in his low back on 7/20/2022 and currently is feeling a tightness described between T12 and L3.        He reports  compliant with staying active at home between sessions.    Response to previous treatment: He responded well to last session.    Functional change: He reports no significant functional change since last session.    Pain:  3/10  Location: low back     OBJECTIVE     Objective Measures updated at progress report unless specified.     Treatment     Valentín received the treatments listed below:      THERAPEUTIC EXERCISES to develop strength, endurance, ROM, flexibility, posture and core stabilization for (35) minutes including:    Intervention Performed Today    Standing Hip flexion  x 3x10 up onto 12inch step in parallel bars    Shuttle x  x   2 x 20 both legs 4 bands  2 x 10 single leg 3 bands   Standing lower extremity exercises, Right knee cage donned to improve posture of lower extremity            2#  ankle weight bilateral :   - hip flexion over gibson 1x10 bilateral   - hip abduction over gibson 1x10 bilateral   - hip extension over gibson 1x10 bilateral   - heel raise on AirEx beam 2 x 20 bilateral   - hamstring curl 1x10 bilateral   - Alternating tapping foot to 8 inch step 1x10 bilateral    NuStep  x Level 4 for 6 minutes   Seated lower extremity exercises  long arc quad with 2# Left and 1# Right 1x15 bilateral    Seated hamstring curl against green band   1x20 bilateral    Sitting trunk extension over ball at back  2 x 10 3 second hold   Sitting hamstring/heelcord stretch  2 x 30 seconds bilateral    Standing heel cord stretch   2x15 seconds bilateral    Reviewed over all lower extremity exercises for Home Exercise Program discussing and practicing set-up and sequence of each with green theraband resistance  - Seated hamstring curl  - Standing hip flexion  - Standing hip abduction  - Standing hip adduction  - Standing hip extension  - Seated ankle eversion        Re-assessment           Neoprene wrap donned to low back for standing exercises to decrease pain and increase support  Right knee cage donned for standing lower extremity exercises.   Plan for Next Visit:      neuromuscular re-education activities to improve: Balance, Coordination, Kinesthetic, Sense, Proprioception and Posture for (10) minutes. The following activities were included:    Intervention Performed Today    NuStep   Level 2, 7 minutes   Parallel bars standing activities      - Standing in staggered stance performing weight shifts forward/back 1x10 bilateral with 1 upper extremity support  - Stepping forward/back with 1-0 upper extremity supports  - stepping forward and backwards with scissoring vertical board in place working on stand balance with 1 upper extremity support   Supine on mat with bilateral lower extremities over Tball for core exercises    - Bridges with chest press between each rep holding bilateral 1# dumbbells  - lower  "trunk rotations moving arms opposite lower extremity movement with 1# dumbbells bilateral   - bilateral knees to chest performing bilateral upper extremity hammer motion down towards knees with each rep with 1# dumbbells bilateral         Standing on AirX pad        - eyes closed 2x10 seconds  - weight shifts Left and Right 1x10 eyes open, 1x5 eyes closed  - holding stand posture while receiving perturbations from all directions   Standing at parallel bars working on lifting pool noodle over head to touch upper back then lowering to lower legs  1x5   Combo lower extremity exercise working on single leg standing and balance with no upper extremity support  - flexion, abduction, extension and heel raise 8x's on each leg   Standing hamstring curl   2x10 bilateral    Sitting in chair with ball at low back performing trunk extension towards neutral   2x10,    Pulley exercises sitting on balance disc on bar stool  Rows against 20# 2x10  Bicep curls against 20# 2x10   Nautilus Leg extension   2 plates, 2x10 for coordination with strengthening   Side stepping on AirX beam   - 2x's back and forth    Tandem stepping on AirX beam   - 2x's back and forth    Holding tandem stance  5 seconds, 3x's each leg   Sit<>stand  x 3 x 8 low mat + blue foam added 1 step left /right    Heel taps   3x8 (B)   Terminal knee extension   30x (Bilateral) purple cooks cord    Tandem Stance  3x30" (B)     Gait Training for (0) minutes:    Patient Education and Home Exercises     Home Exercises Provided and Patient Education Provided     Education provided:   - Patient educated to continue to stay active at home.  - Patient educated on the benefit of a Right knee cage to prevent hyperextension and protect his knee joint  - 6/20/22 Patient educated on results of testing today and Home Exercise Program.  He was also educated that the end of his Plan of Care is approaching and that PT would be requesting 4 more weeks of therapy.  He requested that PT " move forward with requesting order for Right knee cage for home/community use.   - 6/27/22 PT discussed out of pocket cost related to order of knee cage.  He would like to consider all other options at this time.  Written Home Exercises Provided: Patient educated to continue with current Home Exercise Program. See EMR under Patient Instructions for exercises provided during future therapy sessions    ASSESSMENT     Valentín Field tolerated PT session well with minimal complaints of right knee pain. Therapy exercises were reviewed by revisiting exercises given from previous home exercise program while adding no new exercises.      Valentín Is progressing well towards his goals.   Pt prognosis is Good.     Pt will continue to benefit from skilled outpatient physical therapy to address the deficits listed in the problem list box on initial evaluation, provide pt/family education and to maximize pt's level of independence in the home and community environment.     Pt's spiritual, cultural and educational needs considered and pt agreeable to plan of care and goals.     Anticipated Barriers for therapy: sedentary lifestyle and chronicity of condition       GOALS:  SHORT TERM GOALS: 4 weeks Progress   1. Recent signs and systems trend is improving in order to progress towards LTG's. Met   2. Patient will be independent with HEP in order to further progress and return to maximal function. Met   3. Pain rating at Worst: 5/10 in order to progress towards increased independence with activity. PC    4. Patient will be able to correct postural deviations in sitting and standing, to decrease pain and promote postural awareness for injury prevention.  Met with Right knee cage      LONG TERM GOALS: 8 weeks Progress   1. Patient will return to normal ADL, recreational, and work related activities with less pain and limitation.   PC   2. Patient will improve AROM to stated goals in order to return to maximal functional potential.   PC    3. Patient will improve Strength to stated goals of appropriate musculature in order to improve functional independence.   PC   4. Pain Rating at Best: 1/10 to improve Quality of Life.      5. Patient will meet predicted functional outcome (FOTO) score: N/A% to increase self-worth & perceived functional ability.  PC   6. Patient will have met/partially met personal goal of: being able to walk > 500ft independently with no pain in back.   PC   7. Added Goal: Patient will demonstrate improved lower extremity strength by performing 5x sit to stand test in 10 seconds.    8. Added Goal: Patient will demonstrate improved coordination and safety with gait by performing TUG in 18 seconds.              PLAN     Continue Plan of Care (POC) and progress per patient tolerance. See Treatment section for exercise progression.    (Extend 08/20/22)     Cj Gonzalez PTA

## 2022-08-17 ENCOUNTER — OFFICE VISIT (OUTPATIENT)
Dept: UROLOGY | Facility: CLINIC | Age: 62
End: 2022-08-17
Payer: MEDICAID

## 2022-08-17 VITALS
HEART RATE: 97 BPM | SYSTOLIC BLOOD PRESSURE: 147 MMHG | DIASTOLIC BLOOD PRESSURE: 92 MMHG | WEIGHT: 176.38 LBS | BODY MASS INDEX: 21.93 KG/M2 | HEIGHT: 75 IN | TEMPERATURE: 98 F

## 2022-08-17 DIAGNOSIS — R35.0 URINARY FREQUENCY: ICD-10-CM

## 2022-08-17 DIAGNOSIS — R39.15 URINARY URGENCY: ICD-10-CM

## 2022-08-17 DIAGNOSIS — R39.198 DIFFICULTY VOIDING: ICD-10-CM

## 2022-08-17 DIAGNOSIS — R33.8 OTHER RETENTION OF URINE: Primary | ICD-10-CM

## 2022-08-17 LAB
BILIRUB UR QL STRIP: NEGATIVE
CLARITY UR REFRACT.AUTO: CLEAR
COLOR UR AUTO: YELLOW
GLUCOSE UR QL STRIP: NEGATIVE
HGB UR QL STRIP: NEGATIVE
KETONES UR QL STRIP: NEGATIVE
LEUKOCYTE ESTERASE UR QL STRIP: NEGATIVE
NITRITE UR QL STRIP: NEGATIVE
PH UR STRIP: 8 [PH] (ref 5–8)
POC RESIDUAL URINE VOLUME: 16 ML (ref 0–100)
PROT UR QL STRIP: ABNORMAL
SP GR UR STRIP: 1.02 (ref 1–1.03)
URN SPEC COLLECT METH UR: ABNORMAL

## 2022-08-17 PROCEDURE — 87088 URINE BACTERIA CULTURE: CPT | Performed by: NURSE PRACTITIONER

## 2022-08-17 PROCEDURE — 3077F PR MOST RECENT SYSTOLIC BLOOD PRESSURE >= 140 MM HG: ICD-10-PCS | Mod: CPTII,,, | Performed by: NURSE PRACTITIONER

## 2022-08-17 PROCEDURE — 87086 URINE CULTURE/COLONY COUNT: CPT | Performed by: NURSE PRACTITIONER

## 2022-08-17 PROCEDURE — 3080F PR MOST RECENT DIASTOLIC BLOOD PRESSURE >= 90 MM HG: ICD-10-PCS | Mod: CPTII,,, | Performed by: NURSE PRACTITIONER

## 2022-08-17 PROCEDURE — 99999 PR PBB SHADOW E&M-EST. PATIENT-LVL V: ICD-10-PCS | Mod: PBBFAC,,, | Performed by: NURSE PRACTITIONER

## 2022-08-17 PROCEDURE — 3008F BODY MASS INDEX DOCD: CPT | Mod: CPTII,,, | Performed by: NURSE PRACTITIONER

## 2022-08-17 PROCEDURE — 3008F PR BODY MASS INDEX (BMI) DOCUMENTED: ICD-10-PCS | Mod: CPTII,,, | Performed by: NURSE PRACTITIONER

## 2022-08-17 PROCEDURE — 99204 PR OFFICE/OUTPT VISIT, NEW, LEVL IV, 45-59 MIN: ICD-10-PCS | Mod: S$PBB,,, | Performed by: NURSE PRACTITIONER

## 2022-08-17 PROCEDURE — 1159F MED LIST DOCD IN RCRD: CPT | Mod: CPTII,,, | Performed by: NURSE PRACTITIONER

## 2022-08-17 PROCEDURE — 99215 OFFICE O/P EST HI 40 MIN: CPT | Mod: PBBFAC,PO | Performed by: NURSE PRACTITIONER

## 2022-08-17 PROCEDURE — 99204 OFFICE O/P NEW MOD 45 MIN: CPT | Mod: S$PBB,,, | Performed by: NURSE PRACTITIONER

## 2022-08-17 PROCEDURE — 1160F PR REVIEW ALL MEDS BY PRESCRIBER/CLIN PHARMACIST DOCUMENTED: ICD-10-PCS | Mod: CPTII,,, | Performed by: NURSE PRACTITIONER

## 2022-08-17 PROCEDURE — 3080F DIAST BP >= 90 MM HG: CPT | Mod: CPTII,,, | Performed by: NURSE PRACTITIONER

## 2022-08-17 PROCEDURE — 99999 PR PBB SHADOW E&M-EST. PATIENT-LVL V: CPT | Mod: PBBFAC,,, | Performed by: NURSE PRACTITIONER

## 2022-08-17 PROCEDURE — 51798 US URINE CAPACITY MEASURE: CPT | Mod: PBBFAC,PO | Performed by: NURSE PRACTITIONER

## 2022-08-17 PROCEDURE — 1160F RVW MEDS BY RX/DR IN RCRD: CPT | Mod: CPTII,,, | Performed by: NURSE PRACTITIONER

## 2022-08-17 PROCEDURE — 87186 SC STD MICRODIL/AGAR DIL: CPT | Performed by: NURSE PRACTITIONER

## 2022-08-17 PROCEDURE — 87077 CULTURE AEROBIC IDENTIFY: CPT | Performed by: NURSE PRACTITIONER

## 2022-08-17 PROCEDURE — 1159F PR MEDICATION LIST DOCUMENTED IN MEDICAL RECORD: ICD-10-PCS | Mod: CPTII,,, | Performed by: NURSE PRACTITIONER

## 2022-08-17 PROCEDURE — 81003 URINALYSIS AUTO W/O SCOPE: CPT | Performed by: NURSE PRACTITIONER

## 2022-08-17 PROCEDURE — 3077F SYST BP >= 140 MM HG: CPT | Mod: CPTII,,, | Performed by: NURSE PRACTITIONER

## 2022-08-17 RX ORDER — TAMSULOSIN HYDROCHLORIDE 0.4 MG/1
1 CAPSULE ORAL DAILY
COMMUNITY
Start: 2022-08-16 | End: 2022-10-03 | Stop reason: SDUPTHER

## 2022-08-17 RX ORDER — GABAPENTIN 600 MG/1
TABLET ORAL
COMMUNITY
Start: 2022-08-10 | End: 2022-08-26 | Stop reason: SDUPTHER

## 2022-08-17 RX ORDER — AMLODIPINE BESYLATE 5 MG/1
5 TABLET ORAL DAILY
COMMUNITY
Start: 2022-07-29 | End: 2022-10-03 | Stop reason: DRUGHIGH

## 2022-08-17 RX ORDER — CHOLECALCIFEROL (VITAMIN D3) 25 MCG
1000 TABLET ORAL DAILY
COMMUNITY
Start: 2022-07-21 | End: 2022-10-03

## 2022-08-17 RX ORDER — TIZANIDINE 4 MG/1
TABLET ORAL
COMMUNITY
Start: 2022-08-10 | End: 2022-09-29

## 2022-08-17 NOTE — PATIENT INSTRUCTIONS
U/A and urine cx  Bladder scan (PVR)  PSA, total and free today  CHANG today  Start taking tamsulosin (Flomax) 0.4 mg daily as prescribed by PCP (Dr. Field on yesterday).  Follow-up in 4 weeks via telephone encounter.

## 2022-08-17 NOTE — PROGRESS NOTES
Subjective:       Patient ID: Valentín Field is a 61 y.o. male.    Chief Complaint: retention of urine    Patient is new to me. He is a 62 yo AAM who is here today with c/o urinary hesitancy and straining to void. Patient was seen by his PCP on yesterday who started him on tamsulosin 0.4 mg. Patient reports he has not received his medication yet. He gets his meds delivered because of transportation issues. Patient referred by his PCP (Dr. Field).    Other  This is a chronic (difficulty voiding) problem. The current episode started more than 1 month ago. The problem occurs daily. The problem has been unchanged. Associated symptoms include arthralgias, headaches and urinary symptoms. Pertinent negatives include no abdominal pain, change in bowel habit, chills, fatigue, fever, nausea, swollen glands, vomiting or weakness. Nothing aggravates the symptoms. He has tried nothing for the symptoms.     Review of Systems   Constitutional: Negative for chills, fatigue and fever.   Gastrointestinal: Negative for abdominal pain, change in bowel habit, constipation, diarrhea, nausea, vomiting and change in bowel habit.   Genitourinary: Positive for difficulty urinating (urinary hesitancy and straining), frequency and urgency. Negative for decreased urine volume, discharge, dysuria, flank pain, hematuria, penile pain, penile swelling, scrotal swelling and testicular pain.        Post-void dribbling   Musculoskeletal: Positive for arthralgias.   Neurological: Positive for headaches. Negative for dizziness and weakness.   Psychiatric/Behavioral: Negative.          Objective:      Physical Exam  Vitals and nursing note reviewed.   Constitutional:       General: He is not in acute distress.     Appearance: He is well-developed. He is not ill-appearing.   HENT:      Head: Normocephalic and atraumatic.   Eyes:      Pupils: Pupils are equal, round, and reactive to light.   Cardiovascular:      Rate and Rhythm: Normal rate.   Pulmonary:       Effort: Pulmonary effort is normal. No respiratory distress.   Abdominal:      Palpations: Abdomen is soft.      Tenderness: There is no abdominal tenderness.   Genitourinary:     Prostate: Enlarged (mildly). Not tender and no nodules present.      Comments: PVR = 16 mL  Musculoskeletal:      Cervical back: Normal range of motion.      Comments: Ambulates with walker.    Skin:     General: Skin is warm and dry.   Neurological:      Mental Status: He is alert and oriented to person, place, and time.      Coordination: Coordination normal.   Psychiatric:         Mood and Affect: Mood normal.         Behavior: Behavior normal.         Thought Content: Thought content normal.         Judgment: Judgment normal.         Assessment:       Problem List Items Addressed This Visit    None     Visit Diagnoses     Other retention of urine    -  Primary    Relevant Orders    Urine culture    Urinalysis    PSA, Total and Free    POCT Bladder Scan    Urinary frequency        Relevant Orders    Urine culture    Urinalysis    PSA, Total and Free    POCT Bladder Scan    Urinary urgency        Relevant Orders    Urine culture    Urinalysis    PSA, Total and Free    POCT Bladder Scan    Difficulty voiding        Relevant Orders    Urine culture    Urinalysis    PSA, Total and Free    POCT Bladder Scan          Plan:         Valentín was seen today for retention of urine.    Diagnoses and all orders for this visit:    Other retention of urine  -     Ambulatory referral/consult to Urology  -     Urine culture  -     Urinalysis  -     PSA, Total and Free; Future  -     POCT Bladder Scan    Urinary frequency  -     Urine culture  -     Urinalysis  -     PSA, Total and Free; Future  -     POCT Bladder Scan    Urinary urgency  -     Urine culture  -     Urinalysis  -     PSA, Total and Free; Future  -     POCT Bladder Scan    Difficulty voiding  -     Urine culture  -     Urinalysis  -     PSA, Total and Free; Future  -     POCT Bladder  Scan    Other orders  1. CHANG today  2. Start taking tamsulosin (Flomax) 0.4 mg daily as prescribed by PCP (Dr. Field on yesterday).    Follow-up in 4 weeks via telephone encounter. Patient commute via transportation services from [place].    Hazel Saunders NP

## 2022-08-18 ENCOUNTER — CLINICAL SUPPORT (OUTPATIENT)
Dept: REHABILITATION | Facility: HOSPITAL | Age: 62
End: 2022-08-18
Payer: MEDICAID

## 2022-08-18 DIAGNOSIS — Z98.1 S/P CERVICAL SPINAL FUSION: ICD-10-CM

## 2022-08-18 DIAGNOSIS — R13.12 OROPHARYNGEAL DYSPHAGIA: Primary | ICD-10-CM

## 2022-08-18 DIAGNOSIS — R41.841 COGNITIVE COMMUNICATION DISORDER: ICD-10-CM

## 2022-08-18 PROCEDURE — 92507 TX SP LANG VOICE COMM INDIV: CPT

## 2022-08-18 NOTE — PROGRESS NOTES
"[facility]  Speech Therapy Progress Note- Neurological Rehabilitation and Dysphagia     Date: 8/18/2022     Name: Valentín Field   MRN: [id number]   Therapy Diagnosis:   No diagnosis found.  Physician: Octavio Forrester MD  Physician Orders: [id number] - AMB REFERRAL/CONSULT TO SPEECH THERAPY  Medical Diagnosis: Z98.1 (ICD-10-CM) - S/P cervical spinal fusion    Visit #/ Visits Authorized: 15/16  Date of Evaluation:  5/5/2022  Insurance Authorization Period: 5/12/2022 - 9/30/2022  Plan of Care Expiration Date:  9/22/2022  Extended Plan of Care:  NA  Progress Note: 8/18/2022  Visits Cancelled: 1  Visits No Show: 1    Time In:  9:50 AM   Time Out:  10:30 AM   Total Billable Time: 40 minutes      Precautions: Standard  Subjective:   Patient reports: he continues to do his swallowing exercises   Response to previous treatment: Sustained attention goal met today.   Pain Scale:  7/10 on a Visual Analog Scale currently.   Pain Location: "shoulders" "back"   Objective:         UNTIMED  Procedure Min.   Speech- Language- Voice Therapy 40   Dysphagia Therapy 0   Total Timed Units: 0  Total Untimed Units: 1  Charges Billed/Number of units: 1      Short Term Goals: (5 weeks) Current Progress:   Patient will complete formal cognitive-communication assessment.      GOAL MET  5/12/2022         GOAL MET 5/12/2022   Patient will complete formal language assessment.        Discontinued  Not formally addressed. Discontinue due to primary concerns with memory.    Patient will complete a modified barium swallow study to assess swallow safety and efficiency and to determine the least restrictive diet.       COMPLETED 6/20/2022    Patient will sustain attention to complete moderate to complex reasoning tasks for 2 minutes with one request for clarification to increase sustained attention.      GOAL MET 8/18/2022 Sustained attention to moderate reasoning task for 5  minutes with 0 requests for clarification       GOAL MET " 8/18/2022   Patient will complete selective attention tasks (auditory or visual) with 90% accuracy independently increase selective attention.      Selective attention task completed with 85% accuracy with background music       MET X1   Patient will complete short term recall tasks after a 5 minutes delay with 90% accuracy  independently  with use of memory strategies to improve recall of information and generalization of memory strategies.   Not formally addressed.    Patient will independently predict time and accuracy of performance on trials within 10% of actual performance in 90% of opportunities.    Task 1: Time management:   Predicted time: 15 minutes   Predicted accuracy: 100%  Actual time: 5 minutes   Actual accuracy:      Patient will complete a functional task to improve attention, memory and/or executive functions (I.e. sample bill paying activity, recipe, or multiple choice comprehension questions to 1 paragraphs) with 80% accuracy and natural environment noise distractions (TV news background, music, etc.).   Map task completed with 85% accuracy with background music    Patient will be educated regarding cognitive deficits as applicable to the patient's life for increased awareness and will execute returned demonstration of information with 80% accuracy.     GOAL MET 8/4/2022 Additional education provided today and education provided regarding the importance of external aids to compensate.     GOAL MET 8/4/2022   Patient will use external aids to compensate for deficits in attention, memory, and thought organization with 90% accuracy independently   Patient uses his phone and calendar printouts with his appointments on them.   Patient will complete 50-75 effortful swallows per session in order to improve swallowing function. Completed X50 today given a model and minimal cueing for accuracy.    Patient will complete 20-30 Mendelsohn maneuvers per session in order to improve swallowing function.  Completed X30 given specific instruction, a model, and mminimal cueing for accuracy.    Patient will 3 sets of 1 minutes CTAR holds per session in order to improve swallowing function. Discontinue due to reports of sharp pain.   Patient will complete 30 CTAR repetitionsper session in order to improve swallowing function. Discontinue due to reports of sharp pain.    Patient participate in swallowing education with returned demonstration of information.     GOAL MET 8/4/2022       GOAL MET 8/4/2022       Patient Education/Response:   Education provided regarding upcoming appointments, the use of external aids, and the completion of swallowing exercises.  He verbalized understanding.     Home program established: yes-Patient instructed to complete dysphagia exercises at home daily.   Exercises were reviewed and Valentín was able to demonstrate them prior to the end of the session.  Valentín demonstrated fair  understanding of the education provided.     See Electronic Medical Record under Patient Instructions for exercises provided throughout therapy.  Assessment:   PROGRESS:   Valentín is progressing slowly but steadily towards his goals. He completes all swallowing exercises and reports that his completes these as part of his HEP. He has met goals regarding returned demonstration of education provided and sustained attention. CTAR goals have been discontinued due to reported pain with this exercise. He is able to use external aids such as his phone, printed calendars, and written information. He reported these external strategies have been helping.   He will continue to benefit from speech therapy to address his dysphagia and cognitive concerns. Unfortunately, etiology of deficits are still unknown at this time. He is followed by Neurology, has a consultation with Neuropsychology on 8/22/2022 and is scheduled for an MRI on 9/9/2022.     Patient prognosis is Good. Patient will continue to benefit from skilled outpatient speech  and language therapy to address the deficits listed in the problem list on initial evaluation, provide patient/family education and to maximize patient's level of independence in the home and community environment.   Medical necessity is demonstrated by the following IMPAIRMENTS:  Dysphagia impacts quality of life and puts the patient at risk for aspiration related pneumonia.   Cognitive-communication deficits negatively impact quality of life, safety and independence during activities of daily living, and participation in social and community interactions.   Barriers to Therapy: Unknown etiology of deficits; transportation   Patient's spiritual, cultural and educational needs considered and patient agreeable to plan of care and goals.  Plan:   Continue Plan of Care with focus on attention, memory, executive functions, and dysphagia exercises.     Adilia Graham, CCC-SLP, CBIS   Speech Language Pathologist   Certified Brain Injury Specialist   8/18/2022

## 2022-08-19 DIAGNOSIS — N30.00 ACUTE CYSTITIS WITHOUT HEMATURIA: Primary | ICD-10-CM

## 2022-08-19 LAB — BACTERIA UR CULT: ABNORMAL

## 2022-08-19 RX ORDER — SULFAMETHOXAZOLE AND TRIMETHOPRIM 800; 160 MG/1; MG/1
1 TABLET ORAL 2 TIMES DAILY
Qty: 20 TABLET | Refills: 0 | Status: SHIPPED | OUTPATIENT
Start: 2022-08-19 | End: 2022-08-29

## 2022-08-19 NOTE — PROGRESS NOTES
Diagnoses and all orders for this visit:    Acute cystitis without hematuria  -     Urine culture; Future  -     sulfamethoxazole-trimethoprim 800-160mg (BACTRIM DS) 800-160 mg Tab; Take 1 tablet by mouth 2 (two) times daily. for 10 days    Other order  1. Repeat urine cx in 2 weeks after completion of antibiotic therapy.     Keep follow-up appt.     Hazel Saunders NP

## 2022-08-22 ENCOUNTER — OFFICE VISIT (OUTPATIENT)
Dept: NEUROLOGY | Facility: CLINIC | Age: 62
End: 2022-08-22
Payer: MEDICAID

## 2022-08-22 ENCOUNTER — TELEPHONE (OUTPATIENT)
Dept: UROLOGY | Facility: CLINIC | Age: 62
End: 2022-08-22
Payer: MEDICAID

## 2022-08-22 DIAGNOSIS — G95.9 CERVICAL MYELOPATHY: ICD-10-CM

## 2022-08-22 DIAGNOSIS — R41.89 COGNITIVE CHANGE: Primary | ICD-10-CM

## 2022-08-22 DIAGNOSIS — I63.81 LACUNAR INFARCTION: ICD-10-CM

## 2022-08-22 DIAGNOSIS — Z87.820 HISTORY OF CONCUSSION: ICD-10-CM

## 2022-08-22 PROCEDURE — 99212 OFFICE O/P EST SF 10 MIN: CPT | Mod: PBBFAC | Performed by: STUDENT IN AN ORGANIZED HEALTH CARE EDUCATION/TRAINING PROGRAM

## 2022-08-22 PROCEDURE — 96116 NUBHVL XM PHYS/QHP 1ST HR: CPT | Mod: S$PBB,,, | Performed by: STUDENT IN AN ORGANIZED HEALTH CARE EDUCATION/TRAINING PROGRAM

## 2022-08-22 PROCEDURE — 96116 PR NEUROBEHAVIORAL STATUS EXAM BY PSYCH/PHYS: ICD-10-PCS | Mod: S$PBB,,, | Performed by: STUDENT IN AN ORGANIZED HEALTH CARE EDUCATION/TRAINING PROGRAM

## 2022-08-22 PROCEDURE — 1159F MED LIST DOCD IN RCRD: CPT | Mod: CPTII,,, | Performed by: STUDENT IN AN ORGANIZED HEALTH CARE EDUCATION/TRAINING PROGRAM

## 2022-08-22 PROCEDURE — 96121 PR NEUROBEHAVIORAL STAT EXAM, EA ADDTL HR: ICD-10-PCS | Mod: S$PBB,,, | Performed by: STUDENT IN AN ORGANIZED HEALTH CARE EDUCATION/TRAINING PROGRAM

## 2022-08-22 PROCEDURE — 99999 PR PBB SHADOW E&M-EST. PATIENT-LVL II: CPT | Mod: PBBFAC,,, | Performed by: STUDENT IN AN ORGANIZED HEALTH CARE EDUCATION/TRAINING PROGRAM

## 2022-08-22 PROCEDURE — 1159F PR MEDICATION LIST DOCUMENTED IN MEDICAL RECORD: ICD-10-PCS | Mod: CPTII,,, | Performed by: STUDENT IN AN ORGANIZED HEALTH CARE EDUCATION/TRAINING PROGRAM

## 2022-08-22 PROCEDURE — 99999 PR PBB SHADOW E&M-EST. PATIENT-LVL II: ICD-10-PCS | Mod: PBBFAC,,, | Performed by: STUDENT IN AN ORGANIZED HEALTH CARE EDUCATION/TRAINING PROGRAM

## 2022-08-22 PROCEDURE — 96121 NUBHVL XM PHY/QHP EA ADDL HR: CPT | Mod: PBBFAC | Performed by: STUDENT IN AN ORGANIZED HEALTH CARE EDUCATION/TRAINING PROGRAM

## 2022-08-22 PROCEDURE — 99499 UNLISTED E&M SERVICE: CPT | Mod: S$PBB,,, | Performed by: STUDENT IN AN ORGANIZED HEALTH CARE EDUCATION/TRAINING PROGRAM

## 2022-08-22 PROCEDURE — 96116 NUBHVL XM PHYS/QHP 1ST HR: CPT | Mod: PBBFAC | Performed by: STUDENT IN AN ORGANIZED HEALTH CARE EDUCATION/TRAINING PROGRAM

## 2022-08-22 PROCEDURE — 96121 NUBHVL XM PHY/QHP EA ADDL HR: CPT | Mod: S$PBB,,, | Performed by: STUDENT IN AN ORGANIZED HEALTH CARE EDUCATION/TRAINING PROGRAM

## 2022-08-22 PROCEDURE — 99499 NO LOS: ICD-10-PCS | Mod: S$PBB,,, | Performed by: STUDENT IN AN ORGANIZED HEALTH CARE EDUCATION/TRAINING PROGRAM
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
## 2022-08-22 NOTE — TELEPHONE ENCOUNTER
I spoke with pt and informed him of results below.     ----- Message from Hazel Saunders NP sent at 8/19/2022  1:40 PM CDT -----  Please inform patient his urine cx came back positive for a UTI. Script for Bactrim DS sent to [facility] ([facility] Repeat urine cx in 2 weeks after completion of antibiotic therapy. Order placed. Patient can have this done at any [facility] outpatient lab. Keep follow-up appt.

## 2022-08-22 NOTE — PATIENT INSTRUCTIONS
Today you completed the interview portion of your neuropsychological evaluation. The next step will be to come in for a testing appointment. Some helpful information is included below to help you be ready for your testing appointment.    Please call Dr. Costello at [phone] or send a [facility] message with any questions.         _____________________  Alberto Costello, Ph.D.  Neuropsychologist  [facility] Department of Neurology      Instructions for your upcoming neuropsychological assessment    If you must cancel your appointment please do so 48 hours in advance of your scheduled appointment.   Please take all of your medications as prescribed on the date of testing, unless specifically asked not to do so.   Be sure to eat a full breakfast the morning before testing if you typically eat in the morning.   Bring any water, snacks, or other food supplies you may need for a 2-4 hour appointment.  Do your best to get a good night of sleep before testing.  There is nothing you should do or need to do to study for the tests. Just come ready to do your best.

## 2022-08-23 ENCOUNTER — TELEPHONE (OUTPATIENT)
Dept: NEUROLOGY | Facility: CLINIC | Age: 62
End: 2022-08-23
Payer: MEDICAID

## 2022-08-24 ENCOUNTER — CLINICAL SUPPORT (OUTPATIENT)
Dept: REHABILITATION | Facility: HOSPITAL | Age: 62
End: 2022-08-24
Payer: MEDICAID

## 2022-08-24 DIAGNOSIS — R53.1 DECREASED STRENGTH, ENDURANCE, AND MOBILITY: Primary | ICD-10-CM

## 2022-08-24 DIAGNOSIS — M62.81 MUSCLE WEAKNESS: ICD-10-CM

## 2022-08-24 DIAGNOSIS — R68.89 DECREASED STRENGTH, ENDURANCE, AND MOBILITY: Primary | ICD-10-CM

## 2022-08-24 DIAGNOSIS — Z74.09 DECREASED STRENGTH, ENDURANCE, AND MOBILITY: Primary | ICD-10-CM

## 2022-08-24 PROCEDURE — 97110 THERAPEUTIC EXERCISES: CPT

## 2022-08-24 NOTE — PROGRESS NOTES
[facility]   Physical Therapy Treatment Note + Updated Plan of Care     Name: Valentín Field  Clinic Number: [id number]    Therapy Diagnosis:   Encounter Diagnoses   Name Primary?    Decreased strength, endurance, and mobility Yes    Muscle weakness      Physician: Octavio Forrester MD  Visit Date: 8/24/2022  Physician Orders: PT Eval and Treat  Medical Diagnosis from Referral: General Weakness  Evaluation Date: 4/21/2022  Authorization Period Expiration: 4/21/23  Plan of Care Expiration: 06/22/2022  (Extend 09/22/22)                         Progress Update: 07/21/2022                        Visit # / Visits authorized: 23/24 (+1 eval)                       FOTO: Visit #1 - Scored : 1 / 3      PRECAUTIONS: Standard Precautions and Safety/fall precautions      MD Visit:   PTA Visit #: 0/5     Time In: 1115  Time Out: 1200  Total Billable Time:  45 minutes Billing reflects [place] medicaid guidelines, billing all therapy as therapeutic-exercise    SUBJECTIVE     Pt reports: increase low back discomfort with functional movement such as sit<>stand. Notice improvement with functional movements.       He reports  compliant with staying active at home between sessions.    Response to previous treatment: He responded well to last session.    Functional change: He reports no significant functional change since last session.    Pain:  5/10  Location: low back     OBJECTIVE     Objective Measures updated at progress report unless specified.     Performed on 08/24/22   Movement Analysis:   Functional Test  Outcome   Double Leg Squat Knee valgus, unable to go below 75% knee bend without loosing control of movement   Single Leg Step Down Trendelenburg sign bilaterally; unable to control last 50% of descent       Rehab Tests  Outcome  08/24/22 Norms GOAL   Five Time Sit to Stand 15.1 60s: <11.4 sec  70s: <12.6 sec  80s: 14.8 sec  10 seconds    Initial: Unable to perform movement    Timed Up and Go 22.3  w/ AD <13.5 sec  13.5 seconds w/ AD      Initial: Unable to perform movement    6 minutes walk Unable to perform 60s: >538f, 572m  70s: >471f, 527m  80s: >417f, 392m        Balance: Balance:     RIGHT   08/24/22 LEFT Goal   Closed 60 second   60 seconds  Initial: 1 second    Tandem 15 20 20 seconds (Bilaterally)   Initial: Unable to perform    Single Leg 2 3 10 seconds (Bilaterally)   Initial: Unable to perform        MMT 8/24/22  Muscle Groups Left    Right      Hip Flexion 4/5 3+/5   Hip Extension 4/5 3+/5   Knee Flexion 4-/5 3+/5   Knee Extension 5/5 4+/5   Dorsiflexion 4+/5 4+/5   Plantarflexion 5/5 5/5   Inversion 4+/5 4+/5   Eversion 3+/5 3+/5        Treatment     Valentín received the treatments listed below:      THERAPEUTIC EXERCISES to develop strength, endurance, ROM, flexibility, posture and core stabilization for (15) minutes including:    Intervention Performed Today    Standing Hip flexion   3x10 up onto 12inch step in parallel bars    Shuttle X  x   30 both legs 6 bands  30 single leg 5 bands   Standing lower extremity exercises, Right knee cage donned to improve posture of lower extremity            2# ankle weight bilateral :   - hip flexion over gibson 1x10 bilateral   - hip abduction over gibson 1x10 bilateral   - hip extension over gibson 1x10 bilateral   - heel raise on AirEx beam 2 x 20 bilateral   - hamstring curl 1x10 bilateral   - Alternating tapping foot to 8 inch step 1x10 bilateral    NuStep   Level 3 for 6 minutes   Seated lower extremity exercises  long arc quad with 2# Left and 1# Right 1x15 bilateral    Seated hamstring curl against green band   1x20 bilateral    Sitting trunk extension over ball at back  2 x 10 3 second hold   Sitting hamstring/heelcord stretch  2 x 30 seconds bilateral    Standing heel cord stretch   2x15 seconds bilateral    Reviewed over all lower extremity exercises for Home Exercise Program discussing and practicing set-up and sequence of each with green theraband  resistance  - Seated hamstring curl  - Standing hip flexion  - Standing hip abduction  - Standing hip adduction  - Standing hip extension  - Seated ankle eversion   Standing landmine oblique twist X  2x10 (B) 45lb bar   Re-assessment  X          Neoprene wrap donned to low back for standing exercises to decrease pain and increase support  Right knee cage donned for standing lower extremity exercises.   Plan for Next Visit:      neuromuscular re-education activities to improve: Balance, Coordination, Kinesthetic, Sense, Proprioception and Posture for (30) minutes. The following activities were included:    Intervention Performed Today         Parallel bars standing activities  - Standing in staggered stance performing weight shifts forward/back 1x10 bilateral with 1 upper extremity support  - Stepping forward/back with 1-0 upper extremity supports  - stepping forward and backwards with scissoring vertical board in place working on stand balance with 1 upper extremity support   Supine on mat with bilateral lower extremities over Tball for core exercises    - Bridges with chest press between each rep holding bilateral 1# dumbbells  - lower trunk rotations moving arms opposite lower extremity movement with 1# dumbbells bilateral   - bilateral knees to chest performing bilateral upper extremity hammer motion down towards knees with each rep with 1# dumbbells bilateral         Standing on AirX pad        - eyes closed 2x10 seconds  - weight shifts Left and Right 1x10 eyes open, 1x5 eyes closed  - holding stand posture while receiving perturbations from all directions   Standing at parallel bars working on lifting pool noodle over head to touch upper back then lowering to lower legs  1x5   Combo lower extremity exercise working on single leg standing and balance with no upper extremity support  - flexion, abduction, extension and heel raise 8x's on each leg   Standing hamstring curl   2x10 bilateral    Sitting in chair  "with ball at low back performing trunk extension towards neutral   2x10,    Pulley exercises sitting on balance disc on bar stool  Rows against 20# 2x10  Bicep curls against 20# 2x10   Nautilus Leg extension   2 plates, 2x10 for coordination with strengthening   Side stepping on AirX beam   - 2x's back and forth    Tandem stepping on AirX beam   - 2x's back and forth    Holding tandem stance x 5 seconds, 3x's each leg   Sit<>stand  x 3x8 low mat + blue foam    Heel taps  x 3x8 (B)   Standing Marches + KB hold X  2x20 steps with arms ext *holding 5lb KB in front of body*                   TKE + March X  20 steps (B) *Opposite leg in purple cord*   Terminal knee extension (TKE) x 30x (Bilateral) purple cooks cord    Tandem Stance  3x30" (B)     Gait Training for (0) minutes:    Patient Education and Home Exercises     Home Exercises Provided and Patient Education Provided     Education provided:   - Patient educated to continue to stay active at home.  - Patient educated on the benefit of a Right knee cage to prevent hyperextension and protect his knee joint  - 6/20/22 Patient educated on results of testing today and Home Exercise Program.  He was also educated that the end of his Plan of Care is approaching and that PT would be requesting 4 more weeks of therapy.  He requested that PT move forward with requesting order for Right knee cage for home/community use.   - 6/27/22 PT discussed out of pocket cost related to order of knee cage.  He would like to consider all other options at this time.  Written Home Exercises Provided: Patient educated to continue with current Home Exercise Program. See EMR under Patient Instructions for exercises provided during future therapy sessions    ASSESSMENT     Valentín JEFFERSON Field tolerated PT session well with minimal/moderate complaints of pain or discomfort, secondary to poor motor control, decrease muscle strength, and decrease muscle endurance. Objective findings are improving with " measurements of ROM and functional mobility.  Therapy exercises were reviewed by revisiting exercises given from previous home exercise program while adding mobility and core exercises. The patient could benefit from a rollator to help with normalizing gait pattern.  Handouts were not issued during today's visit. Valentín demonstrated good understanding of new exercises and will continue to progress at home until next follow-up.       Valentín Is progressing well towards his goals.   Pt prognosis is Good.     Pt will continue to benefit from skilled outpatient physical therapy to address the deficits listed in the problem list box on initial evaluation, provide pt/family education and to maximize pt's level of independence in the home and community environment.     Pt's spiritual, cultural and educational needs considered and pt agreeable to plan of care and goals.     Anticipated Barriers for therapy: sedentary lifestyle and chronicity of condition       GOALS:  SHORT TERM GOALS: 4 weeks Progress   1. Recent signs and systems trend is improving in order to progress towards LTG's. Met   2. Patient will be independent with HEP in order to further progress and return to maximal function. Met   3. Pain rating at Worst: 5/10 in order to progress towards increased independence with activity. PC    4. Patient will be able to correct postural deviations in sitting and standing, to decrease pain and promote postural awareness for injury prevention.  Met with Right knee cage      LONG TERM GOALS: 8 weeks Progress   1. Patient will return to normal ADL, recreational, and work related activities with less pain and limitation.   PC   2. Patient will improve AROM to stated goals in order to return to maximal functional potential.   PC   3. Patient will improve Strength to stated goals of appropriate musculature in order to improve functional independence.   PC   4. Pain Rating at Best: 1/10 to improve Quality of Life.      5. Patient  will meet predicted functional outcome (FOTO) score: N/A% to increase self-worth & perceived functional ability.  PC   6. Patient will have met/partially met personal goal of: being able to walk > 500ft independently with no pain in back.   PC   7. Added Goal: Patient will demonstrate improved lower extremity strength by performing 5x sit to stand test in 10 seconds. PC   8. Added Goal: Patient will demonstrate improved coordination and safety with gait by performing TUG in 18 seconds.  PC            PLAN     Continue Plan of Care (POC) and progress per patient tolerance. See Treatment section for exercise progression.  2x weekly for 4 weeks   (Extend 09/22/22)     Campos Chambers, PT DPT

## 2022-08-25 ENCOUNTER — CLINICAL SUPPORT (OUTPATIENT)
Dept: REHABILITATION | Facility: HOSPITAL | Age: 62
End: 2022-08-25
Payer: MEDICAID

## 2022-08-25 DIAGNOSIS — R41.841 COGNITIVE COMMUNICATION DISORDER: ICD-10-CM

## 2022-08-25 DIAGNOSIS — Z98.1 S/P CERVICAL SPINAL FUSION: ICD-10-CM

## 2022-08-25 DIAGNOSIS — R13.12 OROPHARYNGEAL DYSPHAGIA: Primary | ICD-10-CM

## 2022-08-25 PROCEDURE — 92507 TX SP LANG VOICE COMM INDIV: CPT

## 2022-08-25 NOTE — PROGRESS NOTES
"[facility]  Speech Therapy Progress Note- Neurological Rehabilitation and Dysphagia     Date: 8/25/2022     Name: Valentín Field   MRN: [id number]   Therapy Diagnosis:   Encounter Diagnoses   Name Primary?    Oropharyngeal dysphagia Yes    Cognitive communication disorder     S/P cervical spinal fusion      Physician: Octavio Forrester MD  Physician Orders: [id number] - AMB REFERRAL/CONSULT TO SPEECH THERAPY  Medical Diagnosis: Z98.1 (ICD-10-CM) - S/P cervical spinal fusion    Visit #/ Visits Authorized: 16/20  Date of Evaluation:  5/5/2022  Insurance Authorization Period: 5/12/2022 - 9/30/2022  Plan of Care Expiration Date:  9/22/2022  Extended Plan of Care:  NA  Progress Note: 8/18/2022  Visits Cancelled: 1  Visits No Show: 1    Time In:  9:40 AM   Time Out:  10:15 AM   Total Billable Time: 35 minutes      Precautions: Standard  Subjective:   Patient reports: he continues to do his swallowing exercises.   Response to previous treatment: Sustained attention goal met today.   Pain Scale:  6/10 on a Visual Analog Scale currently.   Pain Location: "shoulders" "back"   Objective:         UNTIMED  Procedure Min.   Speech- Language- Voice Therapy 35   Dysphagia Therapy 0   Total Timed Units: 0  Total Untimed Units: 1  Charges Billed/Number of units: 1      Short Term Goals: (5 weeks) Current Progress:   Patient will complete formal cognitive-communication assessment.      GOAL MET  5/12/2022         GOAL MET 5/12/2022   Patient will complete formal language assessment.        Discontinued  Not formally addressed. Discontinue due to primary concerns with memory.    Patient will complete a modified barium swallow study to assess swallow safety and efficiency and to determine the least restrictive diet.       COMPLETED 6/20/2022    Patient will sustain attention to complete moderate to complex reasoning tasks for 2 minutes with one request for clarification to increase sustained attention.      GOAL MET " "8/18/2022 Sustained attention to moderate reasoning task for 5  minutes with 0 requests for clarification       GOAL MET 8/18/2022   Patient will complete selective attention tasks (auditory or visual) with 90% accuracy independently increase selective attention.      Selective attention task completed with 85% accuracy with background music       MET X2   Patient will complete short term recall tasks after a 5 minutes delay with 90% accuracy  independently  with use of memory strategies to improve recall of information and generalization of memory strategies.   Four related words: recalled with 75% after a 5 minute delay    Patient will independently predict time and accuracy of performance on trials within 10% of actual performance in 90% of opportunities.    Not formally addressed.      Patient will complete a functional task to improve attention, memory and/or executive functions (I.e. sample bill paying activity, recipe, or multiple choice comprehension questions to 1 paragraphs) with 80% accuracy and natural environment noise distractions (TV news background, music, etc.).   "Reading a map" task completed with 80 accuracy given minimal-moderate cueing.    Patient will be educated regarding cognitive deficits as applicable to the patient's life for increased awareness and will execute returned demonstration of information with 80% accuracy.     GOAL MET 8/4/2022 Additional education provided today and education provided regarding the importance of external aids to compensate.     GOAL MET 8/4/2022   Patient will use external aids to compensate for deficits in attention, memory, and thought organization with 90% accuracy independently    GOAL MET 8/25/2022 Patient uses his phone and calendar printouts with his appointments on them.    GOAL MET 8/25/2022   Patient will complete 50-75 effortful swallows per session in order to improve swallowing function. Completed X110 today given a model and minimal cueing for " accuracy.    Patient will complete 20-30 Mendelsohn maneuvers per session in order to improve swallowing function. Completed X20 given specific instruction, a model, and mminimal cueing for accuracy.    Patient will 3 sets of 1 minutes CTAR holds per session in order to improve swallowing function. Discontinue due to reports of sharp pain.   Patient will complete 30 CTAR repetitionsper session in order to improve swallowing function. Discontinue due to reports of sharp pain.    Patient participate in swallowing education with returned demonstration of information.     GOAL MET 8/4/2022       GOAL MET 8/4/2022       Patient Education/Response:   Education provided regarding upcoming appointments, the use of external aids, and the completion of swallowing exercises.  He verbalized understanding.     Home program established: yes-Patient instructed to complete dysphagia exercises at home daily.   Exercises were reviewed and Valentín was able to demonstrate them prior to the end of the session.  Valentín demonstrated fair  understanding of the education provided.     See Electronic Medical Record under Patient Instructions for exercises provided throughout therapy.  Assessment:   Valentín is progressing slowly but steadily towards his goals. He completes all swallowing exercises and reports that his completes these as part of his HEP. Currently goals remain appropriate and will be updated as needed.     Patient prognosis is Good. Patient will continue to benefit from skilled outpatient speech and language therapy to address the deficits listed in the problem list on initial evaluation, provide patient/family education and to maximize patient's level of independence in the home and community environment.   Medical necessity is demonstrated by the following IMPAIRMENTS:  Dysphagia impacts quality of life and puts the patient at risk for aspiration related pneumonia.   Cognitive-communication deficits negatively impact quality of  life, safety and independence during activities of daily living, and participation in social and community interactions.   Barriers to Therapy: Unknown etiology of deficits; transportation   Patient's spiritual, cultural and educational needs considered and patient agreeable to plan of care and goals.  Plan:   Continue Plan of Care with focus on attention, memory, executive functions, and dysphagia exercises.     Adilia Graham, CCC-SLP, CBIS   Speech Language Pathologist   Certified Brain Injury Specialist   8/25/2022

## 2022-08-26 ENCOUNTER — CLINICAL SUPPORT (OUTPATIENT)
Dept: REHABILITATION | Facility: HOSPITAL | Age: 62
End: 2022-08-26
Payer: MEDICAID

## 2022-08-26 DIAGNOSIS — Z74.09 DECREASED STRENGTH, ENDURANCE, AND MOBILITY: Primary | ICD-10-CM

## 2022-08-26 DIAGNOSIS — Z98.1 S/P CERVICAL SPINAL FUSION: Primary | ICD-10-CM

## 2022-08-26 DIAGNOSIS — M62.81 MUSCLE WEAKNESS: ICD-10-CM

## 2022-08-26 DIAGNOSIS — R68.89 DECREASED STRENGTH, ENDURANCE, AND MOBILITY: Primary | ICD-10-CM

## 2022-08-26 DIAGNOSIS — R53.1 DECREASED STRENGTH, ENDURANCE, AND MOBILITY: Primary | ICD-10-CM

## 2022-08-26 PROCEDURE — 97112 NEUROMUSCULAR REEDUCATION: CPT

## 2022-08-26 PROCEDURE — 97110 THERAPEUTIC EXERCISES: CPT

## 2022-08-26 RX ORDER — GABAPENTIN 600 MG/1
600 TABLET ORAL 3 TIMES DAILY
Qty: 180 TABLET | Refills: 0 | Status: SHIPPED | OUTPATIENT
Start: 2022-08-26 | End: 2022-09-29 | Stop reason: SDUPTHER

## 2022-08-26 NOTE — TELEPHONE ENCOUNTER
Spoke to patient, requested refill of gabapentin and order for a rollator. Sent refill request to Allyson and requested her to put in order for rollator.

## 2022-08-26 NOTE — TELEPHONE ENCOUNTER
----- Message from Jenny Squires sent at 8/26/2022  1:46 PM CDT -----  Regarding: Pt called to speak to the nurse regarding his care and a divice that he needs the doctor's office to put an order in for and pt would like a call back today  Patient Advice:    Pt called to speak to the nurse regarding his care and a divice that he needs the doctor's office to put an order in for and pt would like a call back today    Pt can be reached at [phone]

## 2022-08-29 DIAGNOSIS — M48.02 SPINAL STENOSIS, CERVICAL REGION: Primary | ICD-10-CM

## 2022-08-31 NOTE — PROGRESS NOTES
[facility]   Physical Therapy Treatment Note + Updated Plan of Care     Name: Valentín Field  Clinic Number: [id number]    Therapy Diagnosis:   Encounter Diagnoses   Name Primary?    Decreased strength, endurance, and mobility Yes    Muscle weakness        Physician: Octavio Forrester MD  Visit Date: 8/26/2022  Physician Orders: PT Eval and Treat  Medical Diagnosis from Referral: General Weakness  Evaluation Date: 4/21/2022  Authorization Period Expiration: 4/21/23  Plan of Care Expiration: 06/22/2022  (Extend 09/22/22)                         Progress Update: 07/21/2022                        Visit # / Visits authorized: 24/24 (+1 eval)                       FOTO: Visit #1 - Scored : 1 / 3      PRECAUTIONS: Standard Precautions and Safety/fall precautions      MD Visit:   PTA Visit #: 0/5     Time In: 1000  Time Out: 1045  Total Billable Time:  45 minutes Billing reflects [place] medicaid guidelines, billing all therapy as therapeutic-exercise    SUBJECTIVE     Pt reports: decrease low back discomfort with functional movement such as sit<>stand. Notice improvement with functional movements.       He  reports   compliant with staying active at home between sessions.    Response to previous treatment: He responded well to last session.    Functional change: He reports no significant functional change since last session.    Pain:  5/10  Location: low back     OBJECTIVE     Objective Measures updated at progress report unless specified.     Performed on 08/24/22   Movement Analysis:   Functional Test  Outcome   Double Leg Squat Knee valgus, unable to go below 75% knee bend without loosing control of movement   Single Leg Step Down Trendelenburg sign bilaterally; unable to control last 50% of descent       Rehab Tests  Outcome  08/24/22 Norms GOAL   Five Time Sit to Stand 15.1 60s: <11.4 sec  70s: <12.6 sec  80s: 14.8 sec  10 seconds    Initial: Unable to perform movement    Timed Up and Go  22.3 w/ AD <13.5 sec  13.5 seconds w/ AD      Initial: Unable to perform movement    6 minutes walk Unable to perform 60s: >538f, 572m  70s: >471f, 527m  80s: >417f, 392m        Balance: Balance:     RIGHT   08/24/22 LEFT Goal   Closed 60 second   60 seconds  Initial: 1 second    Tandem 15 20 20 seconds (Bilaterally)   Initial: Unable to perform    Single Leg 2 3 10 seconds (Bilaterally)   Initial: Unable to perform        MMT 8/24/22  Muscle Groups Left    Right      Hip Flexion 4/5 3+/5   Hip Extension 4/5 3+/5   Knee Flexion 4-/5 3+/5   Knee Extension 5/5 4+/5   Dorsiflexion 4+/5 4+/5   Plantarflexion 5/5 5/5   Inversion 4+/5 4+/5   Eversion 3+/5 3+/5        Treatment     Valentín received the treatments listed below:      THERAPEUTIC EXERCISES to develop strength, endurance, ROM, flexibility, posture and core stabilization for (15) minutes including:    Intervention Performed Today    Standing Hip flexion   3x10 up onto 12inch step in parallel bars    Shuttle X  x   30 both legs 6 bands  30 single leg 5 bands   Standing lower extremity exercises, Right knee cage donned to improve posture of lower extremity            2# ankle weight bilateral :   - hip flexion over gibson 1x10 bilateral   - hip abduction over gibson 1x10 bilateral   - hip extension over gibson 1x10 bilateral   - heel raise on AirEx beam 2 x 20 bilateral   - hamstring curl 1x10 bilateral   - Alternating tapping foot to 8 inch step 1x10 bilateral    NuStep   Level 3 for 6 minutes   Seated lower extremity exercises  long arc quad with 2# Left and 1# Right 1x15 bilateral    Seated hamstring curl against green band   1x20 bilateral    Sitting trunk extension over ball at back  2 x 10 3 second hold   Sitting hamstring/heelcord stretch  2 x 30 seconds bilateral    Standing heel cord stretch   2x15 seconds bilateral    Reviewed over all lower extremity exercises for Home Exercise Program discussing and practicing set-up and sequence of each with green  theraband resistance  - Seated hamstring curl  - Standing hip flexion  - Standing hip abduction  - Standing hip adduction  - Standing hip extension  - Seated ankle eversion   Standing landmine oblique twist X  2x10 (B) 45lb bar   Re-assessment  X          Neoprene wrap donned to low back for standing exercises to decrease pain and increase support  Right knee cage donned for standing lower extremity exercises.   Plan for Next Visit:      neuromuscular re-education activities to improve: Balance, Coordination, Kinesthetic, Sense, Proprioception and Posture for (30) minutes. The following activities were included:    Intervention Performed Today         Parallel bars standing activities  - Standing in staggered stance performing weight shifts forward/back 1x10 bilateral with 1 upper extremity support  - Stepping forward/back with 1-0 upper extremity supports  - stepping forward and backwards with scissoring vertical board in place working on stand balance with 1 upper extremity support   Supine on mat with bilateral lower extremities over Tball for core exercises    - Bridges with chest press between each rep holding bilateral 1# dumbbells  - lower trunk rotations moving arms opposite lower extremity movement with 1# dumbbells bilateral   - bilateral knees to chest performing bilateral upper extremity hammer motion down towards knees with each rep with 1# dumbbells bilateral         Standing on AirX pad        - eyes closed 2x10 seconds  - weight shifts Left and Right 1x10 eyes open, 1x5 eyes closed  - holding stand posture while receiving perturbations from all directions   Standing at parallel bars working on lifting pool noodle over head to touch upper back then lowering to lower legs  1x5   Combo lower extremity exercise working on single leg standing and balance with no upper extremity support  - flexion, abduction, extension and heel raise 8x's on each leg   Standing hamstring curl   2x10 bilateral    Sitting in  "chair with ball at low back performing trunk extension towards neutral   2x10,    Pulley exercises sitting on balance disc on bar stool  Rows against 20# 2x10  Bicep curls against 20# 2x10   Nautilus Leg extension   2 plates, 2x10 for coordination with strengthening   Side stepping on AirX beam   - 2x's back and forth    Tandem stepping on AirX beam   - 2x's back and forth    Holding tandem stance x 5 seconds, 3x's each leg   Sit<>stand  x 3x8 low mat + blue foam    Heel taps  x 3x8 (B)   Standing Marches + KB hold X  2x20 steps with arms ext *holding 5lb KB in front of body*                   TKE + March X  20 steps (B) *Opposite leg in purple cord*   Terminal knee extension (TKE) x 30x (Bilateral) purple cooks cord    Tandem Stance  3x30" (B)     Gait Training for (0) minutes:    Patient Education and Home Exercises     Home Exercises Provided and Patient Education Provided     Education provided:   - Patient educated to continue to stay active at home.  - Patient educated on the benefit of a Right knee cage to prevent hyperextension and protect his knee joint  - 6/20/22 Patient educated on results of testing today and Home Exercise Program.  He was also educated that the end of his Plan of Care is approaching and that PT would be requesting 4 more weeks of therapy.  He requested that PT move forward with requesting order for Right knee cage for home/community use.   - 6/27/22 PT discussed out of pocket cost related to order of knee cage.  He would like to consider all other options at this time.  Written Home Exercises Provided: Patient educated to continue with current Home Exercise Program. See EMR under Patient Instructions for exercises provided during future therapy sessions    ASSESSMENT     Valentín JEFFERSON Field tolerated PT session well with minimal/moderate complaints of pain or discomfort, secondary to poor motor control, decrease muscle strength, and decrease muscle endurance. Objective findings are improving " with measurements of ROM and functional mobility.  Therapy exercises were reviewed by revisiting exercises given from previous home exercise program while adding mobility and core exercises. The patient could benefit from a rollator to help with normalizing gait pattern.  Handouts were not issued during today's visit. Valentín demonstrated good understanding of new exercises and will continue to progress at home until next follow-up.       Valentín Is progressing well towards his goals.   Pt prognosis is Good.     Pt will continue to benefit from skilled outpatient physical therapy to address the deficits listed in the problem list box on initial evaluation, provide pt/family education and to maximize pt's level of independence in the home and community environment.     Pt's spiritual, cultural and educational needs considered and pt agreeable to plan of care and goals.     Anticipated Barriers for therapy: sedentary lifestyle and chronicity of condition       GOALS:  SHORT TERM GOALS: 4 weeks Progress   Recent signs and systems trend is improving in order to progress towards LTG's. Met   Patient will be independent with HEP in order to further progress and return to maximal function. Met   Pain rating at Worst: 5/10 in order to progress towards increased independence with activity. PC    Patient will be able to correct postural deviations in sitting and standing, to decrease pain and promote postural awareness for injury prevention.  Met with Right knee cage      LONG TERM GOALS: 8 weeks Progress   Patient will return to normal ADL, recreational, and work related activities with less pain and limitation.   PC   Patient will improve AROM to stated goals in order to return to maximal functional potential.   PC   Patient will improve Strength to stated goals of appropriate musculature in order to improve functional independence.   PC   Pain Rating at Best: 1/10 to improve Quality of Life.      Patient will meet predicted  functional outcome (FOTO) score: N/A% to increase self-worth & perceived functional ability.  PC   Patient will have met/partially met personal goal of: being able to walk > 500ft independently with no pain in back.   PC   Added Goal: Patient will demonstrate improved lower extremity strength by performing 5x sit to stand test in 10 seconds. PC   Added Goal: Patient will demonstrate improved coordination and safety with gait by performing TUG in 18 seconds.  PC            PLAN     Continue Plan of Care (POC) and progress per patient tolerance. See Treatment section for exercise progression.  2x weekly for 4 weeks   (Extend 09/22/22)     Campos Chambers, PT DPT

## 2022-09-02 ENCOUNTER — CLINICAL SUPPORT (OUTPATIENT)
Dept: REHABILITATION | Facility: HOSPITAL | Age: 62
End: 2022-09-02
Payer: MEDICAID

## 2022-09-02 ENCOUNTER — TELEPHONE (OUTPATIENT)
Dept: NEUROLOGY | Facility: CLINIC | Age: 62
End: 2022-09-02
Payer: MEDICAID

## 2022-09-02 DIAGNOSIS — R53.1 DECREASED STRENGTH, ENDURANCE, AND MOBILITY: Primary | ICD-10-CM

## 2022-09-02 DIAGNOSIS — Z74.09 DECREASED STRENGTH, ENDURANCE, AND MOBILITY: Primary | ICD-10-CM

## 2022-09-02 DIAGNOSIS — M62.81 MUSCLE WEAKNESS: ICD-10-CM

## 2022-09-02 DIAGNOSIS — R68.89 DECREASED STRENGTH, ENDURANCE, AND MOBILITY: Primary | ICD-10-CM

## 2022-09-02 PROCEDURE — 97110 THERAPEUTIC EXERCISES: CPT

## 2022-09-02 NOTE — PROGRESS NOTES
[facility]   Physical Therapy Treatment Note     Name: Valentín Field  Clinic Number: [id number]    Therapy Diagnosis:   Encounter Diagnoses   Name Primary?    Decreased strength, endurance, and mobility Yes    Muscle weakness      Physician: cOtavio Forrester MD  Visit Date: 9/2/2022  Physician Orders: PT Eval and Treat  Medical Diagnosis from Referral: General Weakness  Evaluation Date: 4/21/2022  Authorization Period Expiration: 4/21/23  Plan of Care Expiration: 06/22/2022  (Extend 09/22/22)                         Progress Update: 07/21/2022                        Visit # / Visits authorized: 25/28 (+1 eval)                       FOTO: Visit #1 - Scored : 1 / 3      PRECAUTIONS: Standard Precautions and Safety/fall precautions      MD Visit:   PTA Visit #: 0/5     Time In: 1000  Time Out: 1045  Total Billable Time:  45 minutes Billing reflects [place] medicaid guidelines, billing all therapy as therapeutic-exercise    SUBJECTIVE     Pt reports: decrease low back discomfort with functional movement such as sit<>stand. Notice improvement with functional movements.       He  reports   compliant with staying active at home between sessions.    Response to previous treatment: He responded well to last session.    Functional change: He reports no significant functional change since last session.    Pain:  5/10  Location: low back     OBJECTIVE     Objective Measures updated at progress report unless specified.   Treatment     Valentín received the treatments listed below:      THERAPEUTIC EXERCISES to develop strength, endurance, ROM, flexibility, posture and core stabilization for (15) minutes including:    Intervention Performed Today    Standing Hip flexion   3x10 up onto 12inch step in parallel bars    Shuttle X  x   30 both legs 6 bands  30 single leg 5 bands   Standing lower extremity exercises, Right knee cage donned to improve posture of lower extremity            2# ankle weight  bilateral :   - hip flexion over gibson 1x10 bilateral   - hip abduction over gibson 1x10 bilateral   - hip extension over gibson 1x10 bilateral   - heel raise on AirEx beam 2 x 20 bilateral   - hamstring curl 1x10 bilateral   - Alternating tapping foot to 8 inch step 1x10 bilateral    NuStep   Level 3 for 6 minutes   Seated lower extremity exercises  long arc quad with 2# Left and 1# Right 1x15 bilateral    Seated hamstring curl against green band   1x20 bilateral    Sitting trunk extension over ball at back  2 x 10 3 second hold   Sitting hamstring/heelcord stretch  2 x 30 seconds bilateral    Standing heel cord stretch   2x15 seconds bilateral    Reviewed over all lower extremity exercises for Home Exercise Program discussing and practicing set-up and sequence of each with green theraband resistance  - Seated hamstring curl  - Standing hip flexion  - Standing hip abduction  - Standing hip adduction  - Standing hip extension  - Seated ankle eversion   Standing landmine oblique twist X  2x10 (B) 45lb bar   Re-assessment  X          Neoprene wrap donned to low back for standing exercises to decrease pain and increase support  Right knee cage donned for standing lower extremity exercises.   Plan for Next Visit:      neuromuscular re-education activities to improve: Balance, Coordination, Kinesthetic, Sense, Proprioception and Posture for (30) minutes. The following activities were included:    Intervention Performed Today         Parallel bars standing activities  - Standing in staggered stance performing weight shifts forward/back 1x10 bilateral with 1 upper extremity support  - Stepping forward/back with 1-0 upper extremity supports  - stepping forward and backwards with scissoring vertical board in place working on stand balance with 1 upper extremity support   Supine on mat with bilateral lower extremities over Tball for core exercises    - Bridges with chest press between each rep holding bilateral 1#  "dumbbells  - lower trunk rotations moving arms opposite lower extremity movement with 1# dumbbells bilateral   - bilateral knees to chest performing bilateral upper extremity hammer motion down towards knees with each rep with 1# dumbbells bilateral         Standing on AirX pad        - eyes closed 2x10 seconds  - weight shifts Left and Right 1x10 eyes open, 1x5 eyes closed  - holding stand posture while receiving perturbations from all directions   Standing at parallel bars working on lifting pool noodle over head to touch upper back then lowering to lower legs  1x5   Combo lower extremity exercise working on single leg standing and balance with no upper extremity support  - flexion, abduction, extension and heel raise 8x's on each leg   Standing hamstring curl   2x10 bilateral    Sitting in chair with ball at low back performing trunk extension towards neutral   2x10,    Pulley exercises sitting on balance disc on bar stool  Rows against 20# 2x10  Bicep curls against 20# 2x10   Nautilus Leg extension   2 plates, 2x10 for coordination with strengthening   Side stepping on AirX beam   - 2x's back and forth    Tandem stepping on AirX beam   - 2x's back and forth    Holding tandem stance x 5 seconds, 3x's each leg   Sit<>stand  x 3x8 low mat + blue foam    Heel taps  x 3x8 (B)   Standing Marches + KB hold X  2x20 steps with arms ext *holding 5lb KB in front of body*                   TKE + March X  20 steps (B) *Opposite leg in purple cord*   Terminal knee extension (TKE) x 30x (Bilateral) purple cooks cord    Tandem Stance  3x30" (B)     Gait Training for (0) minutes:    Patient Education and Home Exercises     Home Exercises Provided and Patient Education Provided     Education provided:   - Patient educated to continue to stay active at home.  - Patient educated on the benefit of a Right knee cage to prevent hyperextension and protect his knee joint  - 6/20/22 Patient educated on results of testing today and Home " Exercise Program.  He was also educated that the end of his Plan of Care is approaching and that PT would be requesting 4 more weeks of therapy.  He requested that PT move forward with requesting order for Right knee cage for home/community use.   - 6/27/22 PT discussed out of pocket cost related to order of knee cage.  He would like to consider all other options at this time.  Written Home Exercises Provided: Patient educated to continue with current Home Exercise Program. See EMR under Patient Instructions for exercises provided during future therapy sessions    ASSESSMENT     Valentín Field tolerated PT session well with minimal complaints of pain or discomfort, secondary to poor motor control, decrease muscle strength, and decrease muscle endurance. Objective findings are improving with measurements of ROM and functional mobility.  Therapy exercises were reviewed by revisiting exercises given from previous home exercise program while adding mobility and core exercises. The patient could benefit from a rollator to help with normalizing gait pattern. Handouts were not issued during today's visit. Valentín demonstrated good understanding of new exercises and will continue to progress at home until next follow-up.       Valentín Is progressing well towards his goals.   Pt prognosis is Good.     Pt will continue to benefit from skilled outpatient physical therapy to address the deficits listed in the problem list box on initial evaluation, provide pt/family education and to maximize pt's level of independence in the home and community environment.     Pt's spiritual, cultural and educational needs considered and pt agreeable to plan of care and goals.     Anticipated Barriers for therapy: sedentary lifestyle and chronicity of condition       GOALS:  SHORT TERM GOALS: 4 weeks Progress   Recent signs and systems trend is improving in order to progress towards LTG's. Met   Patient will be independent with HEP in order to  further progress and return to maximal function. Met   Pain rating at Worst: 5/10 in order to progress towards increased independence with activity. PC    Patient will be able to correct postural deviations in sitting and standing, to decrease pain and promote postural awareness for injury prevention.  Met with Right knee cage      LONG TERM GOALS: 8 weeks Progress   Patient will return to normal ADL, recreational, and work related activities with less pain and limitation.   PC   Patient will improve AROM to stated goals in order to return to maximal functional potential.   PC   Patient will improve Strength to stated goals of appropriate musculature in order to improve functional independence.   PC   Pain Rating at Best: 1/10 to improve Quality of Life.      Patient will meet predicted functional outcome (FOTO) score: N/A% to increase self-worth & perceived functional ability.  PC   Patient will have met/partially met personal goal of: being able to walk > 500ft independently with no pain in back.   PC   Added Goal: Patient will demonstrate improved lower extremity strength by performing 5x sit to stand test in 10 seconds. PC   Added Goal: Patient will demonstrate improved coordination and safety with gait by performing TUG in 18 seconds.  PC            PLAN     Continue Plan of Care (POC) and progress per patient tolerance. See Treatment section for exercise progression.  2x weekly for 4 weeks   (Extend 09/22/22)     Campos Chambers PT DPT

## 2022-09-08 ENCOUNTER — CLINICAL SUPPORT (OUTPATIENT)
Dept: REHABILITATION | Facility: HOSPITAL | Age: 62
End: 2022-09-08
Payer: MEDICAID

## 2022-09-08 DIAGNOSIS — R13.12 OROPHARYNGEAL DYSPHAGIA: Primary | ICD-10-CM

## 2022-09-08 DIAGNOSIS — Z98.1 S/P CERVICAL SPINAL FUSION: ICD-10-CM

## 2022-09-08 DIAGNOSIS — R41.841 COGNITIVE COMMUNICATION DISORDER: ICD-10-CM

## 2022-09-08 PROCEDURE — 92507 TX SP LANG VOICE COMM INDIV: CPT

## 2022-09-08 NOTE — PROGRESS NOTES
"[facility]  Speech Therapy Progress Note- Neurological Rehabilitation and Dysphagia     Date: 9/8/2022     Name: Valentín Field   MRN: [id number]   Therapy Diagnosis:   Encounter Diagnoses   Name Primary?    Oropharyngeal dysphagia Yes    Cognitive communication disorder     S/P cervical spinal fusion      Physician: Octavio Forrester MD  Physician Orders: [id number] - AMB REFERRAL/CONSULT TO SPEECH THERAPY  Medical Diagnosis: Z98.1 (ICD-10-CM) - S/P cervical spinal fusion    Visit #/ Visits Authorized: 17/20  Date of Evaluation:  5/5/2022  Insurance Authorization Period: 5/12/2022 - 9/30/2022  Plan of Care Expiration Date:  9/22/2022  Extended Plan of Care:  NA  Progress Note: 9/15/2022  Visits Cancelled: 1  Visits No Show: 1    Time In:  9:30 AM   Time Out:  10:15 AM   Total Billable Time: 45 minutes      Precautions: Standard  Subjective:   Patient reports: He completed his consultation with Dr. Alberto Costello. He cannot remember why his follow up was cancelled.   Response to previous treatment: Continued accuracy with completing dysphagia exercises.    Pain Scale:  6/10 on a Visual Analog Scale currently.   Pain Location: "shoulders" "back"   Objective:         UNTIMED  Procedure Min.   Speech- Language- Voice Therapy 45   Dysphagia Therapy 0   Total Timed Units: 0  Total Untimed Units: 1  Charges Billed/Number of units: 1      Short Term Goals: (5 weeks) Current Progress:   Patient will complete formal cognitive-communication assessment.      GOAL MET  5/12/2022         GOAL MET 5/12/2022   Patient will complete formal language assessment.        Discontinued  Not formally addressed. Discontinue due to primary concerns with memory.    Patient will complete a modified barium swallow study to assess swallow safety and efficiency and to determine the least restrictive diet.       COMPLETED 6/20/2022    Patient will sustain attention to complete moderate to complex reasoning tasks for 2 minutes with one request " for clarification to increase sustained attention.      GOAL MET 8/18/2022 Sustained attention to moderate reasoning task for 5  minutes with 0 requests for clarification       GOAL MET 8/18/2022   Patient will complete selective attention tasks (auditory or visual) with 90% accuracy independently increase selective attention.      Not formally addressed.       MET X2   Patient will complete short term recall tasks after a 5 minutes delay with 90% accuracy  independently  with use of memory strategies to improve recall of information and generalization of memory strategies.   Not formally addressed.    Patient will independently predict time and accuracy of performance on trials within 10% of actual performance in 90% of opportunities.    Not formally addressed.      Patient will complete a functional task to improve attention, memory and/or executive functions (I.e. sample bill paying activity, recipe, or multiple choice comprehension questions to 1 paragraphs) with 80% accuracy and natural environment noise distractions (TV news background, music, etc.).   Patient and clinician worked together to set up voicemail on his phone. He reported he did not know how to set up or check his voicemail which was impeding scheduling/rescheduling  important appointments. Once set up, clinician called and left a voicemail. Given a model and minimal cueing, he demonstrated the ability to check his voicemail by the end of the session. Clinician encouraged him to call Neuropsychology to determine if he needs to make another appointment.    Patient will be educated regarding cognitive deficits as applicable to the patient's life for increased awareness and will execute returned demonstration of information with 80% accuracy.     GOAL MET 8/4/2022 Additional education provided today and education provided regarding the importance of external aids to compensate.     GOAL MET 8/4/2022   Patient will use external aids to compensate for  deficits in attention, memory, and thought organization with 90% accuracy independently    GOAL MET 8/25/2022 Patient uses his phone and calendar printouts with his appointments on them.    GOAL MET 8/25/2022   Patient will complete 50-75 effortful swallows per session in order to improve swallowing function. Completed X80 today given a model and minimal cueing for accuracy.    Patient will complete 20-30 Mendelsohn maneuvers per session in order to improve swallowing function. Completed X20 given specific instruction, a model, and mminimal cueing for accuracy.    Patient will 3 sets of 1 minutes CTAR holds per session in order to improve swallowing function. Discontinue due to reports of sharp pain.   Patient will complete 30 CTAR repetitionsper session in order to improve swallowing function. Discontinue due to reports of sharp pain.    Patient participate in swallowing education with returned demonstration of information.     GOAL MET 8/4/2022       GOAL MET 8/4/2022       Patient Education/Response:   Education provided regarding upcoming appointments, the use of external aids, and the completion of swallowing exercises. He was educated on using his phone as an external aid. He verbalized understanding.     Home program established: yes-Patient instructed to complete dysphagia exercises at home daily.   Exercises were reviewed and Valentín was able to demonstrate them prior to the end of the session.  Valentín demonstrated fair  understanding of the education provided.     See Electronic Medical Record under Patient Instructions for exercises provided throughout therapy.  Assessment:   Valentín is progressing slowly but steadily towards his goals. He completes all swallowing exercises and reports that his completes these as part of his HEP. Currently goals remain appropriate and will be updated as needed.     MRI scheduled for 9/6/2022.     Patient prognosis is Good. Patient will continue to benefit from skilled  outpatient speech and language therapy to address the deficits listed in the problem list on initial evaluation, provide patient/family education and to maximize patient's level of independence in the home and community environment.   Medical necessity is demonstrated by the following IMPAIRMENTS:  Dysphagia impacts quality of life and puts the patient at risk for aspiration related pneumonia.   Cognitive-communication deficits negatively impact quality of life, safety and independence during activities of daily living, and participation in social and community interactions.   Barriers to Therapy: Unknown etiology of deficits; transportation   Patient's spiritual, cultural and educational needs considered and patient agreeable to plan of care and goals.  Plan:   Continue Plan of Care with focus on attention, memory, executive functions, and dysphagia exercises.     Adilia Graham, CCC-SLP, CBIS   Speech Language Pathologist   Certified Brain Injury Specialist   9/8/2022

## 2022-09-09 ENCOUNTER — HOSPITAL ENCOUNTER (OUTPATIENT)
Dept: RADIOLOGY | Facility: HOSPITAL | Age: 62
Discharge: HOME | End: 2022-09-09
Attending: NURSE PRACTITIONER
Payer: MEDICAID

## 2022-09-09 DIAGNOSIS — R68.89 DECREASED STRENGTH, ENDURANCE, AND MOBILITY: ICD-10-CM

## 2022-09-09 DIAGNOSIS — M46.42 DISCITIS OF CERVICAL REGION: ICD-10-CM

## 2022-09-09 DIAGNOSIS — Z98.1 S/P CERVICAL SPINAL FUSION: ICD-10-CM

## 2022-09-09 DIAGNOSIS — R41.841 COGNITIVE COMMUNICATION DISORDER: ICD-10-CM

## 2022-09-09 DIAGNOSIS — Z74.09 IMPAIRED FUNCTIONAL MOBILITY, BALANCE, GAIT, AND ENDURANCE: ICD-10-CM

## 2022-09-09 DIAGNOSIS — R53.1 DECREASED STRENGTH, ENDURANCE, AND MOBILITY: ICD-10-CM

## 2022-09-09 DIAGNOSIS — R41.89 COGNITIVE CHANGE: ICD-10-CM

## 2022-09-09 DIAGNOSIS — G44.309 POST-CONCUSSION HEADACHE: ICD-10-CM

## 2022-09-09 DIAGNOSIS — R52 PAIN: ICD-10-CM

## 2022-09-09 DIAGNOSIS — R41.3 OTHER AMNESIA: ICD-10-CM

## 2022-09-09 DIAGNOSIS — M25.60 DECREASED RANGE OF MOTION: ICD-10-CM

## 2022-09-09 DIAGNOSIS — M48.02 CERVICAL SPINAL STENOSIS: ICD-10-CM

## 2022-09-09 DIAGNOSIS — G95.9 CERVICAL MYELOPATHY: ICD-10-CM

## 2022-09-09 DIAGNOSIS — Z74.09 DECREASED STRENGTH, ENDURANCE, AND MOBILITY: ICD-10-CM

## 2022-09-09 DIAGNOSIS — R41.3 MEMORY DIFFICULTIES: ICD-10-CM

## 2022-09-09 PROCEDURE — 70551 MRI BRAIN STEM W/O DYE: CPT | Mod: 26,,, | Performed by: RADIOLOGY

## 2022-09-09 PROCEDURE — 70551 MRI BRAIN WITHOUT CONTRAST: ICD-10-PCS | Mod: 26,,, | Performed by: RADIOLOGY

## 2022-09-09 PROCEDURE — 70551 MRI BRAIN STEM W/O DYE: CPT | Mod: TC

## 2022-09-09 NOTE — PROGRESS NOTES
MRI BRAIN WO:      09-    No acute intracranial pathology.  Findings most consistent with chronic ischemic changes

## 2022-09-12 ENCOUNTER — CLINICAL SUPPORT (OUTPATIENT)
Dept: REHABILITATION | Facility: HOSPITAL | Age: 62
End: 2022-09-12
Payer: MEDICAID

## 2022-09-12 DIAGNOSIS — Z74.09 DECREASED STRENGTH, ENDURANCE, AND MOBILITY: Primary | ICD-10-CM

## 2022-09-12 DIAGNOSIS — R68.89 DECREASED STRENGTH, ENDURANCE, AND MOBILITY: Primary | ICD-10-CM

## 2022-09-12 DIAGNOSIS — M62.81 MUSCLE WEAKNESS: ICD-10-CM

## 2022-09-12 DIAGNOSIS — R53.1 DECREASED STRENGTH, ENDURANCE, AND MOBILITY: Primary | ICD-10-CM

## 2022-09-12 PROCEDURE — 97110 THERAPEUTIC EXERCISES: CPT | Mod: CQ

## 2022-09-12 NOTE — PROGRESS NOTES
[facility]   Physical Therapy Treatment Note     Name: Valentín Field  Clinic Number: [id number]    Therapy Diagnosis:   Encounter Diagnoses   Name Primary?    Decreased strength, endurance, and mobility Yes    Muscle weakness      Physician: Octavio Forrester MD  Visit Date: 9/12/2022  Physician Orders: PT Eval and Treat  Medical Diagnosis from Referral: General Weakness  Evaluation Date: 4/21/2022  Authorization Period Expiration: 4/21/23  Plan of Care Expiration: 06/22/2022  (Extend 09/22/22)                         Progress Update: 07/21/2022                        Visit # / Visits authorized: 26/28 (+1 eval)                       FOTO: Visit #1 - Scored : 1 / 3      PRECAUTIONS: Standard Precautions and Safety/fall precautions      MD Visit:   PTA Visit #: 1/5     Time In: 0925  Time Out: 1000  Total Billable Time:  35 minutes Billing reflects [place] medicaid guidelines, billing all therapy as therapeutic-exercise    SUBJECTIVE     Pt reports: low back pain is more today which is also constant. Pain is described as a tightness.       He  reports   compliant with staying active at home between sessions. Feels really exhausted after performing home exercise program.      Response to previous treatment: He responded well to last session.    Functional change: He reports no significant functional change since last session.    Pain:  7/10  Location: low back     OBJECTIVE     Objective Measures updated at progress report unless specified.   Treatment     Valentín received the treatments listed below:      THERAPEUTIC EXERCISES to develop strength, endurance, ROM, flexibility, posture and core stabilization for (6) minutes including:    Intervention Performed Today    Standing Hip flexion   3x10 up onto 12inch step in parallel bars    Shuttle    30 both legs 6 bands  30 single leg 5 bands   Standing lower extremity exercises, Right knee cage donned to improve posture of lower extremity             2# ankle weight bilateral :   - hip flexion over gibson 1x10 bilateral   - hip abduction over gibson 1x10 bilateral   - hip extension over gibson 1x10 bilateral   - heel raise on AirEx beam 2 x 20 bilateral   - hamstring curl 1x10 bilateral   - Alternating tapping foot to 8 inch step 1x10 bilateral    NuStep  x Level 6 for 6 minutes   Seated lower extremity exercises  long arc quad with 2# Left and 1# Right 1x15 bilateral    Seated hamstring curl against green band   1x20 bilateral    Sitting trunk extension over ball at back  2 x 10 3 second hold   Sitting hamstring/heelcord stretch  2 x 30 seconds bilateral    Standing heel cord stretch   2x15 seconds bilateral    Reviewed over all lower extremity exercises for Home Exercise Program discussing and practicing set-up and sequence of each with green theraband resistance  - Seated hamstring curl  - Standing hip flexion  - Standing hip abduction  - Standing hip adduction  - Standing hip extension  - Seated ankle eversion   Standing landmine oblique twist  2x10 (B) 45lb bar   Re-assessment              Neoprene wrap donned to low back for all exercises to decrease pain and increase support    Right knee cage donned for standing lower extremity exercises.     Plan for Next Visit:      neuromuscular re-education activities to improve: Balance, Coordination, Kinesthetic, Sense, Proprioception and Posture for (29) minutes. The following activities were included:    Intervention Performed Today         Parallel bars standing activities  - Standing in staggered stance performing weight shifts forward/back 1x10 bilateral with 1 upper extremity support  - Stepping forward/back with 1-0 upper extremity supports  - stepping forward and backwards with scissoring vertical board in place working on stand balance with 1 upper extremity support   Supine on mat with bilateral lower extremities over Tball for core exercises    - Bridges with chest press between each rep  "holding bilateral 1# dumbbells  - lower trunk rotations moving arms opposite lower extremity movement with 1# dumbbells bilateral   - bilateral knees to chest performing bilateral upper extremity hammer motion down towards knees with each rep with 1# dumbbells bilateral         Standing on AirX pad           x - eyes closed 2x10 seconds  - weight shifts Left and Right 1x10 eyes open, 1x5 eyes closed  - holding stand posture while receiving perturbations from all directions  Tandem stance (beam) eyes open 3 x 30 seconds each   Standing at parallel bars working on lifting pool noodle over head to touch upper back then lowering to lower legs  1x5   Combo lower extremity exercise working on single leg standing and balance with no upper extremity support x - flexion, abduction, extension and heel raise x 10 on each leg   Standing hamstring curl   2x10 bilateral    Sitting in chair with ball at low back performing trunk extension towards neutral   2x10,    Pulley exercises sitting on balance disc on bar stool  Rows against 20# 2x10  Bicep curls against 20# 2x10   Nautilus Leg extension   2 plates, 2x10 for coordination with strengthening   Side stepping on AirX beam  x 3 minutes left/right    Tandem stepping on AirX beam  x 3 minutes forward / backward  2 hands on parallel bars    Holding tandem stance  5 seconds, 3x's each leg   Sit<>stand   3x8 low mat + blue foam    Heel taps   3x8 (B)   Standing Marches + KB hold X  2x20 steps with arms ext *holding 5lb KB in front of body*                   TKE + March   20 steps (B) *Opposite leg in purple cord*   Terminal knee extension (TKE)  30x (Bilateral) purple cooks cord    Tandem Stance  3x30" (B)     Gait Training for (0) minutes:    Patient Education and Home Exercises     Home Exercises Provided and Patient Education Provided     Education provided:   - Patient educated to continue to stay active at home.  - Patient educated on the benefit of a Right knee cage to prevent " hyperextension and protect his knee joint  - 6/20/22 Patient educated on results of testing today and Home Exercise Program.  He was also educated that the end of his Plan of Care is approaching and that PT would be requesting 4 more weeks of therapy.  He requested that PT move forward with requesting order for Right knee cage for home/community use.   - 6/27/22 PT discussed out of pocket cost related to order of knee cage.  He would like to consider all other options at this time.  Written Home Exercises Provided: Patient educated to continue with current Home Exercise Program. See EMR under Patient Instructions for exercises provided during future therapy sessions  9/12/2022: discussed with patient to take more rests while performing his home exercise program.     ASSESSMENT     Valentín Field arrived to this session and requested to use the bathroom before starting treatment. He was unable to stay longer after his appointment time due to transportation. Mr. Laura tolerated session well with no complaints of pain. He was able to perform all standing exercises on unstable surface with no loss of balance with upper extremity support as needed. He indicated that he understood the recommendation above regarding resting longer when performing his home exercise program.     Valentín Is progressing well towards his goals.   Pt prognosis is Good.     Pt will continue to benefit from skilled outpatient physical therapy to address the deficits listed in the problem list box on initial evaluation, provide pt/family education and to maximize pt's level of independence in the home and community environment.     Pt's spiritual, cultural and educational needs considered and pt agreeable to plan of care and goals.     Anticipated Barriers for therapy: sedentary lifestyle and chronicity of condition       GOALS:  SHORT TERM GOALS: 4 weeks Progress   Recent signs and systems trend is improving in order to progress towards LTG's. Met    Patient will be independent with HEP in order to further progress and return to maximal function. Met   Pain rating at Worst: 5/10 in order to progress towards increased independence with activity. PC    Patient will be able to correct postural deviations in sitting and standing, to decrease pain and promote postural awareness for injury prevention.  Met with Right knee cage      LONG TERM GOALS: 8 weeks Progress   Patient will return to normal ADL, recreational, and work related activities with less pain and limitation.   PC   Patient will improve AROM to stated goals in order to return to maximal functional potential.   PC   Patient will improve Strength to stated goals of appropriate musculature in order to improve functional independence.   PC   Pain Rating at Best: 1/10 to improve Quality of Life.      Patient will meet predicted functional outcome (FOTO) score: N/A% to increase self-worth & perceived functional ability.  PC   Patient will have met/partially met personal goal of: being able to walk > 500ft independently with no pain in back.   PC   Added Goal: Patient will demonstrate improved lower extremity strength by performing 5x sit to stand test in 10 seconds. PC   Added Goal: Patient will demonstrate improved coordination and safety with gait by performing TUG in 18 seconds.  PC            PLAN     Continue Plan of Care (POC) and progress per patient tolerance. See Treatment section for exercise progression.  2x weekly for 4 weeks   (Extend 09/22/22)     Cj Gonzalez PTA

## 2022-09-14 ENCOUNTER — CLINICAL SUPPORT (OUTPATIENT)
Dept: REHABILITATION | Facility: HOSPITAL | Age: 62
End: 2022-09-14
Payer: MEDICAID

## 2022-09-14 DIAGNOSIS — R68.89 DECREASED STRENGTH, ENDURANCE, AND MOBILITY: Primary | ICD-10-CM

## 2022-09-14 DIAGNOSIS — R53.1 DECREASED STRENGTH, ENDURANCE, AND MOBILITY: Primary | ICD-10-CM

## 2022-09-14 DIAGNOSIS — Z74.09 DECREASED STRENGTH, ENDURANCE, AND MOBILITY: Primary | ICD-10-CM

## 2022-09-14 DIAGNOSIS — M62.81 MUSCLE WEAKNESS: ICD-10-CM

## 2022-09-14 PROCEDURE — 97110 THERAPEUTIC EXERCISES: CPT

## 2022-09-14 NOTE — PROGRESS NOTES
[facility]   Physical Therapy Treatment Note + Discharge Note     Name: Valentín Field  Clinic Number: [id number]    Therapy Diagnosis:   Encounter Diagnoses   Name Primary?    Decreased strength, endurance, and mobility Yes    Muscle weakness      Physician: Octavio Forrester MD  Visit Date: 9/14/2022  Physician Orders: PT Eval and Treat  Medical Diagnosis from Referral: General Weakness  Evaluation Date: 4/21/2022  Authorization Period Expiration: 4/21/23  Plan of Care Expiration: 06/22/2022  (Extend 09/22/22)                         Progress Update: 07/21/2022                        Visit # / Visits authorized: 27/28 (+1 eval)                       FOTO: Visit #1 - Scored : 1 / 3      PRECAUTIONS: Standard Precautions and Safety/fall precautions      MD Visit:   PTA Visit #: 0/5     Time In: 1115  Time Out: 1200  Total Billable Time:  45 minutes Billing reflects [place] medicaid guidelines, billing all therapy as therapeutic-exercise    SUBJECTIVE     Pt reports: decrease low back discomfort with functional movement such as sit<>stand. Notice improvement with functional movements.       He  reports   compliant with staying active at home between sessions.    Response to previous treatment: He responded well to last session.    Functional change: He reports no significant functional change since last session.    Pain:  5/10  Location: low back     OBJECTIVE     Objective Measures updated at progress report unless specified.     Performed on 08/24/22   Movement Analysis:   Functional Test  Outcome   Double Leg Squat Knee valgus, unable to go below 50% knee bend without loosing control of movement   Single Leg Step Down Trendelenburg sign bilaterally; unable to control last 50% of descent       Rehab Tests  Outcome  08/24/22 Norms GOAL   Five Time Sit to Stand 15.1 60s: <11.4 sec  70s: <12.6 sec  80s: 14.8 sec  10 seconds    Initial: Unable to perform movement    Timed Up and Go 22.3 w/ AD  <13.5 sec  13.5 seconds w/ AD      Initial: Unable to perform movement    6 minutes walk Unable to perform 60s: >538f, 572m  70s: >471f, 527m  80s: >417f, 392m        Balance: Balance:     RIGHT   08/24/22 LEFT Goal   Closed 60 second   60 seconds  Initial: 1 second    Tandem 15 20 20 seconds (Bilaterally)   Initial: Unable to perform    Single Leg 2 3 10 seconds (Bilaterally)   Initial: Unable to perform        MMT 8/24/22  Muscle Groups Left    Right      Hip Flexion 4/5 3+/5   Hip Extension 4/5 3+/5   Knee Flexion 4-/5 3+/5   Knee Extension 5/5 4+/5   Dorsiflexion 4+/5 4+/5   Plantarflexion 5/5 5/5   Inversion 4+/5 4+/5   Eversion 3+/5 3+/5        Treatment     Valentín received the treatments listed below:      THERAPEUTIC EXERCISES to develop strength, endurance, ROM, flexibility, posture and core stabilization for (15) minutes including:    Intervention Performed Today    Standing Hip flexion   3x10 up onto 12inch step in parallel bars    Shuttle X  x   30 both legs 6 bands  30 single leg 5 bands   Standing lower extremity exercises, Right knee cage donned to improve posture of lower extremity            2# ankle weight bilateral :   - hip flexion over gibson 1x10 bilateral   - hip abduction over gibson 1x10 bilateral   - hip extension over gibson 1x10 bilateral   - heel raise on AirEx beam 2 x 20 bilateral   - hamstring curl 1x10 bilateral   - Alternating tapping foot to 8 inch step 1x10 bilateral    NuStep   Level 3 for 6 minutes   Seated lower extremity exercises  long arc quad with 2# Left and 1# Right 1x15 bilateral    Seated hamstring curl against green band   1x20 bilateral    Sitting trunk extension over ball at back  2 x 10 3 second hold   Sitting hamstring/heelcord stretch  2 x 30 seconds bilateral    Standing heel cord stretch   2x15 seconds bilateral    Reviewed over all lower extremity exercises for Home Exercise Program discussing and practicing set-up and sequence of each with green theraband  resistance  - Seated hamstring curl  - Standing hip flexion  - Standing hip abduction  - Standing hip adduction  - Standing hip extension  - Seated ankle eversion   Standing landmine oblique twist X  2x10 (B) 45lb bar   Re-assessment  X          Neoprene wrap donned to low back for standing exercises to decrease pain and increase support  Right knee cage donned for standing lower extremity exercises.   Plan for Next Visit:      neuromuscular re-education activities to improve: Balance, Coordination, Kinesthetic, Sense, Proprioception and Posture for (30) minutes. The following activities were included:    Intervention Performed Today         Parallel bars standing activities  - Standing in staggered stance performing weight shifts forward/back 1x10 bilateral with 1 upper extremity support  - Stepping forward/back with 1-0 upper extremity supports  - stepping forward and backwards with scissoring vertical board in place working on stand balance with 1 upper extremity support   Supine on mat with bilateral lower extremities over Tball for core exercises    - Bridges with chest press between each rep holding bilateral 1# dumbbells  - lower trunk rotations moving arms opposite lower extremity movement with 1# dumbbells bilateral   - bilateral knees to chest performing bilateral upper extremity hammer motion down towards knees with each rep with 1# dumbbells bilateral         Standing on AirX pad        - eyes closed 2x10 seconds  - weight shifts Left and Right 1x10 eyes open, 1x5 eyes closed  - holding stand posture while receiving perturbations from all directions   Standing at parallel bars working on lifting pool noodle over head to touch upper back then lowering to lower legs  1x5   Combo lower extremity exercise working on single leg standing and balance with no upper extremity support  - flexion, abduction, extension and heel raise 8x's on each leg   Standing hamstring curl   2x10 bilateral    Sitting in chair  "with ball at low back performing trunk extension towards neutral   2x10,    Pulley exercises sitting on balance disc on bar stool  Rows against 20# 2x10  Bicep curls against 20# 2x10   Nautilus Leg extension   2 plates, 2x10 for coordination with strengthening   Side stepping on AirX beam   - 2x's back and forth    Tandem stepping on AirX beam   - 2x's back and forth    Holding tandem stance x 5 seconds, 3x's each leg   Sit<>stand  x 3x8 low mat + blue foam    Heel taps  x 3x8 (B)   Standing Marches + KB hold X  2x20 steps with arms ext *holding 5lb KB in front of body*                   TKE + March X  20 steps (B) *Opposite leg in purple cord*   Terminal knee extension (TKE) x 30x (Bilateral) purple cooks cord    Tandem Stance  3x30" (B)     Gait Training for (0) minutes:    Patient Education and Home Exercises     Home Exercises Provided and Patient Education Provided     Education provided:   - Patient educated to continue to stay active at home.  - Patient educated on the benefit of a Right knee cage to prevent hyperextension and protect his knee joint  - 6/20/22 Patient educated on results of testing today and Home Exercise Program.  He was also educated that the end of his Plan of Care is approaching and that PT would be requesting 4 more weeks of therapy.  He requested that PT move forward with requesting order for Right knee cage for home/community use.   - 6/27/22 PT discussed out of pocket cost related to order of knee cage.  He would like to consider all other options at this time.  Written Home Exercises Provided: Patient educated to continue with current Home Exercise Program. See EMR under Patient Instructions for exercises provided during future therapy sessions    ASSESSMENT     Valentín JEFFERSON Field tolerated PT session well with minimal/moderate complaints of pain or discomfort, secondary to poor motor control, decrease muscle strength, and decrease muscle endurance. Objective findings are improving with " measurements of ROM and functional mobility. Therapy exercises were reviewed by revisiting exercises given from previous home exercise program while adding mobility and core exercises. The patient could benefit from a rollator to help with normalizing gait pattern.  Handouts were not issued during today's visit. Valentín demonstrated good understanding of new exercises and will continue to progress at home until next follow-up.       Valentín Is progressing well towards his goals.   Pt prognosis is Good.     Pt will continue to benefit from skilled outpatient physical therapy to address the deficits listed in the problem list box on initial evaluation, provide pt/family education and to maximize pt's level of independence in the home and community environment.     Pt's spiritual, cultural and educational needs considered and pt agreeable to plan of care and goals.     Anticipated Barriers for therapy: sedentary lifestyle and chronicity of condition       GOALS:  SHORT TERM GOALS: 4 weeks Progress   Recent signs and systems trend is improving in order to progress towards LTG's. Met   Patient will be independent with HEP in order to further progress and return to maximal function. Met   Pain rating at Worst: 5/10 in order to progress towards increased independence with activity. PC    Patient will be able to correct postural deviations in sitting and standing, to decrease pain and promote postural awareness for injury prevention.  Met with Right knee cage      LONG TERM GOALS: 8 weeks Progress   Patient will return to normal ADL, recreational, and work related activities with less pain and limitation.   PC   Patient will improve AROM to stated goals in order to return to maximal functional potential.   PC   Patient will improve Strength to stated goals of appropriate musculature in order to improve functional independence.   PC   Pain Rating at Best: 1/10 to improve Quality of Life.      Patient will meet predicted  functional outcome (FOTO) score: N/A% to increase self-worth & perceived functional ability.  PC   Patient will have met/partially met personal goal of: being able to walk > 500ft independently with no pain in back.   PC   Added Goal: Patient will demonstrate improved lower extremity strength by performing 5x sit to stand test in 10 seconds. PC   Added Goal: Patient will demonstrate improved coordination and safety with gait by performing TUG in 18 seconds.  PC            PLAN     Continue Plan of Care (POC) and progress per patient tolerance. See Treatment section for exercise progression.  2x weekly for 4 weeks   (Extend 09/22/22)     Campos Chambers, PT DPT

## 2022-09-15 ENCOUNTER — CLINICAL SUPPORT (OUTPATIENT)
Dept: REHABILITATION | Facility: HOSPITAL | Age: 62
End: 2022-09-15
Payer: MEDICAID

## 2022-09-15 ENCOUNTER — TELEPHONE (OUTPATIENT)
Dept: ORTHOPEDICS | Facility: CLINIC | Age: 62
End: 2022-09-15
Payer: MEDICAID

## 2022-09-15 DIAGNOSIS — R41.841 COGNITIVE COMMUNICATION DISORDER: ICD-10-CM

## 2022-09-15 DIAGNOSIS — M50.30 DDD (DEGENERATIVE DISC DISEASE), CERVICAL: Primary | ICD-10-CM

## 2022-09-15 DIAGNOSIS — Z98.1 S/P CERVICAL SPINAL FUSION: ICD-10-CM

## 2022-09-15 DIAGNOSIS — R13.12 OROPHARYNGEAL DYSPHAGIA: Primary | ICD-10-CM

## 2022-09-15 DIAGNOSIS — M51.36 DDD (DEGENERATIVE DISC DISEASE), LUMBAR: ICD-10-CM

## 2022-09-15 PROCEDURE — 92507 TX SP LANG VOICE COMM INDIV: CPT

## 2022-09-15 NOTE — PROGRESS NOTES
"[facility]  Speech Therapy Progress Note- Neurological Rehabilitation and Dysphagia     Date: 9/15/2022     Name: Valentín Field   MRN: [id number]   Therapy Diagnosis:   Encounter Diagnoses   Name Primary?    Oropharyngeal dysphagia Yes    Cognitive communication disorder     S/P cervical spinal fusion      Physician: Octavio Forrester MD  Physician Orders: [id number] - AMB REFERRAL/CONSULT TO SPEECH THERAPY  Medical Diagnosis: Z98.1 (ICD-10-CM) - S/P cervical spinal fusion    Visit #/ Visits Authorized: 18/20  Date of Evaluation:  5/5/2022  Insurance Authorization Period: 5/12/2022 - 9/30/2022  Plan of Care Expiration Date:  9/22/2022  Extended Plan of Care:  NA  Progress Note: 9/15/2022  Visits Cancelled: 1  Visits No Show: 1    Time In:  10:20 AM   Time Out:  11:00 AM   Total Billable Time: 40 minutes      Precautions: Standard  Subjective:   Patient reports: His swallowing has been improving.   Response to previous treatment: He   Pain Scale:  6/10 on a Visual Analog Scale currently.   Pain Location: "shoulders" "back"   Objective:         UNTIMED  Procedure Min.   Speech- Language- Voice Therapy 40   Dysphagia Therapy 0   Total Timed Units: 0  Total Untimed Units: 1  Charges Billed/Number of units: 1      Short Term Goals: (5 weeks) Current Progress:   Patient will complete formal cognitive-communication assessment.      GOAL MET  5/12/2022         GOAL MET 5/12/2022   Patient will complete formal language assessment.        Discontinued  Not formally addressed. Discontinue due to primary concerns with memory.    Patient will complete a modified barium swallow study to assess swallow safety and efficiency and to determine the least restrictive diet.       COMPLETED 6/20/2022    Patient will sustain attention to complete moderate to complex reasoning tasks for 2 minutes with one request for clarification to increase sustained attention.      GOAL MET 8/18/2022 Sustained attention to moderate reasoning " task for 5  minutes with 0 requests for clarification       GOAL MET 8/18/2022   Patient will complete selective attention tasks (auditory or visual) with 90% accuracy independently increase selective attention.         GOAL MET 9/15/2022 Completed task with natural background noise with 90% accuracy         GOAL MET 9/15/2022   Patient will complete short term recall tasks after a 5 minutes delay with 90% accuracy  independently  with use of memory strategies to improve recall of information and generalization of memory strategies.   Four related words: completed with 75% accuracy independently; 100% given minimal cueing    Four unrelated words: completed with 0% accuracy      Patient will independently predict time and accuracy of performance on trials within 10% of actual performance in 90% of opportunities.    Predicted time:   Predicted accuracy:   Actual time:   Actual accuracy:      Patient will complete a functional task to improve attention, memory and/or executive functions (I.e. sample bill paying activity, recipe, or multiple choice comprehension questions to 1 paragraphs) with 80% accuracy and natural environment noise distractions (TV news background, music, etc.).   Setting up a pill box:     Completed with 80% accuracy given moderate cueing.    Patient will be educated regarding cognitive deficits as applicable to the patient's life for increased awareness and will execute returned demonstration of information with 80% accuracy.     GOAL MET 8/4/2022 Additional education provided today and education provided regarding the importance of external aids to compensate.     GOAL MET 8/4/2022   Patient will use external aids to compensate for deficits in attention, memory, and thought organization with 90% accuracy independently    GOAL MET 8/25/2022 Patient uses his phone and calendar printouts with his appointments on them.    GOAL MET 8/25/2022   Patient will complete 50-75 effortful swallows per session  in order to improve swallowing function.      GOAL MET 9/15/2022 Completed X80 today given a model and minimal cueing for accuracy.     GOAL MET 9/15/2022   Patient will complete 20-30 Mendelsohn maneuvers per session in order to improve swallowing function.      GOAL MET 9/15/2022 Completed X20 given specific instruction, a model, and mminimal cueing for accuracy.     GOAL MET 9/15/2022   Patient will 3 sets of 1 minutes CTAR holds per session in order to improve swallowing function. Discontinue due to reports of sharp pain.   Patient will complete 30 CTAR repetitionsper session in order to improve swallowing function. Discontinue due to reports of sharp pain.    Patient participate in swallowing education with returned demonstration of information.     GOAL MET 8/4/2022       GOAL MET 8/4/2022       Patient Education/Response:   Education provided regarding upcoming appointments, the use of external aids, and the completion of swallowing exercises. He was educated on using his phone as an external aid. Clinician and patient discussed discharge from  after next week with continued practice with HEP. He verbalized understanding.     Home program established: yes-Patient instructed to complete dysphagia exercises at home daily.   Exercises were reviewed and Valentín was able to demonstrate them prior to the end of the session.  Valentín demonstrated fair  understanding of the education provided.     See Electronic Medical Record under Patient Instructions for exercises provided throughout therapy.  Assessment:   Valentín is progressing slowly but steadily towards his goals. He completes all swallowing exercises and reports that his completes these as part of his HEP. Currently goals remain appropriate and will be updated as needed.     MRI completed. No acute findings. See medical chart for report.     Patient prognosis is Good. Patient will continue to benefit from skilled outpatient speech and language therapy to address  the deficits listed in the problem list on initial evaluation, provide patient/family education and to maximize patient's level of independence in the home and community environment.   Medical necessity is demonstrated by the following IMPAIRMENTS:  Dysphagia impacts quality of life and puts the patient at risk for aspiration related pneumonia.   Cognitive-communication deficits negatively impact quality of life, safety and independence during activities of daily living, and participation in social and community interactions.   Barriers to Therapy: Unknown etiology of deficits; transportation   Patient's spiritual, cultural and educational needs considered and patient agreeable to plan of care and goals.  Plan:   Continue Plan of Care with focus on attention, memory, executive functions, and dysphagia exercises.       Adilia Graham, CCC-SLP, CBIS   Speech Language Pathologist   Certified Brain Injury Specialist   9/15/2022

## 2022-09-15 NOTE — TELEPHONE ENCOUNTER
----- Message from Bindu Aguila sent at 9/15/2022 12:26 PM CDT -----  Regarding: schedule/refill  Contact: [phone]  Request Appt    Appt Type:F/U medication   Call Back Number:[phone]  Additional Information:

## 2022-09-20 ENCOUNTER — HOSPITAL ENCOUNTER (OUTPATIENT)
Dept: RADIOLOGY | Facility: HOSPITAL | Age: 62
Discharge: HOME | End: 2022-09-20
Attending: ORTHOPAEDIC SURGERY
Payer: MEDICAID

## 2022-09-20 ENCOUNTER — OFFICE VISIT (OUTPATIENT)
Dept: ORTHOPEDICS | Facility: CLINIC | Age: 62
End: 2022-09-20
Payer: MEDICAID

## 2022-09-20 VITALS — WEIGHT: 184.75 LBS | BODY MASS INDEX: 22.97 KG/M2 | HEIGHT: 75 IN

## 2022-09-20 DIAGNOSIS — M51.36 DDD (DEGENERATIVE DISC DISEASE), LUMBAR: ICD-10-CM

## 2022-09-20 DIAGNOSIS — M47.816 LUMBAR SPONDYLOSIS: ICD-10-CM

## 2022-09-20 DIAGNOSIS — M25.561 CHRONIC PAIN OF RIGHT KNEE: ICD-10-CM

## 2022-09-20 DIAGNOSIS — Z98.1 S/P CERVICAL SPINAL FUSION: Primary | ICD-10-CM

## 2022-09-20 DIAGNOSIS — M50.30 DDD (DEGENERATIVE DISC DISEASE), CERVICAL: ICD-10-CM

## 2022-09-20 DIAGNOSIS — G89.29 CHRONIC PAIN OF RIGHT KNEE: ICD-10-CM

## 2022-09-20 PROCEDURE — 99999 PR PBB SHADOW E&M-EST. PATIENT-LVL IV: CPT | Mod: PBBFAC,,, | Performed by: ORTHOPAEDIC SURGERY

## 2022-09-20 PROCEDURE — 72050 X-RAY EXAM NECK SPINE 4/5VWS: CPT | Mod: TC

## 2022-09-20 PROCEDURE — 99214 OFFICE O/P EST MOD 30 MIN: CPT | Mod: PBBFAC | Performed by: ORTHOPAEDIC SURGERY

## 2022-09-20 PROCEDURE — 72050 XR CERVICAL SPINE AP LAT WITH FLEX EXTEN: ICD-10-PCS | Mod: 26,,, | Performed by: RADIOLOGY

## 2022-09-20 PROCEDURE — 1160F RVW MEDS BY RX/DR IN RCRD: CPT | Mod: CPTII,,, | Performed by: ORTHOPAEDIC SURGERY

## 2022-09-20 PROCEDURE — 3008F BODY MASS INDEX DOCD: CPT | Mod: CPTII,,, | Performed by: ORTHOPAEDIC SURGERY

## 2022-09-20 PROCEDURE — 72110 X-RAY EXAM L-2 SPINE 4/>VWS: CPT | Mod: 26,,, | Performed by: RADIOLOGY

## 2022-09-20 PROCEDURE — 72110 X-RAY EXAM L-2 SPINE 4/>VWS: CPT | Mod: TC

## 2022-09-20 PROCEDURE — 99214 OFFICE O/P EST MOD 30 MIN: CPT | Mod: S$PBB,,, | Performed by: ORTHOPAEDIC SURGERY

## 2022-09-20 PROCEDURE — 1160F PR REVIEW ALL MEDS BY PRESCRIBER/CLIN PHARMACIST DOCUMENTED: ICD-10-PCS | Mod: CPTII,,, | Performed by: ORTHOPAEDIC SURGERY

## 2022-09-20 PROCEDURE — 1159F MED LIST DOCD IN RCRD: CPT | Mod: CPTII,,, | Performed by: ORTHOPAEDIC SURGERY

## 2022-09-20 PROCEDURE — 99999 PR PBB SHADOW E&M-EST. PATIENT-LVL IV: ICD-10-PCS | Mod: PBBFAC,,, | Performed by: ORTHOPAEDIC SURGERY

## 2022-09-20 PROCEDURE — 3008F PR BODY MASS INDEX (BMI) DOCUMENTED: ICD-10-PCS | Mod: CPTII,,, | Performed by: ORTHOPAEDIC SURGERY

## 2022-09-20 PROCEDURE — 72050 X-RAY EXAM NECK SPINE 4/5VWS: CPT | Mod: 26,,, | Performed by: RADIOLOGY

## 2022-09-20 PROCEDURE — 1159F PR MEDICATION LIST DOCUMENTED IN MEDICAL RECORD: ICD-10-PCS | Mod: CPTII,,, | Performed by: ORTHOPAEDIC SURGERY

## 2022-09-20 PROCEDURE — 99214 PR OFFICE/OUTPT VISIT, EST, LEVL IV, 30-39 MIN: ICD-10-PCS | Mod: S$PBB,,, | Performed by: ORTHOPAEDIC SURGERY

## 2022-09-20 PROCEDURE — 72110 XR LUMBAR SPINE AP AND LAT WITH FLEX/EXT: ICD-10-PCS | Mod: 26,,, | Performed by: RADIOLOGY

## 2022-09-20 NOTE — PROGRESS NOTES
Surgery:   3/18/21 C4 Corpectomy by Dr. Wilson  8/26/21 C3-6 laminectomy and fusion    HPI: Patient returns for follow up after the above procedure. He has been doing well in regards of the neck and BUE. He has LBP that radiates posterolaterally to R calf and to left knee. He finished therapy and wants to do more but they needed a new order. He also wants to see someone about the R knee.    Exam: incision healed, no signs of infection or dvt. Good strength in BUE but joints are stiff. Motor intact in BLE except for 4/5 in b/l hip flexion and R KE    IMAGING:  Today I independently reviewed the following images and my interpretations are as follows:    Previous AP and Lat upright C-spine films showed C4 corpectomy with cage in place. Posterior hardware in good placement without signs of failure.     Lumbar XRs showed spondylosis with DDD without listhesis.     Plan:  1 year out for PCDF, doing well. He has lumbar spondylosis. I educated the patient on the importance of core/back strengthening, correct posture, bending/lifting ergonomics, and low-impact aerobic exercises (walking, elliptical, and aquatherapy). I prescribed PT for his neck, back and R knee. Continue medications. I will refer him to Sports for his R knee. RTC PRN.    Octavio Forrester MD  Orthopaedic Spine Surgeon  Department of Orthopaedic Surgery  [phone]

## 2022-09-21 ENCOUNTER — CLINICAL SUPPORT (OUTPATIENT)
Dept: REHABILITATION | Facility: HOSPITAL | Age: 62
End: 2022-09-21
Payer: MEDICAID

## 2022-09-21 DIAGNOSIS — Z98.1 S/P CERVICAL SPINAL FUSION: ICD-10-CM

## 2022-09-21 DIAGNOSIS — G89.29 CHRONIC PAIN OF RIGHT KNEE: ICD-10-CM

## 2022-09-21 DIAGNOSIS — M47.816 LUMBAR SPONDYLOSIS: ICD-10-CM

## 2022-09-21 DIAGNOSIS — M25.561 CHRONIC PAIN OF RIGHT KNEE: ICD-10-CM

## 2022-09-21 PROBLEM — Z74.09 DECREASED STRENGTH, ENDURANCE, AND MOBILITY: Status: RESOLVED | Noted: 2022-04-25 | Resolved: 2022-09-21

## 2022-09-21 PROBLEM — R53.1 DECREASED STRENGTH, ENDURANCE, AND MOBILITY: Status: RESOLVED | Noted: 2022-04-25 | Resolved: 2022-09-21

## 2022-09-21 PROBLEM — R68.89 DECREASED STRENGTH, ENDURANCE, AND MOBILITY: Status: RESOLVED | Noted: 2022-04-25 | Resolved: 2022-09-21

## 2022-09-21 PROBLEM — M62.81 MUSCLE WEAKNESS: Status: RESOLVED | Noted: 2022-04-25 | Resolved: 2022-09-21

## 2022-09-21 PROCEDURE — 97161 PT EVAL LOW COMPLEX 20 MIN: CPT
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
## 2022-09-22 ENCOUNTER — OFFICE VISIT (OUTPATIENT)
Dept: UROLOGY | Facility: CLINIC | Age: 62
End: 2022-09-22
Payer: MEDICAID

## 2022-09-22 ENCOUNTER — CLINICAL SUPPORT (OUTPATIENT)
Dept: REHABILITATION | Facility: HOSPITAL | Age: 62
End: 2022-09-22
Payer: MEDICAID

## 2022-09-22 DIAGNOSIS — R13.12 OROPHARYNGEAL DYSPHAGIA: Primary | ICD-10-CM

## 2022-09-22 DIAGNOSIS — R41.841 COGNITIVE COMMUNICATION DISORDER: ICD-10-CM

## 2022-09-22 DIAGNOSIS — N30.00 ACUTE CYSTITIS WITHOUT HEMATURIA: Primary | ICD-10-CM

## 2022-09-22 DIAGNOSIS — Z98.1 S/P CERVICAL SPINAL FUSION: ICD-10-CM

## 2022-09-22 PROCEDURE — 1159F PR MEDICATION LIST DOCUMENTED IN MEDICAL RECORD: ICD-10-PCS | Mod: CPTII,95,, | Performed by: NURSE PRACTITIONER

## 2022-09-22 PROCEDURE — 1159F MED LIST DOCD IN RCRD: CPT | Mod: CPTII,95,, | Performed by: NURSE PRACTITIONER

## 2022-09-22 PROCEDURE — 99211 OFF/OP EST MAY X REQ PHY/QHP: CPT | Mod: 95,,, | Performed by: NURSE PRACTITIONER

## 2022-09-22 PROCEDURE — 99211 PR OFFICE/OUTPT VISIT, EST, LEVL I: ICD-10-PCS | Mod: 95,,, | Performed by: NURSE PRACTITIONER

## 2022-09-22 PROCEDURE — 1160F RVW MEDS BY RX/DR IN RCRD: CPT | Mod: CPTII,95,, | Performed by: NURSE PRACTITIONER

## 2022-09-22 PROCEDURE — 1160F PR REVIEW ALL MEDS BY PRESCRIBER/CLIN PHARMACIST DOCUMENTED: ICD-10-PCS | Mod: CPTII,95,, | Performed by: NURSE PRACTITIONER

## 2022-09-22 PROCEDURE — 92507 TX SP LANG VOICE COMM INDIV: CPT

## 2022-09-22 NOTE — PLAN OF CARE
Outpatient Therapy Discharge Summary   Discharge Date: 9/22/2022   Name: Valentín Field  Clinic Number: [id number]  Therapy Diagnosis:   Encounter Diagnoses   Name Primary?    Oropharyngeal dysphagia Yes    Cognitive communication disorder     S/P cervical spinal fusion      Physician: Octavio Forrester MD  Physician Orders: [id number] - AMB REFERRAL/CONSULT TO SPEECH THERAPY  Medical Diagnosis: Z98.1 (ICD-10-CM) - S/P cervical spinal fusion  Evaluation Date: 5/5/2022     Date of Last visit: 9/22/2022   Total Visits Received: 19 (+ evaluation)   Cancelled Visits: 1  No Show Visits: 0    Assessment    Assessment of Current Status: Patient made slow but steady progress towards his goals throughout his 19 speech therapy visits. Progress has been limited largely from unknown etiology of deficits. He continues to follow with Neurology and recently completed an MRI with no acute findings. He is able to use external strategies such as checking his voicemail and utilizing printed calendars independently. Additionally, he has demonstrated proper completion of dysphagia exercises and reports completing these daily.     Patient's score from 5/12/2022:   Subtest Index Score Percentile Qualitative Description   Immediate Memory 57 .2 Extremely low    Visuospatial/Constructional 102 55 Average   Language 78 7 Borderline   Attention 75 5 Borderline   Delayed Memory  68 2 Extremely low    TOTAL SCALE 69 2 Extremely low         Patient's scores from 9/22/2022:   Subtest Index Score Percentile Qualitative Description   Immediate Memory 73 4 Borderline   Visuospatial/Constructional 102 55 Average   Language 82 12 Low Average   Attention 75 5 Borderline   Delayed Memory  71 3 Borderline   TOTAL SCALE 76 5 Borderline     Patient has made some improvements in immediate memory, language, attention and delayed memory as seen above. His total scaled score on the RBANS has improved from 69 (which is considered extremely low) to 76 (which is  considered borderline). He has shown improvement in utilizing external strategies to improve these functions. While he still demonstrates some cognitive deficits, he has shown increased ability to utilize strategies as well as improvements in sustained and selective attention.     Goals:   Short Term Goals: (5 weeks)   Patient will complete formal cognitive-communication assessment.     GOAL MET  5/12/2022   Patient will complete formal language assessment.       Discontinued    Patient will complete a modified barium swallow study to assess swallow safety and efficiency and to determine the least restrictive diet.       Patient will sustain attention to complete moderate to complex reasoning tasks for 2 minutes with one request for clarification to increase sustained attention.    GOAL MET 8/18/2022   Patient will complete selective attention tasks (auditory or visual) with 90% accuracy independently increase selective attention.     GOAL MET 9/15/2022   Patient will complete short term recall tasks after a 5 minutes delay with 90% accuracy  independently  with use of memory strategies to improve recall of information and generalization of memory strategies.      Patient will independently predict time and accuracy of performance on trials within 10% of actual performance in 90% of opportunities.       Patient will complete a functional task to improve attention, memory and/or executive functions (I.e. sample bill paying activity, recipe, or multiple choice comprehension questions to 1 paragraphs) with 80% accuracy and natural environment noise distractions (TV news background, music, etc.).      Patient will be educated regarding cognitive deficits as applicable to the patient's life for increased awareness and will execute returned demonstration of information with 80% accuracy.      GOAL MET 8/4/2022   Patient will use external aids to compensate for deficits in attention, memory, and thought organization with  90% accuracy independently     GOAL MET 8/25/2022   Patient will complete 50-75 effortful swallows per session in order to improve swallowing function.    GOAL MET 9/15/2022   Patient will complete 20-30 Mendelsohn maneuvers per session in order to improve swallowing function.     GOAL MET 9/15/2022   Patient will 3 sets of 1 minutes CTAR holds per session in order to improve swallowing function.   Patient will complete 30 CTAR repetitionsper session in order to improve swallowing function.   Patient participate in swallowing education with returned demonstration of information.      GOAL MET 8/4/2022       Long Term Goals:  1. Patient will improve swallow function in order to consume the least restrictive diet and reduce the risk of aspiration related pneumonia.   2. Patient will improve cognitive-communication function in order to improve quality of life, promote participation in social and community interactions, and to promote independence and safety during activities of daily living.   3. Patient will improve language function function in order to improve quality of life, promote participation in social and community interactions, and to communicate an urgent message if needed.        Discharge reason: Patient has reached the maximum rehab potential for the present time    Plan   This patient is discharged from Speech Therapy.     Adilia Graham, CCC-SLP, CBIS   Speech Language Pathologist   Certified Brain Injury Specialist   9/22/2022

## 2022-09-22 NOTE — PROGRESS NOTES
"[facility]  Speech Therapy Progress Note- Neurological Rehabilitation and Dysphagia     Date: 9/22/2022     Name: Valentín Field   MRN: [id number]   Therapy Diagnosis:   Encounter Diagnoses   Name Primary?    Oropharyngeal dysphagia Yes    Cognitive communication disorder     S/P cervical spinal fusion      Physician: Octavio Forrester MD  Physician Orders: [id number] - AMB REFERRAL/CONSULT TO SPEECH THERAPY  Medical Diagnosis: Z98.1 (ICD-10-CM) - S/P cervical spinal fusion    Visit #/ Visits Authorized: 19/20  Date of Evaluation:  5/5/2022  Insurance Authorization Period: 5/12/2022 - 9/30/2022  Plan of Care Expiration Date:  9/22/2022  Extended Plan of Care:  NA  Progress Note: 9/15/2022  Visits Cancelled: 1  Visits No Show: 1    Time In:  9:30 AM   Time Out:  10:10 AM   Total Billable Time: 40 minutes      Precautions: Standard  Subjective:   Patient reports: His swallowing has been improving.   Response to previous treatment: He   Pain Scale:  8/10 on a Visual Analog Scale currently.   Pain Location: "shoulders" "neck" "back" "knees" "legs"   Objective:         UNTIMED  Procedure Min.   Speech- Language- Voice Therapy 40   Dysphagia Therapy 0   Total Timed Units: 0  Total Untimed Units: 1  Charges Billed/Number of units: 1      Short Term Goals: (5 weeks) Current Progress:   Patient will complete formal cognitive-communication assessment.      GOAL MET  5/12/2022         GOAL MET 5/12/2022   Patient will complete formal language assessment.        Discontinued  Not formally addressed. Discontinue due to primary concerns with memory.    Patient will complete a modified barium swallow study to assess swallow safety and efficiency and to determine the least restrictive diet.       COMPLETED 6/20/2022    Patient will sustain attention to complete moderate to complex reasoning tasks for 2 minutes with one request for clarification to increase sustained attention.      GOAL MET 8/18/2022 Sustained attention " to moderate reasoning task for 5  minutes with 0 requests for clarification       GOAL MET 8/18/2022   Patient will complete selective attention tasks (auditory or visual) with 90% accuracy independently increase selective attention.         GOAL MET 9/15/2022 Completed task with natural background noise with 90% accuracy         GOAL MET 9/15/2022   Patient will complete short term recall tasks after a 5 minutes delay with 90% accuracy  independently  with use of memory strategies to improve recall of information and generalization of memory strategies.   GOAL DISCONTINUED      Patient will independently predict time and accuracy of performance on trials within 10% of actual performance in 90% of opportunities.    Predicted time:   Predicted accuracy:   Actual time:   Actual accuracy:      Patient will complete a functional task to improve attention, memory and/or executive functions (I.e. sample bill paying activity, recipe, or multiple choice comprehension questions to 1 paragraphs) with 80% accuracy and natural environment noise distractions (TV news background, music, etc.).   GOAL DISCONTINUED    Patient will be educated regarding cognitive deficits as applicable to the patient's life for increased awareness and will execute returned demonstration of information with 80% accuracy.     GOAL MET 8/4/2022 Additional education provided today and education provided regarding the importance of external aids to compensate.     GOAL MET 8/4/2022   Patient will use external aids to compensate for deficits in attention, memory, and thought organization with 90% accuracy independently    GOAL MET 8/25/2022 Patient uses his phone and calendar printouts with his appointments on them.    GOAL MET 8/25/2022   Patient will complete 50-75 effortful swallows per session in order to improve swallowing function.      GOAL MET 9/15/2022 Completed X80 today given a model and minimal cueing for accuracy.     GOAL MET 9/15/2022    Patient will complete 20-30 Mendelsohn maneuvers per session in order to improve swallowing function.      GOAL MET 9/15/2022 Completed X20 given specific instruction, a model, and mminimal cueing for accuracy.     GOAL MET 9/15/2022   Patient will 3 sets of 1 minutes CTAR holds per session in order to improve swallowing function. Discontinue due to reports of sharp pain.   Patient will complete 30 CTAR repetitionsper session in order to improve swallowing function. Discontinue due to reports of sharp pain.    Patient participate in swallowing education with returned demonstration of information.     GOAL MET 8/4/2022       GOAL MET 8/4/2022     Repeatable Battery for the Assessment of Neuropsychological Status-Update (RBANS)  The RBANS is a brief, individually administered battery to measure cognitive decline or improvement. It covers five domains including immediate memory, visuospatial/constructional, language, attention, and delayed memory. The RBANS was administered to the patient on 5/12/2022 and again on 9/22/2022 to assess progress. Below are the patient's scores from today's re-assessment. See discharge note for comparison.     Subtest Index Score Percentile Qualitative Description   Immediate Memory 73 4 Borderline   Visuospatial/Constructional 102 55 Average   Language 82 12 Low Average   Attention 75 5 Borderline   Delayed Memory  71 3 Borderline   TOTAL SCALE 76 5 Borderline     See discharge note for interpretation and comparison.     Patient Education/Response:   Education provided regarding external aids such as utilizing a calendar and continuation of his swallowign exercises. Patient and clinician discussed discharge from  at this time with continued completion of an HEP. He verbalized understanding.     Home program established: yes-Patient instructed to complete dysphagia exercises at home daily.   Exercises were reviewed and Valentín was able to demonstrate them prior to the end of the session.   Valentín demonstrated fair  understanding of the education provided.     See Electronic Medical Record under Patient Instructions for exercises provided throughout therapy.  Assessment:   Valentín has progressed slowly towards his goals over the course of speech therapy. He has met most goals and is willing to complete his HEP independently.     Medical necessity is demonstrated by the following IMPAIRMENTS:  Dysphagia impacts quality of life and puts the patient at risk for aspiration related pneumonia.   Cognitive-communication deficits negatively impact quality of life, safety and independence during activities of daily living, and participation in social and community interactions.   Barriers to Therapy: Unknown etiology of deficits; transportation   Patient's spiritual, cultural and educational needs considered and patient agreeable to plan of care and goals.  Plan:   Discharge from  with continued practice of HEP.       Adilia Graham, CCC-SLP, CBIS   Speech Language Pathologist   Certified Brain Injury Specialist   9/22/2022

## 2022-09-28 ENCOUNTER — TELEPHONE (OUTPATIENT)
Dept: ORTHOPEDICS | Facility: CLINIC | Age: 62
End: 2022-09-28
Payer: MEDICAID

## 2022-09-28 ENCOUNTER — LAB VISIT (OUTPATIENT)
Dept: LAB | Facility: HOSPITAL | Age: 62
End: 2022-09-28
Payer: MEDICAID

## 2022-09-28 DIAGNOSIS — N30.00 ACUTE CYSTITIS WITHOUT HEMATURIA: ICD-10-CM

## 2022-09-28 PROCEDURE — 87086 URINE CULTURE/COLONY COUNT: CPT | Performed by: NURSE PRACTITIONER

## 2022-09-28 NOTE — TELEPHONE ENCOUNTER
Spoke with patient and informed him that he would have to know the name of the medications that he need refilled, because of his list, he did not know the name of the medication he wanted refilled.

## 2022-09-28 NOTE — TELEPHONE ENCOUNTER
----- Message from Von Solano sent at 9/28/2022 12:46 PM CDT -----  Name Of Caller:Valentín            Provider Name:Octavio Forrester            Does patient feel the need to be seen today?No             Relationship to the Pt?:Patient            Contact Preference?:[phone]            What is the nature of the call?: Patient has question about prescription refill.

## 2022-09-28 NOTE — TELEPHONE ENCOUNTER
----- Message from Mendy Boss sent at 9/28/2022  2:24 PM CDT -----  Pt missed a call and would the nurse to return their call.    Pt can be reached at  [phone]

## 2022-09-30 LAB — BACTERIA UR CULT: NO GROWTH

## 2022-10-03 ENCOUNTER — TELEPHONE (OUTPATIENT)
Dept: UROLOGY | Facility: CLINIC | Age: 62
End: 2022-10-03
Payer: MEDICAID

## 2022-10-03 ENCOUNTER — OFFICE VISIT (OUTPATIENT)
Dept: UROLOGY | Facility: CLINIC | Age: 62
End: 2022-10-03
Payer: MEDICAID

## 2022-10-03 VITALS
HEART RATE: 93 BPM | TEMPERATURE: 98 F | RESPIRATION RATE: 16 BRPM | BODY MASS INDEX: 26.05 KG/M2 | OXYGEN SATURATION: 97 % | HEIGHT: 71 IN | DIASTOLIC BLOOD PRESSURE: 76 MMHG | WEIGHT: 186.06 LBS | SYSTOLIC BLOOD PRESSURE: 130 MMHG

## 2022-10-03 DIAGNOSIS — R35.0 URINARY FREQUENCY: ICD-10-CM

## 2022-10-03 DIAGNOSIS — R39.15 URINARY URGENCY: ICD-10-CM

## 2022-10-03 DIAGNOSIS — R39.11 URINARY HESITANCY: Primary | ICD-10-CM

## 2022-10-03 PROCEDURE — 99214 PR OFFICE/OUTPT VISIT, EST, LEVL IV, 30-39 MIN: ICD-10-PCS | Mod: S$PBB,,, | Performed by: NURSE PRACTITIONER

## 2022-10-03 PROCEDURE — 1160F RVW MEDS BY RX/DR IN RCRD: CPT | Mod: CPTII,,, | Performed by: NURSE PRACTITIONER

## 2022-10-03 PROCEDURE — 1160F PR REVIEW ALL MEDS BY PRESCRIBER/CLIN PHARMACIST DOCUMENTED: ICD-10-PCS | Mod: CPTII,,, | Performed by: NURSE PRACTITIONER

## 2022-10-03 PROCEDURE — 99215 OFFICE O/P EST HI 40 MIN: CPT | Mod: PBBFAC,PO | Performed by: NURSE PRACTITIONER

## 2022-10-03 PROCEDURE — 99999 PR PBB SHADOW E&M-EST. PATIENT-LVL V: ICD-10-PCS | Mod: PBBFAC,,, | Performed by: NURSE PRACTITIONER

## 2022-10-03 PROCEDURE — 99214 OFFICE O/P EST MOD 30 MIN: CPT | Mod: S$PBB,,, | Performed by: NURSE PRACTITIONER

## 2022-10-03 PROCEDURE — 3075F SYST BP GE 130 - 139MM HG: CPT | Mod: CPTII,,, | Performed by: NURSE PRACTITIONER

## 2022-10-03 PROCEDURE — 1159F PR MEDICATION LIST DOCUMENTED IN MEDICAL RECORD: ICD-10-PCS | Mod: CPTII,,, | Performed by: NURSE PRACTITIONER

## 2022-10-03 PROCEDURE — 3008F BODY MASS INDEX DOCD: CPT | Mod: CPTII,,, | Performed by: NURSE PRACTITIONER

## 2022-10-03 PROCEDURE — 99999 PR PBB SHADOW E&M-EST. PATIENT-LVL V: CPT | Mod: PBBFAC,,, | Performed by: NURSE PRACTITIONER

## 2022-10-03 PROCEDURE — 3075F PR MOST RECENT SYSTOLIC BLOOD PRESS GE 130-139MM HG: ICD-10-PCS | Mod: CPTII,,, | Performed by: NURSE PRACTITIONER

## 2022-10-03 PROCEDURE — 3078F PR MOST RECENT DIASTOLIC BLOOD PRESSURE < 80 MM HG: ICD-10-PCS | Mod: CPTII,,, | Performed by: NURSE PRACTITIONER

## 2022-10-03 PROCEDURE — 3078F DIAST BP <80 MM HG: CPT | Mod: CPTII,,, | Performed by: NURSE PRACTITIONER

## 2022-10-03 PROCEDURE — 1159F MED LIST DOCD IN RCRD: CPT | Mod: CPTII,,, | Performed by: NURSE PRACTITIONER

## 2022-10-03 PROCEDURE — 3008F PR BODY MASS INDEX (BMI) DOCUMENTED: ICD-10-PCS | Mod: CPTII,,, | Performed by: NURSE PRACTITIONER

## 2022-10-03 RX ORDER — TAMSULOSIN HYDROCHLORIDE 0.4 MG/1
0.4 CAPSULE ORAL 2 TIMES DAILY
Qty: 60 CAPSULE | Refills: 11 | Status: SHIPPED | OUTPATIENT
Start: 2022-10-03 | End: 2023-01-20

## 2022-10-03 NOTE — PROGRESS NOTES
Subjective:       Patient ID: Valentín Field is a 62 y.o. male.    Chief Complaint: No chief complaint on file.    Patient is here today for a follow-up for your frequency and urgency. He is currently taking tamsulosin 0.4 mg daily, which he reports gives him mild relief.     Follow-up  This is a chronic (urinary frequency and urgency) problem. The current episode started more than 1 month ago. The problem occurs daily. The problem has been waxing and waning. Associated symptoms include arthralgias, urinary symptoms and weakness. Pertinent negatives include no abdominal pain, change in bowel habit, chills, fatigue, fever, headaches or swollen glands. Nothing aggravates the symptoms. Treatments tried: tamsulosin 0.4 mg daily. The treatment provided mild relief.   Review of Systems   Constitutional:  Negative for chills, fatigue and fever.   Gastrointestinal:  Negative for abdominal pain, change in bowel habit and change in bowel habit.   Genitourinary:  Positive for frequency and urgency. Negative for decreased urine volume, difficulty urinating, dysuria, flank pain, hematuria, penile pain, penile swelling, scrotal swelling and testicular pain.   Musculoskeletal:  Positive for arthralgias and back pain.   Neurological:  Positive for weakness. Negative for dizziness and headaches.   Psychiatric/Behavioral: Negative.         Objective:      Physical Exam  Vitals and nursing note reviewed.   Constitutional:       General: He is not in acute distress.     Appearance: He is well-developed. He is not ill-appearing.   HENT:      Head: Normocephalic and atraumatic.   Eyes:      Pupils: Pupils are equal, round, and reactive to light.   Cardiovascular:      Rate and Rhythm: Normal rate.   Pulmonary:      Effort: Pulmonary effort is normal. No respiratory distress.   Abdominal:      Palpations: Abdomen is soft.      Tenderness: There is no abdominal tenderness.   Musculoskeletal:      Cervical back: Normal range of motion.       Comments: Ambulates with walker.    Skin:     General: Skin is warm and dry.   Neurological:      Mental Status: He is alert and oriented to person, place, and time.      Coordination: Coordination normal.   Psychiatric:         Mood and Affect: Mood normal.         Behavior: Behavior normal.         Thought Content: Thought content normal.         Judgment: Judgment normal.       Assessment:       Problem List Items Addressed This Visit    None  Visit Diagnoses       Urinary hesitancy    -  Primary    Relevant Medications    tamsulosin (FLOMAX) 0.4 mg Cap    Urinary frequency        Relevant Medications    tamsulosin (FLOMAX) 0.4 mg Cap    Urinary urgency        Relevant Medications    tamsulosin (FLOMAX) 0.4 mg Cap              Plan:           Diagnoses and all orders for this visit:    Urinary hesitancy  -     tamsulosin (FLOMAX) 0.4 mg Cap; Take 1 capsule (0.4 mg total) by mouth 2 (two) times a day.    Urinary frequency  -     tamsulosin (FLOMAX) 0.4 mg Cap; Take 1 capsule (0.4 mg total) by mouth 2 (two) times a day.    Urinary urgency  -     tamsulosin (FLOMAX) 0.4 mg Cap; Take 1 capsule (0.4 mg total) by mouth 2 (two) times a day.    Other order  Increase tamsulosin (Flomax) to 0.4 mg twice daily to promoting bladder emptying.     Follow-up in 4 weeks (10/31/2022 at 10:30 AM).     Hazel Saunders NP

## 2022-10-03 NOTE — PATIENT INSTRUCTIONS
Increase tamsulosin (Flomax) to 0.4 mg twice daily to promoting bladder emptying.   Follow-up in 4 weeks (10/31/2022 at 10:30 AM).

## 2022-10-04 ENCOUNTER — CLINICAL SUPPORT (OUTPATIENT)
Dept: REHABILITATION | Facility: HOSPITAL | Age: 62
End: 2022-10-04
Payer: MEDICAID

## 2022-10-04 DIAGNOSIS — R13.12 OROPHARYNGEAL DYSPHAGIA: Primary | ICD-10-CM

## 2022-10-04 DIAGNOSIS — Z98.1 S/P CERVICAL SPINAL FUSION: ICD-10-CM

## 2022-10-04 DIAGNOSIS — R41.841 COGNITIVE COMMUNICATION DISORDER: ICD-10-CM

## 2022-10-04 PROCEDURE — 97110 THERAPEUTIC EXERCISES: CPT | Mod: CQ

## 2022-10-04 NOTE — PROGRESS NOTES
"[facility]   Physical Therapy Treatment Note     Name: Valentín Field  Clinic Number: [id number]    Therapy Diagnosis:   Encounter Diagnoses   Name Primary?    Oropharyngeal dysphagia Yes    Cognitive communication disorder     S/P cervical spinal fusion      Physician: Octavio Forrester MD    Visit Date: 10/4/2022    Physician Orders: PT Eval and Treat   Medical Diagnosis from Referral:   Chronic pain of right knee     Evaluation Date: 9/21/2022  Authorization Period Expiration: 12/31/2022  Plan of Care Expiration: 11/20/2022  Progress Note Due: 10 days or by 10/21/2022  Visit # / Visits authorized: 1/12      FOTO  Number Date Score    1 9/21/2022 51%   2       3       4             Precautions: Fall risk        PTA Visit #: 1/5     Time In: 2:15  Time Out: 3:00  Total Billable Time: 45 minutes    SUBJECTIVE     Pt reports: "I have pain all the time".  He was compliant with home exercise program.  Response to previous treatment: no issues  Functional change: na at this time    Pain: 8/10  Location:  generalized     OBJECTIVE     Objective Measures updated at progress report unless specified.     Treatment     Valentín received the treatments listed below:      therapeutic exercises to develop strength, flexibility, and core stabilization for 35 minutes including:    Shuttle 4 bands w/eccentric return 3 min  Shuttle heel raises into calf stretches 4 bands 2x10  Bridges w/ball squeeze 2x10  Hook lying RLE walk downs/ups x8  Prone knee flexion 2x8 with eccentric lowering  Prone to tall kneeling  Tall kneeling into nordics x8    manual therapy techniques: for 10 minutes, including:  Stretching B hamstrings, piriformis, gastroc, quads (prone)    neuromuscular re-education activities to improve: Balance, Coordination, Kinesthetic Sense, and Proprioception for 0 minutes. The following activities were included:  --    supervised modalities after being cleared for contradictions: --    hot pack for 0 " minutes to --.      Patient Education and Home Exercises     Home Exercises Provided and Patient Education Provided     Education provided:   - HEP review  - Condition    Written Home Exercises Provided: Patient instructed to cont prior HEP. Exercises were reviewed and Valentín was able to demonstrate them prior to the end of the session.  Valentín demonstrated good  understanding of the education provided. See EMR under Patient Instructions for exercises provided during therapy sessions    ASSESSMENT     Valentín presented to therapy today on his rolling walker leaning moderately through his arms. His walker was adjusted. Valentín lacks ankle range of motion to achieve toe push off into heel strike and compensates with a steppage gait bilaterally but greatest on the right with impaired foot clearance also greatest on the right. Today we initiated general mobility and strengthening with good tolerance but with fatigue in his RLE due to weakness. Continued skilled intervention indicated to work on progressive activities for improved mobility w/stability to tolerate daily activities.   Valentín Is progressing well towards his goals.   Pt prognosis is Good.     Pt will continue to benefit from skilled outpatient physical therapy to address the deficits listed in the problem list box on initial evaluation, provide pt/family education and to maximize pt's level of independence in the home and community environment.     Pt's spiritual, cultural and educational needs considered and pt agreeable to plan of care and goals.     Anticipated barriers to physical therapy: transportation    Goals:   Short Term Goals: In 4 weeks   1. I with HEP  2. Pt to improve without knee cage without hyperextension using rolling walker  3. Pt to have pain less than 5/10 at all times.  4. Pt will improve FOTO disability score to 48% disability or less in order to improve overall QOL and return to PLOF.       Long Term Goals: In 8 weeks  1.  Pt will improve  FOTO disability score to 45% disability or less in order to improve overall QOL and return to PLOF.    2.  Patient to have decreased pain to 3/10 at all times.  3.  Patient to demo gait with rolling walker independently without knee cage for short community distances    PLAN     Plan of care Certification: 9/21/2022 to 11/20/2022.     Outpatient Physical Therapy 2 times weekly for 8 weeks to include the following interventions: Gait Training, Moist Heat/ Ice, Neuromuscular Re-ed, Patient Education, Therapeutic Activities, and Therapeutic Exercise.        Lyla Nunez, PTA

## 2022-10-10 ENCOUNTER — CLINICAL SUPPORT (OUTPATIENT)
Dept: REHABILITATION | Facility: HOSPITAL | Age: 62
End: 2022-10-10
Payer: MEDICAID

## 2022-10-10 DIAGNOSIS — R41.841 COGNITIVE COMMUNICATION DISORDER: ICD-10-CM

## 2022-10-10 DIAGNOSIS — Z98.1 S/P CERVICAL SPINAL FUSION: ICD-10-CM

## 2022-10-10 DIAGNOSIS — R13.12 OROPHARYNGEAL DYSPHAGIA: Primary | ICD-10-CM

## 2022-10-10 PROCEDURE — 97110 THERAPEUTIC EXERCISES: CPT | Mod: CQ

## 2022-10-10 NOTE — PROGRESS NOTES
[facility]   Physical Therapy Treatment Note     Name: Valentín Field  Clinic Number: [id number]    Therapy Diagnosis:   No diagnosis found.    Physician: Octavio Forrester MD    Visit Date: 10/10/2022    Physician Orders: PT Eval and Treat   Medical Diagnosis from Referral:   Chronic pain of right knee     Evaluation Date: 9/21/2022  Authorization Period Expiration: 12/31/2022  Plan of Care Expiration: 11/20/2022  Progress Note Due: 10 days or by 10/21/2022  Visit # / Visits authorized: 1/12      FOTO  Number Date Score    1 9/21/2022 51%   2       3       4             Precautions: Fall risk      PTA Visit #: 2/5     Time In: 11:30am  Time Out: 12:15am  Total Billable Time: 45 minutes    SUBJECTIVE     Pt reports: continuing to have buckling of the knee when he does not wear knee brace. Pt reports when wearing knee brace continuously having an aching pain/discomfort in distal aspect of brace.    He was compliant with home exercise program.  Response to previous treatment: no issues  Functional change: na at this time    Pain: 8/10  Location:  generalized     OBJECTIVE     Objective Measures updated at progress report unless specified.     Treatment     Valentín received the treatments listed below:      therapeutic exercises to develop strength, flexibility, and core stabilization for 35 minutes including:    NuStep 6'  Shuttle 4 bands w/eccentric return 3 min  Shuttle heel raises into calf stretches 4 bands 2x12  Bridges w/ball squeeze 3x8  Hook lying RLE walk downs/ups x8  Prone knee flexion 2x8 with eccentric lowering  Prone to tall kneeling  Tall kneeling into nordics x8  Quadruped Rocking; 2x10  Supine 90/90 Nerve glide; x15  Step Taps to 2nd stair; BUE A 2x12    manual therapy techniques: for 10 minutes, including:  Stretching B hamstrings, piriformis, gastroc, quads (prone)    neuromuscular re-education activities to improve: Balance, Coordination, Kinesthetic Sense, and Proprioception  for 0 minutes. The following activities were included:      Patient Education and Home Exercises     Home Exercises Provided and Patient Education Provided     Education provided:   - HEP review  - Condition  - avoid exacerbating pain with home workouts    Written Home Exercises Provided: Patient instructed to cont prior HEP. Exercises were reviewed and Valentín was able to demonstrate them prior to the end of the session.  Valentín demonstrated good  understanding of the education provided. See EMR under Patient Instructions for exercises provided during therapy sessions    ASSESSMENT     Pt presented without knee brace and only RW to therapy. Cueing provided during tx to work more within pain free motions. Pt stated belief that he needed to push into and through pain to see improvements. Discussed with pt about attempting to avoid exercises and ADL that directly increases back and radiating leg pain. Patient verbally stated understanding and would attempt to avoid increasing pain purposely.     Valentín Is progressing well towards his goals.   Pt prognosis is Good.     Pt will continue to benefit from skilled outpatient physical therapy to address the deficits listed in the problem list box on initial evaluation, provide pt/family education and to maximize pt's level of independence in the home and community environment.     Pt's spiritual, cultural and educational needs considered and pt agreeable to plan of care and goals.     Anticipated barriers to physical therapy: transportation    Goals:   Short Term Goals: In 4 weeks   1. I with HEP  2. Pt to improve without knee cage without hyperextension using rolling walker  3. Pt to have pain less than 5/10 at all times.  4. Pt will improve FOTO disability score to 48% disability or less in order to improve overall QOL and return to PLOF.       Long Term Goals: In 8 weeks  1.  Pt will improve FOTO disability score to 45% disability or less in order to improve overall QOL and  return to PLOF.    2.  Patient to have decreased pain to 3/10 at all times.  3.  Patient to demo gait with rolling walker independently without knee cage for short community distances    PLAN     Plan of care Certification: 9/21/2022 to 11/20/2022.     Outpatient Physical Therapy 2 times weekly for 8 weeks to include the following interventions: Gait Training, Moist Heat/ Ice, Neuromuscular Re-ed, Patient Education, Therapeutic Activities, and Therapeutic Exercise.        Bereket Aldana, PTA

## 2022-10-17 ENCOUNTER — CLINICAL SUPPORT (OUTPATIENT)
Dept: REHABILITATION | Facility: HOSPITAL | Age: 62
End: 2022-10-17
Payer: MEDICAID

## 2022-10-17 DIAGNOSIS — Z98.1 S/P CERVICAL SPINAL FUSION: ICD-10-CM

## 2022-10-17 DIAGNOSIS — R13.12 OROPHARYNGEAL DYSPHAGIA: Primary | ICD-10-CM

## 2022-10-17 DIAGNOSIS — R41.841 COGNITIVE COMMUNICATION DISORDER: ICD-10-CM

## 2022-10-17 PROCEDURE — 97110 THERAPEUTIC EXERCISES: CPT

## 2022-10-17 PROCEDURE — 97140 MANUAL THERAPY 1/> REGIONS: CPT

## 2022-10-19 NOTE — PROGRESS NOTES
[facility]   Physical Therapy Treatment Note     Name: Valentín Field  Clinic Number: [id number]    Therapy Diagnosis:   Encounter Diagnoses   Name Primary?    Oropharyngeal dysphagia Yes    Cognitive communication disorder     S/P cervical spinal fusion        Physician: Octavio Forrester MD    Visit Date: 10/17/2022    Physician Orders: PT Eval and Treat   Medical Diagnosis from Referral:   Chronic pain of right knee     Evaluation Date: 9/21/2022  Authorization Period Expiration: 12/31/2022  Plan of Care Expiration: 11/20/2022  Progress Note Due: 10 days or by 10/21/2022  Visit # / Visits authorized: 3/12     FOTO  Number Date Score    1 9/21/2022 51%   2       3       4             Precautions: Fall risk      PTA Visit #: 0/5    Time In: 10:15 am  Time Out: 10:00  Total Billable Time: 40 minutes    SUBJECTIVE     Pt reports: pain at lower end of the brace.       He was compliant with home exercise program.  Response to previous treatment: no issues  Functional change: na at this time    Pain: 8/10  Location:  generalized     OBJECTIVE     Objective Measures updated at progress report unless specified.     Treatment     Valentín received the treatments listed below:      therapeutic exercises to develop strength, flexibility, and core stabilization for 35 minutes including:    NuStep 6'  Shuttle 4 bands w/eccentric return 3 min  Shuttle heel raises into calf stretches 4 bands 2x12  Bridges w/ball squeeze 3x8  Hook lying RLE walk downs/ups x8  Prone knee flexion 2x8 with eccentric lowering  Prone to tall kneeling  Tall kneeling into nordics x8  Quadruped Rocking; 2x10  Supine 90/90 Nerve glide; x15  Step Taps to 2nd stair; BUE A 2x12    manual therapy techniques: for 10 minutes, including:  Stretching B hamstrings, piriformis, gastroc, quads (prone)    neuromuscular re-education activities to improve: Balance, Coordination, Kinesthetic Sense, and Proprioception for 0 minutes. The following  activities were included:      Patient Education and Home Exercises     Home Exercises Provided and Patient Education Provided     Education provided:   - HEP review  - Condition  - avoid exacerbating pain with home workouts    Written Home Exercises Provided: Patient instructed to cont prior HEP. Exercises were reviewed and Valentín was able to demonstrate them prior to the end of the session.  Valentín demonstrated good  understanding of the education provided. See EMR under Patient Instructions for exercises provided during therapy sessions    ASSESSMENT     Pt presented with knee brace and only RW to therapy. Cueing provided during tx to work more within pain free motions. He is progressing with right knee stability exercises and stretching to the calves and hamstrings    Valentín Is progressing well towards his goals.   Pt prognosis is Good.     Pt will continue to benefit from skilled outpatient physical therapy to address the deficits listed in the problem list box on initial evaluation, provide pt/family education and to maximize pt's level of independence in the home and community environment.     Pt's spiritual, cultural and educational needs considered and pt agreeable to plan of care and goals.     Anticipated barriers to physical therapy: transportation    Goals:   Short Term Goals: In 4 weeks   1. I with HEP  2. Pt to improve without knee cage without hyperextension using rolling walker  3. Pt to have pain less than 5/10 at all times.  4. Pt will improve FOTO disability score to 48% disability or less in order to improve overall QOL and return to PLOF.       Long Term Goals: In 8 weeks  1.  Pt will improve FOTO disability score to 45% disability or less in order to improve overall QOL and return to PLOF.    2.  Patient to have decreased pain to 3/10 at all times.  3.  Patient to demo gait with rolling walker independently without knee cage for short community distances    PLAN     Plan of care Certification:  9/21/2022 to 11/20/2022.     Outpatient Physical Therapy 2 times weekly for 8 weeks to include the following interventions: Gait Training, Moist Heat/ Ice, Neuromuscular Re-ed, Patient Education, Therapeutic Activities, and Therapeutic Exercise.        Lionel Maravilla, PT

## 2022-10-21 ENCOUNTER — CLINICAL SUPPORT (OUTPATIENT)
Dept: REHABILITATION | Facility: HOSPITAL | Age: 62
End: 2022-10-21
Payer: MEDICAID

## 2022-10-21 DIAGNOSIS — M25.561 CHRONIC PAIN OF RIGHT KNEE: ICD-10-CM

## 2022-10-21 DIAGNOSIS — M62.81 MUSCLE WEAKNESS: ICD-10-CM

## 2022-10-21 DIAGNOSIS — G89.29 CHRONIC PAIN OF RIGHT KNEE: ICD-10-CM

## 2022-10-21 DIAGNOSIS — R41.841 COGNITIVE COMMUNICATION DISORDER: Primary | ICD-10-CM

## 2022-10-21 PROCEDURE — 97110 THERAPEUTIC EXERCISES: CPT

## 2022-10-23 PROBLEM — M62.81 MUSCLE WEAKNESS: Status: RESOLVED | Noted: 2022-09-21 | Resolved: 2022-09-21

## 2022-10-23 NOTE — PROGRESS NOTES
[facility]   Physical Therapy Treatment and Progress Note     Name: Valentín Field  Clinic Number: [id number]    Therapy Diagnosis:   Encounter Diagnoses   Name Primary?    Cognitive communication disorder Yes    Chronic pain of right knee     Muscle weakness        Physician: Octavio Forrester MD    Visit Date: 10/21/2022    Physician Orders: PT Eval and Treat   Medical Diagnosis from Referral:   Chronic pain of right knee     Evaluation Date: 9/21/2022  Authorization Period Expiration: 12/31/2022  Plan of Care Expiration: 11/20/2022  Progress Note Due: visit 14 or by 11/20/2022  Visit # / Visits authorized: 3/12     FOTO  Number Date Score    1 9/21/2022 51%   2       3       4             Precautions: Fall risk      PTA Visit #: 0/5    Time In: 10:15 am  Time Out: 10:00  Total Billable Time: 40 minutes    SUBJECTIVE     Pt reports: pain at lower end of the brace.       He was compliant with home exercise program.  Response to previous treatment: no issues  Functional change: na at this time    Pain: 6/10  Location:  generalized     OBJECTIVE     Objective Measures updated at progress report unless specified.     Treatment     Valentín received the treatments listed below:      therapeutic exercises to develop strength, flexibility, and core stabilization for 40 minutes including:    NuStep 6'  Shuttle 4 bands w/eccentric return 3 min  Shuttle heel raises into calf stretches 4 bands 2x12  Bridges w/ball squeeze 3x8  Hook lying RLE walk downs/ups x8  Prone knee flexion 2x8 with eccentric lowering  Prone hip extension  Quadruped Rocking; 2x10  Supine 90/90 Nerve glide; x15  Step Taps to 2nd stair; BUE A 2x12    manual therapy techniques: for --- minutes, including:  Stretching B hamstrings, piriformis, gastroc, quads (prone)    neuromuscular re-education activities to improve: Balance, Coordination, Kinesthetic Sense, and Proprioception for 0 minutes. The following activities were  included:      Patient Education and Home Exercises     Home Exercises Provided and Patient Education Provided     Education provided:   - HEP review  - Condition  - avoid exacerbating pain with home workouts    Written Home Exercises Provided: Patient instructed to cont prior HEP. Exercises were reviewed and Valentín was able to demonstrate them prior to the end of the session.  Valentín demonstrated good  understanding of the education provided. See EMR under Patient Instructions for exercises provided during therapy sessions    ASSESSMENT     Pt presented without right knee brace today.   States he does not wear with long pants.  He is performing exercises with eccentric control to improve control of the right knee and reduce popping into extension.    Valentín Is progressing well towards his goals.   Pt prognosis is Good.     Pt will continue to benefit from skilled outpatient physical therapy to address the deficits listed in the problem list box on initial evaluation, provide pt/family education and to maximize pt's level of independence in the home and community environment.     Pt's spiritual, cultural and educational needs considered and pt agreeable to plan of care and goals.     Anticipated barriers to physical therapy: transportation    Goals:   Short Term Goals: In 4 weeks   1. I with HEP  2. Pt to improve without knee cage without hyperextension using rolling walker  (Goal met 10/21/2022)  3. Pt to have pain less than 5/10 at all times.  4. Pt will improve FOTO disability score to 48% disability or less in order to improve overall QOL and return to PLOF.       Long Term Goals: In 8 weeks  1.  Pt will improve FOTO disability score to 45% disability or less in order to improve overall QOL and return to PLOF.    2.  Patient to have decreased pain to 3/10 at all times.  3.  Patient to demo gait with rolling walker independently without knee cage for short community distances    PLAN     Plan of care Certification:  9/21/2022 to 11/20/2022.     Outpatient Physical Therapy 2 times weekly for 8 weeks to include the following interventions: Gait Training, Moist Heat/ Ice, Neuromuscular Re-ed, Patient Education, Therapeutic Activities, and Therapeutic Exercise.        Lionel Maravilla, PT

## 2022-10-31 ENCOUNTER — TELEPHONE (OUTPATIENT)
Dept: UROLOGY | Facility: CLINIC | Age: 62
End: 2022-10-31
Payer: MEDICAID

## 2022-10-31 NOTE — TELEPHONE ENCOUNTER
Telephone call placed to patient for 4 week follow-up appt (telephone encounter), but no answer. Voice message left for patient to return my call.     Hazel Saunders NP

## 2022-11-02 ENCOUNTER — CLINICAL SUPPORT (OUTPATIENT)
Dept: REHABILITATION | Facility: HOSPITAL | Age: 62
End: 2022-11-02
Payer: MEDICAID

## 2022-11-02 DIAGNOSIS — M62.81 MUSCLE WEAKNESS: Primary | ICD-10-CM

## 2022-11-02 PROCEDURE — 97110 THERAPEUTIC EXERCISES: CPT | Mod: CQ

## 2022-11-02 NOTE — PROGRESS NOTES
"[facility]   Physical Therapist Assistant Treatment     Name: Valentín Field  Clinic Number: [id number]    Therapy Diagnosis:   Encounter Diagnosis   Name Primary?    Muscle weakness Yes         Physician: Octavio Forrester MD    Visit Date: 11/2/2022    Physician Orders: PT Eval and Treat   Medical Diagnosis from Referral:   Chronic pain of right knee     Evaluation Date: 9/21/2022  Authorization Period Expiration: 12/31/2022  Plan of Care Expiration: 11/20/2022  Progress Note Due: visit 14 or by 11/20/2022  Visit # / Visits authorized: 5/12  FOTO NEXT VISIT     FOTO  Number Date Score    1 9/21/2022 51%   2       3       4             Precautions: Fall risk      PTA Visit #: 1/5    Time In:12:45  Time Out: 1:39  Total Billable Time: 54 minutes    SUBJECTIVE     Pt reports: continued popping and pain in his right knee    He was compliant with home exercise program.  Response to previous treatment: no issues  Functional change: na at this time    Pain: 8-9/10  Location:  right knee    OBJECTIVE     Objective Measures updated at progress report unless specified.     Treatment     Valentín received the treatments listed below:      therapeutic exercises to develop strength, flexibility, and core stabilization  for 39 minutes including:    NuStep 8' total  w/ 5 min legs only  Standing ankle DF to fatigue ea foot--tactile stabilization to prevent trunk rocking  24" sit to stands; 22" sit to stands; 22" sit to stands in staggered stance w/RLE push off  Standing hip hinging working on glute engagement / knee control; added 10# KB  B Standing hip abduction in //bars and mirror feedback working on glute med engagement/ knee control      DEFERRED TODAY:  Shuttle 4 bands w/eccentric return 3 min  Shuttle heel raises into calf stretches 4 bands 2x12  Bridges w/ball squeeze 3x8  Hook lying RLE walk downs/ups x8  Prone knee flexion 2x8 with eccentric lowering  Prone hip extension  Quadruped Rocking; " 2x10  Supine 90/90 Nerve glide; x15  Step Taps to 2nd stair; BUE A 2x12    manual therapy techniques: for --- minutes, including:--    neuromuscular re-education activities to improve: Balance, Coordination, Kinesthetic Sense, and Proprioception for 15 minutes. The following activities were included:    Gait in the //bars w/theraband assist for right ankle DF/right knee flexion--tactile stabilization at pelvis and intermittently right knee          Patient Education and Home Exercises     Home Exercises Provided and Patient Education Provided     Education provided:   - HEP review  - Condition  - avoid exacerbating pain with home workouts    Written Home Exercises Provided: Patient instructed to cont prior HEP. Exercises were reviewed and Valentín was able to demonstrate them prior to the end of the session.  Valentín demonstrated good  understanding of the education provided. See EMR under Patient Instructions for exercises provided during therapy sessions    ASSESSMENT     Valentín presented to therapy without his right knee brace today. He continues to ambulate leaning heavily through his Ue's on his rolling walking with poor hip flexion, glute activation and lacking functional dorsiflexion on his right and impaired on his left. This is impacting his right knee pain/hyperextension in stance and bilateral hip drop especially without UE support. Focus was changed today to work on these deficits with functional activities and theraband assist to maintain some right ankle dorsiflexion. Valentín was quite challenged with all of his activities today and required moderated tactile and verbal cueing . He performed his activities without complaints of pain but did fatigue. Continued skilled intervention indicated to improve his functional strength and stabilization for improved gait mechanics and decreased knee pain.   Valentín Is progressing well towards his goals.   Pt prognosis is Good.     Pt will continue to benefit from skilled  outpatient physical therapy to address the deficits listed in the problem list box on initial evaluation, provide pt/family education and to maximize pt's level of independence in the home and community environment.     Pt's spiritual, cultural and educational needs considered and pt agreeable to plan of care and goals.     Anticipated barriers to physical therapy: transportation    Goals:   Short Term Goals: In 4 weeks   1. I with HEP  2. Pt to improve without knee cage without hyperextension using rolling walker  (Goal met 10/21/2022)  3. Pt to have pain less than 5/10 at all times.  4. Pt will improve FOTO disability score to 48% disability or less in order to improve overall QOL and return to PLOF.       Long Term Goals: In 8 weeks  1.  Pt will improve FOTO disability score to 45% disability or less in order to improve overall QOL and return to PLOF.    2.  Patient to have decreased pain to 3/10 at all times.  3.  Patient to demo gait with rolling walker independently without knee cage for short community distances    PLAN     Plan of care Certification: 9/21/2022 to 11/20/2022.     Outpatient Physical Therapy 2 times weekly for 8 weeks to include the following interventions: Gait Training, Moist Heat/ Ice, Neuromuscular Re-ed, Patient Education, Therapeutic Activities, and Therapeutic Exercise.        Lyla Nunez, PTA

## 2022-11-04 ENCOUNTER — CLINICAL SUPPORT (OUTPATIENT)
Dept: REHABILITATION | Facility: HOSPITAL | Age: 62
End: 2022-11-04
Payer: MEDICAID

## 2022-11-04 DIAGNOSIS — M25.561 CHRONIC PAIN OF RIGHT KNEE: Primary | ICD-10-CM

## 2022-11-04 DIAGNOSIS — G89.29 CHRONIC PAIN OF RIGHT KNEE: Primary | ICD-10-CM

## 2022-11-04 PROCEDURE — 97110 THERAPEUTIC EXERCISES: CPT | Mod: CQ

## 2022-11-04 NOTE — PROGRESS NOTES
"[facility]   Physical Therapy Treatment Note    Name: Valentín Field  Clinic Number: [id number]    Therapy Diagnosis:        Encounter Diagnoses   Name Primary?    Cognitive communication disorder Yes    Chronic pain of right knee      Muscle weakness        Physician: Octavio Forrester MD    Visit Date: 11/4/2022    Physician Orders: PT Eval and Treat   Medical Diagnosis from Referral:   Chronic pain of right knee     Evaluation Date: 9/21/2022  Authorization Period Expiration: 12/31/2022  Plan of Care Expiration: 11/20/2022  Progress Note Due: visit 14 or by 11/20/2022  Visit # / Visits authorized: 6/12  FOTO NEXT VISIT     FOTO  Number Date Score    1 9/21/2022 51%   2  11/4/2022 51%    3       4             Precautions: Fall risk    PTA Visit #: 1/5    Time In:2:25 pm  Time Out:3:05pm  Total Billable Time: 38 minutes    SUBJECTIVE     Pt reports: having to wear his knee brace today for support and to decrease pain. Patient reports last couple of days since running out of prescription pain medication he has had an increase in pain and aching all over. Patient reports feeling as if the knee is hot over the past week.    He was compliant with home exercise program.  Response to previous treatment: no issues  Functional change: na at this time    Pain: 8-9/10  Location:  right knee    OBJECTIVE     Objective Measures updated at progress report unless specified.     Treatment     Valentín received the treatments listed below:      therapeutic exercises to develop strength, flexibility, and core stabilization  for 38 minutes including:    NuStep 8' total  w/ 5 min legs only  Hamstring Stretch supine; 5x20"  Iliotibial Band Stretch supine;3x20"  SLR; 2x8 (cue PPT for core stabilization)  DKTC cue heel dig; 2x12  Standing Marches to 2nd step at stairs; x20  B Standing hip abduction in //bars and mirror feedback working on glute med engagement/ knee control    DEFERRED TODAY:  Shuttle 4 bands " w/eccentric return 3 min  Shuttle heel raises into calf stretches 4 bands 2x12  Bridges w/ball squeeze 3x8  Hook lying RLE walk downs/ups x8  Prone knee flexion 2x8 with eccentric lowering  Prone hip extension  Quadruped Rocking; 2x10  Supine 90/90 Nerve glide; x15  Step Taps to 2nd stair; BUE A 2x12    manual therapy techniques: for --- minutes, including:--    neuromuscular re-education activities to improve: Balance, Coordination, Kinesthetic Sense, and Proprioception for 00 minutes. The following activities were included:    Gait in the //bars w/theraband assist for right ankle DF/right knee flexion--tactile stabilization at pelvis and intermittently right knee      Patient Education and Home Exercises     Home Exercises Provided and Patient Education Provided     Education provided:   - HEP review  - Condition  - avoid exacerbating pain with home workouts    Written Home Exercises Provided: Patient instructed to cont prior HEP. Exercises were reviewed and Valentín was able to demonstrate them prior to the end of the session.  Valentín demonstrated good  understanding of the education provided. See EMR under Patient Instructions for exercises provided during therapy sessions    ASSESSMENT     Patient presents with knee brace today but once he took it off for mat exercises reports feeling a relief. Patient reports an increase in discomfort in low back when attempting straight leg raises. Cued patient to include abdominal bracing and PPT prior to and during lift. Patient reported relief of complaint in low back with core stabilization. No signs of subjective heat present upon palpation to knee. Patient reports feeling improvements with weightbearing and gait post treatment compared to when he came in. Discussed with patient about regular pain free stretching and activity in right knee in attempt to relieve tightness, tension, and continued complaints of pain.     Valentín Is progressing well towards his goals.   Pt  prognosis is Good.     Pt will continue to benefit from skilled outpatient physical therapy to address the deficits listed in the problem list box on initial evaluation, provide pt/family education and to maximize pt's level of independence in the home and community environment.     Pt's spiritual, cultural and educational needs considered and pt agreeable to plan of care and goals.     Anticipated barriers to physical therapy: transportation    Goals:   Short Term Goals: In 4 weeks   1. I with HEP  2. Pt to improve without knee cage without hyperextension using rolling walker  (Goal met 10/21/2022)  3. Pt to have pain less than 5/10 at all times.  4. Pt will improve FOTO disability score to 48% disability or less in order to improve overall QOL and return to PLOF.       Long Term Goals: In 8 weeks  1.  Pt will improve FOTO disability score to 45% disability or less in order to improve overall QOL and return to PLOF.    2.  Patient to have decreased pain to 3/10 at all times.  3.  Patient to demo gait with rolling walker independently without knee cage for short community distances    PLAN     Plan of care Certification: 9/21/2022 to 11/20/2022.     Outpatient Physical Therapy 2 times weekly for 8 weeks to include the following interventions: Gait Training, Moist Heat/ Ice, Neuromuscular Re-ed, Patient Education, Therapeutic Activities, and Therapeutic Exercise.        Bereket Aldana, PTA

## 2022-11-08 NOTE — PROGRESS NOTES
[facility]   Physical Therapy Treatment Note    Name: Valentín Field  Clinic Number: [id number]    Therapy Diagnosis:        Encounter Diagnoses   Name Primary?    Cognitive communication disorder Yes    Chronic pain of right knee      Muscle weakness        Physician: Octavio Forrester MD    Visit Date: 11/9/2022    Physician Orders: PT Eval and Treat   Medical Diagnosis from Referral:   Chronic pain of right knee     Evaluation Date: 9/21/2022  Authorization Period Expiration: 12/31/2022  Plan of Care Expiration: 11/20/2022  Progress Note Due: visit 14 or by 11/20/2022  Visit # / Visits authorized: 7/12       FOTO  Number Date Score    1 9/21/2022 51%   2  11/4/2022 51%    3       4             Precautions: Fall risk    PTA Visit #: 0/5    Time In:2:45 pm  Time Out:3:30 pm  Total Billable Time: 45 minutes    SUBJECTIVE     Pt reports: tightness and stiffness in the knee and shoulders. Patient wears knee brace half the day or when leaving the house for support and to decrease pain. e knee is hot over the past week.    He was compliant with home exercise program.  Response to previous treatment: no issues  Functional change: na at this time    Pain: 8-9/10  Location:  right knee    OBJECTIVE     Objective Measures updated at progress report unless specified.     Treatment     Valentín received the treatments listed below:      therapeutic exercises to develop strength, flexibility, and core stabilization  for 45 minutes including:    Shuttle 5 bands w/eccentric return 3 min  Step up/down on stairs 2x 10 (each) (reciprocal)  Toe yoga seated 1x 10 with PT assistance  Supine Posterior pelvic tilt to reduce anterior pelvic rotation and R knee hyperextension on R 2x 10  Standing L hip flexion with R LE holding PPT braced at wall 3x 5 L foot step up to stool then advance to no back support on the wall with L LE 3x 4 with L foot stepping up to step    PT To Add tasks to increase hamstring and glute  firing and finding hip neutral as the anterior tipping forward increases foot drag and reduces knee hyperextension.  Add bridges with posterior pelvic tilt to increase glute firing and try to see if he can advance to single version      Deferred  NuStep 8' total  w/ 5 min legs only  SLR; 2x8 (cue PPT for core stabilization)  prone knee flexion 2x8 with eccentric lowering  Prone hip extension  Quadruped Rocking; 2x10  Supine 90/90 Nerve glide; x15  Step Taps to 2nd stair; BUE A 2x12    manual therapy techniques: for --- minutes, including:--    neuromuscular re-education activities to improve: Balance, Coordination, Kinesthetic Sense, and Proprioception for 00 minutes. The following activities were included:    Gait in the //bars w/theraband assist for right ankle DF/right knee flexion--tactile stabilization at pelvis and intermittently right knee      Patient Education and Home Exercises     Home Exercises Provided and Patient Education Provided     Education provided:   - HEP review  - Condition  - avoid exacerbating pain with home workouts    Written Home Exercises Provided: Patient instructed to cont prior HEP. Exercises were reviewed and Valentín was able to demonstrate them prior to the end of the session.  Valentín demonstrated good  understanding of the education provided. See EMR under Patient Instructions for exercises provided during therapy sessions    ASSESSMENT     Patient presents with knee brace today but found he is not needing it if he has core riccardo in neutral vs anterior pelvic tilt and pt was able to clear the foot better with this during gait, as well. PT to add in more core support to reduce this complaint including hamstring and glute strength with obliques and rectus abdominus to assist in moving pelvis to neutral.    Valentín Is progressing well towards his goals.   Pt prognosis is Good.     Pt will continue to benefit from skilled outpatient physical therapy to address the deficits listed in the  problem list box on initial evaluation, provide pt/family education and to maximize pt's level of independence in the home and community environment.     Pt's spiritual, cultural and educational needs considered and pt agreeable to plan of care and goals.     Anticipated barriers to physical therapy: transportation    Goals:   Short Term Goals: In 4 weeks   1. I with HEP  2. Pt to improve without knee cage without hyperextension using rolling walker  (Goal met 10/21/2022)  3. Pt to have pain less than 5/10 at all times. progressing  4. Pt will improve FOTO disability score to 48% disability or less in order to improve overall QOL and return to PLOF.  not progressing on FOTO yet but verbal reports suggesting progress and pt is more upright walking     Long Term Goals: In 8 weeks  1.  Pt will improve FOTO disability score to 45% disability or less in order to improve overall QOL and return to PLOF.    2.  Patient to have decreased pain to 3/10 at all times.  3.  Patient to demo gait with rolling walker independently without knee cage for short community distances    PLAN     Plan of care Certification: 9/21/2022 to 11/20/2022.     Outpatient Physical Therapy 2 times weekly for 8 weeks to include the following interventions: Gait Training, Moist Heat/ Ice, Neuromuscular Re-ed, Patient Education, Therapeutic Activities, and Therapeutic Exercise.        Brittni Butler, PT

## 2022-11-09 ENCOUNTER — CLINICAL SUPPORT (OUTPATIENT)
Dept: REHABILITATION | Facility: HOSPITAL | Age: 62
End: 2022-11-09
Payer: MEDICAID

## 2022-11-09 DIAGNOSIS — G89.29 CHRONIC PAIN OF RIGHT KNEE: ICD-10-CM

## 2022-11-09 DIAGNOSIS — R68.89 DECREASED STRENGTH, ENDURANCE, AND MOBILITY: ICD-10-CM

## 2022-11-09 DIAGNOSIS — Z74.09 DECREASED STRENGTH, ENDURANCE, AND MOBILITY: ICD-10-CM

## 2022-11-09 DIAGNOSIS — R53.1 WEAKNESS: ICD-10-CM

## 2022-11-09 DIAGNOSIS — R26.9 ABNORMAL GAIT: ICD-10-CM

## 2022-11-09 DIAGNOSIS — M25.561 CHRONIC PAIN OF RIGHT KNEE: ICD-10-CM

## 2022-11-09 DIAGNOSIS — M62.81 MUSCLE WEAKNESS: ICD-10-CM

## 2022-11-09 DIAGNOSIS — R29.898 WEAKNESS OF BOTH HANDS: Primary | ICD-10-CM

## 2022-11-09 DIAGNOSIS — R53.1 DECREASED STRENGTH, ENDURANCE, AND MOBILITY: ICD-10-CM

## 2022-11-09 PROCEDURE — 97110 THERAPEUTIC EXERCISES: CPT

## 2022-11-14 ENCOUNTER — TELEPHONE (OUTPATIENT)
Dept: GASTROENTEROLOGY | Facility: CLINIC | Age: 62
End: 2022-11-14
Payer: MEDICAID

## 2022-11-14 NOTE — TELEPHONE ENCOUNTER
Contacted patient to schedule a screening colonoscopy. Patient has to come in for a visit first. Appointment scheduled.

## 2022-11-15 ENCOUNTER — CLINICAL SUPPORT (OUTPATIENT)
Dept: REHABILITATION | Facility: HOSPITAL | Age: 62
End: 2022-11-15
Payer: MEDICAID

## 2022-11-15 DIAGNOSIS — M25.561 CHRONIC PAIN OF RIGHT KNEE: Primary | ICD-10-CM

## 2022-11-15 DIAGNOSIS — G89.29 CHRONIC PAIN OF RIGHT KNEE: Primary | ICD-10-CM

## 2022-11-15 PROCEDURE — 97110 THERAPEUTIC EXERCISES: CPT | Mod: PN,CQ

## 2022-11-15 NOTE — PROGRESS NOTES
[facility]   Physical Therapy Treatment Note    Name: Valentín Field  Clinic Number: [id number]    Therapy Diagnosis:        Encounter Diagnoses   Name Primary?    Cognitive communication disorder Yes    Chronic pain of right knee      Muscle weakness        Physician: Octavio Forrester MD    Visit Date: 11/15/2022    Physician Orders: PT Eval and Treat   Medical Diagnosis from Referral:   Chronic pain of right knee     Evaluation Date: 9/21/2022  Authorization Period Expiration: 12/31/2022  Plan of Care Expiration: 11/20/2022  Progress Note Due: visit 14 or by 11/20/2022  Visit # / Visits authorized: 7/12       FOTO  Number Date Score    1 9/21/2022 51%   2  11/4/2022 51%    3       4             Precautions: Fall risk    PTA Visit #: 0/5    Time In:1345 pm  Time Out:1425 pm  Total Billable Time: 40 minutes    SUBJECTIVE     Pt reports: notes back is tight like a board today.  Not sure if it's the weather or what, but super tight.    He was compliant with home exercise program.  Response to previous treatment: no issues  Functional change: na at this time    Pain: 8-9/10  Location:  right knee    OBJECTIVE     Objective Measures updated at progress report unless specified.     Treatment     Valentín received the treatments listed below:      therapeutic exercises to develop strength, flexibility, and core stabilization  for 40 minutes including:    +SKTC x10  +Pelvic Tilts x20  +Bridges w/ Segmental Mobility x10  +Brides w/ Glute deactivation x5 w/ 4 marches  +Bridges w/ Marches (x4) x5    Supine Posterior pelvic tilt to reduce anterior pelvic rotation and R knee hyperextension on R 2x 10  Standing L hip flexion with R LE holding PPT braced at wall 3x 5 L foot       PT To Add tasks to increase hamstring and glute firing and finding hip neutral as the anterior tipping forward increases foot drag and reduces knee hyperextension.  Add bridges with posterior pelvic tilt to increase glute firing  and try to see if he can advance to single version      Deferred  NuStep 8' total  w/ 5 min legs only  SLR; 2x8 (cue PPT for core stabilization)  prone knee flexion 2x8 with eccentric lowering  Prone hip extension  Quadruped Rocking; 2x10  Supine 90/90 Nerve glide; x15  Step Taps to 2nd stair; BUE A 2x12    manual therapy techniques: for --- minutes, including:--    neuromuscular re-education activities to improve: Balance, Coordination, Kinesthetic Sense, and Proprioception for 00 minutes. The following activities were included:    Gait in the //bars w/theraband assist for right ankle DF/right knee flexion--tactile stabilization at pelvis and intermittently right knee      Patient Education and Home Exercises     Home Exercises Provided and Patient Education Provided     Education provided:   - HEP review  - Condition  - avoid exacerbating pain with home workouts    Written Home Exercises Provided: Patient instructed to cont prior HEP. Exercises were reviewed and Valentín was able to demonstrate them prior to the end of the session.  Valentín demonstrated good  understanding of the education provided. See EMR under Patient Instructions for exercises provided during therapy sessions    ASSESSMENT     Patient presents without knee brace today.  Continued to develop Glute activation with a posterior pelvic tilt.  Pt then able to alternate glute activation but required ModVC for stability.  Eventually able to progress to marching requiring ModVC for stability 2* weakness.  Developed posterior pelvic tilts against the wall but great stability in R Knee without hyperextension or buckling.  Pt felt good following with reduced low back pain and will continue to progress stability with glute and hamstring and rectus and obliques for carryover to gait and community ambulation.       Valentín Is progressing well towards his goals.   Pt prognosis is Good.     Pt will continue to benefit from skilled outpatient physical therapy to  address the deficits listed in the problem list box on initial evaluation, provide pt/family education and to maximize pt's level of independence in the home and community environment.     Pt's spiritual, cultural and educational needs considered and pt agreeable to plan of care and goals.     Anticipated barriers to physical therapy: transportation    Goals:   Short Term Goals: In 4 weeks   1. I with HEP  2. Pt to improve without knee cage without hyperextension using rolling walker  (Goal met 10/21/2022)  3. Pt to have pain less than 5/10 at all times. progressing  4. Pt will improve FOTO disability score to 48% disability or less in order to improve overall QOL and return to PLOF.  not progressing on FOTO yet but verbal reports suggesting progress and pt is more upright walking     Long Term Goals: In 8 weeks  1.  Pt will improve FOTO disability score to 45% disability or less in order to improve overall QOL and return to PLOF.    2.  Patient to have decreased pain to 3/10 at all times.  3.  Patient to demo gait with rolling walker independently without knee cage for short community distances    PLAN     Plan of care Certification: 9/21/2022 to 11/20/2022.     Outpatient Physical Therapy 2 times weekly for 8 weeks to include the following interventions: Gait Training, Moist Heat/ Ice, Neuromuscular Re-ed, Patient Education, Therapeutic Activities, and Therapeutic Exercise.        Jackson Rivero, PTA

## 2022-11-17 ENCOUNTER — DOCUMENTATION ONLY (OUTPATIENT)
Dept: REHABILITATION | Facility: HOSPITAL | Age: 62
End: 2022-11-17

## 2022-11-17 ENCOUNTER — CLINICAL SUPPORT (OUTPATIENT)
Dept: REHABILITATION | Facility: HOSPITAL | Age: 62
End: 2022-11-17
Payer: MEDICAID

## 2022-11-17 DIAGNOSIS — G89.29 CHRONIC PAIN OF RIGHT KNEE: ICD-10-CM

## 2022-11-17 DIAGNOSIS — M25.561 CHRONIC PAIN OF RIGHT KNEE: ICD-10-CM

## 2022-11-17 DIAGNOSIS — R53.1 DECREASED STRENGTH, ENDURANCE, AND MOBILITY: ICD-10-CM

## 2022-11-17 DIAGNOSIS — R26.9 ABNORMAL GAIT: Primary | ICD-10-CM

## 2022-11-17 DIAGNOSIS — M62.81 MUSCLE WEAKNESS: ICD-10-CM

## 2022-11-17 DIAGNOSIS — Z74.09 DECREASED STRENGTH, ENDURANCE, AND MOBILITY: ICD-10-CM

## 2022-11-17 DIAGNOSIS — R68.89 DECREASED STRENGTH, ENDURANCE, AND MOBILITY: ICD-10-CM

## 2022-11-17 PROCEDURE — 97110 THERAPEUTIC EXERCISES: CPT | Mod: PN,CQ

## 2022-11-17 NOTE — PROGRESS NOTES
PT/PTA met face to face to discuss pt's treatment plan and progress towards established goals. Pt will be seen by a physical therapist minimally every 6th visit or every 30 days.    Jackson Rivero PTA

## 2022-11-17 NOTE — PROGRESS NOTES
[facility]   Physical Therapy Treatment Note    Name: Valentín Field  Clinic Number: [id number]    Therapy Diagnosis:        Encounter Diagnoses   Name Primary?    Cognitive communication disorder Yes    Chronic pain of right knee      Muscle weakness        Physician: Octavio Forrester MD    Visit Date: 11/17/2022    Physician Orders: PT Eval and Treat   Medical Diagnosis from Referral:   Chronic pain of right knee     Evaluation Date: 9/21/2022  Authorization Period Expiration: 12/31/2022  Plan of Care Expiration: 11/20/2022  Progress Note Due: visit 14 or by 11/20/2022  Visit # / Visits authorized: 9/12       FOTO  Number Date Score    1 9/21/2022 51%   2  11/4/2022 51%    3       4             Precautions: Fall risk    PTA Visit #: 2/5    Time In:1345 pm  Time Out:1425 pm  Total Billable Time: 40 minutes    SUBJECTIVE     Pt reports: back is hurting like normal and the shoulders are tight today.  Been practicing the sweet spot with walking.    He was compliant with home exercise program.  Response to previous treatment: no issues  Functional change: na at this time    Pain: 8-9/10  Location:  right knee    OBJECTIVE     Objective Measures updated at progress report unless specified.     Treatment     Valentín received the treatments listed below:      therapeutic exercises to develop strength, flexibility, and core stabilization  for 40 minutes including:    +SKTC x10  +Pelvic Tilts x20  +Bridges w/ Segmental Mobility x5  +Brides w/ Glute deactivation x5 w/ 4 marches  +Bridges w/ Marches (x4) x5  Standing L hip flexion with R LE holding PPT braced at wall 3x 5 L foot  +Standing Lateral Weight Shifts on Mobo Board x30  +Standing A/P Weight Shifts in // Bars (focus on R Midstance)  +Standing Step Overs in //Bars (focus on R Midstance)    Supine Posterior pelvic tilt to reduce anterior pelvic rotation and R knee hyperextension on R 2x 10  Standing L hip flexion with R LE holding PPT braced at  wall 3x 5 L foot       PT To Add tasks to increase hamstring and glute firing and finding hip neutral as the anterior tipping forward increases foot drag and reduces knee hyperextension.  Add bridges with posterior pelvic tilt to increase glute firing and try to see if he can advance to single version      Deferred  NuStep 8' total  w/ 5 min legs only  SLR; 2x8 (cue PPT for core stabilization)  prone knee flexion 2x8 with eccentric lowering  Prone hip extension  Quadruped Rocking; 2x10  Supine 90/90 Nerve glide; x15  Step Taps to 2nd stair; BUE A 2x12    manual therapy techniques: for --- minutes, including:--    neuromuscular re-education activities to improve: Balance, Coordination, Kinesthetic Sense, and Proprioception for 00 minutes. The following activities were included:    Gait in the //bars w/theraband assist for right ankle DF/right knee flexion--tactile stabilization at pelvis and intermittently right knee      Patient Education and Home Exercises     Home Exercises Provided and Patient Education Provided     Education provided:   - HEP review  - Condition  - avoid exacerbating pain with home workouts    Written Home Exercises Provided: Patient instructed to cont prior HEP. Exercises were reviewed and Valentín was able to demonstrate them prior to the end of the session.  Valentín demonstrated good  understanding of the education provided. See EMR under Patient Instructions for exercises provided during therapy sessions    ASSESSMENT     Patient presents without knee brace again today.  Continued to develop Glute activation with a posterior pelvic tilt.  Pt then able to alternate glute activation but required MinVC for stability.  Eventually able to progress to marching requiring ModVC for stability 2* weakness.  Developed posterior pelvic tilts against the wall but great stability in R Knee without hyperextension or buckling.  Progressed to lateral weight shifting on FullContact Board and able to achieve strong  midstance without buckling.  Then proceeded to achieve midstance with Semi-Tandem stance. Pt felt good following with reduced low back pain and will continue to progress stability with glute and hamstring and rectus and obliques for carryover to gait and community ambulation.       Valentín Is progressing well towards his goals.   Pt prognosis is Good.     Pt will continue to benefit from skilled outpatient physical therapy to address the deficits listed in the problem list box on initial evaluation, provide pt/family education and to maximize pt's level of independence in the home and community environment.     Pt's spiritual, cultural and educational needs considered and pt agreeable to plan of care and goals.     Anticipated barriers to physical therapy: transportation    Goals:   Short Term Goals: In 4 weeks   1. I with HEP  2. Pt to improve without knee cage without hyperextension using rolling walker  (Goal met 10/21/2022)  3. Pt to have pain less than 5/10 at all times. progressing  4. Pt will improve FOTO disability score to 48% disability or less in order to improve overall QOL and return to PLOF.  not progressing on FOTO yet but verbal reports suggesting progress and pt is more upright walking     Long Term Goals: In 8 weeks  1.  Pt will improve FOTO disability score to 45% disability or less in order to improve overall QOL and return to PLOF.    2.  Patient to have decreased pain to 3/10 at all times.  3.  Patient to demo gait with rolling walker independently without knee cage for short community distances    PLAN     Plan of care Certification: 9/21/2022 to 11/20/2022.     Outpatient Physical Therapy 2 times weekly for 8 weeks to include the following interventions: Gait Training, Moist Heat/ Ice, Neuromuscular Re-ed, Patient Education, Therapeutic Activities, and Therapeutic Exercise.        Jackson Rivero, PTA

## 2022-11-22 ENCOUNTER — CLINICAL SUPPORT (OUTPATIENT)
Dept: REHABILITATION | Facility: HOSPITAL | Age: 62
End: 2022-11-22
Payer: MEDICAID

## 2022-11-22 PROCEDURE — 97110 THERAPEUTIC EXERCISES: CPT | Mod: PN

## 2022-11-22 NOTE — PROGRESS NOTES
[facility]   Physical Therapy Treatment Note    Name: Valentín Field  Clinic Number: [id number]    Therapy Diagnosis:        Encounter Diagnoses   Name Primary?    Cognitive communication disorder Yes    Chronic pain of right knee      Muscle weakness      Physician: Octavio Forrester MD    Visit Date: 11/22/2022    Physician Orders: PT Eval and Treat   Medical Diagnosis from Referral: Chronic pain of right knee     Evaluation Date: 9/21/2022  Authorization Period Expiration: 12/31/2022  Plan of Care Expiration: 12/31/2022  Progress Note Due: visit 14 or by 12/20/2022  Visit # / Visits authorized: 10/12       FOTO  Number Date Score    1 9/21/2022 51%   2  11/4/2022 51%    3       4             Precautions: Fall risk    PTA Visit #: 0/5    Time In: 1315  Time Out:1400   Total Billable Time: 40 minutes    SUBJECTIVE     Pt reports: back pain (chronic)    He was compliant with home exercise program.  Response to previous treatment: no issues  Functional change: na at this time    Pain: 8/10  Location:  right knee    OBJECTIVE     Objective Measures updated at progress report unless specified.     Treatment     Valentín received the treatments listed below:      therapeutic exercises to develop strength, flexibility, and core stabilization for 40 minutes including:    SKTC x10  Pelvic Tilts x20  Shuttle 5 bands 2 minutes  Supine Posterior pelvic tilt to reduce anterior pelvic rotation and R knee hyperextension on R 2x10  Step up/down on stairs 2x10 (each) (reciprocal)  Assisted trunk rotation 2x10  Quadriped heel rocks 2x10  Prone knee flexion 2x10    manual therapy techniques: for --- minutes, including:--    neuromuscular re-education activities to improve: Balance, Coordination, Kinesthetic Sense, and Proprioception for 00 minutes. The following activities were included:    Gait in the //bars w/theraband assist for right ankle DF/right knee flexion--tactile stabilization at pelvis and  intermittently right knee    Patient Education and Home Exercises     Home Exercises Provided and Patient Education Provided     Education provided:   - HEP review  - Condition  - avoid exacerbating pain with home workouts    Written Home Exercises Provided: Patient instructed to cont prior HEP. Exercises were reviewed and Valentín was able to demonstrate them prior to the end of the session.  Valentín demonstrated good  understanding of the education provided. See EMR under Patient Instructions for exercises provided during therapy sessions    ASSESSMENT     Patient presents without knee brace again today.  Potential discharge was discussed with the patient.  He received lower extremity strengthening in prior therapy and has been concentrating on the knee.      Valentín Is progressing well towards his goals.   Pt prognosis is Good.     Pt will continue to benefit from skilled outpatient physical therapy to address the deficits listed in the problem list box on initial evaluation, provide pt/family education and to maximize pt's level of independence in the home and community environment.     Pt's spiritual, cultural and educational needs considered and pt agreeable to plan of care and goals.     Anticipated barriers to physical therapy: transportation    Goals:   Short Term Goals: In 4 weeks   1. I with HEP  2. Pt to improve without knee cage without hyperextension using rolling walker  (Goal met 10/21/2022)  3. Pt to have pain less than 5/10 at all times. progressing  4. Pt will improve FOTO disability score to 48% disability or less in order to improve overall QOL and return to PLOF.  not progressing on FOTO yet but verbal reports suggesting progress and pt is more upright walking     Long Term Goals: In 8 weeks  1.  Pt will improve FOTO disability score to 45% disability or less in order to improve overall QOL and return to PLOF.    2.  Patient to have decreased pain to 3/10 at all times.  3.  Patient to demo gait with  rolling walker independently without knee cage for short community distances   (Goal met 10/22/2022)    PLAN     Plan of care Certification: 11/22/2022 to 12/20/2022     Outpatient Physical Therapy 2 times weekly for 8 weeks to include the following interventions: Gait Training, Moist Heat/ Ice, Neuromuscular Re-ed, Patient Education, Therapeutic Activities, and Therapeutic Exercise.        Lionel Maravilla, PT

## 2022-11-25 ENCOUNTER — CLINICAL SUPPORT (OUTPATIENT)
Dept: REHABILITATION | Facility: HOSPITAL | Age: 62
End: 2022-11-25
Payer: MEDICAID

## 2022-11-25 DIAGNOSIS — M62.81 MUSCLE WEAKNESS: ICD-10-CM

## 2022-11-25 DIAGNOSIS — G89.29 CHRONIC PAIN OF RIGHT KNEE: ICD-10-CM

## 2022-11-25 DIAGNOSIS — Z74.09 DECREASED STRENGTH, ENDURANCE, AND MOBILITY: ICD-10-CM

## 2022-11-25 DIAGNOSIS — R53.1 DECREASED STRENGTH, ENDURANCE, AND MOBILITY: ICD-10-CM

## 2022-11-25 DIAGNOSIS — R26.9 ABNORMAL GAIT: Primary | ICD-10-CM

## 2022-11-25 DIAGNOSIS — M25.561 CHRONIC PAIN OF RIGHT KNEE: ICD-10-CM

## 2022-11-25 DIAGNOSIS — R68.89 DECREASED STRENGTH, ENDURANCE, AND MOBILITY: ICD-10-CM

## 2022-11-25 PROCEDURE — 97110 THERAPEUTIC EXERCISES: CPT | Mod: PN,CQ

## 2022-11-25 PROCEDURE — 97116 GAIT TRAINING THERAPY: CPT | Mod: PN,CQ

## 2022-11-25 NOTE — PROGRESS NOTES
"[facility]   Physical Therapy Assistant Treatment Note    Name: Valentín Field  Clinic Number: [id number]    Therapy Diagnosis:        Encounter Diagnoses   Name Primary?    Cognitive communication disorder Yes    Chronic pain of right knee      Muscle weakness      Physician: Octavio Forrester MD    Visit Date: 11/25/2022    Physician Orders: PT Eval and Treat   Medical Diagnosis from Referral: Chronic pain of right knee     Evaluation Date: 9/21/2022  Authorization Period Expiration: 12/31/2022  Plan of Care Expiration: 12/31/2022  Progress Note Due: visit 14 or by 12/20/2022  Visit # / Visits authorized: 11/12       FOTO  Number Date Score    1 9/21/2022 51%   2  11/4/2022 51%    3       4             Precautions: Fall risk    PTA Visit #: 1/5    Time In: 1459  Time Out:1540   Total Billable Time: 41 minutes    SUBJECTIVE     Pt reports: he has increased low back pain. It always feels "stiff as a board". His knees have been buckling so he uses a knee cage but it hurts him in lower leg (anterior tib). He gets relief with walking when he is more aware of his posture.    He was compliant with home exercise program.  Response to previous treatment: no issues   Functional change: na at this time    Pain: 8/10   Location: right knee    OBJECTIVE     Objective Measures updated at progress report unless specified.     Treatment     Valentín received the treatments listed below:      therapeutic exercises to develop strength, flexibility, and core stabilization for 33 minutes including:    Nustep x 6 minutes for joint nutrition and mobility  Supine Posterior pelvic tilt to reduce anterior pelvic rotation and R knee hyperextension on R 2x10-- deferred   Quadriped heel rocks 2x10  Prone knee flexion (assisted for full knee flexion) 2x10  SKTC (end range needed assistance) x10  Pelvic Tilts x20  Shuttle 6 bands 2 minutes  Step up/down on stairs 2x10 (each) (reciprocal)  Assisted trunk rotation " 2x10    manual therapy techniques: for --- minutes, including:--    neuromuscular re-education activities to improve: Balance, Coordination, Kinesthetic Sense, and Proprioception for 08 minutes. The following activities were included:    Gait in the //bars w/ focus on right ankle DF/right knee flexion--tactile stabilization at pelvis     Patient Education and Home Exercises     Home Exercises Provided and Patient Education Provided     Education provided:   - HEP review  - Condition  - avoid exacerbating pain with home workouts    Written Home Exercises Provided: Patient instructed to cont prior HEP. Exercises were reviewed and Valentín was able to demonstrate them prior to the end of the session.  Valentín demonstrated good  understanding of the education provided. See EMR under Patient Instructions for exercises provided during therapy sessions    ASSESSMENT     Patient presents without knee brace ambulating with rolling walker demonstrating decreased foot and ankle clearance. Patient began treatment with focus on improving gait pattern and decreasing hyperextension of right knee during gait. He reports increased pain in low back and right knee so strengthening and stretching performed with good tolerance. Patient requested active assistance to improve range of motion at end range with stretches. Handout provided to order stretching strap because patient was pleased with it.    Valentín Is progressing well towards his goals.   Pt prognosis is Good.     Pt will continue to benefit from skilled outpatient physical therapy to address the deficits listed in the problem list box on initial evaluation, provide pt/family education and to maximize pt's level of independence in the home and community environment.     Pt's spiritual, cultural and educational needs considered and pt agreeable to plan of care and goals.     Anticipated barriers to physical therapy: transportation    Goals:   Short Term Goals: In 4 weeks   1. I with  HEP  2. Pt to improve without knee cage without hyperextension using rolling walker  (Goal met 10/21/2022)  3. Pt to have pain less than 5/10 at all times. progressing  4. Pt will improve FOTO disability score to 48% disability or less in order to improve overall QOL and return to PLOF.  not progressing on FOTO yet but verbal reports suggesting progress and pt is more upright walking     Long Term Goals: In 8 weeks  1.  Pt will improve FOTO disability score to 45% disability or less in order to improve overall QOL and return to PLOF.    2.  Patient to have decreased pain to 3/10 at all times.  3.  Patient to demo gait with rolling walker independently without knee cage for short community distances   (Goal met 10/22/2022)    PLAN     Plan of care Certification: 11/22/2022 to 12/20/2022     Outpatient Physical Therapy 2 times weekly for 8 weeks to include the following interventions: Gait Training, Moist Heat/ Ice, Neuromuscular Re-ed, Patient Education, Therapeutic Activities, and Therapeutic Exercise.        Fior Osman, PTA

## 2022-11-25 NOTE — PROGRESS NOTES
[facility]   Physical Therapist Assistant Treatment Note    Name: Valentín Field  Clinic Number: [id number]    Therapy Diagnosis:        Encounter Diagnoses   Name Primary?    Cognitive communication disorder Yes    Chronic pain of right knee      Muscle weakness      Physician: Octavio Forrester MD    Visit Date: 11/25/2022    Physician Orders: PT Eval and Treat   Medical Diagnosis from Referral: Chronic pain of right knee     Evaluation Date: 9/21/2022  Authorization Period Expiration: 12/31/2022  Plan of Care Expiration: 12/31/2022  Progress Note Due: visit 14 or by 12/20/2022  Visit # / Visits authorized: 11/12       FOTO  Number Date Score    1 9/21/2022 51%   2  11/4/2022 51%    3       4             Precautions: Fall risk    PTA Visit #: 1/5    Time In:   Time Out:  Total Billable Time: 40 minutes    SUBJECTIVE     Pt reports:    He was compliant with home exercise program.  Response to previous treatment: no issues  Functional change: na at this time    Pain: 8/10  Location:  right knee    OBJECTIVE     Objective Measures updated at progress report unless specified.     Treatment     Valentín received the treatments listed below:      therapeutic exercises to develop strength, flexibility, and core stabilization for 40 minutes including:    SKTC x10  Pelvic Tilts x20  Shuttle 5 bands 2 minutes  Supine Posterior pelvic tilt to reduce anterior pelvic rotation and R knee hyperextension on R 2x10  Step up/down on stairs 2x10 (each) (reciprocal)  Assisted trunk rotation 2x10  Quadriped heel rocks 2x10  Prone knee flexion 2x10    manual therapy techniques: for --- minutes, including:--    neuromuscular re-education activities to improve: Balance, Coordination, Kinesthetic Sense, and Proprioception for 00 minutes. The following activities were included:    Gait in the //bars w/theraband assist for right ankle DF/right knee flexion--tactile stabilization at pelvis and intermittently right  knee    Patient Education and Home Exercises     Home Exercises Provided and Patient Education Provided     Education provided:   - HEP review  - Condition  - avoid exacerbating pain with home workouts    Written Home Exercises Provided: Patient instructed to cont prior HEP. Exercises were reviewed and Valentín was able to demonstrate them prior to the end of the session.  Valentín demonstrated good  understanding of the education provided. See EMR under Patient Instructions for exercises provided during therapy sessions    ASSESSMENT     Patient presents without knee brace again today.  Potential discharge was discussed with the patient.  He received lower extremity strengthening in prior therapy and has been concentrating on the knee.      Valentín Is progressing well towards his goals.   Pt prognosis is Good.     Pt will continue to benefit from skilled outpatient physical therapy to address the deficits listed in the problem list box on initial evaluation, provide pt/family education and to maximize pt's level of independence in the home and community environment.     Pt's spiritual, cultural and educational needs considered and pt agreeable to plan of care and goals.     Anticipated barriers to physical therapy: transportation    Goals:   Short Term Goals: In 4 weeks   1. I with HEP  2. Pt to improve without knee cage without hyperextension using rolling walker  (Goal met 10/21/2022)  3. Pt to have pain less than 5/10 at all times. progressing  4. Pt will improve FOTO disability score to 48% disability or less in order to improve overall QOL and return to PLOF.  not progressing on FOTO yet but verbal reports suggesting progress and pt is more upright walking     Long Term Goals: In 8 weeks  1.  Pt will improve FOTO disability score to 45% disability or less in order to improve overall QOL and return to PLOF.    2.  Patient to have decreased pain to 3/10 at all times.  3.  Patient to demo gait with rolling walker  independently without knee cage for short community distances   (Goal met 10/22/2022)    PLAN     Plan of care Certification: 11/22/2022 to 12/20/2022     Outpatient Physical Therapy 2 times weekly for 8 weeks to include the following interventions: Gait Training, Moist Heat/ Ice, Neuromuscular Re-ed, Patient Education, Therapeutic Activities, and Therapeutic Exercise.        Lyla Nunez, PTA

## 2022-11-29 ENCOUNTER — TELEPHONE (OUTPATIENT)
Dept: ORTHOPEDICS | Facility: CLINIC | Age: 62
End: 2022-11-29
Payer: MEDICAID

## 2022-11-29 ENCOUNTER — CLINICAL SUPPORT (OUTPATIENT)
Dept: REHABILITATION | Facility: HOSPITAL | Age: 62
End: 2022-11-29
Payer: MEDICAID

## 2022-11-29 DIAGNOSIS — R26.9 ABNORMAL GAIT: Primary | ICD-10-CM

## 2022-11-29 PROCEDURE — 97110 THERAPEUTIC EXERCISES: CPT | Mod: PN,CQ

## 2022-11-29 NOTE — TELEPHONE ENCOUNTER
----- Message from Sandi Pollard MA sent at 11/29/2022 12:09 PM CST -----  Type:  Sooner Apoointment Request    Caller is requesting a sooner appointment. yes   Name of Caller: pt   When is the first available appointment? N/a  Symptoms: neck pain  Would the patient rather a call back or a response via [facility]? Call back  Best Call Back Number: [phone]  Additional Information:  please contact pt in regards to scheduling an appt for ongoing neck pain

## 2022-11-29 NOTE — PROGRESS NOTES
"[facility]   Physical Therapy Treatment Note    Name: Valentín Field  Clinic Number: [id number]    Therapy Diagnosis:        Encounter Diagnoses   Name Primary?    Cognitive communication disorder Yes    Chronic pain of right knee      Muscle weakness      Physician: Octavio Forrester MD    Visit Date: 11/29/2022    Physician Orders: PT Eval and Treat   Medical Diagnosis from Referral: Chronic pain of right knee     Evaluation Date: 9/21/2022  Authorization Period Expiration: 12/31/2022  Plan of Care Expiration: 12/31/2022  Progress Note Due: visit 14 or by 12/20/2022  Visit # / Visits authorized: 12/12       FOTO  Number Date Score    1 9/21/2022 51%   2  11/4/2022 51%    3       4             Precautions: Fall risk    PTA Visit #: 1/5    Time In: 2:50pm  Time Out:3:30pm  Total Billable Time: 40 minutes    SUBJECTIVE     Pt reports: doing pretty well today with minimal complaints in the right knee.    He was compliant with home exercise program.  Response to previous treatment: no issues   Functional change: na at this time    Pain: 5/10   Location: right knee    OBJECTIVE     Objective Measures updated at progress report unless specified.     Treatment     Valentín received the treatments listed below:      therapeutic exercises to develop strength, flexibility, and core stabilization for 40 minutes including:    Nustep x 6 minutes for joint nutrition and mobility at level 4.0 resistance  Quadriped heel rocks 2x10  Prone quad stretch; 5x20"  DKTC with swiss ball; x20  Hamstring Isometric against swiss ball; 10x10"  Pelvic Tilts x20  Shuttle 6 bands 2 minutes  Step up/down on stairs 2x10 (each) (reciprocal)  Standing Marches in parallel bars; minimal UE A; 2x10    manual therapy techniques: for --- minutes, including:--    neuromuscular re-education activities to improve: Balance, Coordination, Kinesthetic Sense, and Proprioception for 00 minutes. The following activities were " included:    Gait in the //bars w/ focus on right ankle DF/right knee flexion--tactile stabilization at pelvis     Patient Education and Home Exercises     Home Exercises Provided and Patient Education Provided     Education provided:     Written Home Exercises Provided: Patient instructed to cont prior HEP. Exercises were reviewed and Valentín was able to demonstrate them prior to the end of the session.  Valentín demonstrated good  understanding of the education provided. See EMR under Patient Instructions for exercises provided during therapy sessions    ASSESSMENT     Patient was able to complete all prescribed activities without exacerbations into the knee. Patient reported feeling a good workout throughout treatment without exacerbations reported in the knee. Patient reports feeling he is improving in lower extremity strength, endurance, and stability with decreased pain. Patient reports using his brace left often but continues to use RW regularly.     Valentín Is progressing well towards his goals.   Pt prognosis is Good.     Pt will continue to benefit from skilled outpatient physical therapy to address the deficits listed in the problem list box on initial evaluation, provide pt/family education and to maximize pt's level of independence in the home and community environment.     Pt's spiritual, cultural and educational needs considered and pt agreeable to plan of care and goals.     Anticipated barriers to physical therapy: transportation    Goals:   Short Term Goals: In 4 weeks   1. I with HEP  2. Pt to improve without knee cage without hyperextension using rolling walker  (Goal met 10/21/2022)  3. Pt to have pain less than 5/10 at all times. progressing  4. Pt will improve FOTO disability score to 48% disability or less in order to improve overall QOL and return to PLOF.  not progressing on FOTO yet but verbal reports suggesting progress and pt is more upright walking     Long Term Goals: In 8 weeks  1.  Pt will  improve FOTO disability score to 45% disability or less in order to improve overall QOL and return to PLOF.    2.  Patient to have decreased pain to 3/10 at all times.  3.  Patient to demo gait with rolling walker independently without knee cage for short community distances   (Goal met 10/22/2022)    PLAN     Plan of care Certification: 11/22/2022 to 12/20/2022     Outpatient Physical Therapy 2 times weekly for 8 weeks to include the following interventions: Gait Training, Moist Heat/ Ice, Neuromuscular Re-ed, Patient Education, Therapeutic Activities, and Therapeutic Exercise.        Bereket Aldana, PTA

## 2022-12-01 ENCOUNTER — CLINICAL SUPPORT (OUTPATIENT)
Dept: REHABILITATION | Facility: HOSPITAL | Age: 62
End: 2022-12-01
Payer: MEDICAID

## 2022-12-01 ENCOUNTER — TELEPHONE (OUTPATIENT)
Dept: ORTHOPEDICS | Facility: CLINIC | Age: 62
End: 2022-12-01
Payer: MEDICAID

## 2022-12-01 DIAGNOSIS — R26.9 ABNORMAL GAIT: Primary | ICD-10-CM

## 2022-12-01 DIAGNOSIS — M62.81 MUSCLE WEAKNESS: ICD-10-CM

## 2022-12-01 DIAGNOSIS — G89.29 CHRONIC PAIN OF RIGHT KNEE: ICD-10-CM

## 2022-12-01 DIAGNOSIS — M25.561 CHRONIC PAIN OF RIGHT KNEE: ICD-10-CM

## 2022-12-01 PROCEDURE — 97110 THERAPEUTIC EXERCISES: CPT | Mod: PN

## 2022-12-01 NOTE — TELEPHONE ENCOUNTER
----- Message from Nannette Pollard sent at 12/1/2022  9:35 AM CST -----  Regarding: APPOINTMENT - MEDICAID  Contact: Self  Pt ask for a call to schedule an appointment for his neck and knee      No solution found between 4/4/2023 and 6/4/2023.    Pt ask for a call      Contact [employer]  [phone] (home)

## 2022-12-04 PROBLEM — G89.29 CHRONIC PAIN OF RIGHT KNEE: Status: ACTIVE | Noted: 2022-12-04

## 2022-12-04 PROBLEM — M25.561 CHRONIC PAIN OF RIGHT KNEE: Status: ACTIVE | Noted: 2022-12-04

## 2022-12-04 PROBLEM — M62.81 MUSCLE WEAKNESS: Status: ACTIVE | Noted: 2022-09-21

## 2022-12-04 NOTE — PROGRESS NOTES
"[facility]   Physical Therapy Treatment and Progress Note    Name: Valentín Field  Clinic Number: [id number]    Therapy Diagnosis:        Encounter Diagnoses   Name Primary?    Cognitive communication disorder Yes    Chronic pain of right knee      Muscle weakness      Physician: Octavio Forrester MD    Visit Date: 12/1/2022    Physician Orders: PT Eval and Treat   Medical Diagnosis from Referral: Chronic pain of right knee     Evaluation Date: 9/21/2022  Authorization Period Expiration: 12/31/2022  Plan of Care Expiration: 12/31/2022  Progress Note Due: visit 24 or by 12/31/2022  Visit # / Visits authorized: 12/12       FOTO  Number Date Score    1 9/21/2022 51%   2  11/4/2022 51%    3       4             Precautions: Fall risk    PTA Visit #: 0/5    Time In: 1230  Time Out:1315  Total Billable Time: 40 minutes    SUBJECTIVE     Pt reports: he is walking better without brace with knee control.  He has complaints of low back pain    He was compliant with home exercise program.  Response to previous treatment: no issues   Functional change: na at this time    Pain: 5/10   Location: right knee    OBJECTIVE     Objective Measures updated at progress report unless specified.     Treatment     Valentín received the treatments listed below:      therapeutic exercises to develop strength, flexibility, and core stabilization for 40 minutes including:    Nustep x 6 minutes for joint nutrition and mobility at level 4.0 resistance  Quadriped heel rocks 2x10  Prone quad stretch; 5x20"  DKTC with swiss ball; x20  Hamstring Isometric against swiss ball; 10x10"  Pelvic Tilts x20  Shuttle 6 bands 2 minutes  Step up/down on stairs 2x10 (each) (reciprocal)  Standing Marches in parallel bars; minimal UE A; 2x10    manual therapy techniques: for --- minutes, including:--    neuromuscular re-education activities to improve: Balance, Coordination, Kinesthetic Sense, and Proprioception for 00 minutes. The following " activities were included:      Patient Education and Home Exercises     Home Exercises Provided and Patient Education Provided     Education provided:     Written Home Exercises Provided: Patient instructed to cont prior HEP. Exercises were reviewed and Valentín was able to demonstrate them prior to the end of the session.  Valentín demonstrated good  understanding of the education provided. See EMR under Patient Instructions for exercises provided during therapy sessions    ASSESSMENT     Patient mobility was limited due to low back pain.  He was able to complete his therapist directed program without increased pain.  He was able to perform shuttle squats without popping the right knee into extension  Valentín Is progressing well towards his goals.   Pt prognosis is Good.     Pt will continue to benefit from skilled outpatient physical therapy to address the deficits listed in the problem list box on initial evaluation, provide pt/family education and to maximize pt's level of independence in the home and community environment.     Pt's spiritual, cultural and educational needs considered and pt agreeable to plan of care and goals.     Anticipated barriers to physical therapy: transportation    Goals:   Short Term Goals: In 4 weeks   1. I with HEP  2. Pt to improve without knee cage without hyperextension using rolling walker  (Goal met 10/21/2022)  3. Pt to have pain less than 5/10 at all times. progressing  4. Pt will improve FOTO disability score to 48% disability or less in order to improve overall QOL and return to PLOF.  not progressing on FOTO yet but verbal reports suggesting progress and pt is more upright walking     Long Term Goals: In 8 weeks  1.  Pt will improve FOTO disability score to 45% disability or less in order to improve overall QOL and return to PLOF.    2.  Patient to have decreased pain to 3/10 at all times.  3.  Patient to demo gait with rolling walker independently without knee cage for short  community distances   (Goal met 10/22/2022)    PLAN     Plan of care Certification: 12/1/2022 to 12/31/2022     Outpatient Physical Therapy 2 times weekly for 8 weeks to include the following interventions: Gait Training, Moist Heat/ Ice, Neuromuscular Re-ed, Patient Education, Therapeutic Activities, and Therapeutic Exercise.        Lionel Maravilla, PT

## 2022-12-05 ENCOUNTER — TELEPHONE (OUTPATIENT)
Dept: SPORTS MEDICINE | Facility: CLINIC | Age: 62
End: 2022-12-05
Payer: MEDICAID

## 2022-12-05 ENCOUNTER — OFFICE VISIT (OUTPATIENT)
Dept: ORTHOPEDICS | Facility: CLINIC | Age: 62
End: 2022-12-05
Payer: MEDICAID

## 2022-12-05 VITALS — HEIGHT: 71 IN | BODY MASS INDEX: 27.38 KG/M2 | WEIGHT: 195.56 LBS

## 2022-12-05 DIAGNOSIS — M25.561 CHRONIC PAIN OF RIGHT KNEE: Primary | ICD-10-CM

## 2022-12-05 DIAGNOSIS — Z98.1 S/P CERVICAL SPINAL FUSION: ICD-10-CM

## 2022-12-05 DIAGNOSIS — G89.29 CHRONIC PAIN OF RIGHT KNEE: Primary | ICD-10-CM

## 2022-12-05 PROCEDURE — 1159F MED LIST DOCD IN RCRD: CPT | Mod: CPTII,,, | Performed by: ORTHOPAEDIC SURGERY

## 2022-12-05 PROCEDURE — 99214 PR OFFICE/OUTPT VISIT, EST, LEVL IV, 30-39 MIN: ICD-10-PCS | Mod: S$PBB,,, | Performed by: ORTHOPAEDIC SURGERY

## 2022-12-05 PROCEDURE — 99999 PR PBB SHADOW E&M-EST. PATIENT-LVL III: CPT | Mod: PBBFAC,,, | Performed by: ORTHOPAEDIC SURGERY

## 2022-12-05 PROCEDURE — 99213 OFFICE O/P EST LOW 20 MIN: CPT | Mod: PBBFAC | Performed by: ORTHOPAEDIC SURGERY

## 2022-12-05 PROCEDURE — 3008F PR BODY MASS INDEX (BMI) DOCUMENTED: ICD-10-PCS | Mod: CPTII,,, | Performed by: ORTHOPAEDIC SURGERY

## 2022-12-05 PROCEDURE — 99999 PR PBB SHADOW E&M-EST. PATIENT-LVL III: ICD-10-PCS | Mod: PBBFAC,,, | Performed by: ORTHOPAEDIC SURGERY

## 2022-12-05 PROCEDURE — 99214 OFFICE O/P EST MOD 30 MIN: CPT | Mod: S$PBB,,, | Performed by: ORTHOPAEDIC SURGERY

## 2022-12-05 PROCEDURE — 1160F PR REVIEW ALL MEDS BY PRESCRIBER/CLIN PHARMACIST DOCUMENTED: ICD-10-PCS | Mod: CPTII,,, | Performed by: ORTHOPAEDIC SURGERY

## 2022-12-05 PROCEDURE — 3008F BODY MASS INDEX DOCD: CPT | Mod: CPTII,,, | Performed by: ORTHOPAEDIC SURGERY

## 2022-12-05 PROCEDURE — 1160F RVW MEDS BY RX/DR IN RCRD: CPT | Mod: CPTII,,, | Performed by: ORTHOPAEDIC SURGERY

## 2022-12-05 PROCEDURE — 1159F PR MEDICATION LIST DOCUMENTED IN MEDICAL RECORD: ICD-10-PCS | Mod: CPTII,,, | Performed by: ORTHOPAEDIC SURGERY

## 2022-12-05 NOTE — TELEPHONE ENCOUNTER
LVM for pt with escalation line and [facility] phone number as we do not have new patient access    ----- Message from Ana Maria Wood sent at 12/5/2022  3:15 PM CST -----  Contact: Patient, [phone]  Calling to schedule an appointment for knee pain. Dr Forrester's office told him to call.  Please call him. Thanks.

## 2022-12-05 NOTE — PROGRESS NOTES
"DATE: 12/5/2022  PATIENT: Valentín Field    Attending Physician: Octavio Forrester M.D.    Surgery:   3/18/21 C4 Corpectomy by Dr. Wilson  8/26/21 C3-6 laminectomy and fusion    HISTORY:  Valentín Field is a 62 y.o. male who returns to me today for FU. Patient has been doing well from a neck perspective. He would have occasional pain here and there. He takes meds as needed. He finished PT for his neck. His main complaint if R knee pain; he is wearing a brace. He wants to see someone for his R knee    PMH/PSH/FamHx/SocHx:  Unchanged from prior visit    ROS:  Positive for R knee pain  Denies myelopathic symptoms, perineal paresthesias, bowel or bladder incontinence    EXAM:  Ht 5' 10.51" (1.791 m)   Wt 88.7 kg (195 lb 8.8 oz)   BMI 27.65 kg/m²     My physical examination was notable for the following findings: Good strength in BUE but joints are stiff. Motor intact in BLE except for 4/5 in b/l hip flexion and R KE    IMAGING:  Today I independently reviewed the following images and my interpretations are as follows:    Previous AP and Lat upright C-spine films showed C4 corpectomy with cage in place. Posterior hardware in good placement without signs of failure.     ASSESSMENT/PLAN:  Patient is 15 months out from cervical fusion, doing well. I educated the patient on the importance of core/back strengthening, correct posture, bending/lifting ergonomics, and low-impact aerobic exercises (walking, elliptical, and aquatherapy). I prescribed PT for his R knee; I referred him to Sports for his R knee. RTC in PRN.    Octavio Forrester MD  Orthopaedic Spine Surgeon  Department of Orthopaedic Surgery  [phone]  "

## 2022-12-06 ENCOUNTER — CLINICAL SUPPORT (OUTPATIENT)
Dept: REHABILITATION | Facility: HOSPITAL | Age: 62
End: 2022-12-06
Payer: MEDICAID

## 2022-12-06 DIAGNOSIS — R26.9 ABNORMAL GAIT: ICD-10-CM

## 2022-12-06 DIAGNOSIS — M25.561 CHRONIC PAIN OF RIGHT KNEE: Primary | ICD-10-CM

## 2022-12-06 DIAGNOSIS — G89.29 CHRONIC PAIN OF RIGHT KNEE: Primary | ICD-10-CM

## 2022-12-06 DIAGNOSIS — M62.81 MUSCLE WEAKNESS: ICD-10-CM

## 2022-12-06 PROCEDURE — 97110 THERAPEUTIC EXERCISES: CPT | Mod: PN

## 2022-12-06 NOTE — PROGRESS NOTES
[facility]   Physical Therapy Treatment Note    Name: Valentín Field  Clinic Number: [id number]    Therapy Diagnosis:        Encounter Diagnoses   Name Primary?    Cognitive communication disorder Yes    Chronic pain of right knee      Muscle weakness      Physician: Octavio Forrester MD    Visit Date: 12/6/2022    Physician Orders: PT Eval and Treat   Medical Diagnosis from Referral: Chronic pain of right knee     Evaluation Date: 9/21/2022  Authorization Period Expiration: 12/31/2022  Plan of Care Expiration: 12/31/2022  Progress Note Due: visit 24 or by 12/31/2022  Visit # / Visits authorized: 12/12       FOTO  Number Date Score    1 9/21/2022 51%   2  11/4/2022 51%    3       4             Precautions: Fall risk    PTA Visit #: 0/5    Time In: 1315  Time Out:1400  Total Billable Time: 40 minutes    SUBJECTIVE     Pt reports: right knee pain when he does not wear his brace.     He was compliant with home exercise program.  Response to previous treatment: Increased right knee pain when not wearing knee brace  Functional change: Increased hip and lower extremity strength    Pain: 5/10  Location: right knee    OBJECTIVE     Objective Measures updated at progress report unless specified.     Structural Inspection:      ROM    Right (degrees) Left (degrees)   Hip Flexion 120 120   Hip Extension  10 10   Knee Flexion 130 130   Knee Extension 0 -10                      Strength    Right     Left   Gluteus Long 4/5   4/5   Gluteus Medius 4/5 4/5   Hip Adductors 3+/5 3+/5   Hip Flexor 3/5 3/5   Quadriceps 5/5 5/5   Hamstrings 3+/5 4/5   Anterior Tibialis 4/5 5/5      Gait Analysis: The patient ambulates with the use of rolling walker and presents with the following gait abnormalities: normal base of support, decreased stability, decreased lower extremity control for placement, tendency for right knee hyperextension, and slow gait.     Special Tests      Balance Assessment:       Evaluation  "Reassessment Reassessment   Single Limb Stance R LE 0  (<10 sec = HIGH FALL RISK)       Single Limb Stance L LE 0  (<10 sec = HIGH FALL RISK)             Gait Assessment:  - AD used: Rolling walker      Endurance Assessment:       Evaluation Reassessment Reassessment   Timed Up and Go 30 sec       30 Second Chair Rise    5 reps                 Table: Population Norms for TUG    Age  Average TUG    60 - 69 years  8.1 seconds    70 - 79 years  9.2 seconds    80 - 99 years  11.3 seconds       Table: Normative Data 30 Second Chair Rise (by gender & age)       Treatment     Valentín received the treatments listed below:      therapeutic exercises to develop strength, flexibility, and core stabilization for 40 minutes including:    Nustep x 6 minutes for joint nutrition and mobility at level 4.0 resistance  Shuttle 6 bands 2 minutes  Bridges 2x10  Quad sets 2x10  Heel slides 2x01  DKTC with swiss ball; x20  Quadriped heel rocks 2x10  Prone quad stretch; 5x20"  Pelvic Tilts x20  Step up/down on stairs 2x10 (each) (reciprocal)  Standing Marches in parallel bars; minimal UE A; 2x10  Sit <>stand on 20 inch block with contact support    Patient Education and Home Exercises     Home Exercises Provided and Patient Education Provided     Education provided:     Written Home Exercises Provided: Patient instructed to cont prior HEP. Exercises were reviewed and Valentín was able to demonstrate them prior to the end of the session.  Valentín demonstrated good  understanding of the education provided. See EMR under Patient Instructions for exercises provided during therapy sessions    ASSESSMENT     The patient demonstrates improved strength in the bilateral lower extremities.  He can ambulate without knee brace (Swedish Knee Cage) but has increased pain.  He was instructed to use the brace.  He has good resistance to manual resistance in sitting for the hamstrings but in prone he has difficulty flexing the knees and uses strap for assist.  He " has a fall risk and uses a rolling walker with slow gait.  He leans forward on the walker and receives cues to stand upright to improve posture and decrease lumbar stress.  He transfers sit<>stand with assist using the lowest support on his rolling walker  He is currently being treated for right knee pain with strengthening and range of motion exercises.  The left knee lacks 10 degrees of extension in stance..  He has received gait training and standing exercises to improve stability  Valentín Is progressing well towards his goals.   Pt prognosis is Fair    Pt will continue to benefit from skilled outpatient physical therapy to address the deficits listed in the problem list box on initial evaluation, provide pt/family education and to maximize pt's level of independence in the home and community environment.     Pt's spiritual, cultural and educational needs considered and pt agreeable to plan of care and goals.     Anticipated barriers to physical therapy: transportation    Goals:   Short Term Goals: In 2 weeks   1. I with HEP  (Goal met 12/6/2022)  for current exercises  2. Pt to improve without knee cage without hyperextension using rolling walker  (Goal met 10/21/2022)  3. Pt to have pain less than 5/10 at all times. Progressing but needs to restart use of knee brace.  4. Pt will improve FOTO disability score to 48% disability or less in order to improve overall QOL and return to PLOF.  not progressing on FOTO yet but verbal reports suggesting progress and pt is more upright walking  5. The patient will increase bilateral lower extremity strength 1/2 grade to improve sit<> stand transfers.      Long Term Goals: In 6 weeks  1.  Pt will improve FOTO disability score to 45% disability or less in order to improve overall QOL and return to PLOF.    2.  Patient to have decreased pain to 3/10 at all times.  3.  Patient to demo gait with rolling walker independently without knee cage for short community distances   (Goal  met 10/22/2022)  4.  Patient will improve TUG time to 20 seconds to indicate improved stability and gait speed.    PLAN   Updated Certification Period: 12/01/2022 to 12/31/2022  Recommended Treatment Plan: 2 times per week for 6 weeks: Gait Training, Manual Therapy, Moist Heat/ Ice, Neuromuscular Re-ed, Patient Education, Therapeutic Activities, and Therapeutic Exercise       Loinel Maravilla, PT

## 2022-12-07 ENCOUNTER — OFFICE VISIT (OUTPATIENT)
Dept: GASTROENTEROLOGY | Facility: CLINIC | Age: 62
End: 2022-12-07
Payer: MEDICAID

## 2022-12-07 VITALS
DIASTOLIC BLOOD PRESSURE: 80 MMHG | WEIGHT: 191.69 LBS | SYSTOLIC BLOOD PRESSURE: 132 MMHG | BODY MASS INDEX: 27.11 KG/M2

## 2022-12-07 DIAGNOSIS — I10 ESSENTIAL HYPERTENSION: ICD-10-CM

## 2022-12-07 DIAGNOSIS — R13.12 OROPHARYNGEAL DYSPHAGIA: Primary | ICD-10-CM

## 2022-12-07 DIAGNOSIS — Z12.11 SCREEN FOR COLON CANCER: ICD-10-CM

## 2022-12-07 PROCEDURE — 3075F PR MOST RECENT SYSTOLIC BLOOD PRESS GE 130-139MM HG: ICD-10-PCS | Mod: CPTII,,, | Performed by: INTERNAL MEDICINE

## 2022-12-07 PROCEDURE — 3008F PR BODY MASS INDEX (BMI) DOCUMENTED: ICD-10-PCS | Mod: CPTII,,, | Performed by: INTERNAL MEDICINE

## 2022-12-07 PROCEDURE — 3075F SYST BP GE 130 - 139MM HG: CPT | Mod: CPTII,,, | Performed by: INTERNAL MEDICINE

## 2022-12-07 PROCEDURE — 99213 OFFICE O/P EST LOW 20 MIN: CPT | Mod: S$PBB,,, | Performed by: INTERNAL MEDICINE

## 2022-12-07 PROCEDURE — 3008F BODY MASS INDEX DOCD: CPT | Mod: CPTII,,, | Performed by: INTERNAL MEDICINE

## 2022-12-07 PROCEDURE — 3079F PR MOST RECENT DIASTOLIC BLOOD PRESSURE 80-89 MM HG: ICD-10-PCS | Mod: CPTII,,, | Performed by: INTERNAL MEDICINE

## 2022-12-07 PROCEDURE — 99213 PR OFFICE/OUTPT VISIT, EST, LEVL III, 20-29 MIN: ICD-10-PCS | Mod: S$PBB,,, | Performed by: INTERNAL MEDICINE

## 2022-12-07 PROCEDURE — 1159F MED LIST DOCD IN RCRD: CPT | Mod: CPTII,,, | Performed by: INTERNAL MEDICINE

## 2022-12-07 PROCEDURE — 1159F PR MEDICATION LIST DOCUMENTED IN MEDICAL RECORD: ICD-10-PCS | Mod: CPTII,,, | Performed by: INTERNAL MEDICINE

## 2022-12-07 PROCEDURE — 3079F DIAST BP 80-89 MM HG: CPT | Mod: CPTII,,, | Performed by: INTERNAL MEDICINE

## 2022-12-07 PROCEDURE — 99999 PR PBB SHADOW E&M-EST. PATIENT-LVL III: CPT | Mod: PBBFAC,,, | Performed by: INTERNAL MEDICINE

## 2022-12-07 PROCEDURE — 1160F RVW MEDS BY RX/DR IN RCRD: CPT | Mod: CPTII,,, | Performed by: INTERNAL MEDICINE

## 2022-12-07 PROCEDURE — 99213 OFFICE O/P EST LOW 20 MIN: CPT | Mod: PBBFAC,PO | Performed by: INTERNAL MEDICINE

## 2022-12-07 PROCEDURE — 1160F PR REVIEW ALL MEDS BY PRESCRIBER/CLIN PHARMACIST DOCUMENTED: ICD-10-PCS | Mod: CPTII,,, | Performed by: INTERNAL MEDICINE

## 2022-12-07 PROCEDURE — 99999 PR PBB SHADOW E&M-EST. PATIENT-LVL III: ICD-10-PCS | Mod: PBBFAC,,, | Performed by: INTERNAL MEDICINE

## 2022-12-07 RX ORDER — TIZANIDINE 4 MG/1
TABLET ORAL
COMMUNITY
Start: 2022-11-15 | End: 2022-12-27

## 2022-12-07 RX ORDER — PREGABALIN 75 MG/1
75 CAPSULE ORAL 2 TIMES DAILY
COMMUNITY
Start: 2022-10-20 | End: 2023-03-09

## 2022-12-07 RX ORDER — IBUPROFEN 800 MG/1
TABLET ORAL
COMMUNITY
End: 2023-07-06

## 2022-12-07 NOTE — PLAN OF CARE
Outpatient Therapy Updated Plan of Care     Visit Date: 12/6/2022  Name: Valentín Field  Clinic Number: [id number]    Therapy Diagnosis:   Encounter Diagnoses   Name Primary?    Chronic pain of right knee Yes    Muscle weakness     Abnormal gait      Physician: Octavio Forrester MD    Physician Orders: PT Eval and Treat   Medical Diagnosis from Referral: Chronic pain of right knee     Evaluation Date: 9/21/2022  Authorization Period Expiration: 12/31/2022  Plan of Care Expiration: 12/31/2022  Progress Note Due: visit 24 or by 12/31/2022  Visit # / Visits authorized: 12/12  ; plus 1     FOTO  Number Date Score    1 9/21/2022 51%   2  11/4/2022 51%    3       4           Total Visits Received: 13  Cancelled Visits: 3  No Show Visits: 0    Current Certification Period:  12/1/2022 to 12/31/2022  Precautions:  Fall risk  Visits from Evaluation Date:  13  Functional Level Prior to Evaluation:  Impaired gait with rolling walker and knee brace    Subjective     Update: right knee pain when he does not wear his brace.     Objective     ROM    Right (degrees) Left (degrees)   Hip Flexion 120 120   Hip Extension  10 10   Knee Flexion 130 130   Knee Extension 0 -10                      Strength    Right     Left   Gluteus Long 4/5   4/5   Gluteus Medius 4/5 4/5   Hip Adductors 3+/5 3+/5   Hip Flexor 3/5 3/5   Quadriceps 5/5 5/5   Hamstrings 3+/5 4/5   Anterior Tibialis 4/5 5/5      Gait Analysis: The patient ambulates with the use of rolling walker and presents with the following gait abnormalities: normal base of support, decreased stability, decreased lower extremity control for placement, tendency for right knee hyperextension, and slow gait.     Special Tests      Balance Assessment:       Evaluation Reassessment Reassessment   Single Limb Stance R LE 0  (<10 sec = HIGH FALL RISK)       Single Limb Stance L LE 0  (<10 sec = HIGH FALL RISK)             Gait Assessment:  - AD used: Rolling walker      Endurance Assessment:        Evaluation Reassessment Reassessment   Timed Up and Go 30 sec       30 Second Chair Rise    5 reps                 Table: Population Norms for TUG    Age  Average TUG    60 - 69 years  8.1 seconds    70 - 79 years  9.2 seconds    80 - 99 years  11.3 seconds       Table: Normative Data 30 Second Chair Rise (by gender & age)       Assessment     The patient demonstrates improved strength in the bilateral lower extremities.  He can ambulate without knee brace (Swedish Knee Cage) but has increased pain.  He was instructed to use the brace.  He has good resistance to manual resistance in sitting for the hamstrings but in prone he has difficulty flexing the knees and uses strap for assist.  He has a fall risk and uses a rolling walker with slow gait.  He leans forward on the walker and receives cues to stand upright to improve posture and decrease lumbar stress.  He transfers sit<>stand with assist using the lowest support on his rolling walker  He is currently being treated for right knee pain with strengthening and range of motion exercises.  The left knee lacks 10 degrees of extension in stance..  He has received gait training and standing exercises to improve stability    Previous Short Term Goals Status:     Short Term Goals: In 2 weeks   1. I with HEP  (Goal met 12/6/2022)  for current exercises  2. Pt to improve without knee cage without hyperextension using rolling walker  (Goal met 10/21/2022)  3. Pt to have pain less than 5/10 at all times. Progressing but needs to restart use of knee brace.  4. Pt will improve FOTO disability score to 48% disability or less in order to improve overall QOL and return to PLOF.  not progressing on FOTO yet but verbal reports suggesting progress and pt is more upright walking  5. The patient will increase bilateral lower extremity strength 1/2 grade to improve sit<> stand transfers.      Long Term Goal Status:     Long Term Goals: In 6 weeks  1.  Pt will improve FOTO disability  score to 45% disability or less in order to improve overall QOL and return to PLOF.    2.  Patient to have decreased pain to 3/10 at all times.  3.  Patient to demo gait with rolling walker independently without knee cage for short community distances   (Goal met 10/22/2022)  4.  Patient will improve TUG time to 20 seconds to indicate improved stability and gait speed.  Reasons for Recertification of Therapy:   The patient has chronic right knee pain, impaired gait with rolling walker, and lower extremity weakness    Plan     Updated Certification Period: 12/01/2022 to 12/31/2022  Recommended Treatment Plan: 2 times per week for 6 weeks: Gait Training, Manual Therapy, Moist Heat/ Ice, Neuromuscular Re-ed, Patient Education, Therapeutic Activities, and Therapeutic Exercise      Lionel Maravilla, PT  12/6/2022      I CERTIFY THE NEED FOR THESE SERVICES FURNISHED UNDER THIS PLAN OF TREATMENT AND WHILE UNDER MY CARE    Physician's comments:        Physician's Signature: ___________________________________________________

## 2022-12-08 DIAGNOSIS — Z98.1 S/P CERVICAL SPINAL FUSION: Primary | ICD-10-CM

## 2022-12-12 NOTE — PROGRESS NOTES
Subjective:       Patient ID: Valentín Field is a 62 y.o. male.    Chief Complaint: Colonoscopy    63 yo M seen previously for oropharyngeal dysphagia referred back for screening colonoscopy.  The pt has numerous underlying medical issues and has limited mobility.  He states that he can walk with his walker, but not quickly.  He denies change in bowel habits or bleeding; he has never had colonoscopy.  He states that with ST he is swallowing a little better and does not cough with liquids.    Review of Systems   Constitutional:  Negative for chills and fever.   Respiratory:  Negative for shortness of breath and wheezing.    Cardiovascular:  Negative for chest pain, palpitations and leg swelling.   Musculoskeletal:  Positive for gait problem.   Neurological:  Negative for dizziness and speech difficulty.       Objective:      /80 (BP Location: Right arm, Patient Position: Sitting, BP Method: Medium (Manual))   Wt 87 kg (191 lb 11.2 oz)   BMI 27.11 kg/m²     Physical Exam  Constitutional:       Appearance: Normal appearance. He is well-developed.   HENT:      Head: Normocephalic and atraumatic.   Eyes:      Extraocular Movements: Extraocular movements intact.      Pupils: Pupils are equal, round, and reactive to light.   Pulmonary:      Effort: Pulmonary effort is normal. No respiratory distress.   Abdominal:      General: There is no distension.      Palpations: Abdomen is soft.   Musculoskeletal:         General: No deformity. Normal range of motion.   Skin:     General: Skin is warm and dry.   Neurological:      General: No focal deficit present.      Mental Status: He is alert and oriented to person, place, and time.   Psychiatric:         Mood and Affect: Mood normal.         Behavior: Behavior normal.       Assessment:       Problem List Items Addressed This Visit          Cardiac/Vascular    Essential hypertension    Relevant Orders    CBC Auto Differential (Completed)    Comprehensive Metabolic Panel  (Completed)       GI    Oropharyngeal dysphagia - Primary    Screen for colon cancer         Plan:       Oropharyngeal dysphagia        -     Doing better with ST    Essential hypertension  -     CBC Auto Differential; Future; Expected date: 12/07/2022  -     Comprehensive Metabolic Panel; Future; Expected date: 12/07/2022    Screen for colon cancer        -     Will plan for colonoscopy once renal function known and thus the best prep can be determined        -     Use case request from PCP        -     Use great caution when prepping due to mobility issues, pt aware and voices understanding

## 2022-12-21 ENCOUNTER — DOCUMENTATION ONLY (OUTPATIENT)
Dept: REHABILITATION | Facility: HOSPITAL | Age: 62
End: 2022-12-21
Payer: MEDICAID

## 2022-12-21 PROBLEM — M25.561 CHRONIC PAIN OF RIGHT KNEE: Status: RESOLVED | Noted: 2022-12-04 | Resolved: 2022-12-08

## 2022-12-21 PROBLEM — M62.81 MUSCLE WEAKNESS: Status: RESOLVED | Noted: 2022-09-21 | Resolved: 2022-12-08

## 2022-12-21 PROBLEM — R26.9 ABNORMAL GAIT: Status: RESOLVED | Noted: 2019-12-07 | Resolved: 2022-12-08

## 2022-12-21 PROBLEM — G89.29 CHRONIC PAIN OF RIGHT KNEE: Status: RESOLVED | Noted: 2022-12-04 | Resolved: 2022-12-08

## 2022-12-21 NOTE — PROGRESS NOTES
[facility]  Physical Therapy Discharge Note    Name: Valentín Field  Clinic Number: [id number]    Therapy Diagnosis:        Encounter Diagnoses   Name Primary?    Chronic pain of right knee Yes    Muscle weakness      Abnormal gait        Physician: Octavio Forrester MD    Physician Orders: PT Eval and Treat   Medical Diagnosis from Referral: Chronic pain of right knee  Evaluation Date: 9/21/2022    Date of Last visit: 12/06/2022  Total Visits Received: 13    ASSESSMENT       The patient has been seen by physical therapy for knee pain.  He wanted to improve strength to decrease use of knee brace.  He was able to control right knee hyperextension in gait but reported increase in knee pain.  He was instructed to return to use of right knee brace.  Discharge was discussed with patient who was not receptive to the idea.  He was scheduled on 12/08/2022 for reassessment and cancelled the appointment after acquiring an appointment for evaluation at another facility.      Discharge reason: Patient has completed allowable visits authorized by insurance    Goals:   Short Term Goals: In 2 weeks   1. I with HEP  (Goal met 12/6/2022)  for current exercises  2. Pt to improve without knee cage without hyperextension using rolling walker  (Goal met 10/21/2022)  3. Pt to have pain less than 5/10 at all times. Progressing but needs to restart use of knee brace.  4. Pt will improve FOTO disability score to 48% disability or less in order to improve overall QOL and return to PLOF.  not progressing on FOTO yet but verbal reports suggesting progress and pt is more upright walking  5. The patient will increase bilateral lower extremity strength 1/2 grade to improve sit<> stand transfers.       Long Term Goals: In 6 weeks  1.  Pt will improve FOTO disability score to 45% disability or less in order to improve overall QOL and return to PLOF.    2.  Patient to have decreased pain to 3/10 at all times.  3.  Patient to  demo gait with rolling walker independently without knee cage for short community distances   (Goal met 10/22/2022)  4.  Patient will improve TUG time to 20 seconds to indicate improved stability and gait speed.    PLAN   This patient is discharged from Physical Therapy      Lionel Maravilla PT

## 2022-12-27 DIAGNOSIS — Z98.890 S/P SPINAL SURGERY: ICD-10-CM

## 2023-01-20 ENCOUNTER — OFFICE VISIT (OUTPATIENT)
Dept: NEUROLOGY | Facility: CLINIC | Age: 63
End: 2023-01-20
Payer: MEDICARE

## 2023-01-20 ENCOUNTER — TELEPHONE (OUTPATIENT)
Dept: NEUROLOGY | Facility: CLINIC | Age: 63
End: 2023-01-20
Payer: MEDICARE

## 2023-01-20 VITALS
HEIGHT: 70 IN | HEART RATE: 96 BPM | OXYGEN SATURATION: 98 % | DIASTOLIC BLOOD PRESSURE: 78 MMHG | BODY MASS INDEX: 27.75 KG/M2 | RESPIRATION RATE: 16 BRPM | SYSTOLIC BLOOD PRESSURE: 123 MMHG | WEIGHT: 193.81 LBS

## 2023-01-20 DIAGNOSIS — R41.89 COGNITIVE CHANGE: Primary | ICD-10-CM

## 2023-01-20 DIAGNOSIS — M54.81 CERVICO-OCCIPITAL NEURALGIA OF RIGHT SIDE: ICD-10-CM

## 2023-01-20 DIAGNOSIS — I63.81 LACUNAR INFARCTION: ICD-10-CM

## 2023-01-20 DIAGNOSIS — G95.9 CERVICAL MYELOPATHY: ICD-10-CM

## 2023-01-20 PROCEDURE — 99999 PR PBB SHADOW E&M-EST. PATIENT-LVL IV: CPT | Mod: PBBFAC,,, | Performed by: NURSE PRACTITIONER

## 2023-01-20 PROCEDURE — 99999 PR PBB SHADOW E&M-EST. PATIENT-LVL IV: ICD-10-PCS | Mod: PBBFAC,,, | Performed by: NURSE PRACTITIONER

## 2023-01-20 PROCEDURE — 99214 OFFICE O/P EST MOD 30 MIN: CPT | Mod: S$PBB,,, | Performed by: NURSE PRACTITIONER

## 2023-01-20 PROCEDURE — 99214 OFFICE O/P EST MOD 30 MIN: CPT | Mod: PBBFAC | Performed by: NURSE PRACTITIONER

## 2023-01-20 PROCEDURE — 99214 PR OFFICE/OUTPT VISIT, EST, LEVL IV, 30-39 MIN: ICD-10-PCS | Mod: S$PBB,,, | Performed by: NURSE PRACTITIONER

## 2023-01-20 RX ORDER — AMITRIPTYLINE HYDROCHLORIDE 50 MG/1
50 TABLET, FILM COATED ORAL NIGHTLY
Qty: 30 TABLET | Refills: 11 | Status: SHIPPED | OUTPATIENT
Start: 2023-01-20 | End: 2023-05-24
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
## 2023-01-20 NOTE — TELEPHONE ENCOUNTER
Good morning Home, can you contact patient to get schedule for the testing section with Kirti.     Please and thank you.

## 2023-01-23 ENCOUNTER — OFFICE VISIT (OUTPATIENT)
Dept: NEUROLOGY | Facility: CLINIC | Age: 63
End: 2023-01-23
Payer: MEDICARE

## 2023-01-23 DIAGNOSIS — I63.81 LACUNAR INFARCTION: ICD-10-CM

## 2023-01-23 DIAGNOSIS — M48.02 CERVICAL SPINAL STENOSIS: ICD-10-CM

## 2023-01-23 DIAGNOSIS — Z87.820 HISTORY OF CONCUSSION: ICD-10-CM

## 2023-01-23 DIAGNOSIS — M79.602 PARESTHESIA AND PAIN OF BOTH UPPER EXTREMITIES: ICD-10-CM

## 2023-01-23 DIAGNOSIS — M79.601 PARESTHESIA AND PAIN OF BOTH UPPER EXTREMITIES: ICD-10-CM

## 2023-01-23 DIAGNOSIS — R20.2 PARESTHESIA AND PAIN OF BOTH UPPER EXTREMITIES: ICD-10-CM

## 2023-01-23 DIAGNOSIS — G47.33 OSA (OBSTRUCTIVE SLEEP APNEA): ICD-10-CM

## 2023-01-23 DIAGNOSIS — I10 ESSENTIAL HYPERTENSION: ICD-10-CM

## 2023-01-23 DIAGNOSIS — R41.89 COGNITIVE CHANGE: Primary | ICD-10-CM

## 2023-01-23 PROCEDURE — 96139 PR PSYCH/NEUROPSYCH TEST ADMIN/SCORING, BY TECH, 2+ TESTS, EA ADDTL 30 MIN: ICD-10-PCS | Mod: ,,, | Performed by: STUDENT IN AN ORGANIZED HEALTH CARE EDUCATION/TRAINING PROGRAM

## 2023-01-23 PROCEDURE — 99499 NO LOS: ICD-10-PCS | Mod: S$PBB,,, | Performed by: STUDENT IN AN ORGANIZED HEALTH CARE EDUCATION/TRAINING PROGRAM

## 2023-01-23 PROCEDURE — 96138 PR PSYCH/NEUROPSYCH TEST ADMIN/SCORING, BY TECH, 2+ TESTS, 1ST 30 MIN: ICD-10-PCS | Mod: ,,, | Performed by: STUDENT IN AN ORGANIZED HEALTH CARE EDUCATION/TRAINING PROGRAM

## 2023-01-23 PROCEDURE — 96139 PSYCL/NRPSYC TST TECH EA: CPT | Mod: ,,, | Performed by: STUDENT IN AN ORGANIZED HEALTH CARE EDUCATION/TRAINING PROGRAM

## 2023-01-23 PROCEDURE — 99999 PR PBB SHADOW E&M-EST. PATIENT-LVL I: ICD-10-PCS | Mod: PBBFAC,,, | Performed by: STUDENT IN AN ORGANIZED HEALTH CARE EDUCATION/TRAINING PROGRAM

## 2023-01-23 PROCEDURE — 96133 NRPSYC TST EVAL PHYS/QHP EA: CPT | Mod: ,,, | Performed by: STUDENT IN AN ORGANIZED HEALTH CARE EDUCATION/TRAINING PROGRAM

## 2023-01-23 PROCEDURE — 96133 PR NEUROPSYCHOLOGIC TEST EVAL SVCS, EA ADDTL HR: ICD-10-PCS | Mod: ,,, | Performed by: STUDENT IN AN ORGANIZED HEALTH CARE EDUCATION/TRAINING PROGRAM

## 2023-01-23 PROCEDURE — 96132 PR NEUROPSYCHOLOGIC TEST EVAL SVCS, 1ST HR: ICD-10-PCS | Mod: ,,, | Performed by: STUDENT IN AN ORGANIZED HEALTH CARE EDUCATION/TRAINING PROGRAM

## 2023-01-23 PROCEDURE — 99499 UNLISTED E&M SERVICE: CPT | Mod: S$PBB,,, | Performed by: STUDENT IN AN ORGANIZED HEALTH CARE EDUCATION/TRAINING PROGRAM

## 2023-01-23 PROCEDURE — 96138 PSYCL/NRPSYC TECH 1ST: CPT | Mod: ,,, | Performed by: STUDENT IN AN ORGANIZED HEALTH CARE EDUCATION/TRAINING PROGRAM

## 2023-01-23 PROCEDURE — 99211 OFF/OP EST MAY X REQ PHY/QHP: CPT | Mod: PBBFAC | Performed by: STUDENT IN AN ORGANIZED HEALTH CARE EDUCATION/TRAINING PROGRAM

## 2023-01-23 PROCEDURE — 99999 PR PBB SHADOW E&M-EST. PATIENT-LVL I: CPT | Mod: PBBFAC,,, | Performed by: STUDENT IN AN ORGANIZED HEALTH CARE EDUCATION/TRAINING PROGRAM

## 2023-01-23 PROCEDURE — 96132 NRPSYC TST EVAL PHYS/QHP 1ST: CPT | Mod: ,,, | Performed by: STUDENT IN AN ORGANIZED HEALTH CARE EDUCATION/TRAINING PROGRAM

## 2023-01-27 NOTE — PROGRESS NOTES
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 this test was discontinued at his request.     RESULTS     Tests Administered (manual norms used unless otherwise indicated): [employer] - Test of Premorbid Functioning (TOPF), Dot Counting Test (DCT), TOMM, Wechsler Adult Intelligence Scale 4th Ed. (WAIS-IV) select subtests (Digit Span, Arithmetic, Coding, Vocabulary, Similarities, Matrix Reasoning, and Block Design), Controlled Oral Word Association Test (COWAT; MOAANS norms), Semantic Fluency (Animals, Fruits, Vegetables; MOAANS norms), [place] Naming Test (BNT; MOAANS norms), Baugh Verbal Learning Test, Revised (HVLT-R), Wechsler Memory Scale, 4th Ed. Logical Memory (WMS IV-LM), Brief Visuospatial Memory Test, Revised (BVMT-R), [place] Card Sorting Test (WCST) - 64 card version, Trail Making Test (TMT A/B; MOAANS norms), Grooved Pegboard Test (not interpreted), and Personality Assessment Inventory (NAVI).    Performance Validity: Mr. Field completed both stand-alone and embedded measures of task engagement. Below-cutoff performance on any one stand-alone measure and/ or any two embedded measures of task engagement is highly unlikely to occur outside the context of poor or inconsistent effort during testing.   Emir scored below predetermined cutoff on one embedded measure of task engagement and above predetermined cutoffs on all other administered measures of task engagement. However, given the above behavioral observations, there is concern that his memory test results in particular are an underestimate of his level of functioning, as these scores would appear to suggest dense and amnestic memory that would preclude his ability to report his detailed recent medical history accurately. There is also unexpected intra-domain variability across tests of processing speed, executive functions, and aspects of language. The reasons for these performance validity concerns are unknown, as Mr. Field did not report a source of secondary gain; however, any combination of the following may be considered: Defeatist performance beliefs about memory, a desire to appear impaired, distracting pain symptoms, undisclosed mood symptoms, or other unknown factors. In this context, caution is warranted in interpreting his test results and no conclusions are drawn regarding underlying central nervous system dysfunction. Performances that were within normal limits can, however, be interpreted to reflect at least minimally intact functioning in relevant domains.     Given the above concerns, a data table of scores is not provided. Test scores may be made available to a qualified provider upon request.     Billing Documentation     Time spent conducting face to face interview with the patient: 115 minutes; Billed separately.   Time on review of neuropsychological test data and relevant records, data interpretation, providing feedback about these results, and writing/ documentation: 214 minutes; [id number] &[id number] (x3).  Psychometrist time spent in the administration and scoring of 2 or more neuropsychological tests 307 minutes; 60035 & 22462 (x9).

## 2023-02-23 ENCOUNTER — OFFICE VISIT (OUTPATIENT)
Dept: UROLOGY | Facility: CLINIC | Age: 63
End: 2023-02-23
Payer: MEDICARE

## 2023-02-23 VITALS
DIASTOLIC BLOOD PRESSURE: 74 MMHG | HEART RATE: 93 BPM | WEIGHT: 190.19 LBS | HEIGHT: 70 IN | BODY MASS INDEX: 27.23 KG/M2 | SYSTOLIC BLOOD PRESSURE: 121 MMHG

## 2023-02-23 DIAGNOSIS — R35.0 URINARY FREQUENCY: ICD-10-CM

## 2023-02-23 DIAGNOSIS — R39.15 URINARY URGENCY: ICD-10-CM

## 2023-02-23 DIAGNOSIS — Z09 FOLLOW-UP EXAM AFTER TREATMENT: Primary | ICD-10-CM

## 2023-02-23 DIAGNOSIS — R39.198 DIFFICULTY VOIDING: ICD-10-CM

## 2023-02-23 DIAGNOSIS — R39.11 URINARY HESITANCY: ICD-10-CM

## 2023-02-23 PROCEDURE — 99213 PR OFFICE/OUTPT VISIT, EST, LEVL III, 20-29 MIN: ICD-10-PCS | Mod: S$PBB,,, | Performed by: NURSE PRACTITIONER

## 2023-02-23 PROCEDURE — 99214 OFFICE O/P EST MOD 30 MIN: CPT | Mod: PBBFAC,PO | Performed by: NURSE PRACTITIONER

## 2023-02-23 PROCEDURE — 99213 OFFICE O/P EST LOW 20 MIN: CPT | Mod: S$PBB,,, | Performed by: NURSE PRACTITIONER

## 2023-02-23 PROCEDURE — 99999 PR PBB SHADOW E&M-EST. PATIENT-LVL IV: ICD-10-PCS | Mod: PBBFAC,,, | Performed by: NURSE PRACTITIONER

## 2023-02-23 PROCEDURE — 99999 PR PBB SHADOW E&M-EST. PATIENT-LVL IV: CPT | Mod: PBBFAC,,, | Performed by: NURSE PRACTITIONER

## 2023-02-23 RX ORDER — TAMSULOSIN HYDROCHLORIDE 0.4 MG/1
0.4 CAPSULE ORAL 2 TIMES DAILY
Qty: 60 CAPSULE | Refills: 11 | Status: SHIPPED | OUTPATIENT
Start: 2023-02-23 | End: 2024-03-05

## 2023-02-23 NOTE — PROGRESS NOTES
Subjective:       Patient ID: Valentín Field is a 62 y.o. male.    Chief Complaint: Follow-up    Patient is here today for a 3 month follow-up for lower urinary tract symptoms. Patient reports improvement his symptoms since taking tamsulosin 0.4 mg BID. He only reports urinary frequency in the morning only and states he does not take his tamsulosin consistently but will do better.     Follow-up  This is a chronic problem. The current episode started more than 1 month ago. The problem has been gradually improving. Associated symptoms include arthralgias, urinary symptoms and weakness. Pertinent negatives include no abdominal pain, change in bowel habit, chills, fatigue, fever, headaches, nausea, swollen glands or vomiting. Nothing aggravates the symptoms. Treatments tried: tamsulosin 0.4 mg BID. The treatment provided significant relief.   Review of Systems   Constitutional:  Negative for chills, fatigue and fever.   Gastrointestinal:  Negative for abdominal pain, change in bowel habit, constipation, diarrhea, nausea, vomiting and change in bowel habit.   Genitourinary:  Positive for frequency (in morning only). Negative for decreased urine volume, difficulty urinating, discharge, dysuria, flank pain, hematuria, penile pain, penile swelling, scrotal swelling, testicular pain and urgency.   Musculoskeletal:  Positive for arthralgias and back pain.   Neurological:  Positive for weakness. Negative for dizziness and headaches.   Psychiatric/Behavioral: Negative.         Objective:      Physical Exam  Vitals and nursing note reviewed.   Constitutional:       General: He is not in acute distress.     Appearance: He is well-developed. He is not ill-appearing.   HENT:      Head: Normocephalic and atraumatic.   Eyes:      Pupils: Pupils are equal, round, and reactive to light.   Cardiovascular:      Rate and Rhythm: Normal rate.   Pulmonary:      Effort: Pulmonary effort is normal. No respiratory distress.      Breath sounds:  No stridor.   Abdominal:      Palpations: Abdomen is soft.      Tenderness: There is no abdominal tenderness.   Musculoskeletal:      Cervical back: Normal range of motion.      Comments: Ambulates with walker.   Skin:     General: Skin is warm and dry.   Neurological:      Mental Status: He is alert and oriented to person, place, and time.      Coordination: Coordination normal.   Psychiatric:         Mood and Affect: Mood normal.         Behavior: Behavior normal.         Thought Content: Thought content normal.         Judgment: Judgment normal.       Assessment:       Problem List Items Addressed This Visit    None  Visit Diagnoses       Follow-up exam after treatment    -  Primary    Urinary hesitancy        Relevant Medications    tamsulosin (FLOMAX) 0.4 mg Cap    Difficulty voiding        Relevant Medications    tamsulosin (FLOMAX) 0.4 mg Cap    Urinary frequency        Urinary urgency                Plan:           Valentín was seen today for follow-up.    Diagnoses and all orders for this visit:    Follow-up exam after treatment    Urinary hesitancy  -     tamsulosin (FLOMAX) 0.4 mg Cap; Take 1 capsule (0.4 mg total) by mouth 2 (two) times daily.    Difficulty voiding  -     tamsulosin (FLOMAX) 0.4 mg Cap; Take 1 capsule (0.4 mg total) by mouth 2 (two) times daily.    Urinary frequency    Urinary urgency    Other order  Continue taking tamsulosin (Flomax) 0.4 mg TWICE daily for prostate. Take 1 capsule in the morning and 1 capsule at bedtime.     Follow-up in 6 months for annual urology exam.    Hazel Saunders NP

## 2023-02-23 NOTE — PATIENT INSTRUCTIONS
Continue taking tamsulosin (Flomax) 0.4 mg TWICE daily for prostate. Take 1 capsule in the morning and 1 capsule at bedtime.   Follow-up in 6 months for annual urology exam.

## 2023-02-27 NOTE — PROGRESS NOTES
Audio Only Telehealth Visit     The patient location is: Home  The chief complaint leading to consultation is: F/U for UTI.   Visit type: Virtual visit with audio only (telephone)  Total time spent with patient: Approximately 8 minutes.     The reason for the audio only service rather than synchronous audio and video virtual visit was related to technical difficulties or patient preference/necessity.     Each patient to whom I provide medical services by telemedicine is:  (1) informed of the relationship between the physician and patient and the respective role of any other health care provider with respect to management of the patient; and (2) notified that they may decline to receive medical services by telemedicine and may withdraw from such care at any time. Patient verbally consented to receive this service via voice-only telephone call.       HPI: Patient is present via telephone encounter for a follow-up for UTI. Patient reports he did not repeat urine cx at this time dur to transportation issues.      Assessment and plan:       Diagnoses and all orders for this visit:    Acute cystitis without hematuria  - Repeat urine cx    Follow-up pending urine cx results.     Hazel Saunders NP            This service was not originating from a related E/M service provided within the previous 7 days nor will  to an E/M service or procedure within the next 24 hours or my soonest available appointment.  Prevailing standard of care was able to be met in this audio-only visit.

## 2023-03-28 ENCOUNTER — TELEPHONE (OUTPATIENT)
Dept: NEUROLOGY | Facility: CLINIC | Age: 63
End: 2023-03-28
Payer: MEDICARE

## 2023-03-28 NOTE — TELEPHONE ENCOUNTER
----- Message from Nicolette Reyes sent at 3/28/2023 11:23 AM CDT -----  Patient calling in regards to the Amlodipine he was supposed to be subscribed,Please call back at [phone].Thanks      [facility]/pharmacy #[id number] - [place], [address] AT [address] [address]  Phone: [phone] Fax: [phone]

## 2023-03-29 ENCOUNTER — TELEPHONE (OUTPATIENT)
Dept: NEUROLOGY | Facility: CLINIC | Age: 63
End: 2023-03-29
Payer: MEDICARE

## 2023-03-29 NOTE — TELEPHONE ENCOUNTER
----- Message from Raheem Rivas sent at 3/29/2023  2:28 PM CDT -----  Contact: patient  Valentín Field would like a call back at [phone], in regards to his amitriptyline prescription. Pt states he was told his dosage would be increased, but the pharmacy has not received a new prescription.

## 2023-03-30 ENCOUNTER — TELEPHONE (OUTPATIENT)
Dept: NEUROLOGY | Facility: CLINIC | Age: 63
End: 2023-03-30
Payer: MEDICARE

## 2023-03-30 NOTE — TELEPHONE ENCOUNTER
Spoke with advised him that I contacted the pharmacy and medication is available with a co payment $1.30. pt  verbalized understand .

## 2023-03-30 NOTE — TELEPHONE ENCOUNTER
----- Message from Samantha Anderson sent at 3/30/2023  4:19 PM CDT -----  Contact: Vaelntín Laura would like a call back at in regards to medication,Amitriptyline (ELAVIL) 50 MG tablet not being sent his pharmacy. Patient states he has been to the pharmacy 4 times and they continuously say that don't have it   [facility]/pharmacy #[id number] - [place] [id number] [facility] [address] [address]  Phone: [phone] Fax: [phone]  Thanks  Am

## 2023-05-24 ENCOUNTER — OFFICE VISIT (OUTPATIENT)
Dept: NEUROLOGY | Facility: CLINIC | Age: 63
End: 2023-05-24
Payer: MEDICARE

## 2023-05-24 VITALS
SYSTOLIC BLOOD PRESSURE: 100 MMHG | BODY MASS INDEX: 26.8 KG/M2 | WEIGHT: 187.19 LBS | DIASTOLIC BLOOD PRESSURE: 71 MMHG | HEIGHT: 70 IN | HEART RATE: 94 BPM

## 2023-05-24 DIAGNOSIS — I63.81 LACUNAR INFARCTION: ICD-10-CM

## 2023-05-24 DIAGNOSIS — Z87.820 HISTORY OF CONCUSSION: ICD-10-CM

## 2023-05-24 DIAGNOSIS — R41.3 MEMORY DIFFICULTIES: ICD-10-CM

## 2023-05-24 DIAGNOSIS — M46.42 DISCITIS OF CERVICAL REGION: ICD-10-CM

## 2023-05-24 DIAGNOSIS — Z87.891 FORMER SMOKER: ICD-10-CM

## 2023-05-24 DIAGNOSIS — M54.81 CERVICO-OCCIPITAL NEURALGIA OF RIGHT SIDE: ICD-10-CM

## 2023-05-24 DIAGNOSIS — Z98.1 S/P CERVICAL SPINAL FUSION: ICD-10-CM

## 2023-05-24 DIAGNOSIS — M48.02 CERVICAL SPINAL STENOSIS: ICD-10-CM

## 2023-05-24 DIAGNOSIS — R41.89 COGNITIVE CHANGE: Primary | ICD-10-CM

## 2023-05-24 DIAGNOSIS — R52 PAIN: ICD-10-CM

## 2023-05-24 DIAGNOSIS — G44.309 POST-CONCUSSION HEADACHE: ICD-10-CM

## 2023-05-24 DIAGNOSIS — R26.9 ABNORMALITY OF GAIT: ICD-10-CM

## 2023-05-24 DIAGNOSIS — F11.90 OPIOID USE: ICD-10-CM

## 2023-05-24 DIAGNOSIS — G31.84 MILD COGNITIVE IMPAIRMENT: ICD-10-CM

## 2023-05-24 DIAGNOSIS — R29.898 DECREASED STRENGTH OF UPPER EXTREMITY: ICD-10-CM

## 2023-05-24 DIAGNOSIS — G04.91 MYELITIS, UNSPECIFIED: ICD-10-CM

## 2023-05-24 DIAGNOSIS — R68.89 FORGETFULNESS: ICD-10-CM

## 2023-05-24 DIAGNOSIS — G95.9 CERVICAL MYELOPATHY: ICD-10-CM

## 2023-05-24 DIAGNOSIS — Z87.898 HISTORY OF ALCOHOL USE: ICD-10-CM

## 2023-05-24 DIAGNOSIS — Z78.9 ALCOHOL USE: ICD-10-CM

## 2023-05-24 DIAGNOSIS — Z74.09 IMPAIRED FUNCTIONAL MOBILITY, BALANCE, GAIT, AND ENDURANCE: ICD-10-CM

## 2023-05-24 DIAGNOSIS — R41.3 OTHER AMNESIA: ICD-10-CM

## 2023-05-24 DIAGNOSIS — Z86.73 HISTORY OF STROKE: ICD-10-CM

## 2023-05-24 PROCEDURE — 99999 PR PBB SHADOW E&M-EST. PATIENT-LVL III: CPT | Mod: PBBFAC,,, | Performed by: NURSE PRACTITIONER

## 2023-05-24 PROCEDURE — 99215 PR OFFICE/OUTPT VISIT, EST, LEVL V, 40-54 MIN: ICD-10-PCS | Mod: S$PBB,,, | Performed by: NURSE PRACTITIONER

## 2023-05-24 PROCEDURE — 99999 PR PBB SHADOW E&M-EST. PATIENT-LVL III: ICD-10-PCS | Mod: PBBFAC,,, | Performed by: NURSE PRACTITIONER

## 2023-05-24 PROCEDURE — 99215 OFFICE O/P EST HI 40 MIN: CPT | Mod: S$PBB,,, | Performed by: NURSE PRACTITIONER

## 2023-05-24 PROCEDURE — 99213 OFFICE O/P EST LOW 20 MIN: CPT | Mod: PBBFAC | Performed by: NURSE PRACTITIONER

## 2023-05-24 RX ORDER — AMITRIPTYLINE HYDROCHLORIDE 75 MG/1
75 TABLET ORAL NIGHTLY
Qty: 30 TABLET | Refills: 11 | Status: SHIPPED | OUTPATIENT
Start: 2023-05-24 | End: 2024-05-23

## 2023-05-24 NOTE — PROGRESS NOTES
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 healthy lifestyle, optimal weight, exercise, healthy diet, good sleep hygiene and avoiding drugs including smoking, alcohol and recreational drugs. The patient verbalized full understanding.       Advised the patient to follow COVID-19 prevention measures.       I spent 40 minutes more than 50 % spent  face to face with the patient    Time spent in counseling and coordination of care including discussions etiology of diagnosis, pathogenesis of diagnosis, prognosis of diagnosis,, diagnostic results, impression and recommendations, diagnostic studies, management, risks and benefits of treatment, instructions of disease self-management, treatment instructions, follow up requirements, patient and family counseling/involvement in care compliance with treatment regimen. All of the patient's questions were answered during this discussion.      RTC  6 months      America Fernández, MSN NP      Collaborating Provider: Nusrat Lee MD, FAAN Neurologist/Epileptologist

## 2023-07-06 ENCOUNTER — OFFICE VISIT (OUTPATIENT)
Dept: SURGERY | Facility: CLINIC | Age: 63
End: 2023-07-06
Payer: MEDICARE

## 2023-07-06 VITALS
DIASTOLIC BLOOD PRESSURE: 83 MMHG | BODY MASS INDEX: 26.89 KG/M2 | HEIGHT: 70 IN | SYSTOLIC BLOOD PRESSURE: 127 MMHG | WEIGHT: 187.81 LBS | HEART RATE: 86 BPM

## 2023-07-06 DIAGNOSIS — K81.9 CHOLECYSTITIS, UNSPECIFIED: Primary | ICD-10-CM

## 2023-07-06 PROCEDURE — 99215 OFFICE O/P EST HI 40 MIN: CPT | Mod: PBBFAC,PN | Performed by: SURGERY

## 2023-07-06 PROCEDURE — 99204 OFFICE O/P NEW MOD 45 MIN: CPT | Mod: S$PBB,,, | Performed by: SURGERY

## 2023-07-06 PROCEDURE — 99204 PR OFFICE/OUTPT VISIT, NEW, LEVL IV, 45-59 MIN: ICD-10-PCS | Mod: S$PBB,,, | Performed by: SURGERY

## 2023-07-06 PROCEDURE — 99999 PR PBB SHADOW E&M-EST. PATIENT-LVL V: ICD-10-PCS | Mod: PBBFAC,,, | Performed by: SURGERY

## 2023-07-06 PROCEDURE — 99999 PR PBB SHADOW E&M-EST. PATIENT-LVL V: CPT | Mod: PBBFAC,,, | Performed by: SURGERY

## 2023-07-06 RX ORDER — KETOROLAC TROMETHAMINE 10 MG/1
10 TABLET, FILM COATED ORAL EVERY 6 HOURS PRN
Qty: 12 TABLET | Refills: 0 | Status: SHIPPED | OUTPATIENT
Start: 2023-07-06 | End: 2023-07-10

## 2023-07-06 RX ORDER — CEFAZOLIN SODIUM 2 G/50ML
2 SOLUTION INTRAVENOUS
Status: CANCELLED | OUTPATIENT
Start: 2023-07-06

## 2023-07-06 RX ORDER — SODIUM CHLORIDE 9 MG/ML
INJECTION, SOLUTION INTRAVENOUS CONTINUOUS
Status: CANCELLED | OUTPATIENT
Start: 2023-07-06
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
## 2023-07-07 ENCOUNTER — TELEPHONE (OUTPATIENT)
Dept: PREADMISSION TESTING | Facility: HOSPITAL | Age: 63
End: 2023-07-07
Payer: MEDICARE

## 2023-07-07 DIAGNOSIS — I10 HYPERTENSION, UNSPECIFIED TYPE: ICD-10-CM

## 2023-07-07 DIAGNOSIS — Z01.818 PRE-OP EVALUATION: Primary | ICD-10-CM

## 2023-07-18 ENCOUNTER — TELEPHONE (OUTPATIENT)
Dept: SURGERY | Facility: CLINIC | Age: 63
End: 2023-07-18
Payer: MEDICARE

## 2023-07-18 NOTE — TELEPHONE ENCOUNTER
07/18/2023  1:57    Just confirming with the pt that surgery center will come him between 2-4 to let him know his time       ----- Message from Serena Tai sent at 7/18/2023  1:46 PM CDT -----  Type:  Needs Medical Advice    Who Called: pt  Would the patient rather a call back or a response via [facility]? call  Best Call Back Number: [phone]  Additional Information: pt would like to know procedure time for tomorrow 7/19

## 2023-07-19 ENCOUNTER — ANESTHESIA EVENT (OUTPATIENT)
Dept: SURGERY | Facility: HOSPITAL | Age: 63
End: 2023-07-19
Payer: MEDICARE

## 2023-07-19 ENCOUNTER — HOSPITAL ENCOUNTER (OUTPATIENT)
Facility: HOSPITAL | Age: 63
Discharge: HOME | End: 2023-07-19
Attending: SURGERY | Admitting: SURGERY
Payer: MEDICARE

## 2023-07-19 ENCOUNTER — ANESTHESIA (OUTPATIENT)
Dept: SURGERY | Facility: HOSPITAL | Age: 63
End: 2023-07-19
Payer: MEDICARE

## 2023-07-19 VITALS
HEART RATE: 79 BPM | TEMPERATURE: 98 F | RESPIRATION RATE: 16 BRPM | HEIGHT: 71 IN | OXYGEN SATURATION: 95 % | SYSTOLIC BLOOD PRESSURE: 133 MMHG | BODY MASS INDEX: 25.9 KG/M2 | WEIGHT: 185 LBS | DIASTOLIC BLOOD PRESSURE: 80 MMHG

## 2023-07-19 DIAGNOSIS — Z01.818 PREOP TESTING: ICD-10-CM

## 2023-07-19 DIAGNOSIS — K81.9 CHOLECYSTITIS, UNSPECIFIED: Primary | ICD-10-CM

## 2023-07-19 LAB
ANION GAP SERPL CALC-SCNC: 11 MMOL/L (ref 8–16)
BUN SERPL-MCNC: 13 MG/DL (ref 8–23)
CALCIUM SERPL-MCNC: 9.2 MG/DL (ref 8.7–10.5)
CHLORIDE SERPL-SCNC: 103 MMOL/L (ref 95–110)
CO2 SERPL-SCNC: 29 MMOL/L (ref 23–29)
CREAT SERPL-MCNC: 1.1 MG/DL (ref 0.5–1.4)
EST. GFR  (NO RACE VARIABLE): >60 ML/MIN/1.73 M^2
GLUCOSE SERPL-MCNC: 85 MG/DL (ref 70–110)
POTASSIUM SERPL-SCNC: 3.8 MMOL/L (ref 3.5–5.1)
SODIUM SERPL-SCNC: 143 MMOL/L (ref 136–145)

## 2023-07-19 PROCEDURE — 25000003 PHARM REV CODE 250: Performed by: SURGERY

## 2023-07-19 PROCEDURE — 37000009 HC ANESTHESIA EA ADD 15 MINS: Performed by: SURGERY

## 2023-07-19 PROCEDURE — 93010 EKG 12-LEAD: ICD-10-PCS | Mod: ,,, | Performed by: INTERNAL MEDICINE

## 2023-07-19 PROCEDURE — 27201423 OPTIME MED/SURG SUP & DEVICES STERILE SUPPLY: Performed by: SURGERY

## 2023-07-19 PROCEDURE — 88304 TISSUE EXAM BY PATHOLOGIST: CPT | Mod: 26,,, | Performed by: STUDENT IN AN ORGANIZED HEALTH CARE EDUCATION/TRAINING PROGRAM

## 2023-07-19 PROCEDURE — 63600175 PHARM REV CODE 636 W HCPCS: Performed by: NURSE ANESTHETIST, CERTIFIED REGISTERED

## 2023-07-19 PROCEDURE — 88304 PR  SURG PATH,LEVEL III: ICD-10-PCS | Mod: 26,,, | Performed by: STUDENT IN AN ORGANIZED HEALTH CARE EDUCATION/TRAINING PROGRAM

## 2023-07-19 PROCEDURE — 93010 ELECTROCARDIOGRAM REPORT: CPT | Mod: ,,, | Performed by: INTERNAL MEDICINE

## 2023-07-19 PROCEDURE — 36415 COLL VENOUS BLD VENIPUNCTURE: CPT | Performed by: SURGERY

## 2023-07-19 PROCEDURE — 71000039 HC RECOVERY, EACH ADD'L HOUR: Performed by: SURGERY

## 2023-07-19 PROCEDURE — 80048 BASIC METABOLIC PNL TOTAL CA: CPT | Performed by: SURGERY

## 2023-07-19 PROCEDURE — 93005 ELECTROCARDIOGRAM TRACING: CPT | Mod: 59

## 2023-07-19 PROCEDURE — 88304 TISSUE EXAM BY PATHOLOGIST: CPT | Performed by: STUDENT IN AN ORGANIZED HEALTH CARE EDUCATION/TRAINING PROGRAM

## 2023-07-19 PROCEDURE — 36000710: Performed by: SURGERY

## 2023-07-19 PROCEDURE — D9220A PRA ANESTHESIA: ICD-10-PCS | Mod: ANES,,, | Performed by: ANESTHESIOLOGY

## 2023-07-19 PROCEDURE — 71000015 HC POSTOP RECOV 1ST HR: Performed by: SURGERY

## 2023-07-19 PROCEDURE — 36000711: Performed by: SURGERY

## 2023-07-19 PROCEDURE — D9220A PRA ANESTHESIA: Mod: ANES,,, | Performed by: ANESTHESIOLOGY

## 2023-07-19 PROCEDURE — 63600175 PHARM REV CODE 636 W HCPCS: Performed by: ANESTHESIOLOGY

## 2023-07-19 PROCEDURE — 71000033 HC RECOVERY, INTIAL HOUR: Performed by: SURGERY

## 2023-07-19 PROCEDURE — D9220A PRA ANESTHESIA: ICD-10-PCS | Mod: CRNA,,, | Performed by: NURSE ANESTHETIST, CERTIFIED REGISTERED

## 2023-07-19 PROCEDURE — D9220A PRA ANESTHESIA: Mod: CRNA,,, | Performed by: NURSE ANESTHETIST, CERTIFIED REGISTERED

## 2023-07-19 PROCEDURE — 47562 LAPAROSCOPIC CHOLECYSTECTOMY: CPT | Mod: ,,, | Performed by: SURGERY

## 2023-07-19 PROCEDURE — 47562 PR LAP,CHOLECYSTECTOMY: ICD-10-PCS | Mod: ,,, | Performed by: SURGERY

## 2023-07-19 PROCEDURE — 37000008 HC ANESTHESIA 1ST 15 MINUTES: Performed by: SURGERY

## 2023-07-19 PROCEDURE — 25000003 PHARM REV CODE 250: Performed by: NURSE ANESTHETIST, CERTIFIED REGISTERED

## 2023-07-19 PROCEDURE — 25000003 PHARM REV CODE 250: Performed by: ANESTHESIOLOGY

## 2023-07-19 PROCEDURE — 63600175 PHARM REV CODE 636 W HCPCS: Performed by: SURGERY

## 2023-07-19 RX ORDER — PROCHLORPERAZINE EDISYLATE 5 MG/ML
5 INJECTION INTRAMUSCULAR; INTRAVENOUS ONCE
Status: COMPLETED | OUTPATIENT
Start: 2023-07-19 | End: 2023-07-19

## 2023-07-19 RX ORDER — ONDANSETRON 2 MG/ML
4 INJECTION INTRAMUSCULAR; INTRAVENOUS DAILY PRN
Status: DISCONTINUED | OUTPATIENT
Start: 2023-07-19 | End: 2023-07-19 | Stop reason: HOSPADM

## 2023-07-19 RX ORDER — KETOROLAC TROMETHAMINE 10 MG/1
10 TABLET, FILM COATED ORAL EVERY 6 HOURS PRN
Qty: 12 TABLET | Refills: 0 | Status: SHIPPED | OUTPATIENT
Start: 2023-07-19 | End: 2023-07-22

## 2023-07-19 RX ORDER — INDOCYANINE GREEN AND WATER 25 MG
5 KIT INJECTION ONCE
Status: COMPLETED | OUTPATIENT
Start: 2023-07-19 | End: 2023-07-19

## 2023-07-19 RX ORDER — CEFAZOLIN SODIUM 2 G/50ML
2 SOLUTION INTRAVENOUS
Status: DISCONTINUED | OUTPATIENT
Start: 2023-07-19 | End: 2023-07-19 | Stop reason: HOSPADM

## 2023-07-19 RX ORDER — DEXAMETHASONE SODIUM PHOSPHATE 4 MG/ML
INJECTION, SOLUTION INTRA-ARTICULAR; INTRALESIONAL; INTRAMUSCULAR; INTRAVENOUS; SOFT TISSUE
Status: DISCONTINUED | OUTPATIENT
Start: 2023-07-19 | End: 2023-07-19

## 2023-07-19 RX ORDER — SODIUM CHLORIDE 0.9 % (FLUSH) 0.9 %
10 SYRINGE (ML) INJECTION
Status: DISCONTINUED | OUTPATIENT
Start: 2023-07-19 | End: 2023-07-19 | Stop reason: HOSPADM

## 2023-07-19 RX ORDER — ONDANSETRON 2 MG/ML
INJECTION INTRAMUSCULAR; INTRAVENOUS
Status: DISCONTINUED | OUTPATIENT
Start: 2023-07-19 | End: 2023-07-19

## 2023-07-19 RX ORDER — SODIUM CHLORIDE 9 MG/ML
INJECTION, SOLUTION INTRAVENOUS CONTINUOUS
Status: DISCONTINUED | OUTPATIENT
Start: 2023-07-19 | End: 2023-07-19 | Stop reason: HOSPADM

## 2023-07-19 RX ORDER — OXYCODONE HYDROCHLORIDE 5 MG/1
5 TABLET ORAL
Status: DISCONTINUED | OUTPATIENT
Start: 2023-07-19 | End: 2023-07-19 | Stop reason: HOSPADM

## 2023-07-19 RX ORDER — ACETAMINOPHEN 10 MG/ML
INJECTION, SOLUTION INTRAVENOUS
Status: DISCONTINUED | OUTPATIENT
Start: 2023-07-19 | End: 2023-07-19

## 2023-07-19 RX ORDER — HYDROMORPHONE HYDROCHLORIDE 2 MG/ML
0.5 INJECTION, SOLUTION INTRAMUSCULAR; INTRAVENOUS; SUBCUTANEOUS EVERY 5 MIN PRN
Status: DISCONTINUED | OUTPATIENT
Start: 2023-07-19 | End: 2023-07-19 | Stop reason: HOSPADM

## 2023-07-19 RX ORDER — ONDANSETRON 2 MG/ML
4 INJECTION INTRAMUSCULAR; INTRAVENOUS ONCE AS NEEDED
Status: DISCONTINUED | OUTPATIENT
Start: 2023-07-19 | End: 2023-07-19

## 2023-07-19 RX ORDER — PHENYLEPHRINE HYDROCHLORIDE 10 MG/ML
INJECTION INTRAVENOUS
Status: DISCONTINUED | OUTPATIENT
Start: 2023-07-19 | End: 2023-07-19

## 2023-07-19 RX ORDER — LIDOCAINE HYDROCHLORIDE 10 MG/ML
INJECTION INFILTRATION; PERINEURAL
Status: DISCONTINUED | OUTPATIENT
Start: 2023-07-19 | End: 2023-07-19 | Stop reason: HOSPADM

## 2023-07-19 RX ORDER — CEFAZOLIN SODIUM 1 G/3ML
INJECTION, POWDER, FOR SOLUTION INTRAMUSCULAR; INTRAVENOUS
Status: DISCONTINUED | OUTPATIENT
Start: 2023-07-19 | End: 2023-07-19

## 2023-07-19 RX ORDER — MIDAZOLAM HYDROCHLORIDE 1 MG/ML
INJECTION INTRAMUSCULAR; INTRAVENOUS
Status: DISCONTINUED | OUTPATIENT
Start: 2023-07-19 | End: 2023-07-19

## 2023-07-19 RX ORDER — KETOROLAC TROMETHAMINE 30 MG/ML
15 INJECTION, SOLUTION INTRAMUSCULAR; INTRAVENOUS ONCE
Status: COMPLETED | OUTPATIENT
Start: 2023-07-19 | End: 2023-07-19

## 2023-07-19 RX ORDER — PROPOFOL 10 MG/ML
VIAL (ML) INTRAVENOUS
Status: DISCONTINUED | OUTPATIENT
Start: 2023-07-19 | End: 2023-07-19

## 2023-07-19 RX ORDER — OXYCODONE AND ACETAMINOPHEN 5; 325 MG/1; MG/1
1 TABLET ORAL EVERY 6 HOURS PRN
Qty: 12 TABLET | Refills: 0 | Status: SHIPPED | OUTPATIENT
Start: 2023-07-19 | End: 2023-07-26

## 2023-07-19 RX ORDER — BUPIVACAINE HYDROCHLORIDE 5 MG/ML
INJECTION, SOLUTION PERINEURAL
Status: DISCONTINUED | OUTPATIENT
Start: 2023-07-19 | End: 2023-07-19 | Stop reason: HOSPADM

## 2023-07-19 RX ORDER — LIDOCAINE HYDROCHLORIDE 20 MG/ML
INJECTION INTRAVENOUS
Status: DISCONTINUED | OUTPATIENT
Start: 2023-07-19 | End: 2023-07-19

## 2023-07-19 RX ORDER — ROCURONIUM BROMIDE 10 MG/ML
INJECTION, SOLUTION INTRAVENOUS
Status: DISCONTINUED | OUTPATIENT
Start: 2023-07-19 | End: 2023-07-19

## 2023-07-19 RX ORDER — FENTANYL CITRATE 50 UG/ML
INJECTION, SOLUTION INTRAMUSCULAR; INTRAVENOUS
Status: DISCONTINUED | OUTPATIENT
Start: 2023-07-19 | End: 2023-07-19

## 2023-07-19 RX ORDER — HYDROMORPHONE HYDROCHLORIDE 2 MG/ML
0.5 INJECTION, SOLUTION INTRAMUSCULAR; INTRAVENOUS; SUBCUTANEOUS EVERY 5 MIN PRN
Status: DISCONTINUED | OUTPATIENT
Start: 2023-07-19 | End: 2023-07-19 | Stop reason: SDUPTHER

## 2023-07-19 RX ADMIN — PROCHLORPERAZINE EDISYLATE 5 MG: 5 INJECTION INTRAMUSCULAR; INTRAVENOUS at 01:07

## 2023-07-19 RX ADMIN — SUGAMMADEX 168 MG: 100 INJECTION, SOLUTION INTRAVENOUS at 12:07

## 2023-07-19 RX ADMIN — SODIUM CHLORIDE: 9 INJECTION, SOLUTION INTRAVENOUS at 11:07

## 2023-07-19 RX ADMIN — PHENYLEPHRINE HYDROCHLORIDE 200 MCG: 10 INJECTION INTRAVENOUS at 11:07

## 2023-07-19 RX ADMIN — ACETAMINOPHEN 1000 MG: 10 INJECTION, SOLUTION INTRAVENOUS at 12:07

## 2023-07-19 RX ADMIN — INDOCYANINE GREEN AND WATER 5 MG: KIT at 08:07

## 2023-07-19 RX ADMIN — KETOROLAC TROMETHAMINE 15 MG: 30 INJECTION, SOLUTION INTRAMUSCULAR; INTRAVENOUS at 01:07

## 2023-07-19 RX ADMIN — ROCURONIUM BROMIDE 50 MG: 10 INJECTION, SOLUTION INTRAVENOUS at 11:07

## 2023-07-19 RX ADMIN — OXYCODONE HYDROCHLORIDE 5 MG: 5 TABLET ORAL at 01:07

## 2023-07-19 RX ADMIN — HYDROMORPHONE HYDROCHLORIDE 0.5 MG: 2 INJECTION INTRAMUSCULAR; INTRAVENOUS; SUBCUTANEOUS at 01:07

## 2023-07-19 RX ADMIN — ROCURONIUM BROMIDE 25 MG: 10 INJECTION, SOLUTION INTRAVENOUS at 12:07

## 2023-07-19 RX ADMIN — CEFAZOLIN 2 G: 330 INJECTION, POWDER, FOR SOLUTION INTRAMUSCULAR; INTRAVENOUS at 11:07

## 2023-07-19 RX ADMIN — LIDOCAINE HYDROCHLORIDE 100 MG: 20 INJECTION, SOLUTION INTRAVENOUS at 11:07

## 2023-07-19 RX ADMIN — ONDANSETRON 8 MG: 2 INJECTION, SOLUTION INTRAMUSCULAR; INTRAVENOUS at 12:07

## 2023-07-19 RX ADMIN — SODIUM CHLORIDE: 9 INJECTION, SOLUTION INTRAVENOUS at 08:07

## 2023-07-19 RX ADMIN — PROPOFOL 150 MG: 10 INJECTION, EMULSION INTRAVENOUS at 11:07

## 2023-07-19 RX ADMIN — MIDAZOLAM HYDROCHLORIDE 2 MG: 1 INJECTION, SOLUTION INTRAMUSCULAR; INTRAVENOUS at 11:07

## 2023-07-19 RX ADMIN — FENTANYL CITRATE 100 MCG: 50 INJECTION, SOLUTION INTRAMUSCULAR; INTRAVENOUS at 11:07

## 2023-07-19 RX ADMIN — DEXAMETHASONE SODIUM PHOSPHATE 8 MG: 4 INJECTION, SOLUTION INTRA-ARTICULAR; INTRALESIONAL; INTRAMUSCULAR; INTRAVENOUS; SOFT TISSUE at 11:07

## 2023-07-19 NOTE — OP NOTE
PATIENT: Valentín Field    MRN: [id number]    DATE OF PROCEDURE: 07/19/2023    PREOPERATIVE DIAGNOSIS: Symptomatic cholelithiasis with acute on chronic cholecystitis    POSTOPERATIVE DIAGNOSIS: same    PROCEDURE: Robotic cholecystectomy with Intraoperative cholangiography    SURGEON: Venkat Pat M.D.    ASSISTANT: Dereck Gifford M.D.    ANESTHESIA: General Endotracheal    ESTIMATED BLOOD LOSS: minimal    SPECIMEN: Gallbladder and contents    COMPLICATIONS: None    INDICATIONS: The patient is 61 yo AAM. R/B/A to Robotic cholecystectomy surgery were explained to the patient in detail.  The risks include, but are not limited to: bleeding, infection, re-operation, injury to surrounding structures,reaction to anesthesia, kevan-operative cardiopulmonary events including PE/DVT, bile leak, post-op diarrhea, failure to resolve symptoms, and possible death.  The patient stated a clear understanding of the risks and requests the procedure be done.    PROCEDURE IN DETAIL:  After surgical consent was obtained, the patient was transported to the operative theater and onto the operating room table in a supine position.  General endotracheal anesthesia was administered without difficulty.  The patient's abdomen was prepped and draped in a standard sterile fashion.  Appropriate perioperative antibiotics were given and a time-out was performed.  An incision was made in the periumbilical region and the fascia was elevated after blunt dissection.  It was sharply divided and the abdominal cavity was entered bluntly.  A trocar was inserted in the abdomen and it was insufflated.  Additional robotic trocars were inserted without difficulty and under direct visualization and the robot was docked.  The gallbladder was noted to be acutely and chronically inflamed.  The dome of the gallbladder was identified and elevated anteriorly and towards the patient's right shoulder.  The peritoneum was scored on both sides of the infundibulum and a  critical view of safety was obtained.  ICG/Firefly technology was used for cholangiography.  The cystic duct and common bile duct were illuminated and their anatomical relationship confirmed.  The cystic duct and cystic artery were the only structures seen entering the gallbladder.  They were clipped and divided.  The gallbladder was then taken off the liver bed using cautery.  The liver bed was found to be free of bleeding or bile leakage.  The robot was undocked and the gallbladder was placed in a specimen bag and removed from the field without difficulty. The trocars were removed under direct visualization and the abdomen was desufflated.  The fascial defect at the umbilicus was closed using a figure-of-eight 0 Vicryl suture.  Incisions were all irrigated out with sterile saline and injected with local anesthetic.  The skin openings were closed with interrupted 4 0 Monocryl suture and the incisions were all dressed in a standard sterile fashion.  At the end of the procedure the instrument, lap, and needle counts were all correct.  The patient was awoken from anesthesia having tolerated the procedure without difficulty and was returned to the PACU in a stable condition.  Patient's family was updated at the end of the procedure and all questions were answered.      Venkat Pat M.D., F.A.C.S.  [id number]-[id number] and General Surgery  [facility]

## 2023-07-19 NOTE — ANESTHESIA PROCEDURE NOTES
Intubation    Date/Time: 7/19/2023 11:30 AM  Performed by: Karrie Mtz CRNA  Authorized by: Jimbo Flor MD     Intubation:     Induction:  Intravenous    Intubated:  Postinduction    Mask Ventilation:  Easy mask    Attempts:  1    Attempted By:  CRNA    Method of Intubation:  Video laryngoscopy    Blade:  Way 3    Laryngeal View Grade: Grade I - full view of cords      Difficult Airway Encountered?: No      Complications:  None    Airway Device:  Oral endotracheal tube    Airway Device Size:  7.0    Style/Cuff Inflation:  Cuffed (inflated to minimal occlusive pressure)    Inflation Amount (mL):  6    Tube secured:  22    Secured at:  The lips    Placement Verified By:  Capnometry    Complicating Factors:  None    Findings Post-Intubation:  BS equal bilateral and atraumatic/condition of teeth unchanged

## 2023-07-19 NOTE — TRANSFER OF CARE
"Anesthesia Transfer of Care Note    Patient: Valentín Field    Procedure(s) Performed: Procedure(s) (LRB):  ROBOTIC CHOLECYSTECTOMY (can do lap if schedule can not accomodate robotic) (N/A)    Patient location: PACU    Anesthesia Type: general    Transport from OR: Transported from OR on 6-10 L/min O2 by face mask with adequate spontaneous ventilation    Post pain: adequate analgesia    Post assessment: no apparent anesthetic complications    Post vital signs: stable    Level of consciousness: awake, alert and oriented    Nausea/Vomiting: no nausea/vomiting    Complications: none    Transfer of care protocol was followed      Last vitals:   Visit Vitals  /74   Pulse 80   Temp 36.5 °C (97.7 °F) (Skin)   Resp 18   Ht 5' 11" (1.803 m)   Wt 83.9 kg (185 lb)   SpO2 100%   BMI 25.80 kg/m²     "

## 2023-07-19 NOTE — ANESTHESIA POSTPROCEDURE EVALUATION
Anesthesia Post Evaluation    Patient: Valentín Field    Procedure(s) Performed: Procedure(s) (LRB):  ROBOTIC CHOLECYSTECTOMY (can do lap if schedule can not accomodate robotic) (N/A)    Final Anesthesia Type: general      Patient location during evaluation: PACU  Patient participation: Yes- Able to Participate  Level of consciousness: awake and alert  Post-procedure vital signs: reviewed and stable  Pain management: adequate  Airway patency: patent    PONV status at discharge: No PONV  Anesthetic complications: no      Cardiovascular status: blood pressure returned to baseline  Respiratory status: unassisted  Hydration status: euvolemic            Vitals Value Taken Time   /75 07/19/23 1353   Temp 36.5 °C (97.7 °F) 07/19/23 1250   Pulse 82 07/19/23 1355   Resp 9 07/19/23 1355   SpO2 92 % 07/19/23 1355   Vitals shown include unvalidated device data.      No case tracking events are documented in the log.      Pain/Yocasta Score: Pain Rating Prior to Med Admin: 7 (7/19/2023  1:37 PM)  Yocasta Score: 8 (7/19/2023  1:05 PM)

## 2023-07-19 NOTE — PLAN OF CARE
Contacted patient to let him know he is approved, patient ate a banana at 7 am even though he was instructed not to and will need to reschedule  
POC board updated and reviewed with pt and pt's family. Pt and pt's family verbalize understanding. Safety precautions maintained. Call bell in reach.  Bed locked and in lowest position. Instructed pt to call for assistance. Pt verbalizes understanding.        
Pacu discharge criteria met. Report called to [facility] Rn/ops. Transported to ops via stretcher,sr upx2  
VSS  NAD  Discharge instructions given with claimed understanding by pt and family. Patient has ride home with family or friend. Claims pain level is ____2__ at this time.  Has voided without difficulty if required by surgical type.     
patient representative

## 2023-07-19 NOTE — ANESTHESIA PREPROCEDURE EVALUATION
07/19/2023  Valentín Field is a 62 y.o., male.      Pre-op Assessment     I have reviewed the Nursing Notes.    I have reviewed the Medications.     Review of Systems  Anesthesia Hx:  No problems with previous Anesthesia  Denies Family Hx of Anesthesia complications.    Social:  Non-Smoker, No Alcohol Use    Hematology/Oncology:  Hematology Normal   Oncology Normal     EENT/Dental:EENT/Dental Normal   Cardiovascular:   Exercise tolerance: good Hypertension    Pulmonary:   Sleep Apnea    Renal/:  Renal/ Normal     Hepatic/GI:  Hepatic/GI Normal    Musculoskeletal:  Musculoskeletal Normal    Neurological:   Headaches    Endocrine:  Endocrine Normal        Physical Exam  General: Well nourished    Airway:  Mallampati: II / II  Mouth Opening: Normal  TM Distance: Normal  Tongue: Normal  Neck ROM: Normal ROM    Dental:  Intact        Anesthesia Plan  Type of Anesthesia, risks & benefits discussed:    Anesthesia Type: Gen ETT  Intra-op Monitoring Plan: Standard ASA Monitors  Post Op Pain Control Plan: multimodal analgesia  Induction:  IV  Airway Plan: Direct, Post-Induction  Informed Consent: Informed consent signed with the Patient and all parties understand the risks and agree with anesthesia plan.  All questions answered.   ASA Score: 2    Ready For Surgery From Anesthesia Perspective.     .

## 2023-07-19 NOTE — DISCHARGE SUMMARY
[facility] - [facility] (Hospital)  Discharge Note  Short Stay    Procedure(s) (LRB):  ROBOTIC CHOLECYSTECTOMY (can do lap if schedule can not accomodate robotic) (N/A)      OUTCOME: Patient tolerated treatment/procedure well without complication and is now ready for discharge.    DISPOSITION: Home or Self Care    FINAL DIAGNOSIS:  acute and chronic cholecystitis     FOLLOWUP: In clinic    DISCHARGE INSTRUCTIONS:  No discharge procedures on file.     TIME SPENT ON DISCHARGE: 10 minutes

## 2023-07-23 LAB
FINAL PATHOLOGIC DIAGNOSIS: NORMAL
GROSS: NORMAL
Lab: NORMAL
MICROSCOPIC EXAM: NORMAL

## 2023-11-01 ENCOUNTER — TELEPHONE (OUTPATIENT)
Dept: GASTROENTEROLOGY | Facility: CLINIC | Age: 63
End: 2023-11-01
Payer: MEDICARE

## 2023-12-07 ENCOUNTER — TELEPHONE (OUTPATIENT)
Dept: GASTROENTEROLOGY | Facility: CLINIC | Age: 63
End: 2023-12-07
Payer: MEDICARE

## 2023-12-07 NOTE — TELEPHONE ENCOUNTER
Called and spoke with pt regarding message left about pt thinking he has appt today. Informed pt I am not seeing no where in his chart that he has an appt today. Informed pt his next appt with Dr Guy is 02/05 at 1:30. Pt verbalized understanding and had no further questions.         ----- Message from Licha Ribeiro sent at 12/7/2023  9:54 AM CST -----  Pt Requesting Call Back    Who called: pt  Who called for pt:  Best call back #: [phone]  Add notes: pt said he have an ppt today but none is showing in the system; please let pt know if you see an appt for him or not on his end so he can let the  know in time

## 2024-02-05 ENCOUNTER — OFFICE VISIT (OUTPATIENT)
Dept: GASTROENTEROLOGY | Facility: CLINIC | Age: 64
End: 2024-02-05
Payer: MEDICARE

## 2024-02-05 VITALS
DIASTOLIC BLOOD PRESSURE: 82 MMHG | SYSTOLIC BLOOD PRESSURE: 138 MMHG | BODY MASS INDEX: 27.28 KG/M2 | OXYGEN SATURATION: 99 % | HEIGHT: 71 IN | WEIGHT: 194.88 LBS | HEART RATE: 78 BPM

## 2024-02-05 DIAGNOSIS — Z12.11 SCREEN FOR COLON CANCER: ICD-10-CM

## 2024-02-05 DIAGNOSIS — K59.04 CHRONIC IDIOPATHIC CONSTIPATION: Primary | ICD-10-CM

## 2024-02-05 PROBLEM — K81.9 CHOLECYSTITIS, UNSPECIFIED: Status: RESOLVED | Noted: 2023-07-19 | Resolved: 2024-02-05

## 2024-02-05 PROCEDURE — 99214 OFFICE O/P EST MOD 30 MIN: CPT | Mod: S$GLB,,, | Performed by: INTERNAL MEDICINE

## 2024-02-05 PROCEDURE — 1160F RVW MEDS BY RX/DR IN RCRD: CPT | Mod: CPTII,S$GLB,, | Performed by: INTERNAL MEDICINE

## 2024-02-05 PROCEDURE — 99999 PR PBB SHADOW E&M-EST. PATIENT-LVL IV: CPT | Mod: PBBFAC,,, | Performed by: INTERNAL MEDICINE

## 2024-02-05 PROCEDURE — 1159F MED LIST DOCD IN RCRD: CPT | Mod: CPTII,S$GLB,, | Performed by: INTERNAL MEDICINE

## 2024-02-05 PROCEDURE — 3075F SYST BP GE 130 - 139MM HG: CPT | Mod: CPTII,S$GLB,, | Performed by: INTERNAL MEDICINE

## 2024-02-05 PROCEDURE — 3079F DIAST BP 80-89 MM HG: CPT | Mod: CPTII,S$GLB,, | Performed by: INTERNAL MEDICINE

## 2024-02-05 PROCEDURE — 3008F BODY MASS INDEX DOCD: CPT | Mod: CPTII,S$GLB,, | Performed by: INTERNAL MEDICINE

## 2024-02-05 RX ORDER — SODIUM, POTASSIUM,MAG SULFATES 17.5-3.13G
1 SOLUTION, RECONSTITUTED, ORAL ORAL DAILY
Qty: 1 KIT | Refills: 0 | Status: SHIPPED | OUTPATIENT
Start: 2024-02-05 | End: 2024-02-07

## 2024-02-05 NOTE — PROGRESS NOTES
"Subjective     Patient ID: Valentín Field is a 63 y.o. male.    Chief Complaint: Constipation and Dysphagia    62 yo M here for f/u 12/2022 visit.  He has been seen previously for OP dysphagia and is doing ok.  He complained of gas pains to another provider who sent referral for this appointment.  He states that he only had gas sensation that one day and now it is gone.  He has constipation and was given Linzess by his PCP.  He stated that he stopped the 72 mcg dose b/c it did not work and then did not  the 145 mcg dose.  He takes Miralax as needed but recently has been taking mostly daily; he still has small infrequent BM with this Miralax.  He is ready to schedule colonoscopy, his sister just had one.    Review of Systems   Constitutional:  Negative for chills and fever.   Respiratory:  Negative for shortness of breath and wheezing.    Cardiovascular:  Negative for chest pain, palpitations and leg swelling.   Gastrointestinal:  Positive for constipation.   Neurological:  Negative for dizziness and speech difficulty.      Objective   /82 (BP Location: Right arm, Patient Position: Sitting, BP Method: Medium (Automatic))   Pulse 78   Ht 5' 11" (1.803 m)   Wt 88.4 kg (194 lb 14.2 oz)   SpO2 99%   BMI 27.18 kg/m²     Physical Exam  Constitutional:       Appearance: Normal appearance. He is well-developed. He is not ill-appearing.   HENT:      Head: Normocephalic and atraumatic.   Eyes:      Extraocular Movements: Extraocular movements intact.      Pupils: Pupils are equal, round, and reactive to light.   Pulmonary:      Effort: Pulmonary effort is normal. No respiratory distress.   Abdominal:      General: There is no distension.      Palpations: Abdomen is soft.   Musculoskeletal:      Cervical back: Normal range of motion and neck supple.   Skin:     General: Skin is warm and dry.   Neurological:      General: No focal deficit present.      Mental Status: He is alert and oriented to person, place, " and time.   Psychiatric:         Mood and Affect: Mood normal.         Behavior: Behavior normal.       Lab Results   Component Value Date    WBC 4.64 12/07/2022    HGB 11.9 (L) 12/07/2022    HCT 37.2 (L) 12/07/2022    MCV 92 12/07/2022     12/07/2022     BMP  Lab Results   Component Value Date     08/01/2023    K 4.2 08/01/2023     08/01/2023    CO2 30 (H) 08/01/2023    BUN 12 08/01/2023    CREATININE 0.93 08/01/2023    CALCIUM 8.9 08/01/2023    ANIONGAP 7 (L) 08/01/2023    EGFRNORACEVR >60.0 08/01/2023     Esophagram was independently visualized and reviewed by me and showed mild esophageal dysmotility.     Assessment and Plan     1. Chronic idiopathic constipation  -     X-Ray Abdomen Flat And Erect; Future; Expected date: 02/05/2024  -     Being done to quantify stool burden    2. Screen for colon cancer  -     sodium,potassium,mag sulfates (SUPREP BOWEL PREP KIT) 17.5-3.13-1.6 gram SolR; Take 177 mLs by mouth once daily. for 2 days  Dispense: 1 kit; Refill: 0  -     Use PCP's case request

## 2024-02-05 NOTE — PATIENT INSTRUCTIONS
SUPREP Instructions    PLEASE FOLLOW THESE INSTRUCTIONS NOT THE INSTRUCTIONS ON THE BOX    You are scheduled for a colonoscopy with Dr. Guy on Tuesday, March 5 at [facility] located at [address] in [place].  Enter through the South Entrance #1 (by the ED).  Go and check-in at Same Day Surgery.      You will receive a call 1-2 days before your colonoscopy to tell you the time to arrive.  If you have not received a call by the day before your procedure, call the Endoscopy Lab at [phone].    To ensure that your test is accurate and complete, you MUST follow these instructions listed below.  If you have any questions, please call our office at [phone].  Plan on being at the hospital for your procedure for 3-4 hours.    1.  Follow a CLEAR LIQUID DIET for the entire day before your scheduled colonoscopy.  This means no solid food the entire day starting when you wake.  You may have as much of the clear liquids as you want throughout the day.   CLEAR LIQUID DIET:   - NO DAIRY   - You can have:  Coffee with sugar (no creamer), tea, water, soda, apple or white grape juice, chicken or beef broth/bouillon (no meat, noodles, or veggies), green/yellow popsicles, green/yellow Jell-O, lemonade.    2.  AT 5 pm the evening before your colonoscopy, POUR ONE (1) BOTTLE OF SUPREP INTO THE MIXING CONTAINER, PROVIDED INSIDE THE BOX.  ADD WATER TO THE LINE ON THE CONTAINER AND MIX IT WELL.  DRINK THE ENTIRE CONTAINER AND THEN DRINK TWO (2) MORE CONTAINERS OF WATER OVER THE NEXT 1 HOUR.  This is sometimes easier to drink if this solution is cold, so you can mix the solution 20 minutes ahead of time and place in the refrigerator prior to drinking.  You have to drink the solution within 30-45 minutes of mixing it.  Do NOT put this solution over ice.  It IS ok to drink with a straw.    3.  The endoscopy department will call you 1 day before your colonoscopy to tell you the exact time to arrive, AND  to tell you the exact time to drink the 2nd portion of your prep (which will be FIVE HOURS BEFORE YOUR ARRIVAL TIME).  At this time given to you, POUR ONE (1) BOTTLE OF SUPREP INTO THE MIXING CONTAINER, PROVIDED INSIDE THE BOX.  ADD WATER TO THE LINE ON THE CONTAINER AND MIX IT WELL.  DRINK THE ENTIRE CONTAINER AND THEN DRINK TWO (2) MORE CONTAINERS OF WATER OVER THE NEXT 1 HOUR.  This is sometimes easier to drink if this solution is cold, so you can mix the solution 20 minutes ahead of time and place in the refrigerator prior to drinking.  You have to drink the solution within 30-45 minutes of mixing it.  Do NOT put this solution over ice.  It IS ok to drink with a straw.  Once this is complete, you may not have ANYTHING else by mouth!    4.  You must have someone with you to DRIVE YOU HOME since you will be receiving IV sedation for the colonoscopy.    5.  It is ok to take MOST of your REGULAR MEDICATIONS  in the morning of your test with a SIP of water.  THE ONLY MEDS YOU NEED TO HOLD ARE YOUR DIABETES MEDICATIONS,  SOME BLOOD PRESSURE MEDS, AND BLOOD THINNERS IF OK'D BY YOUR DOCTOR.  Do NOT have anything else to eat or drink the morning of your colonoscopy.  It is ok to brush your teeth.    6.  If you are on blood thinners THAT YOU HAVE BEEN INSTRUCTED TO HOLD BY YOUR DOCTOR FOR THIS PROCEDURE, then do NOT take this the morning of your colonoscopy.  Do NOT stop these medications on your own, they must be approved to be held by your doctor.  Your colonoscopy can NOT be done if you are on these medications.  Examples of blood thinners include: Coumadin, Aggrenox, Plavix, Pradaxa, Reapro, Pletal, Xarelto, Ticagrelor, Brilinta, Eliquis, and high dose aspirin (325 mg).  You do not have to stop baby aspirin 81 mg.    7.  IF YOU ARE DIABETIC:  NO INSULIN OR ORAL MEDICATIONS THE MORNING OF THE COLONOSCOPY.  TAKE ONLY HALF THE DOSE OF YOUR INSULIN THE DAY BEFORE THE COLONOSCOPY.  DO NOT TAKE ANY ORAL DIABETIC MEDICATIONS  THE DAY BEFORE THE COLONOSCOPY.  IF YOU ARE AN INSULIN DEPENDENT DIABETIC WITH UNSTABLE BLOOD SUGARS, NOTIFY YOUR PRIMARY CARE PHYSICIAN FOR INSTRUCTIONS.

## 2024-03-04 ENCOUNTER — TELEPHONE (OUTPATIENT)
Dept: GASTROENTEROLOGY | Facility: CLINIC | Age: 64
End: 2024-03-04
Payer: MEDICARE

## 2024-03-04 NOTE — TELEPHONE ENCOUNTER
instructed pt arrival time of 1100 in SDS. informed pt 1st prep due @5pm. 2nd prep due @0600. instructed pt to be on clear liqudis ONLY. went over things pt can eat. pt voiced understanding.

## 2024-07-17 ENCOUNTER — OFFICE VISIT (OUTPATIENT)
Dept: PAIN MEDICINE | Facility: CLINIC | Age: 64
End: 2024-07-17
Payer: MEDICARE

## 2024-07-17 VITALS — WEIGHT: 186.63 LBS | BODY MASS INDEX: 27.56 KG/M2

## 2024-07-17 DIAGNOSIS — G89.4 CHRONIC PAIN SYNDROME: Primary | ICD-10-CM

## 2024-07-17 PROCEDURE — 99999 PR PBB SHADOW E&M-EST. PATIENT-LVL III: CPT | Mod: PBBFAC,,, | Performed by: ANESTHESIOLOGY

## 2024-07-17 PROCEDURE — 3008F BODY MASS INDEX DOCD: CPT | Mod: CPTII,S$GLB,, | Performed by: ANESTHESIOLOGY

## 2024-07-17 PROCEDURE — 99213 OFFICE O/P EST LOW 20 MIN: CPT | Mod: S$GLB,,, | Performed by: ANESTHESIOLOGY

## 2024-07-17 PROCEDURE — 1159F MED LIST DOCD IN RCRD: CPT | Mod: CPTII,S$GLB,, | Performed by: ANESTHESIOLOGY

## 2024-07-17 RX ORDER — PREGABALIN 75 MG/1
75 CAPSULE ORAL 3 TIMES DAILY
COMMUNITY
End: 2024-07-17

## 2024-07-17 RX ORDER — KETOROLAC TROMETHAMINE 10 MG/1
10 TABLET, FILM COATED ORAL ONCE
COMMUNITY

## 2024-07-17 RX ORDER — TIZANIDINE 4 MG/1
4 TABLET ORAL NIGHTLY PRN
COMMUNITY

## 2024-07-17 RX ORDER — PREGABALIN 100 MG/1
100 CAPSULE ORAL 2 TIMES DAILY
Qty: 60 CAPSULE | Refills: 2 | Status: SHIPPED | OUTPATIENT
Start: 2024-07-17

## 2024-07-17 RX ORDER — CHOLECALCIFEROL (VITAMIN D3) 25 MCG
TABLET ORAL
COMMUNITY
Start: 2024-01-31

## 2024-07-17 NOTE — PROGRESS NOTES
New patient evaluation  [facility]    Valentín Field  : 1960  Date: 2024     CHIEF COMPLAINT:  Low-back Pain (Center of the lower back )    Referring Physician: Trent Field MD  Primary Care Physician: Trent Field MD    HPI:  This is a 63 y.o. male with a chief complaint of Low-back Pain (Center of the lower back )  . The patient has Past medical history/Past surgical history of C3-C6 PSF for myelopathy, hypertension, memory difficulty, history of alcohol use, opioid use, difficult intubation, hypertension, former tobacco use disorder, impaired functional mobility/gait/endurance, history of cocaine and marijuana use    Patient was evaluated and referred by primary care provider for Back pain  Patient was previously seen by Neurosurgery team Dr. Forrester for neck pain  He was on chronic opioid therapy, last prescription was in 2024 by David Nielson MD    Diabetic: No    Anticogualtion drugs: None    Allergy To Iodine: No    Currently on Antibiotic: No    Current Description of Pain Symptoms:    History of Recent Fall or Trauma: No   Onset: Chronic, started   Pain Location:  Low back pain  Radiates/associated symptoms: BL LES, Worse on left,  radiates to bottom of the ankle.  Pain is Getting worse over the last 4 months    The pain is described as aching, burning(in back and RT leg), numbing, shooting, tight band, and constant.   Exacerbating factors: Standing, Walking, Extension, and Lifting.   Mitigating factors sitting still.   Symptoms interfere with daily activity, sleeping.   The patient feels like symptoms have been worsening.   Patient reports significant motor weakness which causes the right leg to give out.    Pain score:   Current: 10/10  Best: 7/10  Worst: 10/10    Current pain medications:      cyclobenzaprine (FLEXERIL) 10 MG tablet, PRN    HYDROcodone-acetaminophen (NORCO) 5-325 mg per tablet, last time in 2024    Toradol 10MG QD    Lyrica 75MG  TID, last time in 2/2024    TiZANidine 4MG PRN     Current Narcotics/Opioid /benzo Medications:  Opioids- Hydrocodone with Acetaminophen (Norco)-last time in February 2024  Benzodiazepines: No    UDS:  NA    PDMP:  Reviewed and consistent with medication use as prescribed.     Previous Chronic Pain Treatment History:  Physical Therapy/HEP/Physician Lead Exercise Program:  Over the past 12 months, Patient has done 6 sessions FOR BACK.  PT response: Not Helpful.   Dates of the PT sessions: 03/2024  Is patient participating in home exercise program (HEP): Yes.    Non-interventional Pain Therapy:  []Chiropractor.   []Acupuncture/Dry needle.  []TENS unit.  [x]Heat/ICE.  [x]Back Brace.(OTC Bought) - Years ago patient had a brace that kept him straight up, reports it helped    Medications previously tried:  NSAIDs: Ibuprofen (Advil/Motrin)  Topical Agent: Yes  TCA/SSRI/SNRI: TCA: Amitriptyline (Elavil)  Anti-convulsants: Gabapentin  and Lyrica   Muscle Relaxants: Flexeril (Cyclobenzaprine) and Tizanidine (Zanaflex)  Opioids- Hydrocodone with Acetaminophen (Norco).    Interventional Pain Procedures:  He had multiple interventional procedure by Dr. David Nielson which provided him with limited relief     Previous spine/Relevant joint surgery:  3/18/21 C4 Corpectomy by Dr. Wilson  8/26/21 C3-6 laminectomy and fusion  Surgical History:   has a past surgical history that includes Surgical removal of vertebral body of cervical spine (N/A, 3/18/2021); Back surgery; Posterior fusion of cervical spine with laminectomy (N/A, 8/26/2021); Robot-assisted cholecystectomy (N/A, 7/19/2023); and Colonoscopy (N/A, 3/5/2024).  Medical History:   has a past medical history of Anemia (08/24/2021), Hypertension, Pain and numbness of left upper extremity (05/24/2021), and Pain and numbness of right upper extremity (05/24/2021).  Family History:  family history includes Diabetes in his brother.  Allergies:  Patient has no known allergies.   Social  History/SUBSTANCE ABUSE HISTORY:  Personal history of substance abuse: No   reports that he has quit smoking. His smoking use included cigarettes. He started smoking about 4 years ago. He has never used smokeless tobacco. He reports current alcohol use. He reports current drug use. Drugs: Marijuana and Cocaine.  LABS:  CBC  Lab Results   Component Value Date    WBC 4.64 12/07/2022    HGB 11.9 (L) 12/07/2022    HCT 37.2 (L) 12/07/2022     Coagulation Profile   Lab Results   Component Value Date     12/07/2022       Lab Results   Component Value Date    INR 0.9 08/24/2021     CMP:  BMP  Lab Results   Component Value Date     08/01/2023    K 4.2 08/01/2023     08/01/2023    CO2 30 (H) 08/01/2023    BUN 12 08/01/2023    CREATININE 0.93 08/01/2023    CALCIUM 8.9 08/01/2023    ANIONGAP 7 (L) 08/01/2023    EGFRNORACEVR >60.0 08/01/2023     Lab Results   Component Value Date    ALT 31 12/07/2022    AST 31 12/07/2022    ALKPHOS 57 12/07/2022    BILITOT 0.7 12/07/2022     HGBA1C:  Lab Results   Component Value Date    HGBA1C 5.1 03/02/2021       ROS:    Review of Systems   GENERAL:  No weight loss, malaise or fevers.  HEENT:   No recent changes in vision or hearing  NECK:  Negative for lumps, no difficulty with swallowing.  RESPIRATORY:  Negative for cough, wheezing or shortness of breath, patient denies any recent URI.  CARDIOVASCULAR:  Negative for chest pain or palpitations.  GI:  Negative for abdominal discomfort, blood in stools or black stools or change in bowel habits.  MUSCULOSKELETAL:  See HPI.  SKIN:  Negative for lesions, rash, and itching.  PSYCH:  No mood disorder or recent psychosocial stressors.   HEMATOLOGY/LYMPHOLOGY:  See the blood thinner sectioned in HPI.  NEURO:  See HPI  All other reviewed and negative other than HPI.    PHYSICAL EXAM:  VITALS: Wt 84.6 kg (186 lb 9.9 oz)   BMI 27.56 kg/m²   Body mass index is 27.56 kg/m².  GENERAL: Well appearing, in no acute distress, alert and  oriented x3, answers questions appropriately.   PSYCH: Flat affect.  SKIN: Skin color, texture, turgor normal, no rashes or lesions.  HEAD/FACE:  Normocephalic, atraumatic. Cranial nerves grossly intact.  CV: Regular rate  PULM: No evidence of respiratory difficulty, symmetric chest rise.  GI:  Soft and non-Distended.    BACK/SIJ/HIP:  Lumbar Spine Exam:       Inspection: No erythema, bruising.       Palpation: (+) TTP of lumbar paraspinals bilaterally      ROM:  Limited in flexion, extension, lateral bending.       (+) Facet loading bilaterally  Hip Exam:      Inspection: No gross deformity or apparent leg length discrepancy      Palpation:  No TTP to bilateral greater trochanteric bursas.   Neurologic Exam:     Strength: 3/5 in BL hip flexion and knee extension     Sensation:  Grossly intact to light touch in bilateral lower extremities     Tone: No abnormality appreciated in bilateral lower extremities    GAIT:  Antalgic gait    DIAGNOSTIC STUDIES AND MEDICAL RECORDS REVIEW:  I have personally reviewed and interpreted relevant radiology reports and reviewed relevant records from other services in the EMR.   X-ray cervical spine 2022    Stable postoperative changes of C4 corpectomy with anterior instrumented fusion of C3-C5 and posterior instrumented fusion from C3-C6.  Hardware appears intact without evidence of loosening or migration.     C1-C2: Pre-dens space is maintained.  Dens and lateral masses of C1 are unremarkable.     Alignment: Alignment is maintained.  No dynamic instability.     Vertebrae: No suspicious appearing lytic or blastic lesions.     Discs and facets: Severe degenerative disc disease at C5-6. facet arthropathy of C2-C3.     Miscellaneous: No additional findings.     Impression:     Stable postoperative changes of the cervical spine.  No detrimental change compared to prior cervical spine radiograph.       MRI lumbar spine 2021  normal height. There is no signal abnormality of the cord. This  cord terminates at about L2. There is crowding of the cauda equina. No abnormal enhancement.     T12-L1: Vertebral body heights are preserved without abnormal signal. Disc spaces preserved. There is no canal stenosis. No significant neural foraminal stenosis.     L1-L2: Anterior disc protrusion. Vertebral body heights are preserved. There is T2 and T1 foci of hyperintensity in the L2 vertebral body. There is no neuroforaminal narrowing. Facet joints are within normal limits. No canal stenosis. Ligamentum flavum thickening.     L2-L3: Circumferential disc bulge. Severe canal stenosis. Bilateral severe neural foraminal narrowing. Facet joints are within normal limits. Disc space narrowing. Ligamentum flavum thickening at this level. Disc osteophyte complex about the anterior aspect of the intervertebral discs. Modic  type II involving the anterosuperior endplate of L3.      L3-L4: Circumferential disc bulge. Severe disc space narrowing. Severe canal stenosis. Bilateral neural foraminal narrowing right greater than left. Joints are within normal limits. Modic type II at the posterior aspect of the L3-L4.   L4-L5: Circumferential disc bulge. Moderate to severe canal stenosis. Severe bilateral foraminal narrowing. Facet joints are within normal limits. Moderate type II about the anterior aspect of L4-L5.     L5-S1: There is no canal stenosis. Moderate to severe bilateral foraminal narrowing.   ASSESSMENT:  Valentín Field is a 63 y.o. male with the following diagnoses based on history, exam, and imaging:  There are no diagnoses linked to this encounter.   ---------------------------------------------------------------------  This is a pleasant 63 y.o. male patient with PMH/PSH of C3-C6 PSF for myelopathy, hypertension, memory difficulty, history of alcohol use, opioid use, difficult intubation, hypertension, former tobacco use disorder, impaired functional mobility/gait/endurance, history of cocaine and marijuana use,  presenting with low back pain and bilateral lower extremity radiation.     We discussed the underlying diagnoses and multiple treatment options including non-opioid medications, interventional procedures, physical therapy, home exercise, core muscle enhancement, and weight loss.  The risks and benefits of each treatment option were discussed and all questions were answered.      Treatment Plan:    Diagnostics/Referrals:  He has not interested in proceeding with any imaging as he had previous interventional that provided him with limited relief    Medications:    NSAIDs: otc  Topical Agent: NA  TCA/SSRI/SNRI: None  Anti-convulsants:  Start Lyrica 100 mg 3 times a day, may call for refill  Muscle Relaxants:  Continue Robaxin/tizanidine as needed for pain  Opioids: None  Patient was educated about the risk and benefit of chronic opioid therapy including dependency, addiction, diversion, and opioid hyperalgesia.  Interventional Therapy:  Declined    Physical Rehabilitation: Declined    Patient Education: Counseled patient regarding the importance of activity modification, I have stressed the importance of physical activity and a home exercise plan to help with pain and improve health.    Follow-up: RTC as needed.      I would like to thank Trent Field MD for the opportunity to assist in the care of this patient. We had a very nice visit and I look forward to continuing their care. Please let me know if I can be of further assistance.     Kleber Gonzalez MD  Anesthesiologist  Interventional Pain Medicine  07/17/2024    Disclaimer:  This note was prepared using voice recognition system and is likely to have sound alike errors that may have been overlooked even after proof reading.  Please call me with any questions.

## 2024-07-17 NOTE — PROGRESS NOTES
New patient evaluation  [facility] intervention[facility] pain [facility]    Valentín Field  : 1960  Date: 2024     CHIEF COMPLAINT:  No chief complaint on file.    Referring Physician: Trent Field MD  Primary Care Physician: Trent Field MD    HPI:  This is a 63 y.o. male with a chief complaint of No chief complaint on file.  . The patient has Past medical history/Past surgical history of C3-C6 PSF for myelopathy, hypertension    Patient was evaluated and referred by primary care provider for neck pain  Patient was previously seen by Neurosurgery team Dr. Forrester  He was not chronic opioid therapy, last prescription was in 2024 by David Nielson MD  He was last seen 2024  Diabetic: {GAYes/No/NA:27263}    {Anticogualtion drugs:90798}    Allergy To Iodine: {GAYes/No/NA:17535}    Currently on Antibiotic: {GAYes/No/NA:82612}    Current Description of Pain Symptoms:    History of Recent Fall or Trauma: {GAYes/No/NA:03625}   Onset: Chronic, started ***  Pain Location: ***  Radiates/associated symptoms: ***.   Pain is Getting worse over the last *** months    The pain is described as {Desc; pain character:81318}.   Exacerbating factors: {Causes; Pain:46079}.   Mitigating factors ***.   Symptoms interfere with daily activity, sleeping, and ***.   The patient feels like symptoms have been {IUW:99132}.   Patient {Denies / Reports:68296} {RED FLAGS:54163}.    Pain score:   Current: {PAIN 0-10:97130}/10  Best: {PAIN 0-10:58625}/10  Worst: {PAIN 0-10:77721}/10    Current pain medications:      amitriptyline (ELAVIL) 75 MG tablet, QHS    cyclobenzaprine (FLEXERIL) 10 MG tablet, PRN    gabapentin (NEURONTIN) 600 MG tablet, TID    HYDROcodone-acetaminophen (NORCO) 5-325 mg per tablet,    ibuprofen (ADVIL,MOTRIN) 200 MG tablet,     Current Narcotics/Opioid /benzo Medications:  Opioids- {GAopioid:11351}  Benzodiazepines: {GAYes/No/NA:75269}    UDS:  NA    PDMP:  Reviewed and consistent with medication use as  prescribed.     Previous Chronic Pain Treatment History:  Physical Therapy/HEP/Physician Lead Exercise Program:  Over the past 12 months, Patient has done  *** sessions.  PT response: {PT response:00972} Helpful.   Dates of the PT sessions: ***, ***  Is patient participating in home exercise program (HEP): {GAYes/No/NA:48546}.    Non-interventional Pain Therapy:  []Chiropractor.   []Acupuncture/Dry needle.  []TENS unit.  []Heat/ICE.  []Back Brace.    Medications previously tried:  NSAIDs: {GANSIAD:38648}  Topical Agent: {GAYes/No/NA:28116}  TCA/SSRI/SNRI: {GATCA/SSRI/SNRI:34652}  Anti-convulsants: {GAAnticonvulsants:09521}  Muscle Relaxants: {GAmuscle Relaxant:89774}  Opioids- {GAopioid:64692}.    Interventional Pain Procedures:  ***    Previous spine/Relevant joint surgery:  3/18/21 C4 Corpectomy by Dr. Wilson  8/26/21 C3-6 laminectomy and fusion  Surgical History:   has a past surgical history that includes Surgical removal of vertebral body of cervical spine (N/A, 3/18/2021); Back surgery; Posterior fusion of cervical spine with laminectomy (N/A, 8/26/2021); Robot-assisted cholecystectomy (N/A, 7/19/2023); and Colonoscopy (N/A, 3/5/2024).  Medical History:   has a past medical history of Anemia (08/24/2021), Hypertension, Pain and numbness of left upper extremity (05/24/2021), and Pain and numbness of right upper extremity (05/24/2021).  Family History:  family history includes Diabetes in his brother.  Allergies:  Patient has no known allergies.   Social History/SUBSTANCE ABUSE HISTORY:  Personal history of substance abuse: No   reports that he has quit smoking. His smoking use included cigarettes. He started smoking about 4 years ago. He has never used smokeless tobacco. He reports current alcohol use. He reports current drug use. Drugs: Marijuana and Cocaine.  LABS:  CBC  Lab Results   Component Value Date    WBC 4.64 12/07/2022    HGB 11.9 (L) 12/07/2022    HCT 37.2 (L) 12/07/2022     Coagulation Profile    Lab Results   Component Value Date     12/07/2022       Lab Results   Component Value Date    INR 0.9 08/24/2021     CMP:  BMP  Lab Results   Component Value Date     08/01/2023    K 4.2 08/01/2023     08/01/2023    CO2 30 (H) 08/01/2023    BUN 12 08/01/2023    CREATININE 0.93 08/01/2023    CALCIUM 8.9 08/01/2023    ANIONGAP 7 (L) 08/01/2023    EGFRNORACEVR >60.0 08/01/2023     Lab Results   Component Value Date    ALT 31 12/07/2022    AST 31 12/07/2022    ALKPHOS 57 12/07/2022    BILITOT 0.7 12/07/2022     HGBA1C:  Lab Results   Component Value Date    HGBA1C 5.1 03/02/2021       ROS:    Review of Systems   GENERAL:  No weight loss, malaise or fevers.  HEENT:   No recent changes in vision or hearing  NECK:  Negative for lumps, no difficulty with swallowing.  RESPIRATORY:  Negative for cough, wheezing or shortness of breath, patient denies any recent URI.  CARDIOVASCULAR:  Negative for chest pain or palpitations.  GI:  Negative for abdominal discomfort, blood in stools or black stools or change in bowel habits.  MUSCULOSKELETAL:  See HPI.  SKIN:  Negative for lesions, rash, and itching.  PSYCH:  No mood disorder or recent psychosocial stressors.   HEMATOLOGY/LYMPHOLOGY:  See the blood thinner sectioned in HPI.  NEURO:  See HPI  All other reviewed and negative other than HPI.    PHYSICAL EXAM:  VITALS: There were no vitals taken for this visit.  There is no height or weight on file to calculate BMI.  GENERAL: Well appearing, in no acute distress, alert and oriented x3, answers questions appropriately.   PSYCH: Flat affect.  SKIN: Skin color, texture, turgor normal, no rashes or lesions.  HEAD/FACE:  Normocephalic, atraumatic. Cranial nerves grossly intact.  CV: Regular rate  PULM: No evidence of respiratory difficulty, symmetric chest rise.  GI:  Soft and non-Distended.  NECK: (***) pain to palpation over the cervical paraspinous muscles. Spurling: ***. (***) pain with neck flexion, extension, or  lateral flexion, Muscle strength in RT UE ***/5 and Left UE ***/5, Hand  5/5 bilaterally   BACK/SIJ/HIP:  Lumbar Spine Exam:       Inspection: No erythema, bruising.       Palpation: (***) TTP of lumbar paraspinals bilaterally      ROM:  Limited in flexion, extension, lateral bending.       (***) Facet loading bilaterally      (***) Straight Leg Raise bilaterally      (***) FERMIN bilaterally, Tenderness over the PSIS, Yeoman test  Hip Exam:      Inspection: No gross deformity or apparent leg length discrepancy      Palpation:  No TTP to bilateral greater trochanteric bursas.       ROM:  No limitation or pain in internal rotation, external rotation b/l  Neurologic Exam:     Alert. Speech is fluent and appropriate.     Strength: ***/5 in *** hip flexion and knee extension     Sensation:  Grossly intact to light touch in bilateral lower extremities     Tone: No abnormality appreciated in bilateral lower extremities  Knee exam:  No gross deformity or apparent leg length discrepancy, positive tenderness over the anteromedial aspect of the knee cap, positive limitation due to pain in flexion and extension, sensation caudal grossly intact to light touch in bilateral lower extremity, no atrophy or tone abnormalities    GAIT: ***    DIAGNOSTIC STUDIES AND MEDICAL RECORDS REVIEW:  I have personally reviewed and interpreted relevant radiology reports and reviewed relevant records from other services in the EMR.   X-ray cervical spine 2022    Stable postoperative changes of C4 corpectomy with anterior instrumented fusion of C3-C5 and posterior instrumented fusion from C3-C6.  Hardware appears intact without evidence of loosening or migration.     C1-C2: Pre-dens space is maintained.  Dens and lateral masses of C1 are unremarkable.     Alignment: Alignment is maintained.  No dynamic instability.     Vertebrae: No suspicious appearing lytic or blastic lesions.     Discs and facets: Severe degenerative disc disease at C5-6.  "facet arthropathy of C2-C3.     Miscellaneous: No additional findings.     Impression:     Stable postoperative changes of the cervical spine.  No detrimental change compared to prior cervical spine radiograph.     ASSESSMENT:  Valentín Field is a 63 y.o. male with the following diagnoses based on history, exam, and imaging:  There are no diagnoses linked to this encounter.   ---------------------------------------------------------------------  This is a pleasant 63 y.o. male patient with PMH/PSH of ***, presenting with***.     We discussed the underlying diagnoses and multiple treatment options including non-opioid medications, interventional procedures, physical therapy, home exercise, core muscle enhancement, and weight loss.  The risks and benefits of each treatment option were discussed and all questions were answered.      Treatment Plan:    Diagnostics/Referrals: {gaimage:91788}    Medications:    NSAIDs: {GANSIAD:11043}  Topical Agent: {GAYes/No/NA:72473}  TCA/SSRI/SNRI: {GATCA/SSRI/SNRI:48001}  Anti-convulsants: {GAAnticonvulsants:28400}  Muscle Relaxants: {GAmuscle Relaxant:06297}  Opioids: {GAopioid:49193::"None"}  Patient was educated about the risk and benefit of chronic opioid therapy including dependency, addiction, diversion, and opioid hyperalgesia.  Interventional Therapy: {GAProcedure:80204}.  Sedation: {GAsedation:23039}.  Clearance to stop Blood thinner: {Anticogualtion drugs:80225}    Regarding the above interventions, the patient has been educated regarding the risks (including bleeding, infection, increased pain, nerve damage, or allergic reaction), benefits, and alternatives. The patient states he understands and is eager to proceed.    Physical Rehabilitation: {GAPT:64047}    Patient Education: Counseled patient regarding the importance of {:96405}, I have stressed the importance of physical activity and a home exercise plan to help with pain and improve health.    Follow-up: RTC " ***.    May consider:     I would like to thank Trent Field MD for the opportunity to assist in the care of this patient. We had a very nice visit and I look forward to continuing their care. Please let me know if I can be of further assistance.     Kleber Gonzalez MD  Anesthesiologist  Interventional Pain Medicine  07/17/2024    Disclaimer:  This note was prepared using voice recognition system and is likely to have sound alike errors that may have been overlooked even after proof reading.  Please call me with any questions.

## 2024-11-20 ENCOUNTER — TELEPHONE (OUTPATIENT)
Dept: ORTHOPEDICS | Facility: CLINIC | Age: 64
End: 2024-11-20
Payer: MEDICARE

## 2024-11-20 NOTE — TELEPHONE ENCOUNTER
Pt informed Dr. Wilson is no longer in office and chose to schedule with Dr. Forrester again. Pt will call back if he needs to reschedule.   ----- Message from Ankita sent at 11/20/2024  7:15 AM CST -----  Regarding: Appt  Contact: [phone]  Patient is calling to speak with someone in office about who to schedule with he was told to schedule with KAIT Lugo so he is looking for a ortho surgeon. Please contact pt

## 2024-11-22 DIAGNOSIS — Z98.890 S/P SPINAL SURGERY: Primary | ICD-10-CM

## 2024-11-25 ENCOUNTER — HOSPITAL ENCOUNTER (OUTPATIENT)
Dept: RADIOLOGY | Facility: HOSPITAL | Age: 64
Discharge: HOME | End: 2024-11-25
Attending: ORTHOPAEDIC SURGERY
Payer: MEDICARE

## 2024-11-25 ENCOUNTER — OFFICE VISIT (OUTPATIENT)
Dept: ORTHOPEDICS | Facility: CLINIC | Age: 64
End: 2024-11-25
Payer: MEDICARE

## 2024-11-25 VITALS — WEIGHT: 190 LBS | HEIGHT: 69 IN | BODY MASS INDEX: 28.14 KG/M2

## 2024-11-25 DIAGNOSIS — M47.812 CERVICAL SPONDYLOSIS: ICD-10-CM

## 2024-11-25 DIAGNOSIS — M47.816 LUMBAR SPONDYLOSIS: Primary | ICD-10-CM

## 2024-11-25 DIAGNOSIS — Z98.890 S/P SPINAL SURGERY: ICD-10-CM

## 2024-11-25 DIAGNOSIS — M51.362 DEGENERATION OF INTERVERTEBRAL DISC OF LUMBAR REGION WITH DISCOGENIC BACK PAIN AND LOWER EXTREMITY PAIN: ICD-10-CM

## 2024-11-25 DIAGNOSIS — Z98.1 S/P CERVICAL SPINAL FUSION: ICD-10-CM

## 2024-11-25 DIAGNOSIS — M54.16 LUMBAR RADICULOPATHY: ICD-10-CM

## 2024-11-25 DIAGNOSIS — R26.89 IMBALANCE: ICD-10-CM

## 2024-11-25 DIAGNOSIS — M50.30 DDD (DEGENERATIVE DISC DISEASE), CERVICAL: ICD-10-CM

## 2024-11-25 DIAGNOSIS — M54.12 CERVICAL RADICULOPATHY: ICD-10-CM

## 2024-11-25 PROCEDURE — 1159F MED LIST DOCD IN RCRD: CPT | Mod: CPTII,S$GLB,, | Performed by: ORTHOPAEDIC SURGERY

## 2024-11-25 PROCEDURE — 72100 X-RAY EXAM L-S SPINE 2/3 VWS: CPT | Mod: TC

## 2024-11-25 PROCEDURE — 72100 X-RAY EXAM L-S SPINE 2/3 VWS: CPT | Mod: 26,,, | Performed by: RADIOLOGY

## 2024-11-25 PROCEDURE — 99214 OFFICE O/P EST MOD 30 MIN: CPT | Mod: S$GLB,,, | Performed by: ORTHOPAEDIC SURGERY

## 2024-11-25 PROCEDURE — 99999 PR PBB SHADOW E&M-EST. PATIENT-LVL IV: CPT | Mod: PBBFAC,,, | Performed by: ORTHOPAEDIC SURGERY

## 2024-11-25 PROCEDURE — 1160F RVW MEDS BY RX/DR IN RCRD: CPT | Mod: CPTII,S$GLB,, | Performed by: ORTHOPAEDIC SURGERY

## 2024-11-25 PROCEDURE — 3008F BODY MASS INDEX DOCD: CPT | Mod: CPTII,S$GLB,, | Performed by: ORTHOPAEDIC SURGERY

## 2024-11-25 RX ORDER — METHOCARBAMOL 500 MG/1
500 TABLET, FILM COATED ORAL 3 TIMES DAILY
Qty: 60 TABLET | Refills: 1 | Status: SHIPPED | OUTPATIENT
Start: 2024-11-25

## 2024-11-25 RX ORDER — MELOXICAM 15 MG/1
15 TABLET ORAL DAILY
Qty: 60 TABLET | Refills: 1 | Status: SHIPPED | OUTPATIENT
Start: 2024-11-25

## 2024-11-25 RX ORDER — PREGABALIN 100 MG/1
100 CAPSULE ORAL 2 TIMES DAILY
Qty: 60 CAPSULE | Refills: 1 | Status: SHIPPED | OUTPATIENT
Start: 2024-11-25

## 2024-11-26 NOTE — PROGRESS NOTES
"DATE: 11/26/2024  PATIENT: Valentín Field    Attending Physician: Octavio Forrester M.D.    Surgery:   3/18/21 C4 Corpectomy by Dr. Wilson  8/26/21 C3-6 laminectomy and fusion    HISTORY:  Valentín Field is a 64 y.o. male who returns to me today for FU. Patient was last seen on 12/5/22. Patient has neck pain and LBP. The LBP radiates down BLE to the toes. He has BLE n/t and weakness. He has R knee pain; he is wearing a brace. He wants pain meds.    The Patient denied myelopathic symptoms such as handwriting changes or difficulty with buttons/coins/keys. He has trouble with balance/walking. Denies perineal paresthesias, bowel/bladder dysfunction.    PMH/PSH/FamHx/SocHx:  Unchanged from prior visit    ROS:  Positive for R knee pain  Denies myelopathic symptoms, perineal paresthesias, bowel or bladder incontinence    EXAM:  Ht 5' 9" (1.753 m)   Wt 86.2 kg (190 lb)   BMI 28.06 kg/m²     My physical examination was notable for the following findings: Good strength in BUE but joints are stiff. Motor intact in BLE except for 4/5 in b/l hip flexion and R KE    IMAGING:  Today I independently reviewed the following images and my interpretations are as follows:    Previous AP and Lat upright C-spine films showed C4 corpectomy with cage in place. Posterior hardware in good placement without signs of failure.     Lumbar Xrs showed spondylosis and DDD.    ASSESSMENT/PLAN:  Patient has cervical spondylosis, in the setting of prior anterior and posterior cervical fusions. He also has lumbar spondylosis and BLE radiculopathy. I educated the patient on the importance of core/back strengthening, correct posture, bending/lifting ergonomics, and low-impact aerobic exercises (walking, elliptical, and aquatherapy). I prescribed mobic, lyrican and robaxin. Given his myelopathic symptoms, I ordered entire spine MRI to evaluate stenosis. I referred him to Pain Mgmt.  RTC in 4 weeks for imaging review.    Octavio Forrester MD  Orthopaedic Spine " Surgeon  Department of Orthopaedic Surgery  [phone]

## 2024-12-11 ENCOUNTER — OFFICE VISIT (OUTPATIENT)
Dept: ORTHOPEDICS | Facility: CLINIC | Age: 64
End: 2024-12-11
Attending: ORTHOPAEDIC SURGERY
Payer: MEDICARE

## 2024-12-11 VITALS — BODY MASS INDEX: 26.77 KG/M2 | HEIGHT: 69 IN | WEIGHT: 180.75 LBS

## 2024-12-11 DIAGNOSIS — M47.816 LUMBAR SPONDYLOSIS: ICD-10-CM

## 2024-12-11 DIAGNOSIS — M48.062 LUMBAR STENOSIS WITH NEUROGENIC CLAUDICATION: Primary | ICD-10-CM

## 2024-12-11 DIAGNOSIS — M51.362 DEGENERATION OF INTERVERTEBRAL DISC OF LUMBAR REGION WITH DISCOGENIC BACK PAIN AND LOWER EXTREMITY PAIN: ICD-10-CM

## 2024-12-11 PROCEDURE — 1160F RVW MEDS BY RX/DR IN RCRD: CPT | Mod: CPTII,S$GLB,, | Performed by: ORTHOPAEDIC SURGERY

## 2024-12-11 PROCEDURE — 99999 PR PBB SHADOW E&M-EST. PATIENT-LVL III: CPT | Mod: PBBFAC,,, | Performed by: ORTHOPAEDIC SURGERY

## 2024-12-11 PROCEDURE — 99214 OFFICE O/P EST MOD 30 MIN: CPT | Mod: S$GLB,,, | Performed by: ORTHOPAEDIC SURGERY

## 2024-12-11 PROCEDURE — 3008F BODY MASS INDEX DOCD: CPT | Mod: CPTII,S$GLB,, | Performed by: ORTHOPAEDIC SURGERY

## 2024-12-11 PROCEDURE — 1159F MED LIST DOCD IN RCRD: CPT | Mod: CPTII,S$GLB,, | Performed by: ORTHOPAEDIC SURGERY

## 2024-12-12 ENCOUNTER — TELEPHONE (OUTPATIENT)
Dept: PAIN MEDICINE | Facility: CLINIC | Age: 64
End: 2024-12-12
Payer: MEDICARE

## 2024-12-12 NOTE — PROGRESS NOTES
"DATE: 12/12/2024  PATIENT: Valentín Field    Attending Physician: Octavio Forrester M.D.    Surgery:   3/18/21 C4 Corpectomy by Dr. Wilson  8/26/21 C3-6 laminectomy and fusion    HISTORY:  Valentín Field is a 64 y.o. male who returns to me today for MRI review. Patient continues to have LBP that radiates down BLE to the toes. He has BLE n/t and weakness he cannot walk longer than 1/2 block due to pain; he gets relief by leaning forward on a shopping cart. He is miserable and considering surgical intervention.    The Patient denied myelopathic symptoms such as handwriting changes or difficulty with buttons/coins/keys. He has trouble with balance/walking. Denies perineal paresthesias, bowel/bladder dysfunction.    PMH/PSH/FamHx/SocHx:  Unchanged from prior visit    ROS:  Positive for R knee pain  Denies myelopathic symptoms, perineal paresthesias, bowel or bladder incontinence    EXAM:  Ht 5' 9" (1.753 m)   Wt 82 kg (180 lb 12.4 oz)   BMI 26.70 kg/m²     My physical examination was notable for the following findings: Good strength in BUE but joints are stiff. Motor intact in BLE except for 3/5 in b/l hip flexion and KE    IMAGING:  Today I independently reviewed the following images and my interpretations are as follows:    Previous AP and Lat upright C-spine films showed C4 corpectomy with cage in place. Posterior hardware in good placement without signs of failure.     Lumbar Xrs showed spondylosis and DDD.    Lumbar MRI showed L2-3 severe central stenosis.    MRI cervical showed chronic myelomalacia but it is well decompressed.    MRI thoracic showed no significant stenosis.    ASSESSMENT/PLAN:  Patient has lumbar spondylosis and stenosis with neurogenic claudication. I educated the patient on the importance of core/back strengthening, correct posture, bending/lifting ergonomics, and low-impact aerobic exercises (walking, elliptical, and aquatherapy). Continue meds. I referred him to Pain Mgmt for L2-3 TARA. " Patient is a candidate for L2-3 PLDF/TLIF if he fails conservative management. RTC in [place]    Octavio Forrester MD  Orthopaedic Spine Surgeon  Department of Orthopaedic Surgery  [phone]

## 2024-12-12 NOTE — TELEPHONE ENCOUNTER
----- Message from Octavio Bejarano MD sent at 2024  2:38 PM CST -----  Regarding: Order for DRE COFFEY    Patient Name: DRE COFFEY([id number])  Sex: Male  : 1960      PCP: GRETCHEN COFFEY    Center: [facility]     Level of Service:61710     [place] OFFICE/OUTPT VISIT, EST, LEVL IV, 30-39 MIN    Types of orders made on 2024: Procedure Request    Order Date:2024  Ordering User:OCTAVIO BEJARANO [[id number]]  Encounter Provider:Octavio Bejarano MD [id number]]  Authorizing Provider: Octavio Bejarano MD [[id number]]  Department:[facility][[id number]]    Common Order Information  Procedure -> Epidural Injection (specify level) Cmt: L2-3    Order Specific Information  Order: Procedure Request Order for Pain Management [Custom: [id number]]  Order #:          [id number]: 1 FUTURE    Priority: Routine  Class: Clinic Performed    Future Order Information      Expires on:2025            Expected by:2024                   Associated Diagnoses      M48.062 Lumbar stenosis with neurogenic claudication      Facility Name: -> [facility]           Priority: Routine  Class: Clinic Performed    Future Order Information      Expires on:2025            Expected by:2024                   Associated Diagnoses      M48.062 Lumbar stenosis with neurogenic claudication      Procedure -> Epidural Injection (specify level) Cmt: L2-3        Facility Name: -> [facility]

## 2024-12-31 ENCOUNTER — OFFICE VISIT (OUTPATIENT)
Dept: PAIN MEDICINE | Facility: CLINIC | Age: 64
End: 2024-12-31
Payer: MEDICARE

## 2024-12-31 VITALS
BODY MASS INDEX: 26.7 KG/M2 | HEART RATE: 96 BPM | DIASTOLIC BLOOD PRESSURE: 99 MMHG | SYSTOLIC BLOOD PRESSURE: 151 MMHG | HEIGHT: 69 IN

## 2024-12-31 DIAGNOSIS — R29.818 NEUROGENIC CLAUDICATION: Primary | ICD-10-CM

## 2024-12-31 PROCEDURE — 3080F DIAST BP >= 90 MM HG: CPT | Mod: CPTII,S$GLB,, | Performed by: EMERGENCY MEDICINE

## 2024-12-31 PROCEDURE — 1159F MED LIST DOCD IN RCRD: CPT | Mod: CPTII,S$GLB,, | Performed by: EMERGENCY MEDICINE

## 2024-12-31 PROCEDURE — 99214 OFFICE O/P EST MOD 30 MIN: CPT | Mod: S$GLB,,, | Performed by: EMERGENCY MEDICINE

## 2024-12-31 PROCEDURE — 3077F SYST BP >= 140 MM HG: CPT | Mod: CPTII,S$GLB,, | Performed by: EMERGENCY MEDICINE

## 2024-12-31 PROCEDURE — 99999 PR PBB SHADOW E&M-EST. PATIENT-LVL III: CPT | Mod: PBBFAC,,, | Performed by: EMERGENCY MEDICINE

## 2024-12-31 PROCEDURE — 3008F BODY MASS INDEX DOCD: CPT | Mod: CPTII,S$GLB,, | Performed by: EMERGENCY MEDICINE
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
## 2025-02-25 NOTE — PROGRESS NOTES
"DATE: 3/5/2025  PATIENT: Valentín Field    Attending Physician: Octavio Forrester M.D.    HISTORY:  Valentín Field is a 64 y.o. male who returns to me today for follow up.  He was last seen by Dr. Forrester.  Today he is doing well but notes low back pain and intermittent bilateral leg pain.  The Patient denied myelopathic symptoms such as handwriting changes or difficulty with buttons/coins/keys. He has trouble with balance/walking. Denies perineal paresthesias, bowel/bladder dysfunction.     EXAM:  Ht 5' 9" (1.753 m)   Wt 82 kg (180 lb 12.4 oz)   BMI 26.70 kg/m²   Stable     IMAGING:  Today I independently reviewed the following images and my interpretations are as follows:     Previous AP and Lat upright C-spine films showed C4 corpectomy with cage in place. Posterior hardware in good placement without signs of failure.      Lumbar Xrs showed spondylosis and DDD.     Lumbar MRI showed L2-3 severe central stenosis.     MRI cervical showed chronic myelomalacia but it is well decompressed.     MRI thoracic showed no significant stenosis.    Body mass index is 26.7 kg/m².    Hemoglobin A1C   Date Value Ref Range Status   03/02/2021 5.1 4.7 - 5.6 % Final         ASSESSMENT/PLAN:    Diagnoses and all orders for this visit:    Lumbar stenosis with neurogenic claudication  -     Procedure Order to Pain Management; Future  -     pregabalin (LYRICA) 100 MG capsule; Take 1 capsule (100 mg total) by mouth 3 (three) times daily.  -     cyclobenzaprine (FLEXERIL) 10 MG tablet; TAKE 1 TABLET BY MOUTH THREE TIMES A DAY AS NEEDED FOR MUSCLE SPASMS    S/P spinal surgery  -     Procedure Order to Pain Management; Future  -     pregabalin (LYRICA) 100 MG capsule; Take 1 capsule (100 mg total) by mouth 3 (three) times daily.  -     cyclobenzaprine (FLEXERIL) 10 MG tablet; TAKE 1 TABLET BY MOUTH THREE TIMES A DAY AS NEEDED FOR MUSCLE SPASMS        L2/3 TARA ordered. I will follow up with him in 6 weeks. If pain persists, we will discuss a L2-3 " TLIF.

## 2025-03-05 ENCOUNTER — TELEPHONE (OUTPATIENT)
Dept: PAIN MEDICINE | Facility: CLINIC | Age: 65
End: 2025-03-05
Payer: MEDICARE

## 2025-03-05 ENCOUNTER — OFFICE VISIT (OUTPATIENT)
Dept: ORTHOPEDICS | Facility: CLINIC | Age: 65
End: 2025-03-05
Payer: MEDICARE

## 2025-03-05 VITALS — WEIGHT: 180.75 LBS | BODY MASS INDEX: 26.77 KG/M2 | HEIGHT: 69 IN

## 2025-03-05 DIAGNOSIS — M48.062 LUMBAR STENOSIS WITH NEUROGENIC CLAUDICATION: Primary | ICD-10-CM

## 2025-03-05 DIAGNOSIS — Z98.890 S/P SPINAL SURGERY: ICD-10-CM

## 2025-03-05 PROCEDURE — 99999 PR PBB SHADOW E&M-EST. PATIENT-LVL III: CPT | Mod: PBBFAC,,, | Performed by: REGISTERED NURSE

## 2025-03-05 PROCEDURE — 1160F RVW MEDS BY RX/DR IN RCRD: CPT | Mod: CPTII,S$GLB,, | Performed by: REGISTERED NURSE

## 2025-03-05 PROCEDURE — 1159F MED LIST DOCD IN RCRD: CPT | Mod: CPTII,S$GLB,, | Performed by: REGISTERED NURSE

## 2025-03-05 PROCEDURE — 3008F BODY MASS INDEX DOCD: CPT | Mod: CPTII,S$GLB,, | Performed by: REGISTERED NURSE

## 2025-03-05 PROCEDURE — 99214 OFFICE O/P EST MOD 30 MIN: CPT | Mod: S$GLB,,, | Performed by: REGISTERED NURSE

## 2025-03-05 RX ORDER — PREGABALIN 100 MG/1
100 CAPSULE ORAL 3 TIMES DAILY
Qty: 90 CAPSULE | Refills: 5 | Status: SHIPPED | OUTPATIENT
Start: 2025-03-05 | End: 2025-09-03

## 2025-03-05 RX ORDER — CYCLOBENZAPRINE HCL 10 MG
TABLET ORAL
Qty: 90 TABLET | Refills: 2 | Status: SHIPPED | OUTPATIENT
Start: 2025-03-05

## 2025-03-05 NOTE — TELEPHONE ENCOUNTER
----- Message from Kleber Gonzalez MD sent at 3/5/2025  1:53 PM CST -----  Regarding: RE: Order for DRE COFFEY  OV next week  ----- Message -----  From: Nhung Gambino LPN  Sent: 3/5/2025   1:49 PM CST  To: Kleber Gonzalez MD  Subject: FW: Order for DRE COFFEY                     Procedure or OV  ----- Message -----  From: DELANEY Ocampo NP  Sent: 3/5/2025  11:23 AM CST  To: [facility] Pain Management Schedulers  Subject: Order for DRE COFFEY                           Patient Name: DRE COFFEY([id number])  Sex: Male  : 1960      PCP: GRETCHEN COFFEY    Center: [facility]     Level of Service:48182     KS OFFICE/OUTPT VISIT, EST, LEVL IV, 30-39 MIN    Types of orders made on 2025: Medications, Procedure Request    Order Date:3/5/2025  Ordering User:TODD OCAMPO [[id number]]  Encounter Provider:DELANEY Ocampo NP [[id number]]  Authorizing Provider: DELANEY Ocampo NP [[id number]]  Supervising Provider:DORIS BEJARANO [[id number]]  Type of Supervision:Collaborating Physician  Department:[facility][[id number]]    Common Order Information  Procedure -> Epidural Injection (specify level) Cmt: L2-3    Order Specific Information  Order: Procedure Order to Pain Management [Custom: [id number]]  Order #:          [id number]: 1 FUTURE    Priority: Routine  Class: Clinic Performed    Future Order Information      Expires on:2026            Expected by:2025                   Associated Diagnoses      Z98.890 S/P spinal surgery      M48.062 Lumbar stenosis with neurogenic claudication      Facility Name: [facility]           Priority: Routine  Class: Clinic Performed    Future Order Information      Expires on:2026            Expected by:2025                   Associated Diagnoses      Z98.890 S/P spinal surgery      M48.062 Lumbar stenosis with neurogenic claudication      Procedure -> Epidural Injection (specify level) Cmt: L2-3        Facility Name: [facility]        chest, midsternal

## 2025-03-12 ENCOUNTER — TELEPHONE (OUTPATIENT)
Dept: PAIN MEDICINE | Facility: CLINIC | Age: 65
End: 2025-03-12
Payer: MEDICARE

## 2025-03-12 ENCOUNTER — OFFICE VISIT (OUTPATIENT)
Dept: PAIN MEDICINE | Facility: CLINIC | Age: 65
End: 2025-03-12
Payer: MEDICARE

## 2025-03-12 VITALS
OXYGEN SATURATION: 98 % | BODY MASS INDEX: 26.45 KG/M2 | SYSTOLIC BLOOD PRESSURE: 158 MMHG | HEIGHT: 69 IN | DIASTOLIC BLOOD PRESSURE: 90 MMHG | HEART RATE: 73 BPM | WEIGHT: 178.56 LBS

## 2025-03-12 DIAGNOSIS — G89.4 CHRONIC PAIN SYNDROME: ICD-10-CM

## 2025-03-12 DIAGNOSIS — R29.818 NEUROGENIC CLAUDICATION: Primary | ICD-10-CM

## 2025-03-12 DIAGNOSIS — M54.16 LUMBAR RADICULOPATHY: Primary | ICD-10-CM

## 2025-03-12 DIAGNOSIS — M54.51 VERTEBROGENIC LOW BACK PAIN: ICD-10-CM

## 2025-03-12 DIAGNOSIS — M54.16 LUMBAR RADICULITIS: ICD-10-CM

## 2025-03-12 PROCEDURE — 99999 PR PBB SHADOW E&M-EST. PATIENT-LVL IV: CPT | Mod: PBBFAC,,, | Performed by: ANESTHESIOLOGY

## 2025-03-12 NOTE — PROGRESS NOTES
New patient evaluation  [facility]    Valentín Field  : 1960  Date: 3/12/2025     CHIEF COMPLAINT:  No chief complaint on file.    Referring Physician: No ref. provider found  Primary Care Physician: Trent Field MD    HPI:  This is a 64 y.o. male with a chief complaint of No chief complaint on file.  . The patient has Past medical history/Past surgical history of C3-C6 PSF for myelopathy, hypertension, memory difficulty, history of alcohol use, opioid use, difficult intubation, hypertension, former tobacco use disorder, impaired functional mobility/gait/endurance, history of cocaine and marijuana use    Patient was evaluated and referred by primary care provider for Back pain  Patient was previously seen by Neurosurgery team Dr. Forrester for neck pain  He was on chronic opioid therapy, last prescription was in 2024 by David Nielson MD    Interval History 2025:  Valentín Field is here for  follow up visit after seeing dr Hebert, patient continues to request opioid medication and declined or interventional procedure    Current pain score: ***/10      Diabetic: No    Anticogualtion drugs: None    Allergy To Iodine: No    Currently on Antibiotic: No    Current Description of Pain Symptoms:    History of Recent Fall or Trauma: No   Onset: Chronic, started   Pain Location:  Low back pain  Radiates/associated symptoms: BL LES, Worse on left,  radiates to bottom of the ankle.  Pain is Getting worse over the last 4 months    The pain is described as aching, burning(in back and RT leg), numbing, shooting, tight band, and constant.   Exacerbating factors: Standing, Walking, Extension, and Lifting.   Mitigating factors sitting still.   Symptoms interfere with daily activity, sleeping.   The patient feels like symptoms have been worsening.   Patient reports significant motor weakness which causes the right leg to give out.    Pain score:   Current: 10/10  Best: 7/10  Worst:  10/10    Current pain medications:      cyclobenzaprine (FLEXERIL) 10 MG tablet, PRN    HYDROcodone-acetaminophen (NORCO) 5-325 mg per tablet, last time in 2/2024    Toradol 10MG QD    Lyrica 75MG TID, last time in 2/2024    TiZANidine 4MG PRN     Current Narcotics/Opioid /benzo Medications:  Opioids- Hydrocodone with Acetaminophen (Norco)-last time in February 2024  Benzodiazepines: No    UDS:  NA    PDMP:  Reviewed and consistent with medication use as prescribed.     Previous Chronic Pain Treatment History:  Physical Therapy/HEP/Physician Lead Exercise Program:  Over the past 12 months, Patient has done 6 sessions FOR BACK.  PT response: Not Helpful.   Dates of the PT sessions: 03/2024  Is patient participating in home exercise program (HEP): Yes.    Non-interventional Pain Therapy:  []Chiropractor.   []Acupuncture/Dry needle.  []TENS unit.  [x]Heat/ICE.  [x]Back Brace.(OTC Bought) - Years ago patient had a brace that kept him straight up, reports it helped    Medications previously tried:  NSAIDs: Ibuprofen (Advil/Motrin)  Topical Agent: Yes  TCA/SSRI/SNRI: TCA: Amitriptyline (Elavil)  Anti-convulsants: Gabapentin  and Lyrica   Muscle Relaxants: Flexeril (Cyclobenzaprine) and Tizanidine (Zanaflex)  Opioids- Hydrocodone with Acetaminophen (Norco).    Interventional Pain Procedures:  He had multiple interventional procedure by Dr. David Nielson which provided him with limited relief     Previous spine/Relevant joint surgery:  3/18/21 C4 Corpectomy by Dr. Wilson  8/26/21 C3-6 laminectomy and fusion  Surgical History:   has a past surgical history that includes Surgical removal of vertebral body of cervical spine (N/A, 03/18/2021); Back surgery; Posterior fusion of cervical spine with laminectomy (N/A, 08/26/2021); Robot-assisted cholecystectomy (N/A, 07/19/2023); Colonoscopy (N/A, 03/05/2024); and Joint replacement.  Medical History:   has a past medical history of Anemia (08/24/2021), Hypertension, Pain and  numbness of left upper extremity (05/24/2021), and Pain and numbness of right upper extremity (05/24/2021).  Family History:  family history includes Diabetes in his brother.  Allergies:  Patient has no known allergies.   Social History/SUBSTANCE ABUSE HISTORY:  Personal history of substance abuse: No   reports that he has quit smoking. His smoking use included cigarettes. He started smoking about 5 years ago. He has never used smokeless tobacco. He reports current alcohol use. He reports current drug use. Drugs: Marijuana and Cocaine.  LABS:  CBC  Lab Results   Component Value Date    WBC 3.91 11/14/2024    HGB 13.1 (L) 11/14/2024    HCT 40.0 11/14/2024     Coagulation Profile   Lab Results   Component Value Date     11/14/2024       Lab Results   Component Value Date    INR 0.9 08/24/2021     CMP:  BMP  Lab Results   Component Value Date     11/14/2024    K 4.4 11/14/2024     11/14/2024    CO2 29 11/14/2024    BUN 12 11/14/2024    CREATININE 1.0 11/14/2024    CALCIUM 9.5 11/14/2024    ANIONGAP 6 (L) 11/14/2024    EGFRNORACEVR >60.0 11/14/2024     Lab Results   Component Value Date    ALT 15 11/14/2024    AST 14 11/14/2024    ALKPHOS 52 11/14/2024    BILITOT 0.6 11/14/2024     HGBA1C:  Lab Results   Component Value Date    HGBA1C 5.1 03/02/2021       ROS:    Review of Systems   GENERAL:  No weight loss, malaise or fevers.  HEENT:   No recent changes in vision or hearing  NECK:  Negative for lumps, no difficulty with swallowing.  RESPIRATORY:  Negative for cough, wheezing or shortness of breath, patient denies any recent URI.  CARDIOVASCULAR:  Negative for chest pain or palpitations.  GI:  Negative for abdominal discomfort, blood in stools or black stools or change in bowel habits.  MUSCULOSKELETAL:  See HPI.  SKIN:  Negative for lesions, rash, and itching.  PSYCH:  No mood disorder or recent psychosocial stressors.   HEMATOLOGY/LYMPHOLOGY:  See the blood thinner sectioned in HPI.  NEURO:  See  HPI  All other reviewed and negative other than HPI.    PHYSICAL EXAM:  VITALS: There were no vitals taken for this visit.  There is no height or weight on file to calculate BMI.  GENERAL: Well appearing, in no acute distress, alert and oriented x3, answers questions appropriately.   PSYCH: Flat affect.  SKIN: Skin color, texture, turgor normal, no rashes or lesions.  HEAD/FACE:  Normocephalic, atraumatic. Cranial nerves grossly intact.  CV: Regular rate  PULM: No evidence of respiratory difficulty, symmetric chest rise.  GI:  Soft and non-Distended.    BACK/SIJ/HIP:  Lumbar Spine Exam:       Inspection: No erythema, bruising.       Palpation: (+) TTP of lumbar paraspinals bilaterally      ROM:  Limited in flexion, extension, lateral bending.       (+) Facet loading bilaterally  Hip Exam:      Inspection: No gross deformity or apparent leg length discrepancy      Palpation:  No TTP to bilateral greater trochanteric bursas.   Neurologic Exam:     Strength: 3/5 in BL hip flexion and knee extension     Sensation:  Grossly intact to light touch in bilateral lower extremities     Tone: No abnormality appreciated in bilateral lower extremities    GAIT:  Antalgic gait    DIAGNOSTIC STUDIES AND MEDICAL RECORDS REVIEW:  I have personally reviewed and interpreted relevant radiology reports and reviewed relevant records from other services in the EMR.   X-ray cervical spine 2022    Stable postoperative changes of C4 corpectomy with anterior instrumented fusion of C3-C5 and posterior instrumented fusion from C3-C6.  Hardware appears intact without evidence of loosening or migration.     C1-C2: Pre-dens space is maintained.  Dens and lateral masses of C1 are unremarkable.     Alignment: Alignment is maintained.  No dynamic instability.     Vertebrae: No suspicious appearing lytic or blastic lesions.     Discs and facets: Severe degenerative disc disease at C5-6. facet arthropathy of C2-C3.     Miscellaneous: No additional  findings.     Impression:     Stable postoperative changes of the cervical spine.  No detrimental change compared to prior cervical spine radiograph.       MRI lumbar spine 2021  normal height. There is no signal abnormality of the cord. This cord terminates at about L2. There is crowding of the cauda equina. No abnormal enhancement.     T12-L1: Vertebral body heights are preserved without abnormal signal. Disc spaces preserved. There is no canal stenosis. No significant neural foraminal stenosis.     L1-L2: Anterior disc protrusion. Vertebral body heights are preserved. There is T2 and T1 foci of hyperintensity in the L2 vertebral body. There is no neuroforaminal narrowing. Facet joints are within normal limits. No canal stenosis. Ligamentum flavum thickening.     L2-L3: Circumferential disc bulge. Severe canal stenosis. Bilateral severe neural foraminal narrowing. Facet joints are within normal limits. Disc space narrowing. Ligamentum flavum thickening at this level. Disc osteophyte complex about the anterior aspect of the intervertebral discs. Modic  type II involving the anterosuperior endplate of L3.      L3-L4: Circumferential disc bulge. Severe disc space narrowing. Severe canal stenosis. Bilateral neural foraminal narrowing right greater than left. Joints are within normal limits. Modic type II at the posterior aspect of the L3-L4.   L4-L5: Circumferential disc bulge. Moderate to severe canal stenosis. Severe bilateral foraminal narrowing. Facet joints are within normal limits. Moderate type II about the anterior aspect of L4-L5.     L5-S1: There is no canal stenosis. Moderate to severe bilateral foraminal narrowing.   ASSESSMENT:  Valentín Field is a 64 y.o. male with the following diagnoses based on history, exam, and imaging:  There are no diagnoses linked to this encounter.   ---------------------------------------------------------------------  This is a pleasant 64 y.o. male patient with PMH/PSH of  C3-C6 PSF for myelopathy, hypertension, memory difficulty, history of alcohol use, opioid use, difficult intubation, hypertension, former tobacco use disorder, impaired functional mobility/gait/endurance, history of cocaine and marijuana use, presenting with low back pain and bilateral lower extremity radiation.     We discussed the underlying diagnoses and multiple treatment options including non-opioid medications, interventional procedures, physical therapy, home exercise, core muscle enhancement, and weight loss.  The risks and benefits of each treatment option were discussed and all questions were answered.      Treatment Plan:    Diagnostics/Referrals:  He has not interested in proceeding with any imaging as he had previous interventional that provided him with limited relief    Medications:    NSAIDs: otc  Topical Agent: NA  TCA/SSRI/SNRI: None  Anti-convulsants:  Start Lyrica 100 mg 3 times a day, may call for refill  Muscle Relaxants:  Continue Robaxin/tizanidine as needed for pain  Opioids: None  Patient was educated about the risk and benefit of chronic opioid therapy including dependency, addiction, diversion, and opioid hyperalgesia.  Interventional Therapy:  Declined    Physical Rehabilitation: Declined    Patient Education: Counseled patient regarding the importance of activity modification, I have stressed the importance of physical activity and a home exercise plan to help with pain and improve health.    Follow-up: RTC as needed.        Kleber Gonzalez MD  Anesthesiologist  Interventional Pain Medicine  03/12/2025    Disclaimer:  This note was prepared using voice recognition system and is likely to have sound alike errors that may have been overlooked even after proof reading.  Please call me with any questions.

## 2025-03-12 NOTE — PROGRESS NOTES
EST patient evaluation  [facility]    Valentín Field  : 1960  Date: 3/12/2025     CHIEF COMPLAINT:  Discuss injection, Low-back Pain, and Mid-back Pain    Referring Physician: No ref. provider found  Primary Care Physician: Trent Filed MD    HPI:  This is a 64 y.o. male with a chief complaint of Discuss injection, Low-back Pain, and Mid-back Pain  . The patient has Past medical history/Past surgical history of C3-C6 PSF for myelopathy, hypertension, memory difficulty, history of alcohol use, opioid use, difficult intubation, hypertension, former tobacco use disorder, impaired functional mobility/gait/endurance, history of cocaine and marijuana use    Patient was evaluated and referred by primary care provider for Back pain  Patient was previously seen by Neurosurgery team Dr. Forrester for neck pain  He was on chronic opioid therapy, last prescription was in 2024 by David Nielson MD    Interval History 2025:  Valentín Field is here for  follow up visit to discuss lumbar epidural steroid injection at L2-L3 per orthopedic team recommendation.  Current pain score: 10/10    Diabetic: No    Anticoagulation drugs: None    Allergy To Iodine: No    Currently on Antibiotic: No    Current Description of Pain Symptoms:    History of Recent Fall or Trauma: No   Onset: Chronic, started   Pain Location:  Low back pain  Radiates/associated symptoms: BL LES, Worse on left,  radiates to bottom of the ankle.  Pain is Getting worse over the last 4 months    The pain is described as aching, burning(in back and RT leg), numbing, shooting, tight band, and constant.   Exacerbating factors: Standing, Walking, Extension, and Lifting.   Mitigating factors sitting still.   Symptoms interfere with daily activity, sleeping.   The patient feels like symptoms have been worsening.   Patient reports significant motor weakness which causes the right leg to give out.    Pain score:   Best:  7/10  Worst: 10/10    Current pain medications      Lyrica 100 mg 3 times a day  Current Narcotics/Opioid /benzo Medications:  Opioids- none  Benzodiazepines: No    UDS:  NA    PDMP:  Reviewed and consistent with medication use as prescribed.     Previous Chronic Pain Treatment History:  Physical Therapy/HEP/Physician Lead Exercise Program:  Over the past 12 months, Patient has done 6 sessions FOR BACK.  PT response: Not Helpful.   Dates of the PT sessions: 03/2024  Is patient participating in home exercise program (HEP): Yes.    Non-interventional Pain Therapy:  []Chiropractor.   []Acupuncture/Dry needle.  []TENS unit.  [x]Heat/ICE.  [x]Back Brace.(OTC Bought) - Years ago patient had a brace that kept him straight up, reports it helped    Medications previously tried:  NSAIDs: Ibuprofen (Advil/Motrin)  Topical Agent: Yes  TCA/SSRI/SNRI: TCA: Amitriptyline (Elavil)  Anti-convulsants: Gabapentin  and Lyrica   Muscle Relaxants: Flexeril (Cyclobenzaprine) and Tizanidine (Zanaflex)  Opioids- Hydrocodone with Acetaminophen (Norco).    Interventional Pain Procedures:  He had multiple interventional procedure by Dr. David Nielson which provided him with limited relief     Previous spine/Relevant joint surgery:  3/18/21 C4 Corpectomy by Dr. Wilson  8/26/21 C3-6 laminectomy and fusion  Surgical History:   has a past surgical history that includes Surgical removal of vertebral body of cervical spine (N/A, 03/18/2021); Back surgery; Posterior fusion of cervical spine with laminectomy (N/A, 08/26/2021); Robot-assisted cholecystectomy (N/A, 07/19/2023); Colonoscopy (N/A, 03/05/2024); Joint replacement; and Neck surgery.  Medical History:   has a past medical history of Anemia (08/24/2021), Hypertension, Pain and numbness of left upper extremity (05/24/2021), and Pain and numbness of right upper extremity (05/24/2021).  Family History:  family history includes Diabetes in his brother.  Allergies:  Patient has no known allergies.  "  Social History/SUBSTANCE ABUSE HISTORY:  Personal history of substance abuse: No   reports that he has quit smoking. His smoking use included cigarettes. He started smoking about 5 years ago. He has never used smokeless tobacco. He reports current alcohol use. He reports current drug use. Drugs: Marijuana and Cocaine.  LABS:  CBC  Lab Results   Component Value Date    WBC 3.91 11/14/2024    HGB 13.1 (L) 11/14/2024    HCT 40.0 11/14/2024     Coagulation Profile   Lab Results   Component Value Date     11/14/2024       Lab Results   Component Value Date    INR 0.9 08/24/2021     CMP:  BMP  Lab Results   Component Value Date     11/14/2024    K 4.4 11/14/2024     11/14/2024    CO2 29 11/14/2024    BUN 12 11/14/2024    CREATININE 1.0 11/14/2024    CALCIUM 9.5 11/14/2024    ANIONGAP 6 (L) 11/14/2024    EGFRNORACEVR >60.0 11/14/2024     Lab Results   Component Value Date    ALT 15 11/14/2024    AST 14 11/14/2024    ALKPHOS 52 11/14/2024    BILITOT 0.6 11/14/2024     HGBA1C:  Lab Results   Component Value Date    HGBA1C 5.1 03/02/2021       ROS:    Review of Systems   GENERAL:  No weight loss, malaise or fevers.  HEENT:   No recent changes in vision or hearing  NECK:  Negative for lumps, no difficulty with swallowing.  RESPIRATORY:  Negative for cough, wheezing or shortness of breath, patient denies any recent URI.  CARDIOVASCULAR:  Negative for chest pain or palpitations.  GI:  Negative for abdominal discomfort, blood in stools or black stools or change in bowel habits.  MUSCULOSKELETAL:  See HPI.  SKIN:  Negative for lesions, rash, and itching.  PSYCH:  No mood disorder or recent psychosocial stressors.   HEMATOLOGY/LYMPHOLOGY:  See the blood thinner sectioned in HPI.  NEURO:  See HPI  All other reviewed and negative other than HPI.    PHYSICAL EXAM:  VITALS: BP (!) 158/90 (BP Location: Left arm, Patient Position: Sitting)   Pulse 73   Ht 5' 9" (1.753 m)   Wt 81 kg (178 lb 9.2 oz)   SpO2 98%   " BMI 26.37 kg/m²   Body mass index is 26.37 kg/m².  GENERAL: Well appearing, in no acute distress, alert and oriented x3, answers questions appropriately.   PSYCH: Flat affect.  SKIN: Skin color, texture, turgor normal, no rashes or lesions.  HEAD/FACE:  Normocephalic, atraumatic. Cranial nerves grossly intact.  CV: Regular rate  PULM: No evidence of respiratory difficulty, symmetric chest rise.  GI:  Soft and non-Distended.    BACK/SIJ/HIP:  Lumbar Spine Exam:       Inspection: No erythema, bruising.       Palpation: (+) TTP of lumbar paraspinals bilaterally      ROM:  Limited in flexion, extension, lateral bending.       (+) Facet loading bilaterally  Hip Exam:      Inspection: No gross deformity or apparent leg length discrepancy      Palpation:  No TTP to bilateral greater trochanteric bursas.   Neurologic Exam:     Strength: 3/5 in BL hip flexion and knee extension     Sensation:  Grossly intact to light touch in bilateral lower extremities     Tone: No abnormality appreciated in bilateral lower extremities    GAIT:  Antalgic gait    DIAGNOSTIC STUDIES AND MEDICAL RECORDS REVIEW:  I have personally reviewed and interpreted relevant radiology reports and reviewed relevant records from other services in the EMR.   X-ray cervical spine 2022    Stable postoperative changes of C4 corpectomy with anterior instrumented fusion of C3-C5 and posterior instrumented fusion from C3-C6.  Hardware appears intact without evidence of loosening or migration.     C1-C2: Pre-dens space is maintained.  Dens and lateral masses of C1 are unremarkable.     Alignment: Alignment is maintained.  No dynamic instability.     Vertebrae: No suspicious appearing lytic or blastic lesions.     Discs and facets: Severe degenerative disc disease at C5-6. facet arthropathy of C2-C3.     Miscellaneous: No additional findings.     Impression:     Stable postoperative changes of the cervical spine.  No detrimental change compared to prior cervical  spine radiograph.       MRI lumbar spine 2021  normal height. There is no signal abnormality of the cord. This cord terminates at about L2. There is crowding of the cauda equina. No abnormal enhancement.     T12-L1: Vertebral body heights are preserved without abnormal signal. Disc spaces preserved. There is no canal stenosis. No significant neural foraminal stenosis.     L1-L2: Anterior disc protrusion. Vertebral body heights are preserved. There is T2 and T1 foci of hyperintensity in the L2 vertebral body. There is no neuroforaminal narrowing. Facet joints are within normal limits. No canal stenosis. Ligamentum flavum thickening.     L2-L3: Circumferential disc bulge. Severe canal stenosis. Bilateral severe neural foraminal narrowing. Facet joints are within normal limits. Disc space narrowing. Ligamentum flavum thickening at this level. Disc osteophyte complex about the anterior aspect of the intervertebral discs. Modic  type II involving the anterosuperior endplate of L3.      L3-L4: Circumferential disc bulge. Severe disc space narrowing. Severe canal stenosis. Bilateral neural foraminal narrowing right greater than left. Joints are within normal limits. Modic type II at the posterior aspect of the L3-L4.   L4-L5: Circumferential disc bulge. Moderate to severe canal stenosis. Severe bilateral foraminal narrowing. Facet joints are within normal limits. Moderate type II about the anterior aspect of L4-L5.     L5-S1: There is no canal stenosis. Moderate to severe bilateral foraminal narrowing.     -MRI lumbar spine 12/2024  Grade 1 retrolisthesis of L3 on L4.  No spondylolysis.  Vertebral body heights are well maintained.  No acute fractures.  No marrow signal abnormality to suggest an infiltrative process.     Multilevel degenerative disc space narrowing desiccation most severe at L3-L4 and L4-L5.  Degenerative endplate edema/Modic 1 changes at L4-L5 and to a lesser extent L3-L4 and L2.     Distal spinal cord  demonstrates normal contour and signal intensity.  Cauda equina appears normal without findings to suggest arachnoiditis.  Conus medullaris terminates at L1-L2.     Bilateral renal cysts.  Mild fatty degeneration of the posterior paraspinal musculature.  SI joints are symmetric.     T12-L1: Bilateral facet arthropathy.  No spinal canal stenosis.  No neural foraminal narrowing.     L1-L2: Bilateral facet arthropathy and bilateral ligamentum flavum hypertrophy.  No spinal canal stenosis.  Moderate bilateral neural foraminal narrowing.     L2-L3: Circumferential disc bulge causes mass effect on the ventral thecal sac and lateral recesses.  Bilateral facet arthropathy with a trace right joint effusion.  Bilateral ligamentum flavum hypertrophy.  Severe spinal canal stenosis.  Moderate left and severe right neural foraminal narrowing.     L3-L4: Circumferential disc bulge causes mass effect on the ventral thecal sac and lateral recesses.  Bilateral facet arthropathy and bilateral ligamentum flavum hypertrophy.  Mild spinal canal stenosis.  Mild-to-moderate left and moderate to severe right neural foraminal narrowing.     L4-L5: Circumferential disc bulge causes mass effect on the ventral thecal sac and lateral recesses.  Bilateral facet arthropathy and bilateral ligamentum flavum hypertrophy.  Mild spinal canal stenosis.  Severe bilateral neural foraminal narrowing.     L5-S1: Circumferential disc bulge causes mass effect on the ventral thecal sac and lateral recesses.  Bilateral facet arthropathy.  No spinal canal stenosis.  Severe bilateral neural foraminal narrowing.       ASSESSMENT:  Valentín Field is a 64 y.o. male with the following diagnoses based on history, exam, and imagin. Neurogenic claudication    2. Chronic pain syndrome    3. Vertebrogenic low back pain    4. Lumbar radiculitis     ---------------------------------------------------------------------  This is a pleasant 64 y.o. male patient with  PMH/PSH of C3-C6 PSF for myelopathy, hypertension, memory difficulty, history of alcohol use, opioid use, difficult intubation, hypertension, former tobacco use disorder, impaired functional mobility/gait/endurance, history of cocaine and marijuana use, presenting with low back pain and bilateral lower extremity radiation, MRI lumbar spine showed severe spinal canal stenosis at L2-L3, patient was seen by neurosurgery who recommended L2-L3 lumbar epidural steroid injection and possible consider surgical intervention.     Treatment Plan:    Diagnostics/Referrals:  Continue with neurosurgery    Medications:    NSAIDs: otc  Topical Agent: NA  TCA/SSRI/SNRI: None  Anti-convulsants:  Continue Lyrica 100 mg 3 times a day  Muscle Relaxants:  None  Opioids: None    Interventional Therapy:  Please schedule for lumbar epidural steroid injection at L2-L3, oral sedation    Physical Rehabilitation: Declined    Patient Education: Counseled patient regarding the importance of activity modification, I have stressed the importance of physical activity and a home exercise plan to help with pain and improve health.    Follow-up: RTC as discussed    Kleber Gonzalez MD  Anesthesiologist  Interventional Pain Medicine  03/12/2025    Disclaimer:  This note was prepared using voice recognition system and is likely to have sound alike errors that may have been overlooked even after proof reading.  Please call me with any questions.

## 2025-03-12 NOTE — TELEPHONE ENCOUNTER
----- Message from Kleber Gonzalez MD sent at 3/12/2025  1:11 PM CDT -----  Interventional Therapy:  Please schedule for lumbar epidural steroid injection at L2-L3, oral sedation

## 2025-03-19 ENCOUNTER — HOSPITAL ENCOUNTER (OUTPATIENT)
Dept: RADIOLOGY | Facility: HOSPITAL | Age: 65
Discharge: HOME | End: 2025-03-19
Attending: PHYSICIAN ASSISTANT
Payer: MEDICARE

## 2025-03-19 ENCOUNTER — OFFICE VISIT (OUTPATIENT)
Dept: ORTHOPEDICS | Facility: CLINIC | Age: 65
End: 2025-03-19
Payer: MEDICARE

## 2025-03-19 DIAGNOSIS — M25.561 RIGHT KNEE PAIN: ICD-10-CM

## 2025-03-19 DIAGNOSIS — Z98.890 S/P SPINAL SURGERY: ICD-10-CM

## 2025-03-19 DIAGNOSIS — M17.11 PRIMARY OSTEOARTHRITIS OF RIGHT KNEE: ICD-10-CM

## 2025-03-19 DIAGNOSIS — M25.361 INSTABILITY OF RIGHT KNEE JOINT: Primary | ICD-10-CM

## 2025-03-19 DIAGNOSIS — R26.89 IMBALANCE: ICD-10-CM

## 2025-03-19 PROCEDURE — 73562 X-RAY EXAM OF KNEE 3: CPT | Mod: 26,RT,, | Performed by: RADIOLOGY

## 2025-03-19 PROCEDURE — 99999 PR PBB SHADOW E&M-EST. PATIENT-LVL III: CPT | Mod: PBBFAC,,, | Performed by: PHYSICIAN ASSISTANT

## 2025-03-19 PROCEDURE — 73560 X-RAY EXAM OF KNEE 1 OR 2: CPT | Mod: TC,LT

## 2025-03-19 PROCEDURE — 1159F MED LIST DOCD IN RCRD: CPT | Mod: CPTII,S$GLB,, | Performed by: PHYSICIAN ASSISTANT

## 2025-03-19 PROCEDURE — 99214 OFFICE O/P EST MOD 30 MIN: CPT | Mod: S$GLB,,, | Performed by: PHYSICIAN ASSISTANT

## 2025-03-19 PROCEDURE — 1160F RVW MEDS BY RX/DR IN RCRD: CPT | Mod: CPTII,S$GLB,, | Performed by: PHYSICIAN ASSISTANT

## 2025-03-19 PROCEDURE — 73560 X-RAY EXAM OF KNEE 1 OR 2: CPT | Mod: 26,59,LT, | Performed by: RADIOLOGY
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
## 2025-04-16 ENCOUNTER — TELEPHONE (OUTPATIENT)
Dept: ORTHOPEDICS | Facility: CLINIC | Age: 65
End: 2025-04-16
Payer: MEDICARE

## 2025-04-16 NOTE — TELEPHONE ENCOUNTER
Called for follow up. No answer x 2 times. Unable to leave VM. I have scheduled a in person follow up next week.

## 2025-04-23 ENCOUNTER — TELEPHONE (OUTPATIENT)
Dept: ORTHOPEDICS | Facility: CLINIC | Age: 65
End: 2025-04-23
Payer: MEDICARE

## 2025-04-23 NOTE — TELEPHONE ENCOUNTER
Attempt to contact patient regarding rescheduling his appointment for TARA follow up. Left message stating that his appointment has been rescheduled for 1:00 pm on 05/29/2025 to see Glendy Ocampo. Also left number for patient to return call back to [phone]. Thanks

## 2025-04-24 ENCOUNTER — TELEPHONE (OUTPATIENT)
Dept: ORTHOPEDICS | Facility: CLINIC | Age: 65
End: 2025-04-24
Payer: MEDICARE

## 2025-04-24 NOTE — TELEPHONE ENCOUNTER
R[place] with name and callback number for patient's MRI results and getting him set up with Dr. Louie in sports

## 2025-04-25 ENCOUNTER — TELEPHONE (OUTPATIENT)
Dept: ORTHOPEDICS | Facility: CLINIC | Age: 65
End: 2025-04-25
Payer: MEDICARE

## 2025-04-29 ENCOUNTER — TELEPHONE (OUTPATIENT)
Dept: ORTHOPEDICS | Facility: CLINIC | Age: 65
End: 2025-04-29
Payer: MEDICARE

## 2025-04-29 NOTE — TELEPHONE ENCOUNTER
LVM for patient with name and callback number asking patient to call office for MRI results and f/u appts.    ----- Message from Med Assistant Hart sent at 4/29/2025  7:32 AM CDT -----  Regarding: FW: MRI    ----- Message -----  From: Jael Almonte MA  Sent: 4/24/2025   5:47 PM CDT  To: Hailey Juarez MA  Subject: FW: MRI                                          Would you mind trying to get in contact with this nicole on Friday for [place]? I've tried calling him a couple times but never got him to answer  ----- Message -----  From: Jael Almonte MA  Sent: 4/23/2025   4:49 PM CDT  To: Jael Almonte MA  Subject: FW: MRI                                          Call patient and let him know of R meniscal tear. Get him set up to see Liban in sports  ----- Message -----  From: [place] Hayes PA-C  Sent: 4/23/2025   4:33 PM CDT  To: Jael Almonte MA  Subject: MRI                                              Has been Diskus tear needs to schedule appointment with sports

## 2025-05-02 DIAGNOSIS — M48.062 LUMBAR STENOSIS WITH NEUROGENIC CLAUDICATION: ICD-10-CM

## 2025-05-02 DIAGNOSIS — Z98.890 S/P SPINAL SURGERY: ICD-10-CM

## 2025-05-02 RX ORDER — CYCLOBENZAPRINE HCL 10 MG
TABLET ORAL
Qty: 90 TABLET | Refills: 2 | Status: SHIPPED | OUTPATIENT
Start: 2025-05-02

## 2025-05-02 RX ORDER — PREGABALIN 100 MG/1
100 CAPSULE ORAL 3 TIMES DAILY
Qty: 90 CAPSULE | Refills: 5 | Status: SHIPPED | OUTPATIENT
Start: 2025-05-02 | End: 2025-10-31

## 2025-05-28 ENCOUNTER — TELEPHONE (OUTPATIENT)
Dept: PAIN MEDICINE | Facility: CLINIC | Age: 65
End: 2025-05-28
Payer: MEDICARE

## 2025-05-28 NOTE — TELEPHONE ENCOUNTER
LVM to call clinic in regards to previous message. A referral is needed as we do not have on in system on paper.

## 2025-05-29 DIAGNOSIS — Z98.890 S/P SPINAL SURGERY: ICD-10-CM

## 2025-05-29 DIAGNOSIS — M48.062 LUMBAR STENOSIS WITH NEUROGENIC CLAUDICATION: ICD-10-CM

## 2025-05-29 RX ORDER — PREGABALIN 100 MG/1
100 CAPSULE ORAL 3 TIMES DAILY
Qty: 90 CAPSULE | Refills: 5 | OUTPATIENT
Start: 2025-05-29 | End: 2025-11-27

## 2025-06-05 ENCOUNTER — OFFICE VISIT (OUTPATIENT)
Dept: ORTHOPEDICS | Facility: CLINIC | Age: 65
End: 2025-06-05
Payer: MEDICARE

## 2025-06-05 VITALS — BODY MASS INDEX: 25.77 KG/M2 | HEIGHT: 69 IN | WEIGHT: 174 LBS

## 2025-06-05 DIAGNOSIS — M54.16 LUMBAR RADICULOPATHY: Primary | ICD-10-CM

## 2025-06-05 DIAGNOSIS — M54.12 CERVICAL RADICULOPATHY: ICD-10-CM

## 2025-06-05 PROCEDURE — 1159F MED LIST DOCD IN RCRD: CPT | Mod: CPTII,S$GLB,, | Performed by: REGISTERED NURSE

## 2025-06-05 PROCEDURE — 3008F BODY MASS INDEX DOCD: CPT | Mod: CPTII,S$GLB,, | Performed by: REGISTERED NURSE

## 2025-06-05 PROCEDURE — 99999 PR PBB SHADOW E&M-EST. PATIENT-LVL III: CPT | Mod: PBBFAC,,, | Performed by: REGISTERED NURSE

## 2025-06-05 PROCEDURE — 99215 OFFICE O/P EST HI 40 MIN: CPT | Mod: S$GLB,,, | Performed by: REGISTERED NURSE

## 2025-06-05 PROCEDURE — 1160F RVW MEDS BY RX/DR IN RCRD: CPT | Mod: CPTII,S$GLB,, | Performed by: REGISTERED NURSE

## 2025-06-05 RX ORDER — PREGABALIN 150 MG/1
150 CAPSULE ORAL 3 TIMES DAILY
Qty: 90 CAPSULE | Refills: 5 | Status: SHIPPED | OUTPATIENT
Start: 2025-06-05 | End: 2025-12-04

## 2025-06-06 ENCOUNTER — TELEPHONE (OUTPATIENT)
Dept: PAIN MEDICINE | Facility: CLINIC | Age: 65
End: 2025-06-06
Payer: MEDICARE

## 2025-06-06 NOTE — TELEPHONE ENCOUNTER
----- Message from DELANEY Shannon NP sent at 2025 11:29 AM CDT -----  Regarding: Order for DRE COFFEY    Patient Name: DRE COFFEY([id number])  Sex: Male  : 1960      PCP: GRETCHEN COFFEY    Center: [facility]     Types of orders made on 2025: Procedure Request    Order Date:2025  Ordering User:TODD SHANNON [[id number]]  Encounter Provider:DELANEY Shannon NP [[id number]]  Authorizing Provider: DELANEY Shannon NP [[id number]]  Supervising Provider:DORIS BEJARANO [[id number]]  Type of Supervision:Collaborating Physician  Department:[facility][[id number]]    Common Order Information  Procedure -> Epidural Injection (specify level) Cmt: C7/T1    Order Specific Information  Order: Procedure Order to Pain Management [Custom: [id number]]  Order #:          [id number]: 1 FUTURE    Priority: Routine  Class: Clinic Performed    Future Order Information      Expires on:2026            Expected by:2025                   Associated Diagnoses      M54.12 Cervical radiculopathy      Facility Name: -> [facility]           Priority: Routine  Class: Clinic Performed    Future Order Information      Expires on:2026            Expected by:2025                   Associated Diagnoses      M54.12 Cervical radiculopathy      Procedure -> Epidural Injection (specify level) Cmt: C7/T1        Facility Name: > [facility]

## 2025-06-06 NOTE — TELEPHONE ENCOUNTER
Attempted to contact patient.     No answer, no voicemail at [phone]  Called [phone], female answered states wrong number.

## 2025-06-10 ENCOUNTER — TELEPHONE (OUTPATIENT)
Dept: PAIN MEDICINE | Facility: CLINIC | Age: 65
End: 2025-06-10
Payer: MEDICARE

## 2025-06-10 ENCOUNTER — TELEPHONE (OUTPATIENT)
Dept: ORTHOPEDICS | Facility: CLINIC | Age: 65
End: 2025-06-10
Payer: MEDICARE

## 2025-06-10 NOTE — TELEPHONE ENCOUNTER
----- Message from Fernanda sent at 6/10/2025  8:13 AM CDT -----  Type:  Sooner Apoointment RequestCaller is requesting a sooner appointment.  Caller declined first available appointment listed below.  Caller will not accept being placed on the waitlist and is requesting a message be sent to doctor.Name of Caller: Pt When is the first available appointment? Symptoms: injection apptWould the patient rather a call back or a response via [facility]? [facility] Call Back Number:  [phone] Information: Pt states he was given a referral to be seen by provider for an injection.  Pt would like to speak with nurse in office for an appt.

## 2025-06-10 NOTE — TELEPHONE ENCOUNTER
Attempt to contact patient regarding a sooner appointment. Left message stating that he do not have an appointment with spine. Also left message for patient to return call back to [phone]. Thanks

## 2025-06-13 ENCOUNTER — TELEPHONE (OUTPATIENT)
Dept: PAIN MEDICINE | Facility: CLINIC | Age: 65
End: 2025-06-13
Payer: MEDICARE

## 2025-06-13 DIAGNOSIS — M54.12 CERVICAL RADICULAR PAIN: Primary | ICD-10-CM

## 2025-06-13 NOTE — TELEPHONE ENCOUNTER
----- Message from DELANEY Shannon NP sent at 2025 11:29 AM CDT -----  Regarding: Order for DRE COFFEY    Patient Name: DRE COFFEY([id number])  Sex: Male  : 1960      PCP: GRETCHEN COFFEY    Center: [facility]     Types of orders made on 2025: Procedure Request    Order Date:2025  Ordering User:TODD SHANNON [[id number]]  Encounter Provider:DELANEY Shannon NP [[id number]]  Authorizing Provider: DELANEY Shannon NP [[id number]]  Supervising Provider:DORIS BEJARANO [[id number]]  Type of Supervision:Collaborating Physician  Department:[facility][[id number]]    Common Order Information  Procedure -> Epidural Injection (specify level) Cmt: C7/T1    Order Specific Information  Order: Procedure Order to Pain Management [Custom: [id number]]  Order #:          [id number]: 1 FUTURE    Priority: Routine  Class: Clinic Performed    Future Order Information      Expires on:2026            Expected by:2025                   Associated Diagnoses      M54.12 Cervical radiculopathy      Facility Name: -> [facility]           Priority: Routine  Class: Clinic Performed    Future Order Information      Expires on:2026            Expected by:2025                   Associated Diagnoses      M54.12 Cervical radiculopathy      Procedure -> Epidural Injection (specify level) Cmt: C7/T1        Facility Name: > [facility]

## 2025-06-13 NOTE — TELEPHONE ENCOUNTER
Discussed with patient DR. Gonzalez is out of clinic until 7/2. Inj scheduled and patient voiced understanding .

## 2025-06-26 ENCOUNTER — OFFICE VISIT (OUTPATIENT)
Dept: ORTHOPEDICS | Facility: CLINIC | Age: 65
End: 2025-06-26
Payer: MEDICARE

## 2025-06-26 DIAGNOSIS — M23.91 INTERNAL DERANGEMENT OF RIGHT KNEE: Primary | ICD-10-CM

## 2025-06-26 DIAGNOSIS — M17.11 PRIMARY OSTEOARTHRITIS OF RIGHT KNEE: ICD-10-CM

## 2025-06-26 DIAGNOSIS — M54.16 LUMBAR RADICULOPATHY: ICD-10-CM

## 2025-06-26 PROCEDURE — 99999 PR PBB SHADOW E&M-EST. PATIENT-LVL II: CPT | Mod: PBBFAC,,, | Performed by: PHYSICIAN ASSISTANT

## 2025-06-26 RX ORDER — TRIAMCINOLONE ACETONIDE 40 MG/ML
40 INJECTION, SUSPENSION INTRA-ARTICULAR; INTRAMUSCULAR
Status: COMPLETED | OUTPATIENT
Start: 2025-06-26 | End: 2025-06-26

## 2025-06-26 RX ADMIN — TRIAMCINOLONE ACETONIDE 40 MG: 40 INJECTION, SUSPENSION INTRA-ARTICULAR; INTRAMUSCULAR at 11:06
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
## 2025-06-27 ENCOUNTER — TELEPHONE (OUTPATIENT)
Dept: PAIN MEDICINE | Facility: CLINIC | Age: 65
End: 2025-06-27
Payer: MEDICARE

## 2025-06-27 NOTE — TELEPHONE ENCOUNTER
Patient was calling for a time of arrival for his procedure. I informed him that the procedure team will call him the day before with a time. Patient verbally understood.

## 2025-07-02 ENCOUNTER — TELEPHONE (OUTPATIENT)
Dept: PAIN MEDICINE | Facility: CLINIC | Age: 65
End: 2025-07-02
Payer: MEDICARE

## 2025-07-02 DIAGNOSIS — M54.16 LUMBAR RADICULOPATHY: Primary | ICD-10-CM

## 2025-07-02 NOTE — TELEPHONE ENCOUNTER
Procedure Order to Pain Management [[id number]]    Electronically signed by: DELANEY Ocampo NP on 06/05/25 1128 Status: Active   Ordering user: DELANEY Ocampo NP 06/05/25 1128 Authorized by: DELANEY Ocampo NP   Ordering mode: Standard   Frequency:  06/05/25 -     Diagnoses  Lumbar radiculopathy [M54.16]   Questionnaire    Question Answer   Procedure Epidural Injection (specify level) Comment - L2/3   Facility Name: [place]     Scheduled 7/9

## 2025-07-02 NOTE — TELEPHONE ENCOUNTER
----- Message from Kleber Gonzalez MD sent at 7/2/2025 11:24 AM CDT -----  Please schedule  for next week  ----- Message -----  From: Nhung Gambino LPN  Sent: 7/2/2025  11:12 AM CDT  To: Kleber Gonzalez MD    I have one from [facility] for TARA L2/3.  ----- Message -----  From: Kleber Gonzalez MD  Sent: 7/2/2025   9:57 AM CDT  To: Lisa Shahid Staff    Orthopedic team ordered lumbar epidural and cervical epidural can you check if there is order for lumbar epidural steroid injection at L2-L3  Patient is asking for back injection but also surgery team wants to do surgery

## (undated) DEVICE — TUBE FRAZIER 5MM 2FT SOFT TIP

## (undated) DEVICE — ELECTRODE REM PLYHSV RETURN 9

## (undated) DEVICE — BUR BONE CUT MICRO TPS 3X3.8MM

## (undated) DEVICE — CLIP HEMO-LOK ML

## (undated) DEVICE — DRESSING TRANS 4X4 TEGADERM

## (undated) DEVICE — MARKER SKIN STND TIP BLUE BARR

## (undated) DEVICE — KIT WING PAD POSITIONING

## (undated) DEVICE — TRAY FOLEY 16FR INFECTION CONT

## (undated) DEVICE — SCREW SYMPHONY PA 3.5X14MM
Type: IMPLANTABLE DEVICE | Site: SPINE CERVICAL | Status: NON-FUNCTIONAL
Removed: 2021-08-26

## (undated) DEVICE — PIN DISTRACTION DISP 14MM
Type: IMPLANTABLE DEVICE | Site: SPINE CERVICAL | Status: NON-FUNCTIONAL
Removed: 2021-03-18

## (undated) DEVICE — DRESSING ABSRBNT ISLAND 3.6X8

## (undated) DEVICE — SUT MONOCRYL 5-0 P-3 UND 18

## (undated) DEVICE — DRAPE C-ARMOR EQUIPMENT COVER

## (undated) DEVICE — SEE MEDLINE ITEM 157131

## (undated) DEVICE — NDL 18GA X1 1/2 REG BEVEL

## (undated) DEVICE — SEE MEDLINE ITEM 146417

## (undated) DEVICE — NDL SPINAL 18GX3.5 SPINOCAN

## (undated) DEVICE — SUT ETHIBOND 0 CR/CT-1 8-18

## (undated) DEVICE — TROCAR ENDOPATH XCEL 11MM 10CM

## (undated) DEVICE — GLOVE SURGICAL LATEX SZ 8

## (undated) DEVICE — SEE MEDLINE ITEM 146292

## (undated) DEVICE — SPONGE GAUZE 16PLY 4X4

## (undated) DEVICE — ADHESIVE DERMABOND ADVANCED

## (undated) DEVICE — SUT 0 VICRYL / UR6 (J603)

## (undated) DEVICE — OBTURATOR BLADELESS 8MM XI CLR

## (undated) DEVICE — GAUZE SPONGE 4X4 12PLY

## (undated) DEVICE — COVER TIP CURVED SCISSORS XI

## (undated) DEVICE — DRAPE ABDOMINAL TIBURON 14X11

## (undated) DEVICE — DRESSING TELFA N ADH 3X8

## (undated) DEVICE — APPLICATOR CHLORAPREP ORN 26ML

## (undated) DEVICE — SUT SILK 3-0 SH 18IN BLACK

## (undated) DEVICE — BAG TISSUE RETRIEVAL 225ML

## (undated) DEVICE — DRAPE C ARM 42 X 120 10/BX

## (undated) DEVICE — BURR RND FLUT SFT TOUCH 2.0MM

## (undated) DEVICE — SUT VICRYL PLUS 2-0 CT1 18

## (undated) DEVICE — DRESSING AQUACEL FOAM 5 X 5

## (undated) DEVICE — SUT VICRYL 3-0 27 SH

## (undated) DEVICE — SEE MEDLINE ITEM 157150

## (undated) DEVICE — SUT SILK 2-0 BLK BR PS-2 18

## (undated) DEVICE — DIFFUSER

## (undated) DEVICE — KIT SURGIFLO HEMOSTATIC MATRIX

## (undated) DEVICE — 3.5 TAP

## (undated) DEVICE — NDL HYPO REG 25G X 1 1/2

## (undated) DEVICE — ELECTRODE BLD 1 INCH TEFLON

## (undated) DEVICE — TIP KERRISON RONGEUR THIN 1MM

## (undated) DEVICE — CLOSURE SKIN 1X5 STERI-STRIP

## (undated) DEVICE — CORD BIPOLAR 12 FOOT

## (undated) DEVICE — ADHESIVE MASTISOL VIAL 48/BX

## (undated) DEVICE — 2.5 DRILL BIT

## (undated) DEVICE — CLOSURE SKIN STERI STRIP 1/2X4

## (undated) DEVICE — SEE MEDLINE ITEM 156905

## (undated) DEVICE — BLADE MILL BONE MEDIUM

## (undated) DEVICE — SEE MEDLINE ITEM 157117

## (undated) DEVICE — SUT 2/0 30IN SILK BLK BRAI

## (undated) DEVICE — GLOVE ULTRATOUCH PLYSPHRN 7.5

## (undated) DEVICE — BLADE ELECTRO EDGE INSULATED

## (undated) DEVICE — DRESSING MEPILEX BORDER 4 X 4

## (undated) DEVICE — SYR 30CC LUER LOCK

## (undated) DEVICE — SYS SEE SHARP SCOPE ANTIFOG

## (undated) DEVICE — NDL 22GA X1 1/2 REG BEVEL

## (undated) DEVICE — DRAPE THYROID WITH ARMBOARD

## (undated) DEVICE — CARTRIDGE OIL

## (undated) DEVICE — SUT MCRYL PLUS 4-0 PS2 27IN

## (undated) DEVICE — DRAPE COLUMN DAVINCI XI

## (undated) DEVICE — DRAPE STERI-DRAPE 1000 17X11IN

## (undated) DEVICE — DRAPE ARM DAVINCI XI

## (undated) DEVICE — SPONGE LAP 4X18 PREWASHED

## (undated) DEVICE — SEAL UNIVERSAL 5MM-8MM XI

## (undated) DEVICE — DRESSING AQUACEL SACRAL 9 X 9

## (undated) DEVICE — TUBE EMG NIM 7.0MM TRIVANTAGE

## (undated) DEVICE — Device

## (undated) DEVICE — GAUZE SPONGE PEANUT STRL

## (undated) DEVICE — DRESSING SURGICAL 1/2X1/2

## (undated) DEVICE — CHLORAPREP 3ML APPLICATOR TINT

## (undated) DEVICE — PAD PINK TRENDELENBURG POS XL

## (undated) DEVICE — COVER MAYO STND XL 30X57IN

## (undated) DEVICE — EVACUATOR WOUND BULB 100CC

## (undated) DEVICE — KIT EVACUATOR 3-SPRING 1/8 DRN

## (undated) DEVICE — DRESSING AQUACEL FOAM 4 X 12

## (undated) DEVICE — DRAIN CHANNEL ROUND 10FR